# Patient Record
Sex: MALE | Race: WHITE | NOT HISPANIC OR LATINO | Employment: OTHER | URBAN - METROPOLITAN AREA
[De-identification: names, ages, dates, MRNs, and addresses within clinical notes are randomized per-mention and may not be internally consistent; named-entity substitution may affect disease eponyms.]

---

## 2017-01-06 ENCOUNTER — TELEPHONE (OUTPATIENT)
Dept: FAMILY MEDICINE | Facility: CLINIC | Age: 62
End: 2017-01-06

## 2017-01-06 NOTE — TELEPHONE ENCOUNTER
Reason for Call:  Same Day Appointment, Requested Provider:  Kris Weems M.D.    PCP: Kris Weems    Reason for visit: Patient has been having some right sided back pain around his kidney area. He is aware that Dr. Weems is not in the office today. He would like to get worked in with him next week.     Duration of symptoms: 1 week    Have you been treated for this in the past? No    Additional comments:     Can we leave a detailed message on this number? YES    Phone number patient can be reached at: Home number on file [phone] (home)    Best Time: any    Call taken on 1/6/2017 at 9:55 AM by Michela Swenson

## 2017-01-06 NOTE — TELEPHONE ENCOUNTER
Damon Manley is a 61 year old male who calls with right sided back pain.    NURSING ASSESSMENT:  Description:  He is reporting PCP had discussed possible kidney stone with patient a long time ago with this same sort of back pain.  He reports he does not have the pain all the time, but when it does come, it is sharp and would rate it at 6/10.  He is reporting his urine is a dark yellow/orange color.  He says he does not drink much water at all.  Mostly Diet Coke.  He is informed he needs to be drinking LOTS of water to see if this improves his symptoms.  He states he had nausea yesterday, but does not today.  He is denying the following:  Weakness in legs, numbness or tingling in legs or groin area, inability to urinate, cool moist skin, blood in urine, fever, vomiting, difficulty walking, pain worsening when laying down.  Patient is informed this will be routed to PCP for possible work-in next week.  Patient understands he will go to the ED if he has any of the discussed symptoms over the weekend.  Onset/duration:  1 week  Precip. factors:  none  Associated symptoms:  See above  Improves/worsens symptoms:  same  Pain scale (0-10)   6/10  LMP/preg/breast feeding:  NA  Last exam/Treatment:  8/25/16  Allergies:   Allergies   Allergen Reactions     No Known Drug Allergies        NURSING PLAN: Routed to provider Yes    RECOMMENDED DISPOSITION:  See in 72 hours   Will comply with recommendation: Yes  If further questions/concerns or if symptoms do not improve, worsen or new symptoms develop, call your PCP or [facility] as soon as possible.      Guideline used:  Back pain  Telephone Triage Protocols for Nurses, Fifth Edition, Kathy Yeung RN

## 2017-01-06 NOTE — TELEPHONE ENCOUNTER
Reason for call:  Patient reporting a symptom    Symptom or request: Patient has been experiencing right sided back pain around his kidney area. He has requested an appt with Dr. Weems for next week. Wondering if he needs to come in sooner.     Duration (how long have symptoms been present): 1 week.     Have you been treated for this before? No    Additional comments:     Phone Number patient can be reached at:  Home number on file [phone] (home)    Best Time:  any    Can we leave a detailed message on this number:  YES    Call taken on 1/6/2017 at 9:57 AM by Michela Swenson

## 2017-01-09 NOTE — TELEPHONE ENCOUNTER
I have attempted to contact this patient by phone with the following results: left message to return my call on answering machine.  Please have pt come at 4:10 today if he wishes.   Lori Pond, [facility]

## 2017-01-10 ENCOUNTER — OFFICE VISIT (OUTPATIENT)
Dept: FAMILY MEDICINE | Facility: CLINIC | Age: 62
End: 2017-01-10
Payer: COMMERCIAL

## 2017-01-10 VITALS
RESPIRATION RATE: 18 BRPM | BODY MASS INDEX: 41.52 KG/M2 | DIASTOLIC BLOOD PRESSURE: 86 MMHG | TEMPERATURE: 97.5 F | WEIGHT: 290 LBS | HEART RATE: 78 BPM | HEIGHT: 70 IN | SYSTOLIC BLOOD PRESSURE: 132 MMHG | OXYGEN SATURATION: 97 %

## 2017-01-10 DIAGNOSIS — Z23 NEED FOR PROPHYLACTIC VACCINATION AND INOCULATION AGAINST INFLUENZA: Primary | ICD-10-CM

## 2017-01-10 DIAGNOSIS — Z12.11 SPECIAL SCREENING FOR MALIGNANT NEOPLASMS, COLON: ICD-10-CM

## 2017-01-10 DIAGNOSIS — Z12.11 ENCOUNTER FOR SCREENING COLONOSCOPY: Primary | ICD-10-CM

## 2017-01-10 DIAGNOSIS — M62.830 BACK MUSCLE SPASM: ICD-10-CM

## 2017-01-10 PROCEDURE — 90686 IIV4 VACC NO PRSV 0.5 ML IM: CPT | Performed by: FAMILY MEDICINE

## 2017-01-10 PROCEDURE — 90471 IMMUNIZATION ADMIN: CPT | Performed by: FAMILY MEDICINE

## 2017-01-10 PROCEDURE — 99213 OFFICE O/P EST LOW 20 MIN: CPT | Mod: 25 | Performed by: FAMILY MEDICINE

## 2017-01-10 RX ORDER — CYCLOBENZAPRINE HCL 10 MG
10 TABLET ORAL 3 TIMES DAILY PRN
Qty: 60 TABLET | Refills: 1 | Status: SHIPPED | OUTPATIENT
Start: 2017-01-10 | End: 2017-09-28

## 2017-01-10 ASSESSMENT — PAIN SCALES - GENERAL: PAINLEVEL: MODERATE PAIN (4)

## 2017-01-10 NOTE — NURSING NOTE
"Chief Complaint   Patient presents with     Back Pain     x2w Pain can get real severe. He was working on a cement floor and it started bothering him after that. Low rt side, radiating to the center of back at times       Initial /86 mmHg  Pulse 78  Temp(Src) 97.5  F (36.4  C) (Tympanic)  Resp 18  Ht 5' 10.1\" (1.781 m)  Wt 290 lb (131.543 kg)  BMI 41.47 kg/m2  SpO2 97% Estimated body mass index is 41.47 kg/(m^2) as calculated from the following:    Height as of this encounter: 5' 10.1\" (1.781 m).    Weight as of this encounter: 290 lb (131.543 kg).  BP completed using cuff size: large  Health Maintenance Due   Topic Date Due     INFLUENZA VACCINE (SYSTEM ASSIGNED)  09/01/2016     Lori Pond, [facility]      "

## 2017-01-10 NOTE — PROGRESS NOTES
SUBJECTIVE:  Damon Manley is here today with complaints of right-sided back pain.  He was working on an [employer] last week, was on the cold floor, does not remember lifting or twisting, but developed right-sided back pain just underneath the right kidney.  States it stayed in this area.  It did not radiate into his buttocks or down his leg.  He actually got better for a few days and then last evening started to bother him again.  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  He was able make it through his shift.  He comes in this morning for evaluation.  No significant radicular pain at this time either.  Just states that it is just painful on the right side.      OBJECTIVE:  Patient has tenderness to palpation in the paraspinal musculature in the lower thoracic and upper lumbar region on the right.  The patient has forward flexion without difficulty.  Extension causes significant discomfort in this region.  He is able trunk twist to the right and left without pain.  Gait is normal muscle strength in the lower extremities is normal.      ASSESSMENT:   1.  Back muscle spasm.   2.  Need for a flu vaccination.   3.  Need for screening colonoscopy.      PLAN:  The patient did tell me that he is overdue for his colonoscopy.  He did have a history of adenomatous polyps back in 2007, but failed to do his followups.  States he got numerous letters but was always too busy to get the test done.  Now he has noticed some blood in his stool, it is only when wiping, it is bright red blood.  It is usually after a hard stool and his wife about wanted to make sure he got in and got it taken care of.  We will get that scheduled for him today also.  I will start Flexeril 10 mg t.i.d. p.r.n. for his back muscle spasm.  We will get him in to see Shyam Fields for further evaluation and treatment plan as per Dr. Fields.  I do not feel he has any thoracic or lumbar disk disease at this point in time, if he has  persistent symptoms imaging may be indicated.         DANIEL RODRIGUEZ MD             D: 01/10/2017 08:23   T: 01/10/2017 09:02   MT: ANNE[facility]MARIE#[id number]      Name:     AIXA BECKETT   MRN:      [id number]        Account:      [id number]   :      1955           Visit Date:   01/10/2017      Document: [id number]

## 2017-01-10 NOTE — PROGRESS NOTES
Injectable Influenza Immunization Documentation    1.  Is the person to be vaccinated sick today?  No    2. Does the person to be vaccinated have an allergy to eggs or to a component of the vaccine?  No    3. Has the person to be vaccinated today ever had a serious reaction to influenza vaccine in the past?  No    4. Has the person to be vaccinated ever had Guillain-Fraser syndrome?  No     Form completed by Lori Pond, [facility]

## 2017-01-10 NOTE — NURSING NOTE
Prior to injection verified patient identity using patient's name and date of birth.   Patient instructed to remain in clinic for 20 minutes afterwards and to report any adverse reaction to me immediately.  Lori Pond, [facility]

## 2017-01-10 NOTE — MR AVS SNAPSHOT
After Visit Summary   1/10/2017    Damon Manley    MRN: [id number]           Patient Information     Date Of Birth          1955        Visit Information        Provider Department      1/10/2017 7:40 AM Kris Weems MD [facility]        Today's Diagnoses     Need for prophylactic vaccination and inoculation against influenza    -  1     Back muscle spasm         Special screening for malignant neoplasms, colon            Follow-ups after your visit        Additional Services     GASTROENTEROLOGY ADULT REFERRAL +/- PROCEDURE       Your provider has referred you to Gastroenterology Services.    English    Procedure/Referral: PROCEDURE ONLY - COLONOSCOPY - Reason for procedure: Screening  FMG: [facility] [phone]   [email or web address]  Colonoscopy Questions - [phone] (home)     PROCEDURE: Colonoscopy  Patient able to sign consent: Yes     -- No Known Drug Allergies     COMPLETE FOR ALL PATIENTS:  Has the patient ever had problems with sedation when they had a previous Colonoscopy or Endoscopy? No    Does the patient have (or previously had) an Artificial Heart Valve, Indwelling Vascular Device, such as a Reeder Catheter, Dialysis Shunt, or Fistula? No  (If yes, order Prophylactic Antibiotic - recommendations in NL GI ORDERS AND PATIENT INSTRUCTIONS SmartSet)    Does the patient have (or previously had) Bacterial Endocarditis? No   (If yes, order Prophylactic Antibiotic - recommendations in NL GI ORDERS AND PATIENT INSTRUCTIONS SmartSet)    Antibiotics needed? NO  (If yes, order Prophylactic Antibiotic - recommendations in NL GI ORDERS AND PATIENT INSTRUCTIONS SmartSet)    Does the patient have CHF or Renal Insufficiency? NO  (If yes, patient needs to use the GoLytely/ NuLytely/ CoLyte prep)    Does the patient have an Internal Defibrillator? NO  (If yes, contact [facility] for scheduling at [phone])    PATIENT  INFORMATION REQUIRED PRE-TESTING/PROCEDURE FOR ALL PATIENTS  Is the patient on an Iron Supplement? No  Is the patient a Diabetic? No  Is the patient on Blood Thinners? No  Has the patient been instructed to hold all morning medications until after the procedure? No  Other Instructions:     If the patient has a Defibrillator and is scheduled with [facility], please fax the referral to [phone].    Date of Procedure: Unknown  Primary Provider: Kris Weems   Ordering Provider: Kris Weems    Please be aware that coverage of these services is subject to the terms and limitations of your health insurance plan.  Call member services at your health plan with any benefit or coverage questions.  Any procedures must be performed at a [facility] facility OR coordinated by your clinic's referral office.    Please bring the following with you to your appointment:    (1) Any X-Rays, CTs or MRIs which have been performed.  Contact the facility where they were done to arrange for  prior to your scheduled appointment.    (2) List of current medications   (3) This referral request   (4) Any documents/labs given to you for this referral                  Who to contact     If you have questions or need follow up information about today's clinic visit or your schedule please contact [facility] directly at [phone].  Normal or non-critical lab and imaging results will be communicated to you by MyChart, letter or phone within 4 business days after the clinic has received the results. If you do not hear from us within 7 days, please contact the clinic through MyChart or phone. If you have a critical or abnormal lab result, we will notify you by phone as soon as possible.  Submit refill requests through [employer] or call your pharmacy and they will forward the refill request to us. Please allow 3 business days for your refill to be completed.          Additional Information About Your Visit        [facility]  "Information     Mediamind lets you send messages to your doctor, view your test results, renew your prescriptions, schedule appointments and more. To sign up, go to [email or web address] . Click on \"Log in\" on the left side of the screen, which will take you to the Welcome page. Then click on \"Sign up Now\" on the right side of the page.     You will be asked to enter the access code listed below, as well as some personal information. Please follow the directions to create your username and password.     Your access code is: [email or web address]  Expires: 4/10/2017  3:04 PM     Your access code will  in 90 days. If you need help or a new code, please call your [place] clinic or [phone].        Care EveryWhere ID     This is your Care EveryWhere ID. This could be used by other organizations to access your [place] medical records  [id number]        Your Vitals Were     Pulse Temperature Respirations Height BMI (Body Mass Index) Pulse Oximetry    78 97.5  F (36.4  C) (Tympanic) 18 5' 10.1\" (1.781 m) 41.47 kg/m2 97%       Blood Pressure from Last 3 Encounters:   01/10/17 132/86   16 120/78   16 130/86    Weight from Last 3 Encounters:   01/10/17 290 lb (131.543 kg)   16 291 lb 4 oz (132.11 kg)   16 288 lb (130.636 kg)              We Performed the Following     FLU VAC, SPLIT VIRUS IM > 3 YO (QUADRIVALENT) [06260]     GASTROENTEROLOGY ADULT REFERRAL +/- PROCEDURE     Vaccine Administration, Initial [56490]          Today's Medication Changes          These changes are accurate as of: 1/10/17  3:04 PM.  If you have any questions, ask your nurse or doctor.               Start taking these medicines.        Dose/Directions    cyclobenzaprine 10 MG tablet   Commonly known as:  FLEXERIL   Used for:  Back muscle spasm   Started by:  Kris Weems MD        Dose:  10 mg   Take 1 tablet (10 mg) by mouth 3 times daily as needed for muscle spasms   Quantity:  60 tablet   Refills:  1          "   Where to get your medicines      These medications were sent to [facility] - [place], [place] - [address]  [address], [place] [address]     Phone:  [phone]    - cyclobenzaprine 10 MG tablet             Primary Care Provider Office Phone # Fax #    Kris Weems [phone]       [facility] [address] [facility] [address]        Thank you!     Thank you for choosing [facility]  for your care. Our goal is always to provide you with excellent care. Hearing back from our patients is one way we can continue to improve our services. Please take a few minutes to complete the written survey that you may receive in the mail after your visit with us. Thank you!             Your Updated Medication List - Protect others around you: Learn how to safely use, store and throw away your medicines at www.disposemymeds.org.          This list is accurate as of: 1/10/17  3:04 PM.  Always use your most recent med list.                   Brand Name Dispense Instructions for use    ampicillin 500 MG capsule    PRINCIPEN    4 capsule    4 capsules 1 hour prior to dental work       aspirin 81 MG tablet      Take 1 tablet (81 mg) by mouth daily       cyclobenzaprine 10 MG tablet    FLEXERIL    60 tablet    Take 1 tablet (10 mg) by mouth 3 times daily as needed for muscle spasms       ibuprofen 200 MG tablet   Generic drug:  ibuprofen      Take 400 mg by mouth every 4 hours as needed for mild pain       losartan 100 MG tablet    COZAAR    90 tablet    Take 1 tablet (100 mg) by mouth daily       metoprolol 100 MG 24 hr tablet    TOPROL-XL    30 tablet    Take 1 tablet (100mg) by mouth daily

## 2017-01-13 ENCOUNTER — SURGERY (OUTPATIENT)
Age: 62
End: 2017-01-13

## 2017-01-13 ENCOUNTER — HOSPITAL ENCOUNTER (OUTPATIENT)
Facility: CLINIC | Age: 62
Discharge: HOME | End: 2017-01-13
Attending: INTERNAL MEDICINE | Admitting: INTERNAL MEDICINE
Payer: COMMERCIAL

## 2017-01-13 VITALS
OXYGEN SATURATION: 94 % | SYSTOLIC BLOOD PRESSURE: 129 MMHG | DIASTOLIC BLOOD PRESSURE: 96 MMHG | RESPIRATION RATE: 18 BRPM | BODY MASS INDEX: 41.47 KG/M2 | WEIGHT: 290 LBS | TEMPERATURE: 97.9 F

## 2017-01-13 LAB — COLONOSCOPY: NORMAL

## 2017-01-13 PROCEDURE — 25000125 ZZHC RX 250: Performed by: INTERNAL MEDICINE

## 2017-01-13 PROCEDURE — 45385 COLONOSCOPY W/LESION REMOVAL: CPT | Performed by: INTERNAL MEDICINE

## 2017-01-13 PROCEDURE — 88305 TISSUE EXAM BY PATHOLOGIST: CPT | Performed by: INTERNAL MEDICINE

## 2017-01-13 PROCEDURE — 40000296 ZZH STATISTIC ENDO RECOVERY CLASS 1:2 FIRST HOUR: Performed by: INTERNAL MEDICINE

## 2017-01-13 PROCEDURE — 45380 COLONOSCOPY AND BIOPSY: CPT | Performed by: INTERNAL MEDICINE

## 2017-01-13 PROCEDURE — 88305 TISSUE EXAM BY PATHOLOGIST: CPT | Mod: 26 | Performed by: INTERNAL MEDICINE

## 2017-01-13 RX ORDER — ONDANSETRON 2 MG/ML
4 INJECTION INTRAMUSCULAR; INTRAVENOUS
Status: DISCONTINUED | OUTPATIENT
Start: 2017-01-13 | End: 2017-01-13 | Stop reason: HOSPADM

## 2017-01-13 RX ORDER — FENTANYL CITRATE 50 UG/ML
INJECTION, SOLUTION INTRAMUSCULAR; INTRAVENOUS PRN
Status: DISCONTINUED | OUTPATIENT
Start: 2017-01-13 | End: 2017-01-13 | Stop reason: HOSPADM

## 2017-01-13 RX ORDER — LIDOCAINE 40 MG/G
CREAM TOPICAL
Status: DISCONTINUED | OUTPATIENT
Start: 2017-01-13 | End: 2017-01-13 | Stop reason: HOSPADM

## 2017-01-13 RX ADMIN — MIDAZOLAM HYDROCHLORIDE 1 MG: 1 INJECTION, SOLUTION INTRAMUSCULAR; INTRAVENOUS at 11:51

## 2017-01-13 RX ADMIN — MIDAZOLAM HYDROCHLORIDE 1 MG: 1 INJECTION, SOLUTION INTRAMUSCULAR; INTRAVENOUS at 11:21

## 2017-01-13 RX ADMIN — FENTANYL CITRATE 50 MCG: 50 INJECTION, SOLUTION INTRAMUSCULAR; INTRAVENOUS at 11:21

## 2017-01-13 RX ADMIN — MIDAZOLAM HYDROCHLORIDE 1 MG: 1 INJECTION, SOLUTION INTRAMUSCULAR; INTRAVENOUS at 11:23

## 2017-01-13 RX ADMIN — MIDAZOLAM HYDROCHLORIDE 1 MG: 1 INJECTION, SOLUTION INTRAMUSCULAR; INTRAVENOUS at 11:35

## 2017-01-13 RX ADMIN — MIDAZOLAM HYDROCHLORIDE 1 MG: 1 INJECTION, SOLUTION INTRAMUSCULAR; INTRAVENOUS at 11:22

## 2017-01-13 NOTE — CONSULTS
[facility] GI Pre-Procedure Physical Assessment    Damon Manley MRN# [id number]   Age: 61 year old YOB: 1955      Date of Surgery: 1/13/2017  Location [facility]      Date of Exam 1/13/2017 Facility (Same day)       Home clinic: [facility]  Primary care provider: Kris Weems         Active problem list:   Patient Active Problem List   Diagnosis     GERD (gastroesophageal reflux disease)     CARDIOVASCULAR SCREENING; LDL GOAL LESS THAN 130     Abnormal glucose     Advanced directives, counseling/discussion     Essential hypertension with goal blood pressure less than 140/90            Medications (include herbals and vitamins):   Any Plavix use in the last 7 days?  No     Current Facility-Administered Medications   Medication     lidocaine 1 % 1 mL     lidocaine (LMX4) kit     sodium chloride (PF) 0.9% PF flush 3 mL     sodium chloride (PF) 0.9% PF flush 3 mL     sodium chloride (PF) 0.9% PF flush 3 mL     ondansetron (ZOFRAN) injection 4 mg             Allergies:      Allergies   Allergen Reactions     No Known Drug Allergies      Allergy to Latex?  No  Allergy to tape?    No          Social History:     Social History   Substance Use Topics     Smoking status: Former Smoker -- 15 years     Quit date: 04/01/1988     Smokeless tobacco: Never Used     Alcohol Use: 0.0 oz/week     0 Standard drinks or equivalent per week      Comment: social - glass of wine socially            Physical Exam:   All vitals have been reviewed  Blood pressure 127/89, temperature 97.9  F (36.6  C), temperature source Oral, resp. rate 16, weight 290 lb (131.543 kg), SpO2 99 %.  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 - no symptoms, no anticoag           Lab / Radiology Results:   All laboratory data reviewed          Assessment:   Appropriately NPO  Chief complaint or anatomic assessment of involved area: colonoscopy history of polyps         Plan:   Moderate (conscious) sedation     Patient's active problems diagnostically and therapeutically optimized for the planned procedure  Risks, benefits, alternatives to sedation and blood explained and consent obtained  Risks, benefits, alternatives to procedure explained and consent obtained  P2 (patient with mild systemic disease)  Orders and progress notes are in the chart  Discharge from Phase 1 and / or Phase 2 recovery when patient meets criteria    I have reviewed the history and physical, lab finding(s), diagnostic data, medicaitons, and the plan for sedation.  I have determined this patient to be an appropriate candidate for the planned sedation / procedure and have reassessed the patient immediately prior to sedation / procedure.    I have personally and medically directed the administration of medications used.    Dejan Mckee MD

## 2017-01-17 LAB — COPATH REPORT: NORMAL

## 2017-06-14 ENCOUNTER — TELEPHONE (OUTPATIENT)
Dept: FAMILY MEDICINE | Facility: CLINIC | Age: 62
End: 2017-06-14

## 2017-06-14 DIAGNOSIS — Z79.2 PROPHYLACTIC ANTIBIOTIC: Primary | ICD-10-CM

## 2017-06-14 NOTE — TELEPHONE ENCOUNTER
Reason for Call:  Other prescription    Detailed comments: Pt requesting Rx for Antibiotic for his upcoming dental appt on 06/16 @8:30am - [facility] [place]    Phone Number Patient can be reached at: Home number on file [phone] (home)  Try cell number also    Best Time: any    Can we leave a detailed message on this number? YES    Call taken on 6/14/2017 at 4:56 PM by Sofy Friend

## 2017-06-14 NOTE — TELEPHONE ENCOUNTER
Called Damon and told him that RF was out until morning and I will get back to him.   He is fine with this.  Lori Pond, [facility]

## 2017-06-15 RX ORDER — AMPICILLIN TRIHYDRATE 500 MG
500 CAPSULE ORAL 4 TIMES DAILY
Qty: 4 CAPSULE | Refills: 0 | Status: SHIPPED | OUTPATIENT
Start: 2017-06-15 | End: 2017-09-28

## 2017-06-15 NOTE — TELEPHONE ENCOUNTER
Patient calling back, gave message below, patient states that he already got the message, but stated that someone from Dr. Weems's office called him today, please call patient back if there is a new message

## 2017-06-15 NOTE — TELEPHONE ENCOUNTER
I have attempted to contact this patient by phone with the following results: left message to return my call on answering machine.    Please inform him that Weems filled antibiotic for him and it will be sent to [facility] in [place].  Lori Pond, [facility]

## 2017-08-07 DIAGNOSIS — I10 HYPERTENSION GOAL BP (BLOOD PRESSURE) < 140/90: ICD-10-CM

## 2017-08-07 DIAGNOSIS — I10 ESSENTIAL HYPERTENSION WITH GOAL BLOOD PRESSURE LESS THAN 140/90: ICD-10-CM

## 2017-08-07 NOTE — TELEPHONE ENCOUNTER
metoprolol      Last Written Prescription Date: 07/12/16  Last Fill Quantity: 30, # refills: 11    Last Office Visit with [facility], [facility] or [facility] prescribing provider:  01/10/17   Future Office Visit:        BP Readings from Last 3 Encounters:   01/13/17 (!) 129/96   01/10/17 132/86   08/25/16 120/78   \  losartan      Last Written Prescription Date: 05/03/16  Last Fill Quantity: 90, # refills: 3  Last Office Visit with [facility], [facility] or [facility] prescribing provider: 01/10/17       Potassium   Date Value Ref Range Status   05/03/2016 4.2 3.4 - 5.3 mmol/L Final     Creatinine   Date Value Ref Range Status   05/03/2016 0.92 0.66 - 1.25 mg/dL Final     BP Readings from Last 3 Encounters:   01/13/17 (!) 129/96   01/10/17 132/86   08/25/16 120/78

## 2017-08-07 NOTE — TELEPHONE ENCOUNTER
[place] metoprol    [facility] - pharmacy at [facility]    Reason for Call:  Other prescription    Detailed comments: patient states his wife works at [facility] and she is there and states they do not have his prescriptions.  I did explain that they need to request these 3 business days prior. He states that they did.  I am not showing any requests.  He is wanting to get his Losartan and Metoprolol filled as soon as possible.      Phone Number Patient can be reached at: Cell number on file:    Telephone Information:   Mobile [phone]       Best Time: any    Can we leave a detailed message on this number? YES    Call taken on 8/7/2017 at 3:24 PM by Car Enamorado

## 2017-08-09 RX ORDER — METOPROLOL SUCCINATE 100 MG/1
TABLET, EXTENDED RELEASE ORAL
Qty: 30 TABLET | Refills: 2 | Status: SHIPPED | OUTPATIENT
Start: 2017-08-09 | End: 2018-05-08

## 2017-08-09 RX ORDER — LOSARTAN POTASSIUM 100 MG/1
100 TABLET ORAL DAILY
Qty: 90 TABLET | Refills: 3 | Status: SHIPPED | OUTPATIENT
Start: 2017-08-09 | End: 2018-08-30

## 2017-08-09 NOTE — TELEPHONE ENCOUNTER
Losartan Routing refill request to provider for review/approval because:  Labs not current:  Potassium and Creatinine.  A break in medication.    Metoprolol Prescription approved per G Refill Protocol.    Elizabeth Robles RN

## 2017-09-28 ENCOUNTER — OFFICE VISIT (OUTPATIENT)
Dept: FAMILY MEDICINE | Facility: CLINIC | Age: 62
End: 2017-09-28
Payer: COMMERCIAL

## 2017-09-28 ENCOUNTER — HOSPITAL ENCOUNTER (OUTPATIENT)
Dept: CARDIOLOGY | Facility: CLINIC | Age: 62
Discharge: HOME | End: 2017-09-28
Attending: FAMILY MEDICINE | Admitting: FAMILY MEDICINE
Payer: COMMERCIAL

## 2017-09-28 VITALS
HEART RATE: 74 BPM | RESPIRATION RATE: 16 BRPM | HEIGHT: 70 IN | BODY MASS INDEX: 40.66 KG/M2 | SYSTOLIC BLOOD PRESSURE: 122 MMHG | WEIGHT: 284 LBS | DIASTOLIC BLOOD PRESSURE: 78 MMHG | TEMPERATURE: 97.8 F

## 2017-09-28 DIAGNOSIS — Z95.2 AORTIC VALVE REPLACED: ICD-10-CM

## 2017-09-28 DIAGNOSIS — Z95.2 PULMONARY VALVE REPLACED: ICD-10-CM

## 2017-09-28 DIAGNOSIS — R06.02 SOB (SHORTNESS OF BREATH): ICD-10-CM

## 2017-09-28 DIAGNOSIS — Z00.00 ROUTINE GENERAL MEDICAL EXAMINATION AT A HEALTH CARE FACILITY: ICD-10-CM

## 2017-09-28 DIAGNOSIS — Z13.6 CARDIOVASCULAR SCREENING; LDL GOAL LESS THAN 130: Primary | ICD-10-CM

## 2017-09-28 DIAGNOSIS — I10 ESSENTIAL HYPERTENSION WITH GOAL BLOOD PRESSURE LESS THAN 140/90: ICD-10-CM

## 2017-09-28 DIAGNOSIS — R79.89 ELEVATED LFTS: ICD-10-CM

## 2017-09-28 DIAGNOSIS — R06.09 DOE (DYSPNEA ON EXERTION): ICD-10-CM

## 2017-09-28 LAB
ALBUMIN SERPL-MCNC: 3.8 G/DL (ref 3.4–5)
ALP SERPL-CCNC: 80 U/L (ref 40–150)
ALT SERPL W P-5'-P-CCNC: 53 U/L (ref 0–70)
AST SERPL W P-5'-P-CCNC: 34 U/L (ref 0–45)
BILIRUB DIRECT SERPL-MCNC: 0.1 MG/DL (ref 0–0.2)
BILIRUB SERPL-MCNC: 0.6 MG/DL (ref 0.2–1.3)
CHOLEST SERPL-MCNC: 192 MG/DL
GLUCOSE SERPL-MCNC: 141 MG/DL (ref 70–99)
HDLC SERPL-MCNC: 45 MG/DL
LDLC SERPL CALC-MCNC: 116 MG/DL
NONHDLC SERPL-MCNC: 147 MG/DL
PROT SERPL-MCNC: 8 G/DL (ref 6.8–8.8)
TRIGL SERPL-MCNC: 157 MG/DL

## 2017-09-28 PROCEDURE — 40000264 ECHO COMPLETE WITH OPTISON

## 2017-09-28 PROCEDURE — 93306 TTE W/DOPPLER COMPLETE: CPT | Mod: 26 | Performed by: INTERNAL MEDICINE

## 2017-09-28 PROCEDURE — 36415 COLL VENOUS BLD VENIPUNCTURE: CPT | Performed by: FAMILY MEDICINE

## 2017-09-28 PROCEDURE — 99213 OFFICE O/P EST LOW 20 MIN: CPT | Mod: 25 | Performed by: FAMILY MEDICINE

## 2017-09-28 PROCEDURE — 99396 PREV VISIT EST AGE 40-64: CPT | Performed by: FAMILY MEDICINE

## 2017-09-28 PROCEDURE — 80061 LIPID PANEL: CPT | Performed by: FAMILY MEDICINE

## 2017-09-28 PROCEDURE — 80076 HEPATIC FUNCTION PANEL: CPT | Performed by: FAMILY MEDICINE

## 2017-09-28 PROCEDURE — 25500064 ZZH RX 255 OP 636: Performed by: INTERNAL MEDICINE

## 2017-09-28 PROCEDURE — 82947 ASSAY GLUCOSE BLOOD QUANT: CPT | Performed by: FAMILY MEDICINE

## 2017-09-28 RX ADMIN — HUMAN ALBUMIN MICROSPHERES AND PERFLUTREN 4 ML: 10; .22 INJECTION, SOLUTION INTRAVENOUS at 10:50

## 2017-09-28 ASSESSMENT — PAIN SCALES - GENERAL: PAINLEVEL: NO PAIN (0)

## 2017-09-28 NOTE — PROGRESS NOTES
SUBJECTIVE:   CC: Damon Manley is an 62 year old male who presents for preventative health visit.     Physical   Annual:     Getting at least 3 servings of Calcium per day::  NO    Bi-annual eye exam::  Yes    Dental care twice a year::  Yes    Sleep apnea or symptoms of sleep apnea::  None    Diet::  Regular (no restrictions)    Frequency of exercise::  1 day/week    Duration of exercise::  15-30 minutes    Taking medications regularly::  Yes    Medication side effects::  None    Additional concerns today::  YES            Patient also wants to discuss Right foot pain    Today's PHQ-2 Score: PHQ-2 ( 1999 Pf[employer]) 9/28/2017   Q1: Little interest or pleasure in doing things 0   Q2: Feeling down, depressed or hopeless 0   PHQ-2 Score 0   Q1: Little interest or pleasure in doing things Not at all   Q2: Feeling down, depressed or hopeless Not at all   PHQ-2 Score 0       Abuse: Current or Past(Physical, Sexual or Emotional)- No  Do you feel safe in your environment - Yes    Social History   Substance Use Topics     Smoking status: Former Smoker     Years: 15.00     Quit date: 4/1/1988     Smokeless tobacco: Never Used     Alcohol use 0.0 oz/week     0 Standard drinks or equivalent per week      Comment: social - glass of wine socially     The patient does not drink >3 drinks per day nor >7 drinks per week.    Last PSA:   PSA   Date Value Ref Range Status   08/26/2015 0.48 0 - 4 ug/L Final       Reviewed orders with patient. Reviewed health maintenance and updated orders accordingly - Yes  BP Readings from Last 3 Encounters:   09/28/17 122/78   01/13/17 (!) 129/96   01/10/17 132/86    Wt Readings from Last 3 Encounters:   09/28/17 284 lb (128.8 kg)   01/13/17 290 lb (131.5 kg)   01/10/17 290 lb (131.5 kg)                  Current Outpatient Prescriptions   Medication Sig Dispense Refill     metoprolol (TOPROL-XL) 100 MG 24 hr tablet Take 1 tablet (100mg) by mouth daily 30 tablet 2     losartan (COZAAR) 100 MG tablet  "Take 1 tablet (100 mg) by mouth daily 90 tablet 3     aspirin 81 MG tablet Take 1 tablet (81 mg) by mouth daily       ampicillin (PRINCIPEN) 500 MG capsule 4 capsules 1 hour prior to dental work 4 capsule 2         Reviewed and updated as needed this visit by clinical staffTobacco  Allergies  Meds         Reviewed and updated as needed this visit by Provider              ROS:  C: NEGATIVE for fever, chills, change in weight  I: NEGATIVE for worrisome rashes, moles or lesions  E: NEGATIVE for vision changes or irritation  ENT: NEGATIVE for ear, mouth and throat problems  RESP:NEGATIVE for significant cough or SOB, dyspnea on exertion and SOB/dyspnea started three months ago and has gotten worse, has not seen his cardiologist in some time, lost to follow up   CV: NEGATIVE for chest pain, palpitations or peripheral edema  GI: NEGATIVE for nausea, abdominal pain, heartburn, or change in bowel habits   male: negative for dysuria, hematuria, decreased urinary stream, erectile dysfunction, urethral discharge  M: NEGATIVE for significant arthralgias or myalgia  N: NEGATIVE for weakness, dizziness or paresthesias  P: NEGATIVE for changes in mood or affect    OBJECTIVE:   /78  Pulse 74  Temp 97.8  F (36.6  C) (Tympanic)  Resp 16  Ht 5' 10.1\" (1.781 m)  Wt 284 lb (128.8 kg)  BMI 40.63 kg/m2    EXAM:  GENERAL: healthy, alert and no distress  NECK: no adenopathy, no asymmetry, masses, or scars and thyroid normal to palpation  RESP: lungs clear to auscultation - no rales, rhonchi or wheezes  CV: regular rate and rhythm, normal S1 S2, no S3 or S4, no murmur, click or rub, no peripheral edema and peripheral pulses strong  ABDOMEN: soft, nontender, no hepatosplenomegaly, no masses and bowel sounds normal  MS: no gross musculoskeletal defects noted, no edema      Results for orders placed or performed in visit on 09/28/17 (from the past 24 hour(s))   Glucose   Result Value Ref Range    Glucose 141 (H) 70 - 99 mg/dL " "  Hepatic panel (Albumin, ALT, AST, Bili, Alk Phos, TP)   Result Value Ref Range    Bilirubin Direct 0.1 0.0 - 0.2 mg/dL    Bilirubin Total 0.6 0.2 - 1.3 mg/dL    Albumin 3.8 3.4 - 5.0 g/dL    Protein Total 8.0 6.8 - 8.8 g/dL    Alkaline Phosphatase 80 40 - 150 U/L    ALT 53 0 - 70 U/L    AST 34 0 - 45 U/L   Lipid panel reflex to direct LDL   Result Value Ref Range    Cholesterol 192 <200 mg/dL    Triglycerides 157 (H) <150 mg/dL    HDL Cholesterol 45 >39 mg/dL    LDL Cholesterol Calculated 116 (H) <100 mg/dL    Non HDL Cholesterol 147 (H) <130 mg/dL       ASSESSMENT/PLAN:   1. CARDIOVASCULAR SCREENING; LDL GOAL LESS THAN 130      2. ARIAS (dyspnea on exertion)    - Echocardiogram Complete; Future  - CARDIOLOGY EVAL ADULT REFERRAL  Concerned about valve issues vs CHF form valve concerns     3. SOB (shortness of breath)    - Echocardiogram Complete; Future  - CARDIOLOGY EVAL ADULT REFERRAL    4. Pulmonary valve replaced    - Echocardiogram Complete; Future  - CARDIOLOGY EVAL ADULT REFERRAL    5. Aortic valve replaced    - Echocardiogram Complete; Future  - CARDIOLOGY EVAL ADULT REFERRAL    6. Essential hypertension with goal blood pressure less than 140/90  Stable     7. Elevated LFTs      8. Routine general medical examination at a health care facility    - Glucose  Elevated , weight loss , exercise and recheck in 3 months   COUNSELING:   Reviewed preventive health counseling, as reflected in patient instructions       Regular exercise       Healthy diet/nutrition         reports that he quit smoking about 29 years ago. He quit after 15.00 years of use. He has never used smokeless tobacco.      Estimated body mass index is 40.63 kg/(m^2) as calculated from the following:    Height as of this encounter: 5' 10.1\" (1.781 m).    Weight as of this encounter: 284 lb (128.8 kg).   Weight management plan: Discussed healthy diet and exercise guidelines and patient will follow up in 12 months in clinic to " re-evaluate.    Counseling Resources:  ATP IV Guidelines  Pooled Cohorts Equation Calculator  FRAX Risk Assessment  ICSI Preventive Guidelines  Dietary Guidelines for Americans, 2010  USDA's MyPlate  ASA Prophylaxis  Lung CA Screening    Kris Weems MD  [facility]

## 2017-09-28 NOTE — PATIENT INSTRUCTIONS
Preventive Health Recommendations  Male Ages 50   64    Yearly exam:             See your health care provider every year in order to  o   Review health changes.   o   Discuss preventive care.    o   Review your medicines if your doctor has prescribed any.     Have a cholesterol test every 5 years, or more frequently if you are at risk for high cholesterol/heart disease.     Have a diabetes test (fasting glucose) every three years. If you are at risk for diabetes, you should have this test more often.     Have a colonoscopy at age 50, or have a yearly FIT test (stool test). These exams will check for colon cancer.      Talk with your health care provider about whether or not a prostate cancer screening test (PSA) is right for you.    You should be tested each year for STDs (sexually transmitted diseases), if you re at risk.     Shots: Get a flu shot each year. Get a tetanus shot every 10 years.     Nutrition:    Eat at least 5 servings of fruits and vegetables daily.     Eat whole-grain bread, whole-wheat pasta and brown rice instead of white grains and rice.     Talk to your provider about Calcium and Vitamin D.     Lifestyle    Exercise for at least 150 minutes a week (30 minutes a day, 5 days a week). This will help you control your weight and prevent disease.     Limit alcohol to one drink per day.     No smoking.     Wear sunscreen to prevent skin cancer.     See your dentist every six months for an exam and cleaning.     See your eye doctor every 1 to 2 years.    Ankle Dorsiflexion/Plantarflexion (Flexibility)    1. Sit on the floor or in bed with your legs straight in front of you.  2. Point both feet. Then flex both feet.  3. Do this 10 to 30 times in a row.  4. Repeat this exercise 2 times a day, or as instructed.  Date Last Reviewed: 5/1/2016 2000-2017 [employer] [address] [place] [address], [place] [address]. All rights reserved. This information is not intended as a substitute for  professional medical care. Always follow your healthcare professional's instructions.        Toe Extension (Flexibility)    These instructions are for your right foot. Switch sides for your left foot.  5. Sit in a chair. Rest your right ankle on your left knee.  6. Hold your toes with your right hand. Gently bend the toes backward. Feel a stretch in the undersides of the toes and ball of the foot. Hold for 30 to 60 seconds.  7. Then gently bend the toes in the other direction. Gently press on them until your foot is pointed. Hold for 30 to 60 seconds.  8. Repeat 5 times, or as instructed.  Date Last Reviewed: 5/1/2016 2000-2017 [employer]. [address] [place] [address], [place] [address]. All rights reserved. This information is not intended as a substitute for professional medical care. Always follow your healthcare professional's instructions.

## 2017-09-28 NOTE — LETTER
October 3, 2017      Damon Manley  [address]  [place] [address]        Dear ,    We are writing to inform you of your test results.    You need to get on  A serious exercise and weight loss program of diabetes will be in your future your blood sugars have been drifting up if you would like to meet with our dietician or diabetic Nurse educator please contact me otherwise we will recheck your blood sugar in 6 months.    Resulted Orders   Glucose   Result Value Ref Range    Glucose 141 (H) 70 - 99 mg/dL   Hepatic panel (Albumin, ALT, AST, Bili, Alk Phos, TP)   Result Value Ref Range    Bilirubin Direct 0.1 0.0 - 0.2 mg/dL    Bilirubin Total 0.6 0.2 - 1.3 mg/dL    Albumin 3.8 3.4 - 5.0 g/dL    Protein Total 8.0 6.8 - 8.8 g/dL    Alkaline Phosphatase 80 40 - 150 U/L    ALT 53 0 - 70 U/L    AST 34 0 - 45 U/L   Lipid panel reflex to direct LDL   Result Value Ref Range    Cholesterol 192 <200 mg/dL    Triglycerides 157 (H) <150 mg/dL      Comment:      Borderline high:  150-199 mg/dl  High:             200-499 mg/dl  Very high:       >499 mg/dl      HDL Cholesterol 45 >39 mg/dL    LDL Cholesterol Calculated 116 (H) <100 mg/dL      Comment:      Above desirable:  100-129 mg/dl  Borderline High:  130-159 mg/dL  High:             160-189 mg/dL  Very high:       >189 mg/dl      Non HDL Cholesterol 147 (H) <130 mg/dL      Comment:      Above Desirable:  130-159 mg/dl  Borderline high:  160-189 mg/dl  High:             190-219 mg/dl  Very high:       >219 mg/dl         If you have any questions or concerns, please call the clinic at the number listed above.       Sincerely,        Kris Weems MD, MD

## 2017-09-28 NOTE — MR AVS SNAPSHOT
After Visit Summary   9/28/2017    Damon Manley    MRN: [id number]           Patient Information     Date Of Birth          1955        Visit Information        Provider Department      9/28/2017 8:20 AM Kris Weems MD [facility]        Today's Diagnoses     CARDIOVASCULAR SCREENING; LDL GOAL LESS THAN 130    -  1    ARIAS (dyspnea on exertion)        SOB (shortness of breath)        Pulmonary valve replaced        Aortic valve replaced        Essential hypertension with goal blood pressure less than 140/90        Elevated LFTs        Routine general medical examination at a health care facility          Care Instructions      Preventive Health Recommendations  Male Ages 50 - 64    Yearly exam:             See your health care provider every year in order to  o   Review health changes.   o   Discuss preventive care.    o   Review your medicines if your doctor has prescribed any.     Have a cholesterol test every 5 years, or more frequently if you are at risk for high cholesterol/heart disease.     Have a diabetes test (fasting glucose) every three years. If you are at risk for diabetes, you should have this test more often.     Have a colonoscopy at age 50, or have a yearly FIT test (stool test). These exams will check for colon cancer.      Talk with your health care provider about whether or not a prostate cancer screening test (PSA) is right for you.    You should be tested each year for STDs (sexually transmitted diseases), if you re at risk.     Shots: Get a flu shot each year. Get a tetanus shot every 10 years.     Nutrition:    Eat at least 5 servings of fruits and vegetables daily.     Eat whole-grain bread, whole-wheat pasta and brown rice instead of white grains and rice.     Talk to your provider about Calcium and Vitamin D.     Lifestyle    Exercise for at least 150 minutes a week (30 minutes a day, 5 days a week). This will help you control your weight and prevent  disease.     Limit alcohol to one drink per day.     No smoking.     Wear sunscreen to prevent skin cancer.     See your dentist every six months for an exam and cleaning.     See your eye doctor every 1 to 2 years.    Ankle Dorsiflexion/Plantarflexion (Flexibility)    1. Sit on the floor or in bed with your legs straight in front of you.  2. Point both feet. Then flex both feet.  3. Do this 10 to 30 times in a row.  4. Repeat this exercise 2 times a day, or as instructed.  Date Last Reviewed: 5/1/2016 2000-2017 [employer] [address] [place] [address]. All rights reserved. This information is not intended as a substitute for professional medical care. Always follow your healthcare professional's instructions.        Toe Extension (Flexibility)    These instructions are for your right foot. Switch sides for your left foot.  5. Sit in a chair. Rest your right ankle on your left knee.  6. Hold your toes with your right hand. Gently bend the toes backward. Feel a stretch in the undersides of the toes and ball of the foot. Hold for 30 to 60 seconds.  7. Then gently bend the toes in the other direction. Gently press on them until your foot is pointed. Hold for 30 to 60 seconds.  8. Repeat 5 times, or as instructed.  Date Last Reviewed: 5/1/2016 2000-2017 [employer] [address] [place] [address]. All rights reserved. This information is not intended as a substitute for professional medical care. Always follow your healthcare professional's instructions.                Follow-ups after your visit        Additional Services     CARDIOLOGY EVAL ADULT REFERRAL       Your provider has referred you to:  [facility]: [facility] [phone]   [email or web address]    Please be aware that coverage of these services is subject to the terms and limitations of your health insurance plan.   Call member services at your health plan with any benefit or coverage questions.      Type of Referral:  New Cardiology Consult    Timeframe requested:  Within 1 month    Please bring the following to your appointment:  >>   Any x-rays, CTs or MRIs which have been performed.  Contact the facility where they were done to arrange for  prior to your scheduled appointment.    >>   List of current medications  >>   This referral request   >>   Any documents/labs given to you for this referral                  Your next 10 appointments already scheduled     Sep 28, 2017 10:00 AM CDT   Ech Complete with PHECHNIRALI   [facility]ch[facility] ([facility])    [address] [place], [place] [address] [id number] [phone]           1. Please bring or wear a comfortable two-piece outfit. 2. You may eat, drink and take your normal medicines. 3. For any questions that cannot be answered, please contact the ordering physician            Oct 16, 2017 10:00 AM CDT   New Visit with Ochoa Kenney MD   [facility] ([facility])    [address] [place] [id number] [phone]              Future tests that were ordered for you today     Open Future Orders        Priority Expected Expires Ordered    Echocardiogram Complete Routine  9/28/2018 9/28/2017            Who to contact     If you have questions or need follow up information about today's clinic visit or your schedule please contact [facility] directly at [phone].  Normal or non-critical lab and imaging results will be communicated to you by MyChart, letter or phone within 4 business days after the clinic has received the results. If you do not hear from us within 7 days, please contact the clinic through MyChart or phone. If you have a critical or abnormal lab result, we will notify you by phone as soon as possible.  Submit refill requests through [employer] or call your pharmacy  "and they will forward the refill request to us. Please allow 3 business days for your refill to be completed.          Additional Information About Your Visit        MyChart Information     [email or web address] lets you send messages to your doctor, view your test results, renew your prescriptions, schedule appointments and more. To sign up, go to [email or web address] . Click on \"Log in\" on the left side of the screen, which will take you to the Welcome page. Then click on \"Sign up Now\" on the right side of the page.     You will be asked to enter the access code listed below, as well as some personal information. Please follow the directions to create your username and password.     Your access code is: [email or web address]  Expires: 2017  9:16 AM     Your access code will  in 90 days. If you need help or a new code, please call your [facility] clinic or [phone].        Care EveryWhere ID     This is your [facility] EveryWhere ID. This could be used by other organizations to access your [facility] medical records  [id number]        Your Vitals Were     Pulse Temperature Respirations Height BMI (Body Mass Index)       74 97.8  F (36.6  C) (Tympanic) 16 5' 10.1\" (1.781 m) 40.63 kg/m2        Blood Pressure from Last 3 Encounters:   17 122/78   17 (!) 129/96   01/10/17 132/86    Weight from Last 3 Encounters:   17 284 lb (128.8 kg)   17 290 lb (131.5 kg)   01/10/17 290 lb (131.5 kg)              We Performed the Following     CARDIOLOGY EVAL ADULT REFERRAL     Glucose        Primary Care Provider Office Phone # Fax #    Kris Weems [phone] [address] [place] [address]        Equal Access to Services     [facility] : Soha Aly, wademarcus luqyg, qaybta kaalmajayna machado, carmelita quigley. [facility] [phone].    ATENCIÓN: Si habla español, tiene a givens disposición servicios gratuitos de asistencia lingüística. Llame al " [phone].    We comply with applicable federal civil rights laws and [place] laws. We do not discriminate on the basis of race, color, national origin, age, disability sex, sexual orientation or gender identity.            Thank you!     Thank you for choosing [facility]  for your care. Our goal is always to provide you with excellent care. Hearing back from our patients is one way we can continue to improve our services. Please take a few minutes to complete the written survey that you may receive in the mail after your visit with us. Thank you!             Your Updated Medication List - Protect others around you: Learn how to safely use, store and throw away your medicines at www.disposemymeds.org.          This list is accurate as of: 9/28/17  9:16 AM.  Always use your most recent med list.                   Brand Name Dispense Instructions for use Diagnosis    ampicillin 500 MG capsule    PRINCIPEN    4 capsule    4 capsules 1 hour prior to dental work    Unspecified prophylactic or treatment measure       aspirin 81 MG tablet      Take 1 tablet (81 mg) by mouth daily        losartan 100 MG tablet    COZAAR    90 tablet    Take 1 tablet (100 mg) by mouth daily    Hypertension goal BP (blood pressure) < 140/90       metoprolol 100 MG 24 hr tablet    TOPROL-XL    30 tablet    Take 1 tablet (100mg) by mouth daily    Essential hypertension with goal blood pressure less than 140/90

## 2017-09-28 NOTE — NURSING NOTE
"Chief Complaint   Patient presents with     Physical       Initial /78  Pulse 74  Temp 97.8  F (36.6  C) (Tympanic)  Resp 16  Ht 5' 10.1\" (1.781 m)  Wt 284 lb (128.8 kg)  BMI 40.63 kg/m2 Estimated body mass index is 40.63 kg/(m^2) as calculated from the following:    Height as of this encounter: 5' 10.1\" (1.781 m).    Weight as of this encounter: 284 lb (128.8 kg).  Medication Reconciliation: complete   Cristina Wolff CMA (AAMA)   "

## 2017-10-19 ENCOUNTER — OFFICE VISIT (OUTPATIENT)
Dept: CARDIOLOGY | Facility: CLINIC | Age: 62
End: 2017-10-19
Attending: FAMILY MEDICINE
Payer: COMMERCIAL

## 2017-10-19 VITALS
OXYGEN SATURATION: 94 % | WEIGHT: 286.7 LBS | HEIGHT: 70 IN | DIASTOLIC BLOOD PRESSURE: 88 MMHG | HEART RATE: 67 BPM | SYSTOLIC BLOOD PRESSURE: 140 MMHG | BODY MASS INDEX: 41.04 KG/M2 | RESPIRATION RATE: 14 BRPM

## 2017-10-19 DIAGNOSIS — Z95.2 S/P PULMONARY VALVE REPLACEMENT: ICD-10-CM

## 2017-10-19 DIAGNOSIS — Z95.2 S/P AVR (AORTIC VALVE REPLACEMENT): Primary | ICD-10-CM

## 2017-10-19 DIAGNOSIS — I10 BENIGN ESSENTIAL HYPERTENSION: ICD-10-CM

## 2017-10-19 DIAGNOSIS — R06.09 DYSPNEA ON EXERTION: ICD-10-CM

## 2017-10-19 DIAGNOSIS — I42.8 OTHER CARDIOMYOPATHY (H): ICD-10-CM

## 2017-10-19 PROCEDURE — 99204 OFFICE O/P NEW MOD 45 MIN: CPT | Performed by: INTERNAL MEDICINE

## 2017-10-19 ASSESSMENT — PAIN SCALES - GENERAL: PAINLEVEL: EXTREME PAIN (8)

## 2017-10-19 NOTE — MR AVS SNAPSHOT
"              After Visit Summary   10/19/2017    Damon Manley    MRN: [id number]           Patient Information     Date Of Birth          1955        Visit Information        Provider Department      10/19/2017 3:00 PM Ochoa Kenney MD [facility]         Follow-ups after your visit        Who to contact     If you have questions or need follow up information about today's clinic visit or your schedule please contact [facility] directly at [phone].  Normal or non-critical lab and imaging results will be communicated to you by MyChart, letter or phone within 4 business days after the clinic has received the results. If you do not hear from us within 7 days, please contact the clinic through [employer] or phone. If you have a critical or abnormal lab result, we will notify you by phone as soon as possible.  Submit refill requests through [employer] or call your pharmacy and they will forward the refill request to us. Please allow 3 business days for your refill to be completed.          Additional Information About Your Visit        [employer] Information     [employer] lets you send messages to your doctor, view your test results, renew your prescriptions, schedule appointments and more. To sign up, go to [email or web address] . Click on \"Log in\" on the left side of the screen, which will take you to the Welcome page. Then click on \"Sign up Now\" on the right side of the page.     You will be asked to enter the access code listed below, as well as some personal information. Please follow the directions to create your username and password.     Your access code is: [email or web address]  Expires: 2017  9:16 AM     Your access code will  in 90 days. If you need help or a new code, please call your [facility] or [phone].        Care EveryWhere ID     This is your Care EveryWhere ID. This could be used by other organizations to access your [place] medical " "records  [id number]        Your Vitals Were     Pulse Respirations Height Pulse Oximetry BMI (Body Mass Index)       67 14 1.778 m (5' 10\") 94% 41.14 kg/m2        Blood Pressure from Last 3 Encounters:   10/19/17 140/88   09/28/17 122/78   01/13/17 (!) 129/96    Weight from Last 3 Encounters:   10/19/17 130 kg (286 lb 11.2 oz)   09/28/17 128.8 kg (284 lb)   01/13/17 131.5 kg (290 lb)              Today, you had the following     No orders found for display       Primary Care Provider Office Phone # Fax #    Kris Weems [phone] [address] [place] [address]        Equal Access to Services     PEPE ASCENCIO : Hadii mckenna [place] hadasho Soomaali, waaxda luqadaha, qaybta kaalmada adeegyada, carmelita curiel . So [facility] [phone].    ATENCIÓN: Si habla español, tiene a givens disposición servicios gratuitos de asistencia lingüística. Llame al [phone].    We comply with applicable federal civil rights laws and [place] laws. We do not discriminate on the basis of race, color, national origin, age, disability, sex, sexual orientation, or gender identity.            Thank you!     Thank you for choosing [facility]  for your care. Our goal is always to provide you with excellent care. Hearing back from our patients is one way we can continue to improve our services. Please take a few minutes to complete the written survey that you may receive in the mail after your visit with us. Thank you!             Your Updated Medication List - Protect others around you: Learn how to safely use, store and throw away your medicines at www.disposemymeds.org.          This list is accurate as of: 10/19/17  4:01 PM.  Always use your most recent med list.                   Brand Name Dispense Instructions for use Diagnosis    ampicillin 500 MG capsule    PRINCIPEN    4 capsule    4 capsules 1 hour prior to dental work    Unspecified prophylactic or treatment measure "       aspirin 81 MG tablet      Take 1 tablet (81 mg) by mouth daily        losartan 100 MG tablet    COZAAR    90 tablet    Take 1 tablet (100 mg) by mouth daily    Hypertension goal BP (blood pressure) < 140/90       metoprolol 100 MG 24 hr tablet    TOPROL-XL    30 tablet    Take 1 tablet (100mg) by mouth daily    Essential hypertension with goal blood pressure less than 140/90

## 2017-10-19 NOTE — LETTER
10/19/2017    Kris Weems MD  [address] [place] [address]    RE: Damon Manley       Dear Colleague,    I had the pleasure of seeing Damon Manley in the [facility].    HISTORY:    Damon Manley is a very nice 62-year-old male accompanied by his wife today. 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 He had no significant coronary artery disease at that time.    Damon was asked to see me today because of complaints of increasing dyspnea and fatigue. He recently underwent an echocardiogram which I reviewed with him. It shows mildly decreased LV function, unchanged from 2 years ago.  The mitral and pulmonary valves continue to function quite well with a mean gradient of 17 mmHg across the aortic valve and 8 mmHg across the pulmonic valve.     Damon works as a  and does quite a lot of heavy lifting, often spending 45 hours out of the average day lifting heavy boxes. He can do this without difficulty but if he walks up an incline he finds himself getting short of breath. On a flat surface he can walk for long distances and in fact he did a 5K walk with his granddaughter recently and was able to complete it without too much difficulty, just some shortness of breath on the uphill segments.    Damon does very little aerobic exercise. He states that up until a year ago he was walking 10 miles a day at a different job, and went to his health club regularly with swimming as his favorite activity. He gave up his gym membership and changed his job and has been very inactive for about a year (other than the lifting described above). He denies exertional chest, arm, neck, or jaw discomfort and has not had problems with palpitations, claudication, peripheral edema, PND/orthopnea. He describes an episode of near syncope in late August which was fairly classic vasovagal. He began feeling  lightheaded and tunnel vision after standing up. He checked his blood pressure and noted that it was very low at 90/50 and that his pulse was slow. He does not recall if he was diaphoretic. He has had some more mild episode similar to this over many years.      ASSESSMENT/PLAN:    1.  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 Echo shows these bowels are still functioning adequately and his symptoms of dyspnea are not secondary to his valve.  2. Mild cardiomyopathy, idiopathic with negative stress test in the past. No significant change in LV function. Currently using Toprol-XL and losartan.  3. Dyspnea on exertion. This is clearly multifactorial with age, obesity, and particularly deconditioning planning significant roles.  4. Hypertension. It is noted that the blood pressure's a bit high today but this is unusual, with his blood pressure 3 weeks ago being more typical at 122/78. This will bear watching over time.    Thank you for arrest me to participate in your patient's care. I will plan a 2 year follow-up visit with echocardiogram to monitor her valvular function. I don't see any concerning cardiac abnormalities at this time. Please don't hesitate to call if I can be of further assistance.    No orders of the defined types were placed in this encounter.    No orders of the defined types were placed in this encounter.    There are no discontinued medications.      No diagnosis found.    CURRENT MEDICATIONS:  Current Outpatient Prescriptions   Medication Sig Dispense Refill     metoprolol (TOPROL-XL) 100 MG 24 hr tablet Take 1 tablet (100mg) by mouth daily 30 tablet 2     losartan (COZAAR) 100 MG tablet Take 1 tablet (100 mg) by mouth daily 90 tablet 3     aspirin 81 MG tablet Take 1 tablet (81 mg) by mouth daily       ampicillin (PRINCIPEN) 500 MG capsule 4 capsules 1 hour prior to dental work 4 capsule 2       ALLERGIES     Allergies   Allergen Reactions     No Known Drug Allergies         PAST MEDICAL HISTORY:  Past Medical History:   Diagnosis Date     Coronary artery disease     Valves replaced due to hx of Rheumatic fever. - No mechanical valves     Depressive disorder     situational - resolved     Hypertension      Motion sickness      Obesity (BMI 30-39.9)        PAST SURGICAL HISTORY:  Past Surgical History:   Procedure Laterality Date     C ANESTH,DX ARTHROSCOPIC PROC KNEE JOINT  12/12/05    Left knee with partial medial meniscectomy.     C ANESTH,OPEN HEART; W/O PUMP OXYEGENATOR  1998     COLONOSCOPY  05/09/2007    Colon polyps.     COLONOSCOPY N/A 1/13/2017    Procedure: COMBINED COLONOSCOPY, SINGLE OR MULTIPLE BIOPSY/POLYPECTOMY BY BIOPSY;  Surgeon: Dejan Mckee MD;  Location:  GI     HC KNEE SCOPE,MED/LAT MENISECTOMY  09/05/2007    Partial medial meniscectomy, both knees.       FAMILY HISTORY:  Family History   Problem Relation Age of Onset     DIABETES No family hx of      Coronary Artery Disease No family hx of      Hypertension No family hx of      Hyperlipidemia No family hx of      CEREBROVASCULAR DISEASE No family hx of      Other Cancer Father      lung     Other Cancer Mother      lung     Other Cancer Paternal Aunt      unsure       SOCIAL HISTORY:  Social History     Social History     Marital status:      Spouse name: Wanda Parks     Number of children: 2     Years of education: 14     Social History Main Topics     Smoking status: Former Smoker     Years: 15.00     Quit date: 4/1/1988     Smokeless tobacco: Never Used     Alcohol use 0.0 oz/week     0 Standard drinks or equivalent per week      Comment: social - glass of wine socially     Drug use: No     Sexual activity: Yes     Partners: Female      Comment:  - 2 children     Other Topics Concern      Service No     Blood Transfusions Yes     open heart surgery 1998     Caffeine Concern No     Occupational Exposure No     Hobby Hazards No     Sleep Concern Yes      wakes after 4-5 hours of  "sleep at night     Stress Concern Yes     Weight Concern Yes     Special Diet No     Back Care No     Exercise Yes     3-4 times/week     Bike Helmet Not Asked     n/a     Seat Belt Yes     Self-Exams Not Asked     n/a     Social History Narrative       Review of Systems:  Skin:  Negative     Eyes:  Negative    ENT:  Negative    Respiratory:  Positive for dyspnea on exertion  Cardiovascular:    Positive for;heaviness;exercise intolerance;fatigue;lightheadedness  Gastroenterology: Positive for heartburn  Genitourinary:  Negative    Musculoskeletal:  Positive for    Neurologic:  Negative    Psychiatric:  Negative    Heme/Lymph/Imm:  Negative    Endocrine:  Positive for      Physical Exam:  Vitals: /88 (BP Location: Right arm, Patient Position: Fowlers, Cuff Size: Adult Large)  Pulse 67  Resp 14  Ht 1.778 m (5' 10\")  Wt 130 kg (286 lb 11.2 oz)  SpO2 94%  BMI 41.14 kg/m2    Constitutional:  cooperative, alert and oriented, well developed, well nourished, in no acute distress;cooperative obese      Skin:  warm and dry to the touch        Head:  normocephalic        Eyes:  no xanthalasma        ENT:  no pallor or cyanosis        Neck:  carotid pulses are full and equal bilaterally, JVP normal, no carotid bruit, no thyromegaly        Chest:  normal breath sounds, clear to auscultation, normal A-P diameter, normal symmetry, normal respiratory excursion, no use of accessory muscles        Cardiac: regular rhythm;normal S1 and S2;no S3 or S4;apical impulse not displaced       grade 2;systolic ejection murmur;RUSB;LUSB          Abdomen:  abdomen soft, non-tender, BS normoactive, no mass, no HSM, no bruits        Vascular: pulses full and equal                                      Extremities and Back:  no edema        Neurological:  affect appropriate, oriented to time, person and place;no gross motor deficits          Recent Lab Results:  LIPID RESULTS:  Lab Results   Component Value Date    CHOL 192 09/28/2017    " HDL 45 09/28/2017     (H) 09/28/2017    TRIG 157 (H) 09/28/2017    CHOLHDLRATIO 4.0 02/26/2014       LIVER ENZYME RESULTS:  Lab Results   Component Value Date    AST 34 09/28/2017    ALT 53 09/28/2017       CBC RESULTS:  Lab Results   Component Value Date    WBC 7.9 03/14/2016    RBC 5.28 02/26/2010    HGB 16.7 02/26/2010    HCT 47.3 02/26/2010    MCV 90 02/26/2010    MCH 31.6 02/26/2010    MCHC 35.3 02/26/2010    RDW 12.5 02/26/2010     02/26/2010       BMP RESULTS:  Lab Results   Component Value Date     05/03/2016    POTASSIUM 4.2 05/03/2016    CHLORIDE 104 05/03/2016    CO2 25 05/03/2016    ANIONGAP 8 05/03/2016     (H) 09/28/2017    BUN 13 05/03/2016    CR 0.92 05/03/2016    GFRESTIMATED 84 05/03/2016    GFRESTBLACK >90   GFR Calc   05/03/2016    MARGARET 8.7 05/03/2016        A1C RESULTS:  Lab Results   Component Value Date    A1C 6.3 (H) 02/09/2011       INR RESULTS:  Lab Results   Component Value Date    INR 1.05 03/28/2007     Thank you for allowing me to participate in the care of your patient.    Sincerely,     Ochoa Kenney MD     [facility]

## 2017-10-19 NOTE — PROGRESS NOTES
HISTORY:    Damon Manley is a very nice 62-year-old male accompanied by his wife today. 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 He had no significant coronary artery disease at that time.    Damon was asked to see me today because of complaints of increasing dyspnea and fatigue. He recently underwent an echocardiogram which I reviewed with him. It shows mildly decreased LV function, unchanged from 2 years ago.  The mitral and pulmonary valves continue to function quite well with a mean gradient of 17 mmHg across the aortic valve and 8 mmHg across the pulmonic valve.     Damon works as a  and does quite a lot of heavy lifting, often spending 4 to 5 hours out of the average day lifting heavy boxes. He can do this without difficulty but if he walks up an incline he finds himself getting short of breath. On a flat surface he can walk for long distances and in fact he did a 5K walk with his granddaughter recently, and was able to complete it without too much difficulty, just some shortness of breath on the uphill segments.    Damon does very little aerobic exercise. He states that up until a year ago he was walking 10 miles a day at a different job, and went to his health club regularly with swimming as his favorite activity. He gave up his gym membership and changed his job and has been very inactive for about a year (other than the lifting described above). He denies exertional chest, arm, neck, or jaw discomfort and has not had problems with palpitations, claudication, peripheral edema, PND/orthopnea. He describes an episode of near syncope in late August which was fairly classic vasovagal. He began feeling lightheaded and tunnel vision after standing up. He checked his blood pressure and noted that it was very low at 90/50 and that his pulse was slow. He does not recall if he was diaphoretic. He has had some more mild  episode similar to this over many years.      ASSESSMENT/PLAN:    1.  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 Echo shows these valves are still functioning adequately and his symptoms of dyspnea are not secondary to valve disfunction.  2. Mild cardiomyopathy, idiopathic with negative stress test in the past. No significant change in LV function. Currently using Toprol-XL and losartan.  3. Dyspnea on exertion. This is clearly multifactorial with age, obesity, and particularly deconditioning playing significant roles.  4. Hypertension. It is noted that the blood pressure's a bit high today but this is unusual, with his blood pressure 3 weeks ago being more typical at 122/78. This will bear watching over time.    Thank you for asking me to participate in your patient's care. I will plan a 2 year follow-up visit with echocardiogram to monitor valvular function. I don't see any concerning cardiac abnormalities at this time. Please don't hesitate to call if I can be of further assistance.    No orders of the defined types were placed in this encounter.    No orders of the defined types were placed in this encounter.    There are no discontinued medications.      No diagnosis found.    CURRENT MEDICATIONS:  Current Outpatient Prescriptions   Medication Sig Dispense Refill     metoprolol (TOPROL-XL) 100 MG 24 hr tablet Take 1 tablet (100mg) by mouth daily 30 tablet 2     losartan (COZAAR) 100 MG tablet Take 1 tablet (100 mg) by mouth daily 90 tablet 3     aspirin 81 MG tablet Take 1 tablet (81 mg) by mouth daily       ampicillin (PRINCIPEN) 500 MG capsule 4 capsules 1 hour prior to dental work 4 capsule 2       ALLERGIES     Allergies   Allergen Reactions     No Known Drug Allergies        PAST MEDICAL HISTORY:  Past Medical History:   Diagnosis Date     Coronary artery disease     Valves replaced due to hx of Rheumatic fever. - No mechanical valves     Depressive disorder     situational - resolved      Hypertension      Motion sickness      Obesity (BMI 30-39.9)        PAST SURGICAL HISTORY:  Past Surgical History:   Procedure Laterality Date     C ANESTH,DX ARTHROSCOPIC PROC KNEE JOINT  12/12/05    Left knee with partial medial meniscectomy.     C ANESTH,OPEN HEART; W/O PUMP OXYEGENATOR  1998     COLONOSCOPY  05/09/2007    Colon polyps.     COLONOSCOPY N/A 1/13/2017    Procedure: COMBINED COLONOSCOPY, SINGLE OR MULTIPLE BIOPSY/POLYPECTOMY BY BIOPSY;  Surgeon: Dejan Mckee MD;  Location: [facility]     HC KNEE SCOPE,MED/LAT MENISECTOMY  09/05/2007    Partial medial meniscectomy, both knees.       FAMILY HISTORY:  Family History   Problem Relation Age of Onset     DIABETES No family hx of      Coronary Artery Disease No family hx of      Hypertension No family hx of      Hyperlipidemia No family hx of      CEREBROVASCULAR DISEASE No family hx of      Other Cancer Father      lung     Other Cancer Mother      lung     Other Cancer Paternal Aunt      unsure       SOCIAL HISTORY:  Social History     Social History     Marital status:      Spouse name: Wanda Parks     Number of children: 2     Years of education: 14     Social History Main Topics     Smoking status: Former Smoker     Years: 15.00     Quit date: 4/1/1988     Smokeless tobacco: Never Used     Alcohol use 0.0 oz/week     0 Standard drinks or equivalent per week      Comment: social - glass of wine socially     Drug use: No     Sexual activity: Yes     Partners: Female      Comment:  - 2 children     Other Topics Concern      Service No     Blood Transfusions Yes     open heart surgery 1998     Caffeine Concern No     Occupational Exposure No     Hobby Hazards No     Sleep Concern Yes      wakes after 4-5 hours of sleep at night     Stress Concern Yes     Weight Concern Yes     Special Diet No     Back Care No     Exercise Yes     3-4 times/week     Bike Helmet Not Asked     n/a     Seat Belt Yes     Self-Exams Not Asked     n/a  "    Social History Narrative       Review of Systems:  Skin:  Negative     Eyes:  Negative    ENT:  Negative    Respiratory:  Positive for dyspnea on exertion  Cardiovascular:    Positive for;heaviness;exercise intolerance;fatigue;lightheadedness  Gastroenterology: Positive for heartburn  Genitourinary:  Negative    Musculoskeletal:  Positive for    Neurologic:  Negative    Psychiatric:  Negative    Heme/Lymph/Imm:  Negative    Endocrine:  Positive for      Physical Exam:  Vitals: /88 (BP Location: Right arm, Patient Position: Fowlers, Cuff Size: Adult Large)  Pulse 67  Resp 14  Ht 1.778 m (5' 10\")  Wt 130 kg (286 lb 11.2 oz)  SpO2 94%  BMI 41.14 kg/m2    Constitutional:  cooperative, alert and oriented, well developed, well nourished, in no acute distress;cooperative obese      Skin:  warm and dry to the touch        Head:  normocephalic        Eyes:  no xanthalasma        ENT:  no pallor or cyanosis        Neck:  carotid pulses are full and equal bilaterally, JVP normal, no carotid bruit, no thyromegaly        Chest:  normal breath sounds, clear to auscultation, normal A-P diameter, normal symmetry, normal respiratory excursion, no use of accessory muscles        Cardiac: regular rhythm;normal S1 and S2;no S3 or S4;apical impulse not displaced       grade 2;systolic ejection murmur;RUSB;LUSB          Abdomen:  abdomen soft, non-tender, BS normoactive, no mass, no HSM, no bruits        Vascular: pulses full and equal                                      Extremities and Back:  no edema        Neurological:  affect appropriate, oriented to time, person and place;no gross motor deficits          Recent Lab Results:  LIPID RESULTS:  Lab Results   Component Value Date    CHOL 192 09/28/2017    HDL 45 09/28/2017     (H) 09/28/2017    TRIG 157 (H) 09/28/2017    CHOLHDLRATIO 4.0 02/26/2014       LIVER ENZYME RESULTS:  Lab Results   Component Value Date    AST 34 09/28/2017    ALT 53 09/28/2017       CBC " RESULTS:  Lab Results   Component Value Date    WBC 7.9 03/14/2016    RBC 5.28 02/26/2010    HGB 16.7 02/26/2010    HCT 47.3 02/26/2010    MCV 90 02/26/2010    MCH 31.6 02/26/2010    MCHC 35.3 02/26/2010    RDW 12.5 02/26/2010     02/26/2010       BMP RESULTS:  Lab Results   Component Value Date     05/03/2016    POTASSIUM 4.2 05/03/2016    CHLORIDE 104 05/03/2016    CO2 25 05/03/2016    ANIONGAP 8 05/03/2016     (H) 09/28/2017    BUN 13 05/03/2016    CR 0.92 05/03/2016    GFRESTIMATED 84 05/03/2016    GFRESTBLACK >90   GFR Calc   05/03/2016    MARGARET 8.7 05/03/2016        A1C RESULTS:  Lab Results   Component Value Date    A1C 6.3 (H) 02/09/2011       INR RESULTS:  Lab Results   Component Value Date    INR 1.05 03/28/2007         Ochoa Kenney MD, FACC    CC  Kris Weems MD  [address] [place] [address]

## 2017-10-19 NOTE — LETTER
10/19/2017      RE: Damon Manley  [address]  [facility] [address]       Dear Colleague,    Thank you for the opportunity to participate in the care of your patient, Damon Manley, at the [facility]. Please see a copy of my visit note below.    HISTORY:    Damon Manley is a very nice 62-year-old male accompanied by his wife today. 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 He had no significant coronary artery disease at that time.    Damon was asked to see me today because of complaints of increasing dyspnea and fatigue. He recently underwent an echocardiogram which I reviewed with him. It shows mildly decreased LV function, unchanged from 2 years ago.  The mitral and pulmonary valves continue to function quite well with a mean gradient of 17 mmHg across the aortic valve and 8 mmHg across the pulmonic valve.     Damon works as a  and does quite a lot of heavy lifting, often spending 45 hours out of the average day lifting heavy boxes. He can do this without difficulty but if he walks up an incline he finds himself getting short of breath. On a flat surface he can walk for long distances and in fact he did a 5K walk with his granddaughter recently and was able to complete it without too much difficulty, just some shortness of breath on the uphill segments.    Damon does very little aerobic exercise. He states that up until a year ago he was walking 10 miles a day at a different job, and went to his health club regularly with swimming as his favorite activity. He gave up his gym membership and changed his job and has been very inactive for about a year (other than the lifting described above). He denies exertional chest, arm, neck, or jaw discomfort and has not had problems with palpitations, claudication, peripheral edema, PND/orthopnea. He describes  an episode of near syncope in late August which was fairly classic vasovagal. He began feeling lightheaded and tunnel vision after standing up. He checked his blood pressure and noted that it was very low at 90/50 and that his pulse was slow. He does not recall if he was diaphoretic. He has had some more mild episode similar to this over many years.      ASSESSMENT/PLAN:    1.  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 Echo shows these bowels are still functioning adequately and his symptoms of dyspnea are not secondary to his valve.  2. Mild cardiomyopathy, idiopathic with negative stress test in the past. No significant change in LV function. Currently using Toprol-XL and losartan.  3. Dyspnea on exertion. This is clearly multifactorial with age, obesity, and particularly deconditioning planning significant roles.  4. Hypertension. It is noted that the blood pressure's a bit high today but this is unusual, with his blood pressure 3 weeks ago being more typical at 122/78. This will bear watching over time.    Thank you for arrest me to participate in your patient's care. I will plan a 2 year follow-up visit with echocardiogram to monitor her valvular function. I don't see any concerning cardiac abnormalities at this time. Please don't hesitate to call if I can be of further assistance.    No orders of the defined types were placed in this encounter.    No orders of the defined types were placed in this encounter.    There are no discontinued medications.      No diagnosis found.    CURRENT MEDICATIONS:  Current Outpatient Prescriptions   Medication Sig Dispense Refill     metoprolol (TOPROL-XL) 100 MG 24 hr tablet Take 1 tablet (100mg) by mouth daily 30 tablet 2     losartan (COZAAR) 100 MG tablet Take 1 tablet (100 mg) by mouth daily 90 tablet 3     aspirin 81 MG tablet Take 1 tablet (81 mg) by mouth daily       ampicillin (PRINCIPEN) 500 MG capsule 4 capsules 1 hour prior to dental work 4  capsule 2       ALLERGIES     Allergies   Allergen Reactions     No Known Drug Allergies        PAST MEDICAL HISTORY:  Past Medical History:   Diagnosis Date     Coronary artery disease     Valves replaced due to hx of Rheumatic fever. - No mechanical valves     Depressive disorder     situational - resolved     Hypertension      Motion sickness      Obesity (BMI 30-39.9)        PAST SURGICAL HISTORY:  Past Surgical History:   Procedure Laterality Date     C ANESTH,DX ARTHROSCOPIC PROC KNEE JOINT  12/12/05    Left knee with partial medial meniscectomy.     C ANESTH,OPEN HEART; W/O PUMP OXYEGENATOR  1998     COLONOSCOPY  05/09/2007    Colon polyps.     COLONOSCOPY N/A 1/13/2017    Procedure: COMBINED COLONOSCOPY, SINGLE OR MULTIPLE BIOPSY/POLYPECTOMY BY BIOPSY;  Surgeon: Dejan Mckee MD;  Location: [facility]     HC KNEE SCOPE,MED/LAT MENISECTOMY  09/05/2007    Partial medial meniscectomy, both knees.       FAMILY HISTORY:  Family History   Problem Relation Age of Onset     DIABETES No family hx of      Coronary Artery Disease No family hx of      Hypertension No family hx of      Hyperlipidemia No family hx of      CEREBROVASCULAR DISEASE No family hx of      Other Cancer Father      lung     Other Cancer Mother      lung     Other Cancer Paternal Aunt      unsure       SOCIAL HISTORY:  Social History     Social History     Marital status:      Spouse name: Wanda Parks     Number of children: 2     Years of education: 14     Social History Main Topics     Smoking status: Former Smoker     Years: 15.00     Quit date: 4/1/1988     Smokeless tobacco: Never Used     Alcohol use 0.0 oz/week     0 Standard drinks or equivalent per week      Comment: social - glass of wine socially     Drug use: No     Sexual activity: Yes     Partners: Female      Comment:  - 2 children     Other Topics Concern      Service No     Blood Transfusions Yes     open heart surgery 1998     Caffeine Concern No      "Occupational Exposure No     Hobby Hazards No     Sleep Concern Yes      wakes after 4-5 hours of sleep at night     Stress Concern Yes     Weight Concern Yes     Special Diet No     Back Care No     Exercise Yes     3-4 times/week     Bike Helmet Not Asked     n/a     Seat Belt Yes     Self-Exams Not Asked     n/a     Social History Narrative       Review of Systems:  Skin:  Negative     Eyes:  Negative    ENT:  Negative    Respiratory:  Positive for dyspnea on exertion  Cardiovascular:    Positive for;heaviness;exercise intolerance;fatigue;lightheadedness  Gastroenterology: Positive for heartburn  Genitourinary:  Negative    Musculoskeletal:  Positive for    Neurologic:  Negative    Psychiatric:  Negative    Heme/Lymph/Imm:  Negative    Endocrine:  Positive for      Physical Exam:  Vitals: /88 (BP Location: Right arm, Patient Position: Fowlers, Cuff Size: Adult Large)  Pulse 67  Resp 14  Ht 1.778 m (5' 10\")  Wt 130 kg (286 lb 11.2 oz)  SpO2 94%  BMI 41.14 kg/m2    Constitutional:  cooperative, alert and oriented, well developed, well nourished, in no acute distress;cooperative obese      Skin:  warm and dry to the touch        Head:  normocephalic        Eyes:  no xanthalasma        ENT:  no pallor or cyanosis        Neck:  carotid pulses are full and equal bilaterally, JVP normal, no carotid bruit, no thyromegaly        Chest:  normal breath sounds, clear to auscultation, normal A-P diameter, normal symmetry, normal respiratory excursion, no use of accessory muscles        Cardiac: regular rhythm;normal S1 and S2;no S3 or S4;apical impulse not displaced       grade 2;systolic ejection murmur;RUSB;LUSB          Abdomen:  abdomen soft, non-tender, BS normoactive, no mass, no HSM, no bruits        Vascular: pulses full and equal                                      Extremities and Back:  no edema        Neurological:  affect appropriate, oriented to time, person and place;no gross motor deficits    "       Recent Lab Results:  LIPID RESULTS:  Lab Results   Component Value Date    CHOL 192 09/28/2017    HDL 45 09/28/2017     (H) 09/28/2017    TRIG 157 (H) 09/28/2017    CHOLHDLRATIO 4.0 02/26/2014       LIVER ENZYME RESULTS:  Lab Results   Component Value Date    AST 34 09/28/2017    ALT 53 09/28/2017       CBC RESULTS:  Lab Results   Component Value Date    WBC 7.9 03/14/2016    RBC 5.28 02/26/2010    HGB 16.7 02/26/2010    HCT 47.3 02/26/2010    MCV 90 02/26/2010    MCH 31.6 02/26/2010    MCHC 35.3 02/26/2010    RDW 12.5 02/26/2010     02/26/2010       BMP RESULTS:  Lab Results   Component Value Date     05/03/2016    POTASSIUM 4.2 05/03/2016    CHLORIDE 104 05/03/2016    CO2 25 05/03/2016    ANIONGAP 8 05/03/2016     (H) 09/28/2017    BUN 13 05/03/2016    CR 0.92 05/03/2016    GFRESTIMATED 84 05/03/2016    GFRESTBLACK >90   GFR Calc   05/03/2016    MARGARET 8.7 05/03/2016        A1C RESULTS:  Lab Results   Component Value Date    A1C 6.3 (H) 02/09/2011       INR RESULTS:  Lab Results   Component Value Date    INR 1.05 03/28/2007         Ochoa Kenney MD, FACC    CC  Kris Weems MD  [address] [place] [address]                    Please do not hesitate to contact me if you have any questions/concerns.     Sincerely,     Ochoa Kenney MD

## 2017-11-03 DIAGNOSIS — I10 ESSENTIAL HYPERTENSION WITH GOAL BLOOD PRESSURE LESS THAN 140/90: ICD-10-CM

## 2017-11-07 RX ORDER — METOPROLOL SUCCINATE 100 MG/1
TABLET, EXTENDED RELEASE ORAL
Qty: 30 TABLET | Refills: 11 | Status: SHIPPED | OUTPATIENT
Start: 2017-11-07 | End: 2019-02-15

## 2017-11-07 NOTE — TELEPHONE ENCOUNTER
Requested Prescriptions   Pending Prescriptions Disp Refills     metoprolol (TOPROL-XL) 100 MG 24 hr tablet [Pharmacy Med Name: metoprolol succinate (TOPROL XL) 100 mg Sustained-Release tablet] 30 tablet 11     Sig: Take 1 tablet by mouth daily    Beta-Blockers Protocol Failed    11/3/2017  9:43 AM       Failed - Blood pressure under 140/90    BP Readings from Last 3 Encounters:   10/19/17 140/88   09/28/17 122/78   01/13/17 (!) 129/96                Passed - Patient is age 6 or older       Passed - Recent or future visit with authorizing provider's specialty    Patient had office visit in the last year or has a visit in the next 30 days with authorizing provider.  See chart review.               Routing refill request to provider for review/approval because:  Blood pressures are above goal.     Elizabeth Robles RN

## 2017-12-07 DIAGNOSIS — Z95.2 PULMONARY VALVE REPLACED: ICD-10-CM

## 2017-12-07 DIAGNOSIS — Z95.2 AORTIC VALVE PROSTHESIS PRESENT: Primary | ICD-10-CM

## 2017-12-07 RX ORDER — AMPICILLIN TRIHYDRATE 500 MG
CAPSULE ORAL
Qty: 4 CAPSULE | Refills: 2 | Status: SHIPPED | OUTPATIENT
Start: 2017-12-07 | End: 2018-05-08

## 2017-12-07 NOTE — TELEPHONE ENCOUNTER
Reason for Call:  Medication or medication refill:    Do you use a [facility]?  Name of the pharmacy and phone number for the current request:  [facility] - [phone]    Name of the medication requested: ampicillin (PRINCIPEN) 500 MG capsule needed for prophylactic dental work    Other request: would like to  today, has dental appointment tomorrow 12/8    Can we leave a detailed message on this number? YES    Phone number patient can be reached at: Cell number on file:    Telephone Information:   Mobile [phone]       Best Time: any    Call taken on 12/7/2017 at 1:38 PM by Mae Comer

## 2018-01-23 ENCOUNTER — TELEPHONE (OUTPATIENT)
Dept: FAMILY MEDICINE | Facility: CLINIC | Age: 63
End: 2018-01-23

## 2018-01-23 DIAGNOSIS — Z01.818 ENCOUNTER FOR PREOPERATIVE DENTAL EXAMINATION: Primary | ICD-10-CM

## 2018-01-23 RX ORDER — AMPICILLIN TRIHYDRATE 500 MG
CAPSULE ORAL
Qty: 20 CAPSULE | Refills: 0 | Status: SHIPPED | OUTPATIENT
Start: 2018-01-23 | End: 2018-05-08

## 2018-01-23 NOTE — TELEPHONE ENCOUNTER
Reason for Call:  Other prescription    Detailed comments: Pt need amoxicillin prescribed for his upcoming dental appt.  [facility]    Phone Number Patient can be reached at: Home number on file [phone] (home)    Best Time: any    Can we leave a detailed message on this number? YES    Call taken on 1/23/2018 at 1:24 PM by Sofy Friend

## 2018-02-05 DIAGNOSIS — Z95.2 PULMONARY VALVE REPLACED: ICD-10-CM

## 2018-02-05 DIAGNOSIS — Z95.2 AORTIC VALVE PROSTHESIS PRESENT: ICD-10-CM

## 2018-02-07 NOTE — TELEPHONE ENCOUNTER
Patient called back and said he usually only gets four pills prior to going to the dentist.  He was confused about the 20 pills.  He is asking that someone call him back as soon as possible.  Thanks.

## 2018-02-07 NOTE — TELEPHONE ENCOUNTER
Ampicillin  Medication was filled for 20 capsule on 1/23/18--want to confirm with pt that he has medication remaining.  Telephone call attempted to pt, no answer.  Left message that we were calling in regards to refill and asked for pt to return our call.    Rik Sherwood RN, BSN

## 2018-02-07 NOTE — TELEPHONE ENCOUNTER
Telephone call attempted to pt, no answer.  Left message to call back and ask for triage RN.    Rik Sherwood RN, BSN

## 2018-02-09 RX ORDER — AMPICILLIN TRIHYDRATE 500 MG
CAPSULE ORAL
Qty: 4 CAPSULE | Refills: 2 | OUTPATIENT
Start: 2018-02-09

## 2018-02-09 NOTE — TELEPHONE ENCOUNTER
Telephone call to pt, states that he received 20 pills and much prefers the 4 pills at a time with refills.  Advised pt unsure why med was ordered this way this time, but to specifically ask for 4 pill with refills next time.  Pt agrees to plan.  Refill refused.    Rik Sherwood RN, BSN

## 2018-04-26 ENCOUNTER — APPOINTMENT (OUTPATIENT)
Dept: CT IMAGING | Facility: CLINIC | Age: 63
End: 2018-04-26
Attending: EMERGENCY MEDICINE
Payer: COMMERCIAL

## 2018-04-26 ENCOUNTER — TELEPHONE (OUTPATIENT)
Dept: FAMILY MEDICINE | Facility: CLINIC | Age: 63
End: 2018-04-26

## 2018-04-26 ENCOUNTER — HOSPITAL ENCOUNTER (EMERGENCY)
Facility: CLINIC | Age: 63
Discharge: HOME | End: 2018-04-26
Attending: EMERGENCY MEDICINE | Admitting: EMERGENCY MEDICINE
Payer: COMMERCIAL

## 2018-04-26 VITALS
WEIGHT: 274 LBS | DIASTOLIC BLOOD PRESSURE: 75 MMHG | OXYGEN SATURATION: 97 % | BODY MASS INDEX: 39.22 KG/M2 | HEIGHT: 70 IN | SYSTOLIC BLOOD PRESSURE: 121 MMHG | RESPIRATION RATE: 16 BRPM | TEMPERATURE: 96 F

## 2018-04-26 DIAGNOSIS — R11.2 NON-INTRACTABLE VOMITING WITH NAUSEA, UNSPECIFIED VOMITING TYPE: ICD-10-CM

## 2018-04-26 DIAGNOSIS — R73.9 HYPERGLYCEMIA: ICD-10-CM

## 2018-04-26 DIAGNOSIS — R74.8 ELEVATED LIPASE: ICD-10-CM

## 2018-04-26 DIAGNOSIS — R42 DIZZINESS: ICD-10-CM

## 2018-04-26 LAB
ALBUMIN SERPL-MCNC: 4.2 G/DL (ref 3.4–5)
ALBUMIN UR-MCNC: NEGATIVE MG/DL
ALP SERPL-CCNC: 80 U/L (ref 40–150)
ALT SERPL W P-5'-P-CCNC: 53 U/L (ref 0–70)
ANION GAP SERPL CALCULATED.3IONS-SCNC: 10 MMOL/L (ref 3–14)
APPEARANCE UR: CLEAR
AST SERPL W P-5'-P-CCNC: 30 U/L (ref 0–45)
BASOPHILS # BLD AUTO: 0.1 10E9/L (ref 0–0.2)
BASOPHILS NFR BLD AUTO: 0.6 %
BILIRUB SERPL-MCNC: 0.5 MG/DL (ref 0.2–1.3)
BILIRUB UR QL STRIP: NEGATIVE
BUN SERPL-MCNC: 24 MG/DL (ref 7–30)
CALCIUM SERPL-MCNC: 9.4 MG/DL (ref 8.5–10.1)
CHLORIDE SERPL-SCNC: 106 MMOL/L (ref 94–109)
CO2 SERPL-SCNC: 24 MMOL/L (ref 20–32)
COLOR UR AUTO: ABNORMAL
CREAT SERPL-MCNC: 1.28 MG/DL (ref 0.66–1.25)
DIFFERENTIAL METHOD BLD: NORMAL
EOSINOPHIL # BLD AUTO: 0.3 10E9/L (ref 0–0.7)
EOSINOPHIL NFR BLD AUTO: 2.5 %
ERYTHROCYTE [DISTWIDTH] IN BLOOD BY AUTOMATED COUNT: 12.8 % (ref 10–15)
GFR SERPL CREATININE-BSD FRML MDRD: 57 ML/MIN/1.7M2
GLUCOSE SERPL-MCNC: 175 MG/DL (ref 70–99)
GLUCOSE UR STRIP-MCNC: 50 MG/DL
HBA1C MFR BLD: 7.3 % (ref 0–6.4)
HCT VFR BLD AUTO: 48.9 % (ref 40–53)
HGB BLD-MCNC: 17 G/DL (ref 13.3–17.7)
HGB UR QL STRIP: NEGATIVE
IMM GRANULOCYTES # BLD: 0 10E9/L (ref 0–0.4)
IMM GRANULOCYTES NFR BLD: 0.3 %
KETONES UR STRIP-MCNC: 5 MG/DL
LEUKOCYTE ESTERASE UR QL STRIP: NEGATIVE
LIPASE SERPL-CCNC: 959 U/L (ref 73–393)
LYMPHOCYTES # BLD AUTO: 3.4 10E9/L (ref 0.8–5.3)
LYMPHOCYTES NFR BLD AUTO: 31.1 %
MAGNESIUM SERPL-MCNC: 2.3 MG/DL (ref 1.6–2.3)
MCH RBC QN AUTO: 31.1 PG (ref 26.5–33)
MCHC RBC AUTO-ENTMCNC: 34.8 G/DL (ref 31.5–36.5)
MCV RBC AUTO: 89 FL (ref 78–100)
MONOCYTES # BLD AUTO: 1.1 10E9/L (ref 0–1.3)
MONOCYTES NFR BLD AUTO: 10.1 %
MUCOUS THREADS #/AREA URNS LPF: PRESENT /LPF
NEUTROPHILS # BLD AUTO: 6 10E9/L (ref 1.6–8.3)
NEUTROPHILS NFR BLD AUTO: 55.4 %
NITRATE UR QL: NEGATIVE
PH UR STRIP: 6 PH (ref 5–7)
PLATELET # BLD AUTO: 179 10E9/L (ref 150–450)
POTASSIUM SERPL-SCNC: 3.9 MMOL/L (ref 3.4–5.3)
PROT SERPL-MCNC: 8.1 G/DL (ref 6.8–8.8)
RBC # BLD AUTO: 5.47 10E12/L (ref 4.4–5.9)
RBC #/AREA URNS AUTO: 1 /HPF (ref 0–2)
SODIUM SERPL-SCNC: 140 MMOL/L (ref 133–144)
SOURCE: ABNORMAL
SP GR UR STRIP: 1.04 (ref 1–1.03)
TROPONIN I SERPL-MCNC: <0.015 UG/L (ref 0–0.04)
UROBILINOGEN UR STRIP-MCNC: 0 MG/DL (ref 0–2)
WBC # BLD AUTO: 10.9 10E9/L (ref 4–11)
WBC #/AREA URNS AUTO: 1 /HPF (ref 0–5)

## 2018-04-26 PROCEDURE — 80053 COMPREHEN METABOLIC PANEL: CPT | Performed by: EMERGENCY MEDICINE

## 2018-04-26 PROCEDURE — 96374 THER/PROPH/DIAG INJ IV PUSH: CPT | Performed by: EMERGENCY MEDICINE

## 2018-04-26 PROCEDURE — 96361 HYDRATE IV INFUSION ADD-ON: CPT | Performed by: EMERGENCY MEDICINE

## 2018-04-26 PROCEDURE — 99285 EMERGENCY DEPT VISIT HI MDM: CPT | Mod: 25 | Performed by: EMERGENCY MEDICINE

## 2018-04-26 PROCEDURE — 93005 ELECTROCARDIOGRAM TRACING: CPT | Performed by: EMERGENCY MEDICINE

## 2018-04-26 PROCEDURE — 96375 TX/PRO/DX INJ NEW DRUG ADDON: CPT | Performed by: EMERGENCY MEDICINE

## 2018-04-26 PROCEDURE — 83690 ASSAY OF LIPASE: CPT | Performed by: EMERGENCY MEDICINE

## 2018-04-26 PROCEDURE — 74177 CT ABD & PELVIS W/CONTRAST: CPT

## 2018-04-26 PROCEDURE — 36415 COLL VENOUS BLD VENIPUNCTURE: CPT | Performed by: EMERGENCY MEDICINE

## 2018-04-26 PROCEDURE — 25000128 H RX IP 250 OP 636: Performed by: EMERGENCY MEDICINE

## 2018-04-26 PROCEDURE — 81001 URINALYSIS AUTO W/SCOPE: CPT | Performed by: EMERGENCY MEDICINE

## 2018-04-26 PROCEDURE — 93010 ELECTROCARDIOGRAM REPORT: CPT | Mod: Z6 | Performed by: EMERGENCY MEDICINE

## 2018-04-26 PROCEDURE — 83735 ASSAY OF MAGNESIUM: CPT | Performed by: EMERGENCY MEDICINE

## 2018-04-26 PROCEDURE — 25000125 ZZHC RX 250: Performed by: EMERGENCY MEDICINE

## 2018-04-26 PROCEDURE — 83036 HEMOGLOBIN GLYCOSYLATED A1C: CPT | Performed by: EMERGENCY MEDICINE

## 2018-04-26 PROCEDURE — 85025 COMPLETE CBC W/AUTO DIFF WBC: CPT | Performed by: EMERGENCY MEDICINE

## 2018-04-26 PROCEDURE — 84484 ASSAY OF TROPONIN QUANT: CPT | Performed by: EMERGENCY MEDICINE

## 2018-04-26 RX ORDER — ONDANSETRON 4 MG/1
4 TABLET, ORALLY DISINTEGRATING ORAL ONCE
Status: COMPLETED | OUTPATIENT
Start: 2018-04-26 | End: 2018-04-26

## 2018-04-26 RX ORDER — LORAZEPAM 2 MG/ML
1 INJECTION INTRAMUSCULAR ONCE
Status: COMPLETED | OUTPATIENT
Start: 2018-04-26 | End: 2018-04-26

## 2018-04-26 RX ORDER — ONDANSETRON 4 MG/1
4 TABLET, ORALLY DISINTEGRATING ORAL EVERY 6 HOURS PRN
Qty: 10 TABLET | Refills: 0 | Status: SHIPPED | OUTPATIENT
Start: 2018-04-26 | End: 2019-02-23

## 2018-04-26 RX ORDER — ONDANSETRON 2 MG/ML
4 INJECTION INTRAMUSCULAR; INTRAVENOUS EVERY 30 MIN PRN
Status: DISCONTINUED | OUTPATIENT
Start: 2018-04-26 | End: 2018-04-26 | Stop reason: HOSPADM

## 2018-04-26 RX ORDER — IOPAMIDOL 755 MG/ML
500 INJECTION, SOLUTION INTRAVASCULAR ONCE
Status: COMPLETED | OUTPATIENT
Start: 2018-04-26 | End: 2018-04-26

## 2018-04-26 RX ADMIN — IOPAMIDOL 100 ML: 755 INJECTION, SOLUTION INTRAVENOUS at 19:13

## 2018-04-26 RX ADMIN — LORAZEPAM 1 MG: 2 INJECTION INTRAMUSCULAR; INTRAVENOUS at 17:26

## 2018-04-26 RX ADMIN — ONDANSETRON 4 MG: 4 TABLET, ORALLY DISINTEGRATING ORAL at 20:21

## 2018-04-26 RX ADMIN — SODIUM CHLORIDE 1000 ML: 9 INJECTION, SOLUTION INTRAVENOUS at 17:22

## 2018-04-26 RX ADMIN — ONDANSETRON 4 MG: 2 INJECTION INTRAMUSCULAR; INTRAVENOUS at 17:23

## 2018-04-26 RX ADMIN — SODIUM CHLORIDE 60 ML: 9 INJECTION, SOLUTION INTRAVENOUS at 19:13

## 2018-04-26 NOTE — ED AVS SNAPSHOT
[facility] Emergency Department    [address] [place] [address]    Phone:  [phone]    Fax:  [phone]                                       Damon Manley   MRN: [id number]    Department:  [facility] Emergency Department   Date of Visit:  4/26/2018           Patient Information     Date Of Birth          1955        Your diagnoses for this visit were:     Non-intractable vomiting with nausea, unspecified vomiting type     Dizziness possible vertigo    Elevated lipase     Hyperglycemia        You were seen by Danielle Hui MD.      Follow-up Information     Schedule an appointment as soon as possible for a visit with Kris Weems MD.    Specialty:  Family Practice    Contact information:    [address] [place] [address] [phone]          Discharge Instructions       Drink plenty of fluids and rest.    Please follow-up with Dr. Weems.  I am hoping you can see him in the next week or two.  I would like you to have repeat blood testing.    If you have any worsening, changes or concerns, return to the ED at any time.    I hope you stay well and have a good weekend!!    24 Hour Appointment Hotline       To make an appointment at any [facility]3], call [phone] ([phone]). If you don't have a family doctor or clinic, we will help you find one. [facility] are conveniently located to serve the needs of you and your family.             Review of your medicines      START taking        Dose / Directions Last dose taken    ondansetron 4 MG ODT tab   Commonly known as:  ZOFRAN ODT   Dose:  4 mg   Quantity:  10 tablet        Take 1 tablet (4 mg) by mouth every 6 hours as needed for nausea or vomiting   Refills:  0          Our records show that you are taking the medicines listed below. If these are incorrect, please call your family doctor or clinic.        Dose / Directions Last dose taken    * ampicillin 500 MG capsule   Commonly  known as:  PRINCIPEN   Quantity:  4 capsule        4 capsules 1 hour prior to dental work   Refills:  2        * ampicillin 500 MG capsule   Commonly known as:  PRINCIPEN   Quantity:  20 capsule        4 caps one hour prior to dental work   Refills:  0        aspirin 81 MG tablet   Dose:  81 mg        Take 1 tablet (81 mg) by mouth daily   Refills:  0        losartan 100 MG tablet   Commonly known as:  COZAAR   Dose:  100 mg   Quantity:  90 tablet        Take 1 tablet (100 mg) by mouth daily   Refills:  3        * metoprolol succinate 100 MG 24 hr tablet   Commonly known as:  TOPROL-XL   Quantity:  30 tablet        Take 1 tablet (100mg) by mouth daily   Refills:  2        * metoprolol succinate 100 MG 24 hr tablet   Commonly known as:  TOPROL-XL   Quantity:  30 tablet        Take 1 tablet by mouth daily   Refills:  11        * Notice:  This list has 4 medication(s) that are the same as other medications prescribed for you. Read the directions carefully, and ask your doctor or other care provider to review them with you.            Prescriptions were sent or printed at these locations (1 Prescription)                   [facility] [place] [address] [address]    [address] , [facility] [address]    Telephone:  [phone]   Fax:  [phone]   Hours:                  E-Prescribed (1 of 1)         ondansetron (ZOFRAN ODT) 4 MG ODT tab                Procedures and tests performed during your visit     CBC with platelets differential    CT Abdomen Pelvis w Contrast    Comprehensive metabolic panel    EKG 12-lead, tracing only    Give 20 ounces of water 15 minutes before CT of abdomen    Hemoglobin A1c    Lipase    Magnesium    Peripheral IV catheter    Troponin I    UA with Microscopic      Orders Needing Specimen Collection     None      Pending Results     Date and Time Order Name Status Description    4/26/2018 2001 UA with Microscopic In process             Pending Culture Results     Date  "and Time Order Name Status Description    2018 2001 UA with Microscopic In process             Pending Results Instructions     If you had any lab results that were not finalized at the time of your Discharge, you can call the ED Lab Result RN at [phone]. You will be contacted by this team for any positive Lab results or changes in treatment. The nurses are available 7 days a week from 10A to 6:30P.  You can leave a message 24 hours per day and they will return your call.        Thank you for choosing [facility]       Thank you for choosing [facility] for your care. Our goal is always to provide you with excellent care. Hearing back from our patients is one way we can continue to improve our services. Please take a few minutes to complete the written survey that you may receive in the mail after you visit with us. Thank you!        [employer] Information     [email or web address] lets you send messages to your doctor, view your test results, renew your prescriptions, schedule appointments and more. To sign up, go to [email or web address] . Click on \"Log in\" on the left side of the screen, which will take you to the Welcome page. Then click on \"Sign up Now\" on the right side of the page.     You will be asked to enter the access code listed below, as well as some personal information. Please follow the directions to create your username and password.     Your access code is: [email or web address]  Expires: 2018  8:12 PM     Your access code will  in 90 days. If you need help or a new code, please call your [facility] clinic or [phone].        Care EveryWhere ID     This is your Care EveryWhere ID. This could be used by other organizations to access your [facility] medical records  [id number]        Equal Access to Services     PEPE ASCENCIO : Soha Aly, joseph moraes, carmelita scott. So [facility] [phone].    ATENCIÓN: Si habla español, tiene a givens " disposición servicios gratuitos de asistencia lingüística. Claudio al [phone].    We comply with applicable federal civil rights laws and [place] laws. We do not discriminate on the basis of race, color, national origin, age, disability, sex, sexual orientation, or gender identity.            After Visit Summary       This is your record. Keep this with you and show to your community pharmacist(s) and doctor(s) at your next visit.

## 2018-04-26 NOTE — ED AVS SNAPSHOT
[facility] Emergency Department    [address] [place] [address]    Phone:  [phone]    Fax:  [phone]                                       Damon Manley   MRN: [id number]    Department:  [facility] Emergency Department   Date of Visit:  4/26/2018           After Visit Summary Signature Page     I have received my discharge instructions, and my questions have been answered. I have discussed any challenges I see with this plan with the nurse or doctor.    ..........................................................................................................................................  Patient/Patient Representative Signature      ..........................................................................................................................................  Patient Representative Print Name and Relationship to Patient    ..................................................               ................................................  Date                                            Time    ..........................................................................................................................................  Reviewed by Signature/Title    ...................................................              ..............................................  Date                                                            Time

## 2018-04-26 NOTE — ED TRIAGE NOTES
Pt presents to ED via EMS with N&V. 4mg given en route. Patient had sudden onset of N&V at baseball game. Was not outside long.

## 2018-04-27 NOTE — TELEPHONE ENCOUNTER
Patient was seen by Dr Hui on 04.26.18. She would like him to see you for a follow up sometime the week of May 7th. Your schedule is full for that week. Please contact patient as to where you would be able to work him in.    Thank you    Angela BARON  [facility] Emergency Dept  [phone]

## 2018-04-27 NOTE — DISCHARGE INSTRUCTIONS
Drink plenty of fluids and rest.    Please follow-up with Dr. Weems.  I am hoping you can see him in the next week or two.  I would like you to have repeat blood testing.    If you have any worsening, changes or concerns, return to the ED at any time.    I hope you stay well and have a good weekend!!

## 2018-04-27 NOTE — ED PROVIDER NOTES
History     Chief Complaint   Patient presents with     Nausea & Vomiting     The history is provided by the patient, medical records and the EMS personnel.     This is a 63-year-old male with history of aortic and pulmonary valve replacement secondary to rheumatic fever, hypertension, GERD, presented with nausea and vomiting.  Patient states that he was feeling well this morning.  He notes that he works overnight and stopped at [employer] on the way home.  He went to the gym and did some walking and swimming without difficulty.  He felt a little dizziness when he stopped at [facility].  He went home and worked with his wife, painting the ceiling.  He ate some leftover pork and then went to his granddaughter's baseball game.  He states he was just sitting and watching when he had acute onset of feeling unwell.  He states that he became off balance and quite dizzy and fell to the side.  Medics were called.  He then started having significant nausea and vomiting.  He denies any headache.  No chest pain.  He is not having any shortness of breath.  He has been having normal bowel movements with no blackness or blood.  He has had a recent cold with nasal stuffiness.  No fevers or chills although he was noted to have significant diaphoresis and medics thought he seemed overheated.  He has not had any urinary symptoms.  No weakness, numbness or tingling.    Problem List:    Patient Active Problem List    Diagnosis Date Noted     Aortic valve prosthesis present 09/28/2017     Priority: Medium     Pulmonary valve replaced 09/28/2017     Priority: Medium     Aortic valve replaced 09/28/2017     Priority: Medium     ARIAS (dyspnea on exertion) 09/28/2017     Priority: Medium     SOB (shortness of breath) 09/28/2017     Priority: Medium     Elevated LFTs 09/28/2017     Priority: Medium     Essential hypertension with goal blood pressure less than 140/90 06/15/2016     Priority: Medium     Advanced directives, counseling/discussion  03/05/2013     Priority: Medium     Abnormal glucose 03/09/2011     Priority: Medium     Problem list name updated by automated process. Provider to review       CARDIOVASCULAR SCREENING; LDL GOAL LESS THAN 130 10/31/2010     Priority: Medium     GERD (gastroesophageal reflux disease) 02/10/2010     Priority: Medium        Past Medical History:    Past Medical History:   Diagnosis Date     Coronary artery disease      Depressive disorder      Hypertension      Motion sickness      Obesity (BMI 30-39.9)        Past Surgical History:    Past Surgical History:   Procedure Laterality Date     C ANESTH,DX ARTHROSCOPIC PROC KNEE JOINT  12/12/05    Left knee with partial medial meniscectomy.     C ANESTH,OPEN HEART; W/O PUMP OXYEGENATOR  1998     COLONOSCOPY  05/09/2007    Colon polyps.     COLONOSCOPY N/A 1/13/2017    Procedure: COMBINED COLONOSCOPY, SINGLE OR MULTIPLE BIOPSY/POLYPECTOMY BY BIOPSY;  Surgeon: Dejan Mckee MD;  Location: [facility]     HC KNEE SCOPE,MED/LAT MENISECTOMY  09/05/2007    Partial medial meniscectomy, both knees.       Family History:    Family History   Problem Relation Age of Onset     DIABETES No family hx of      Coronary Artery Disease No family hx of      Hypertension No family hx of      Hyperlipidemia No family hx of      CEREBROVASCULAR DISEASE No family hx of      Other Cancer Father      lung     Other Cancer Mother      lung     Other Cancer Paternal Aunt      unsure       Social History:  Marital Status:   [2]  Social History   Substance Use Topics     Smoking status: Former Smoker     Years: 15.00     Quit date: 4/1/1988     Smokeless tobacco: Never Used     Alcohol use 0.0 oz/week     0 Standard drinks or equivalent per week      Comment: social - glass of wine socially        Medications:      ondansetron (ZOFRAN ODT) 4 MG ODT tab   ampicillin (PRINCIPEN) 500 MG capsule   ampicillin (PRINCIPEN) 500 MG capsule   aspirin 81 MG tablet   losartan (COZAAR) 100 MG tablet  "  metoprolol (TOPROL-XL) 100 MG 24 hr tablet   metoprolol (TOPROL-XL) 100 MG 24 hr tablet         Review of Systems   All other ROS reviewed and are negative or non-contributory except as stated in HPI.     Physical Exam   BP: 115/75  Heart Rate: 59  Temp: 96  F (35.6  C)  Resp: 18  Height: 177.8 cm (5' 10\")  Weight: 124.3 kg (274 lb)  SpO2: 98 %      Physical Exam   Constitutional: He is oriented to person, place, and time. He appears well-developed and well-nourished.   Patient arrived with medics on the cart.  He is a large man, extremely diaphoretic, retching and vomiting loudly.   HENT:   Head: Normocephalic.   Nose: Nose normal.   Mouth/Throat: Oropharynx is clear and moist.   Eyes: Conjunctivae and EOM are normal. Pupils are equal, round, and reactive to light.   Neck: Normal range of motion. Neck supple.   Cardiovascular: Normal rate, regular rhythm, normal heart sounds and intact distal pulses.    Pulmonary/Chest: Effort normal and breath sounds normal.   Abdominal: Soft. There is no tenderness.   Musculoskeletal: Normal range of motion. He exhibits no edema.   Neurological: He is alert and oriented to person, place, and time. No cranial nerve deficit. He exhibits normal muscle tone.   Skin: He is diaphoretic.   Flushed   Psychiatric: He has a normal mood and affect. His behavior is normal.   Vitals reviewed.      ED Course (with Medical Decision Making)    Pt seen and examined by me.  RN and EPIC notes reviewed.      Patient with acute onset of dizziness, nausea and vomiting.  No chest pain.  He does have a cardiac history.  This may be cardiac event, foodborne illness, viral gastroenteritis, vertigo, numerous other causes.  EKG, IV, labs, fluids, nausea medications.    Patient had improvement of his nausea and dizziness with Zofran and Ativan.  CBC is normal.  Chemistry panel with elevated glucose, slightly elevated BUN/creatinine.  Lipase is elevated although he denies any abdominal pain.  I did add a " hemoglobin A1c.  Because of the elevated lipase, CT scan of the abdomen was done.  Urine was also checked and he is obviously dehydrated.  He received 2 L of fluid.    CT scan does not show any intra-abdominal abnormalities.    After fluids, medication and rest, patient noted no further dizziness.  He still feels a little queasy.  He was able to get up and ambulate to the bathroom without difficulty.  I am wondering if he was having an acute vertigo attack versus other cause.  I do not see any focal neurologic deficits to suggest stroke.  His hemoglobin A1c is elevated.  I plan is to discharge him home with family.  He was given Zofran as needed for nausea.  I would like him to have follow-up appointment with primary care provider although Dr. Weems is out of the office for the next week.  Hopefully can be seen the following week to recheck the lipase and evaluate for diabetes treatment.    In the meantime, if patient has any worsening or changes return at any time.       Procedures       EKG Interpretation:      Interpreted by Danielle Hiu  Time reviewed: 1744  Symptoms at time of EKG: Nausea and dizziness  Rhythm: sinus bradycardia and 1 degree AV block  Rate: 58  Axis: Left Axis Deviation  Ectopy: none  Conduction: left anterior fasciclar block, 1st degree AV block and poor R-wave progression  ST Segments/ T Waves: Anterolateral ST elevation read by machine, repolarization variant  Q Waves: none  Comparison to prior: 2011 EKG with first-degree AV block    Clinical Impression: non-specific EKG, sinus bradycardia and 1st degree AV block    Results for orders placed or performed during the hospital encounter of 04/26/18 (from the past 24 hour(s))   CBC with platelets differential   Result Value Ref Range    WBC 10.9 4.0 - 11.0 10e9/L    RBC Count 5.47 4.4 - 5.9 10e12/L    Hemoglobin 17.0 13.3 - 17.7 g/dL    Hematocrit 48.9 40.0 - 53.0 %    MCV 89 78 - 100 fl    MCH 31.1 26.5 - 33.0 pg    MCHC 34.8 31.5 - 36.5  g/dL    RDW 12.8 10.0 - 15.0 %    Platelet Count 179 150 - 450 10e9/L    Diff Method Automated Method     % Neutrophils 55.4 %    % Lymphocytes 31.1 %    % Monocytes 10.1 %    % Eosinophils 2.5 %    % Basophils 0.6 %    % Immature Granulocytes 0.3 %    Absolute Neutrophil 6.0 1.6 - 8.3 10e9/L    Absolute Lymphocytes 3.4 0.8 - 5.3 10e9/L    Absolute Monocytes 1.1 0.0 - 1.3 10e9/L    Absolute Eosinophils 0.3 0.0 - 0.7 10e9/L    Absolute Basophils 0.1 0.0 - 0.2 10e9/L    Abs Immature Granulocytes 0.0 0 - 0.4 10e9/L   Comprehensive metabolic panel   Result Value Ref Range    Sodium 140 133 - 144 mmol/L    Potassium 3.9 3.4 - 5.3 mmol/L    Chloride 106 94 - 109 mmol/L    Carbon Dioxide 24 20 - 32 mmol/L    Anion Gap 10 3 - 14 mmol/L    Glucose 175 (H) 70 - 99 mg/dL    Urea Nitrogen 24 7 - 30 mg/dL    Creatinine 1.28 (H) 0.66 - 1.25 mg/dL    GFR Estimate 57 (L) >60 mL/min/1.7m2    GFR Estimate If Black 69 >60 mL/min/1.7m2    Calcium 9.4 8.5 - 10.1 mg/dL    Bilirubin Total 0.5 0.2 - 1.3 mg/dL    Albumin 4.2 3.4 - 5.0 g/dL    Protein Total 8.1 6.8 - 8.8 g/dL    Alkaline Phosphatase 80 40 - 150 U/L    ALT 53 0 - 70 U/L    AST 30 0 - 45 U/L   Lipase   Result Value Ref Range    Lipase 959 (H) 73 - 393 U/L   Magnesium   Result Value Ref Range    Magnesium 2.3 1.6 - 2.3 mg/dL   Troponin I   Result Value Ref Range    Troponin I ES <0.015 0.000 - 0.045 ug/L   Hemoglobin A1c   Result Value Ref Range    Hemoglobin A1C 7.3 (H) 0 - 6.4 %   CT Abdomen Pelvis w Contrast    Narrative    CT ABDOMEN PELVIS W CONTRAST 4/26/2018 7:20 PM    HISTORY: Nausea and vomiting.     COMPARISON: None.    TECHNIQUE:  Abdomen and pelvis CT was performed following intravenous  administration of 100mls Isovue 370.  Radiation dose for this scan was  reduced using automated exposure control, adjustment of the mA and/or  kV according to patient size, or iterative reconstruction technique.    FINDINGS: Lung bases are clear.    Gallbladder is collapsed. Liver,  spleen, pancreas, adrenal glands and  kidneys appear normal.    The bowel has normal caliber. Appendix is visualized and normal. No  free air. No free or loculated fluid collection.    Urinary bladder is distended with normal wall thickness. No free fluid  in the pelvis.      Impression    IMPRESSION: No acute abnormality in the abdomen or pelvis.    NADYA GIRALDO MD   UA with Microscopic   Result Value Ref Range    Color Urine Straw     Appearance Urine Clear     Glucose Urine 50 (A) NEG^Negative mg/dL    Bilirubin Urine Negative NEG^Negative    Ketones Urine 5 (A) NEG^Negative mg/dL    Specific Gravity Urine 1.040 (H) 1.003 - 1.035    Blood Urine Negative NEG^Negative    pH Urine 6.0 5.0 - 7.0 pH    Protein Albumin Urine Negative NEG^Negative mg/dL    Urobilinogen mg/dL 0.0 0.0 - 2.0 mg/dL    Nitrite Urine Negative NEG^Negative    Leukocyte Esterase Urine Negative NEG^Negative    Source Midstream Urine     WBC Urine 1 0 - 5 /HPF    RBC Urine 1 0 - 2 /HPF    Mucous Urine Present (A) NEG^Negative /LPF       Medications   0.9% sodium chloride BOLUS (0 mLs Intravenous Stopped 4/26/18 1953)   LORazepam (ATIVAN) injection 1 mg (1 mg Intravenous Given 4/26/18 1726)   sodium chloride 0.9 % bag 500mL for CT scan flush use (60 mLs Intravenous Given 4/26/18 1913)   sodium chloride (PF) 0.9% PF flush 3 mL (3 mLs Intracatheter Given 4/26/18 1912)   iopamidol (ISOVUE-370) solution 500 mL (100 mLs Intravenous Given 4/26/18 1913)   ondansetron (ZOFRAN-ODT) ODT tab 4 mg (4 mg Oral Given 4/26/18 2021)       Assessments & Plan     I have reviewed the findings, diagnosis, plan and need for follow up with the patient.  Discharge Medication List as of 4/26/2018  8:19 PM      START taking these medications    Details   ondansetron (ZOFRAN ODT) 4 MG ODT tab Take 1 tablet (4 mg) by mouth every 6 hours as needed for nausea or vomiting, Disp-10 tablet, R-0, E-Prescribe             Final diagnoses:   Non-intractable vomiting with nausea,  unspecified vomiting type   Dizziness - possible vertigo   Elevated lipase   Hyperglycemia     Disposition: Patient discharged home in stable condition.  Plan as above.  Return for concerns.     Note: Chart documentation done in part with Dragon Voice Recognition software. Although reviewed after completion, some word and grammatical errors may remain.     4/26/2018   [facility] EMERGENCY DEPARTMENT     Danielle Hui MD  04/27/18 0122

## 2018-05-08 ENCOUNTER — TELEPHONE (OUTPATIENT)
Dept: FAMILY MEDICINE | Facility: CLINIC | Age: 63
End: 2018-05-08

## 2018-05-08 ENCOUNTER — OFFICE VISIT (OUTPATIENT)
Dept: FAMILY MEDICINE | Facility: CLINIC | Age: 63
End: 2018-05-08
Payer: COMMERCIAL

## 2018-05-08 VITALS
TEMPERATURE: 96.6 F | BODY MASS INDEX: 38.94 KG/M2 | DIASTOLIC BLOOD PRESSURE: 60 MMHG | WEIGHT: 271.38 LBS | HEART RATE: 67 BPM | RESPIRATION RATE: 11 BRPM | SYSTOLIC BLOOD PRESSURE: 112 MMHG | OXYGEN SATURATION: 98 %

## 2018-05-08 DIAGNOSIS — R74.8 ELEVATED LIPASE: ICD-10-CM

## 2018-05-08 DIAGNOSIS — R55 NEAR SYNCOPE: ICD-10-CM

## 2018-05-08 DIAGNOSIS — Z95.2 AORTIC VALVE PROSTHESIS PRESENT: ICD-10-CM

## 2018-05-08 DIAGNOSIS — Z95.2 PULMONARY VALVE REPLACED: ICD-10-CM

## 2018-05-08 DIAGNOSIS — R42 DIZZINESS: Primary | ICD-10-CM

## 2018-05-08 LAB
ALBUMIN SERPL-MCNC: 4 G/DL (ref 3.4–5)
ALP SERPL-CCNC: 74 U/L (ref 40–150)
ALT SERPL W P-5'-P-CCNC: 54 U/L (ref 0–70)
AMYLASE SERPL-CCNC: 167 U/L (ref 30–110)
ANION GAP SERPL CALCULATED.3IONS-SCNC: 7 MMOL/L (ref 3–14)
AST SERPL W P-5'-P-CCNC: 32 U/L (ref 0–45)
BILIRUB SERPL-MCNC: 0.9 MG/DL (ref 0.2–1.3)
BUN SERPL-MCNC: 18 MG/DL (ref 7–30)
CALCIUM SERPL-MCNC: 8.9 MG/DL (ref 8.5–10.1)
CHLORIDE SERPL-SCNC: 107 MMOL/L (ref 94–109)
CO2 SERPL-SCNC: 26 MMOL/L (ref 20–32)
CREAT SERPL-MCNC: 0.93 MG/DL (ref 0.66–1.25)
GFR SERPL CREATININE-BSD FRML MDRD: 83 ML/MIN/1.7M2
GLUCOSE SERPL-MCNC: 137 MG/DL (ref 70–99)
LIPASE SERPL-CCNC: 3349 U/L (ref 73–393)
POTASSIUM SERPL-SCNC: 4.2 MMOL/L (ref 3.4–5.3)
PROT SERPL-MCNC: 7.7 G/DL (ref 6.8–8.8)
SODIUM SERPL-SCNC: 140 MMOL/L (ref 133–144)

## 2018-05-08 PROCEDURE — 36415 COLL VENOUS BLD VENIPUNCTURE: CPT | Performed by: FAMILY MEDICINE

## 2018-05-08 PROCEDURE — 99214 OFFICE O/P EST MOD 30 MIN: CPT | Performed by: FAMILY MEDICINE

## 2018-05-08 PROCEDURE — 82150 ASSAY OF AMYLASE: CPT | Performed by: FAMILY MEDICINE

## 2018-05-08 PROCEDURE — 83690 ASSAY OF LIPASE: CPT | Performed by: FAMILY MEDICINE

## 2018-05-08 PROCEDURE — 80053 COMPREHEN METABOLIC PANEL: CPT | Performed by: FAMILY MEDICINE

## 2018-05-08 RX ORDER — AMPICILLIN TRIHYDRATE 500 MG
CAPSULE ORAL
Qty: 4 CAPSULE | Refills: 2 | Status: SHIPPED | OUTPATIENT
Start: 2018-05-08 | End: 2019-05-15

## 2018-05-08 ASSESSMENT — PAIN SCALES - GENERAL: PAINLEVEL: MILD PAIN (2)

## 2018-05-08 NOTE — TELEPHONE ENCOUNTER
Called to notify Damon that per Dr. Weems he needs to see GI due to elevated lipase and amylase. Referral was placed and patient notified they will call him to schedule. He also needs to see cardiology. Referral was placed and patient given number to schedule. OSMIN/BOB

## 2018-05-08 NOTE — MR AVS SNAPSHOT
After Visit Summary   5/8/2018    Damon Manley    MRN: [id number]           Patient Information     Date Of Birth          1955        Visit Information        Provider Department      5/8/2018 10:00 AM Kris Weems MD [facility]        Today's Diagnoses     Dizziness    -  1    Aortic valve prosthesis present        Pulmonary valve replaced        Elevated lipase           Follow-ups after your visit        Your next 10 appointments already scheduled     May 10, 2018  8:45 AM CDT   MR BRAIN W/O & W CONTRAST with PHMR1   [facility] ([facility])    [address] [place] [id number] [phone]           Take your medicines as usual, unless your doctor tells you not to. Bring a list of your current medicines to your exam (including vitamins, minerals and over-the-counter drugs).  You may or may not receive intravenous (IV) contrast for this exam pending the discretion of the Radiologist.  You do not need to do anything special to prepare.  The MRI machine uses a strong magnet. Please wear clothes without metal (snaps, zippers). A sweatsuit works well, or we may give you a hospital gown.  Please remove any body piercings and hair extensions before you arrive. You will also remove watches, jewelry, hairpins, wallets, dentures, partial dental plates and hearing aids. You may wear contact lenses, and you may be able to wear your rings. We have a safe place to keep your personal items, but it is safer to leave them at home.  **IMPORTANT** THE INSTRUCTIONS BELOW ARE ONLY FOR THOSE PATIENTS WHO HAVE BEEN PRESCRIBED SEDATION OR GENERAL ANESTHESIA DURING THEIR MRI PROCEDURE:  IF YOUR DOCTOR PRESCRIBED ORAL SEDATION (take medicine to help you relax during your exam):   You must get the medicine from your doctor (oral medication) before you arrive. Bring the medicine to the exam. Do not take it at home. You ll be told when to take it upon  arriving for your exam.   Arrive one hour early. Bring someone who can take you home after the test. Your medicine will make you sleepy. After the exam, you may not drive, take a bus or take a taxi by yourself.  IF YOUR DOCTOR PRESCRIBED IV SEDATION:   Arrive one hour early. Bring someone who can take you home after the test. Your medicine will make you sleepy. After the exam, you may not drive, take a bus or take a taxi by yourself.   No eating 6 hours before your exam. You may have clear liquids up until 4 hours before your exam. (Clear liquids include water, clear tea, black coffee and fruit juice without pulp.)  IF YOUR DOCTOR PRESCRIBED ANESTHESIA (be asleep for your exam):   Arrive 1 1/2 hours early. Bring someone who can take you home after the test. You may not drive, take a bus or take a taxi by yourself.   No eating 8 hours before your exam. You may have clear liquids up until 4 hours before your exam. (Clear liquids include water, clear tea, black coffee and fruit juice without pulp.)   You will spend four to five hours in the recovery room.  Please call the Imaging Department at your exam site with any questions.            May 11, 2018  8:00 AM CDT   Event Monitor with [facility] EVENT/HOLTER MONITOR   [facility] ([facility])    [address] [place] [id number] [phone]              Future tests that were ordered for you today     Open Future Orders        Priority Expected Expires Ordered    Cardiac Event Monitor - Peds/Adult Routine  6/22/2018 5/8/2018    MR Brain w/o & w Contrast Routine  5/8/2019 5/8/2018            Who to contact     If you have questions or need follow up information about today's clinic visit or your schedule please contact [facility] directly at [phone].  Normal or non-critical lab and imaging results will be communicated to you by MyChart, letter or phone within 4 business days after the clinic has  "received the results. If you do not hear from us within 7 days, please contact the clinic through [email or web address] or phone. If you have a critical or abnormal lab result, we will notify you by phone as soon as possible.  Submit refill requests through [email or web address] or call your pharmacy and they will forward the refill request to us. Please allow 3 business days for your refill to be completed.          Additional Information About Your Visit        [email or web address] Information     [email or web address] lets you send messages to your doctor, view your test results, renew your prescriptions, schedule appointments and more. To sign up, go to [email or web address] . Click on \"Log in\" on the left side of the screen, which will take you to the Welcome page. Then click on \"Sign up Now\" on the right side of the page.     You will be asked to enter the access code listed below, as well as some personal information. Please follow the directions to create your username and password.     Your access code is: [email or web address]  Expires: 2018  8:12 PM     Your access code will  in 90 days. If you need help or a new code, please call your [place] clinic or [phone].        Care EveryWhere ID     This is your Care EveryWhere ID. This could be used by other organizations to access your [place] medical records  [id number]        Your Vitals Were     Pulse Temperature Respirations Pulse Oximetry BMI (Body Mass Index)       67 96.6  F (35.9  C) (Tympanic) 11 98% 38.94 kg/m2        Blood Pressure from Last 3 Encounters:   18 112/60   18 121/75   10/19/17 140/88    Weight from Last 3 Encounters:   18 271 lb 6 oz (123.1 kg)   18 274 lb (124.3 kg)   10/19/17 286 lb 11.2 oz (130 kg)              We Performed the Following     Amylase     Comprehensive metabolic panel (BMP + Alb, Alk Phos, ALT, AST, Total. Bili, TP)     Lipase          Where to get your medicines      These medications were sent to [facility] [address] [place] [address] - [address]  [address], [facility] [address]     Phone:  [phone]     ampicillin 500 MG capsule          Primary Care Provider Office Phone # Fax #    Kris Weems [phone] [address] [place] [address]        Equal Access to Services     BRENT ASCENCIO : Hadii aad ku hadasho Soomaali, waaxda luqadaha, qaybta kaalmada adeegyada, waxay idiin hayaan adeeg kharash la'aan ah. So [facility] [phone].    ATENCIÓN: Si habla español, tiene a givens disposición servicios gratuitos de asistencia lingüística. Llame al [phone].    We comply with applicable federal civil rights laws and [place] laws. We do not discriminate on the basis of race, color, national origin, age, disability, sex, sexual orientation, or gender identity.            Thank you!     Thank you for choosing [facility]  for your care. Our goal is always to provide you with excellent care. Hearing back from our patients is one way we can continue to improve our services. Please take a few minutes to complete the written survey that you may receive in the mail after your visit with us. Thank you!             Your Updated Medication List - Protect others around you: Learn how to safely use, store and throw away your medicines at www.disposemymeds.org.          This list is accurate as of 5/8/18 10:36 AM.  Always use your most recent med list.                   Brand Name Dispense Instructions for use Diagnosis    ampicillin 500 MG capsule    PRINCIPEN    4 capsule    4 capsules 1 hour prior to dental work    Aortic valve prosthesis present, Pulmonary valve replaced       aspirin 81 MG tablet      Take 1 tablet (81 mg) by mouth daily        losartan 100 MG tablet    COZAAR    90 tablet    Take 1 tablet (100 mg) by mouth daily    Hypertension goal BP (blood pressure) < 140/90       metoprolol succinate 100 MG 24 hr tablet    TOPROL-XL    30 tablet    Take 1 tablet by mouth daily    Essential hypertension with goal  blood pressure less than 140/90

## 2018-05-08 NOTE — PROGRESS NOTES
SUBJECTIVE:   Damon Manley is a 63 year old male who presents to clinic today for the following health issues:      ED/UC Followup:    Facility:  [facility]  Date of visit: 04/26/18  Reason for visit: nausea and vomiting  Current Status: he is still having nausea at times. He also gets lightheaded. Denies vomiting since ED visit. Weakness. Pain in rt side trunk at times.             Problem list and histories reviewed & adjusted, as indicated.  Additional history: as documented Dmaon was in the emergency room with nausea vomiting which started his severe dizziness.  He was hanging out with his 2 granddaughters at [facility] get a ball game and 1 of his granddaughter started to walk over to talk to him and he started to get very dizzy and eventually had to lay down on the ground because the dizziness became so severe.  He denied any chest pain no shortness of breath.  Ambulance was called he was transported to the urgency room and had a complete workup including CT of the abdomen because his lipase was elevated.  All of which turned out to be negative.  EKG was negative.  States she has not had any episodes since that time.  Cannot recall eating any contaminated food prior to the event.  After the event he became very diaphoretic as wife stated as he was getting into the ambulance is what was a stripping off him.  He has had a history of aortic valve replacement and pulmonary valve replacement but his cardiac follow-ups have all been normal recent cardiac visit last November was normal with echocardiogram.  He did have a stress thallium back in 2015 which was also normal.  Never had a history of atherosclerotic coronary vascular disease.  No other associated neuro symptoms with the event such as weakness difficulty with speech.  He has had history of benign positional vertigo but he stated the symptoms were different.  Did talk to him about the fact that he has 2 artificial heart valves and want to make  sure that were not dealing with some type of near syncopal event because 1 of the valves is not working correctly etc.  Would like him to see cardiology again    Current Outpatient Prescriptions   Medication Sig Dispense Refill     ampicillin (PRINCIPEN) 500 MG capsule 4 capsules 1 hour prior to dental work 4 capsule 2     aspirin 81 MG tablet Take 1 tablet (81 mg) by mouth daily       losartan (COZAAR) 100 MG tablet Take 1 tablet (100 mg) by mouth daily 90 tablet 3     metoprolol (TOPROL-XL) 100 MG 24 hr tablet Take 1 tablet by mouth daily 30 tablet 11     [DISCONTINUED] metoprolol (TOPROL-XL) 100 MG 24 hr tablet Take 1 tablet (100mg) by mouth daily 30 tablet 2     BP Readings from Last 3 Encounters:   05/08/18 112/60   04/26/18 121/75   10/19/17 140/88    Wt Readings from Last 3 Encounters:   05/08/18 271 lb 6 oz (123.1 kg)   04/26/18 274 lb (124.3 kg)   10/19/17 286 lb 11.2 oz (130 kg)                    Reviewed and updated as needed this visit by clinical staff       Reviewed and updated as needed this visit by Provider         ROS:  Constitutional, HEENT, cardiovascular, pulmonary, gi and gu systems are negative, except as otherwise noted.    OBJECTIVE:     /60  Pulse 67  Temp 96.6  F (35.9  C) (Tympanic)  Resp 11  Wt 271 lb 6 oz (123.1 kg)  SpO2 98%  BMI 38.94 kg/m2  Body mass index is 38.94 kg/(m^2).   GENERAL: healthy, alert and no distress  NECK: no adenopathy, no asymmetry, masses, or scars and thyroid normal to palpation  RESP: lungs clear to auscultation - no rales, rhonchi or wheezes  CV: Grade 2 systolic ejection murmur unchanged from previous  MS: no gross musculoskeletal defects noted, no edema  NEURO: Normal strength and tone, mentation intact and speech normal    Diagnostic Test Results:  Results for orders placed or performed in visit on 05/08/18 (from the past 24 hour(s))   Lipase   Result Value Ref Range    Lipase 3349 (H) 73 - 393 U/L   Amylase   Result Value Ref Range    Amylase  167 (H) 30 - 110 U/L   Comprehensive metabolic panel (BMP + Alb, Alk Phos, ALT, AST, Total. Bili, TP)   Result Value Ref Range    Sodium 140 133 - 144 mmol/L    Potassium 4.2 3.4 - 5.3 mmol/L    Chloride 107 94 - 109 mmol/L    Carbon Dioxide 26 20 - 32 mmol/L    Anion Gap 7 3 - 14 mmol/L    Glucose 137 (H) 70 - 99 mg/dL    Urea Nitrogen 18 7 - 30 mg/dL    Creatinine 0.93 0.66 - 1.25 mg/dL    GFR Estimate 83 >60 mL/min/1.7m2    GFR Estimate If Black >90 >60 mL/min/1.7m2    Calcium 8.9 8.5 - 10.1 mg/dL    Bilirubin Total 0.9 0.2 - 1.3 mg/dL    Albumin 4.0 3.4 - 5.0 g/dL    Protein Total 7.7 6.8 - 8.8 g/dL    Alkaline Phosphatase 74 40 - 150 U/L    ALT 54 0 - 70 U/L    AST 32 0 - 45 U/L       ASSESSMENT:       PLAN:   1. Aortic valve prosthesis present    - ampicillin (PRINCIPEN) 500 MG capsule; 4 capsules 1 hour prior to dental work  Dispense: 4 capsule; Refill: 2    2. Pulmonary valve replaced    - ampicillin (PRINCIPEN) 500 MG capsule; 4 capsules 1 hour prior to dental work  Dispense: 4 capsule; Refill: 2    3. Dizziness  I am going to get an MRI of the brain just to make sure not missing some neurologic issue here is I do not think the elevated lipase and amylase have anything to do with his episode of dizziness.  - Comprehensive metabolic panel (BMP + Alb, Alk Phos, ALT, AST, Total. Bili, TP)    - MR Brain w/o & w Contrast; Future    4. Elevated lipase  We will get a GI consult as soon as  - Lipase  - Amylase    5. Near syncope  We will get a cardiology consult.  Cardiac event monitor will be placed.        }    Kris Weems MD  [facility]

## 2018-05-09 NOTE — TELEPHONE ENCOUNTER
Patient called stating that Dr. Weems called him. He is returning his call. I gave him the information that was in the message and he said he was already aware of all of that and has appts scheduled. If you have any other questions you can call him back.  Thank you,  Arabella Swenson   for [facility]

## 2018-05-10 ENCOUNTER — HOSPITAL ENCOUNTER (OUTPATIENT)
Dept: MRI IMAGING | Facility: CLINIC | Age: 63
Discharge: HOME | End: 2018-05-10
Attending: FAMILY MEDICINE | Admitting: FAMILY MEDICINE
Payer: COMMERCIAL

## 2018-05-10 ENCOUNTER — TELEPHONE (OUTPATIENT)
Dept: FAMILY MEDICINE | Facility: CLINIC | Age: 63
End: 2018-05-10

## 2018-05-10 DIAGNOSIS — R42 DIZZINESS: ICD-10-CM

## 2018-05-10 PROCEDURE — 25000128 H RX IP 250 OP 636: Performed by: RADIOLOGY

## 2018-05-10 PROCEDURE — A9585 GADOBUTROL INJECTION: HCPCS | Performed by: RADIOLOGY

## 2018-05-10 PROCEDURE — 70553 MRI BRAIN STEM W/O & W/DYE: CPT

## 2018-05-10 RX ORDER — GADOBUTROL 604.72 MG/ML
15 INJECTION INTRAVENOUS ONCE
Status: COMPLETED | OUTPATIENT
Start: 2018-05-10 | End: 2018-05-10

## 2018-05-10 RX ADMIN — GADOBUTROL 12 ML: 604.72 INJECTION INTRAVENOUS at 09:43

## 2018-05-10 NOTE — TELEPHONE ENCOUNTER
----- Message from Kris Weems MD sent at 5/10/2018 12:34 PM CDT -----  Please call the patient with normal brain MRI results

## 2018-05-11 ENCOUNTER — TRANSFERRED RECORDS (OUTPATIENT)
Dept: HEALTH INFORMATION MANAGEMENT | Facility: CLINIC | Age: 63
End: 2018-05-11

## 2018-05-11 ENCOUNTER — HOSPITAL ENCOUNTER (OUTPATIENT)
Dept: CARDIOLOGY | Facility: CLINIC | Age: 63
Discharge: HOME | End: 2018-05-11
Attending: FAMILY MEDICINE | Admitting: FAMILY MEDICINE
Payer: COMMERCIAL

## 2018-05-11 DIAGNOSIS — R42 DIZZINESS: ICD-10-CM

## 2018-05-11 PROCEDURE — 93270 REMOTE 30 DAY ECG REV/REPORT: CPT | Performed by: FAMILY MEDICINE

## 2018-05-11 PROCEDURE — 93272 ECG/REVIEW INTERPRET ONLY: CPT | Performed by: INTERNAL MEDICINE

## 2018-05-11 NOTE — TELEPHONE ENCOUNTER
Patient returned call and message per PCP was relayed to patient, no further questions.  Thank you,  Keri Alfaro  Patient Representative

## 2018-05-22 ENCOUNTER — TELEPHONE (OUTPATIENT)
Dept: FAMILY MEDICINE | Facility: CLINIC | Age: 63
End: 2018-05-22

## 2018-05-22 DIAGNOSIS — E11.9 TYPE 2 DIABETES MELLITUS WITHOUT COMPLICATION, WITHOUT LONG-TERM CURRENT USE OF INSULIN (H): Primary | ICD-10-CM

## 2018-05-22 NOTE — TELEPHONE ENCOUNTER
Order was placed.  Per Dr Weems.    Called Damon and he was notified that someone would be calling him to get him scheduled with Diabetic [facility].   I did also give patient the phone too if he didn't hear anything.  MP/MA

## 2018-05-22 NOTE — TELEPHONE ENCOUNTER
Reason for Call:  Other call back    Detailed comments: Patients wife Wanda is requesting to speak with Dr Weems to discuss next steps after patients procedure on 5.29, please advise    Phone Number Patient can be reached at: Cell number on file:    Telephone Information:   Mobile [phone]       Best Time:     Can we leave a detailed message on this number? YES    Call taken on 5/22/2018 at 7:22 AM by Keri Alfaro

## 2018-05-24 ENCOUNTER — TELEPHONE (OUTPATIENT)
Dept: FAMILY MEDICINE | Facility: CLINIC | Age: 63
End: 2018-05-24

## 2018-05-24 DIAGNOSIS — E11.9 TYPE 2 DIABETES MELLITUS WITHOUT COMPLICATION, WITHOUT LONG-TERM CURRENT USE OF INSULIN (H): Primary | ICD-10-CM

## 2018-05-24 NOTE — TELEPHONE ENCOUNTER
Reason for call:  Form   Our goal is to have forms completed within 72 hours, however some forms may require a visit or additional information.     Who is the form from? Patient  Where did the form come from? Patient or family brought in     What clinic location was the form placed at? [place]  Where was the form placed? [employer]  What number is listed as a contact on the form? [phone]  Phone call message - patient request for a letter, form or note:     Date needed: as soon as possible  Please fax to [phone]  Pt would like to  originals  Has the patient signed a consent form for release of information? Not Applicable    Additional comments: Please call patient when forms are ready    Type of letter, form or note: [facility]    Phone number to reach patient:  Home number on file [phone] (home)    Best Time:  Any    Can we leave a detailed message on this number?  YES

## 2018-05-25 ENCOUNTER — OFFICE VISIT (OUTPATIENT)
Dept: FAMILY MEDICINE | Facility: CLINIC | Age: 63
End: 2018-05-25
Payer: COMMERCIAL

## 2018-05-25 VITALS
TEMPERATURE: 97.2 F | BODY MASS INDEX: 39.04 KG/M2 | HEART RATE: 70 BPM | SYSTOLIC BLOOD PRESSURE: 120 MMHG | DIASTOLIC BLOOD PRESSURE: 80 MMHG | OXYGEN SATURATION: 96 % | WEIGHT: 272.1 LBS

## 2018-05-25 DIAGNOSIS — E11.8 TYPE 2 DIABETES MELLITUS WITH COMPLICATION, WITHOUT LONG-TERM CURRENT USE OF INSULIN (H): ICD-10-CM

## 2018-05-25 DIAGNOSIS — Z01.818 PREOP GENERAL PHYSICAL EXAM: Primary | ICD-10-CM

## 2018-05-25 DIAGNOSIS — Z95.2 AORTIC VALVE REPLACED: ICD-10-CM

## 2018-05-25 DIAGNOSIS — Z95.2 PULMONARY VALVE REPLACED: ICD-10-CM

## 2018-05-25 DIAGNOSIS — R74.8 ELEVATED LIPASE: ICD-10-CM

## 2018-05-25 DIAGNOSIS — I10 ESSENTIAL HYPERTENSION WITH GOAL BLOOD PRESSURE LESS THAN 140/90: ICD-10-CM

## 2018-05-25 LAB
ALBUMIN SERPL-MCNC: 4 G/DL (ref 3.4–5)
ALP SERPL-CCNC: 78 U/L (ref 40–150)
ALT SERPL W P-5'-P-CCNC: 59 U/L (ref 0–70)
ANION GAP SERPL CALCULATED.3IONS-SCNC: 9 MMOL/L (ref 3–14)
AST SERPL W P-5'-P-CCNC: 25 U/L (ref 0–45)
BILIRUB SERPL-MCNC: 0.6 MG/DL (ref 0.2–1.3)
BUN SERPL-MCNC: 21 MG/DL (ref 7–30)
CALCIUM SERPL-MCNC: 8.7 MG/DL (ref 8.5–10.1)
CHLORIDE SERPL-SCNC: 107 MMOL/L (ref 94–109)
CO2 SERPL-SCNC: 23 MMOL/L (ref 20–32)
CREAT SERPL-MCNC: 0.81 MG/DL (ref 0.66–1.25)
ERYTHROCYTE [DISTWIDTH] IN BLOOD BY AUTOMATED COUNT: 12.8 % (ref 10–15)
GFR SERPL CREATININE-BSD FRML MDRD: >90 ML/MIN/1.7M2
GLUCOSE SERPL-MCNC: 136 MG/DL (ref 70–99)
HCT VFR BLD AUTO: 49.1 % (ref 40–53)
HGB BLD-MCNC: 17 G/DL (ref 13.3–17.7)
LIPASE SERPL-CCNC: 2885 U/L (ref 73–393)
MCH RBC QN AUTO: 31.1 PG (ref 26.5–33)
MCHC RBC AUTO-ENTMCNC: 34.6 G/DL (ref 31.5–36.5)
MCV RBC AUTO: 90 FL (ref 78–100)
PLATELET # BLD AUTO: 160 10E9/L (ref 150–450)
POTASSIUM SERPL-SCNC: 4.3 MMOL/L (ref 3.4–5.3)
PROT SERPL-MCNC: 8 G/DL (ref 6.8–8.8)
RBC # BLD AUTO: 5.46 10E12/L (ref 4.4–5.9)
SODIUM SERPL-SCNC: 139 MMOL/L (ref 133–144)
WBC # BLD AUTO: 8.2 10E9/L (ref 4–11)

## 2018-05-25 PROCEDURE — 85027 COMPLETE CBC AUTOMATED: CPT | Performed by: NURSE PRACTITIONER

## 2018-05-25 PROCEDURE — 80053 COMPREHEN METABOLIC PANEL: CPT | Performed by: NURSE PRACTITIONER

## 2018-05-25 PROCEDURE — 99214 OFFICE O/P EST MOD 30 MIN: CPT | Performed by: NURSE PRACTITIONER

## 2018-05-25 PROCEDURE — 83690 ASSAY OF LIPASE: CPT | Performed by: NURSE PRACTITIONER

## 2018-05-25 PROCEDURE — 36415 COLL VENOUS BLD VENIPUNCTURE: CPT | Performed by: NURSE PRACTITIONER

## 2018-05-25 NOTE — MR AVS SNAPSHOT
After Visit Summary   5/25/2018    Damon Manley    MRN: [id number]           Patient Information     Date Of Birth          1955        Visit Information        Provider Department      5/25/2018 8:00 AM Vy Allen APRN CNP [facility]        Today's Diagnoses     Preop general physical exam    -  1    Elevated lipase        Type 2 diabetes mellitus with complication, without long-term current use of insulin (H)        Pulmonary valve replaced        Aortic valve replaced        Essential hypertension with goal blood pressure less than 140/90          Care Instructions      Before Your Surgery      Call your surgeon if there is any change in your health. This includes signs of a cold or flu (such as a sore throat, runny nose, cough, rash or fever).    Do not smoke, drink alcohol or take over the counter medicine (unless your surgeon or primary care doctor tells you to) for the 24 hours before and after surgery.    If you take prescribed drugs: Follow your doctor s orders about which medicines to take and which to stop until after surgery.    Eating and drinking prior to surgery: follow the instructions from your surgeon    Take a shower or bath the night before surgery. Use the soap your surgeon gave you to gently clean your skin. If you do not have soap from your surgeon, use your regular soap. Do not shave or scrub the surgery site.  Wear clean pajamas and have clean sheets on your bed.           Follow-ups after your visit        Your next 10 appointments already scheduled     Jun 04, 2018  9:00 AM CDT   New Visit with Ochoa Kenney MD   [facility] ([facility])    [address] [place] [id number] [phone]              Who to contact     If you have questions or need follow up information about today's clinic visit or your schedule please contact [facility] directly at  "[phone].  Normal or non-critical lab and imaging results will be communicated to you by [employer], letter or phone within 4 business days after the clinic has received the results. If you do not hear from us within 7 days, please contact the clinic through [employer] or phone. If you have a critical or abnormal lab result, we will notify you by phone as soon as possible.  Submit refill requests through [employer] or call your pharmacy and they will forward the refill request to us. Please allow 3 business days for your refill to be completed.          Additional Information About Your Visit        [employer] Information     [employer] lets you send messages to your doctor, view your test results, renew your prescriptions, schedule appointments and more. To sign up, go to [email or web address] . Click on \"Log in\" on the left side of the screen, which will take you to the Welcome page. Then click on \"Sign up Now\" on the right side of the page.     You will be asked to enter the access code listed below, as well as some personal information. Please follow the directions to create your username and password.     Your access code is: [email or web address]  Expires: 2018 10:01 AM     Your access code will  in 90 days. If you need help or a new code, please call your [facility] clinic or [phone].        Care EveryWhere ID     This is your Care EveryWhere ID. This could be used by other organizations to access your [facility] medical records  [id number]        Your Vitals Were     Pulse Temperature Pulse Oximetry BMI (Body Mass Index)          70 97.2  F (36.2  C) (Temporal) 96% 39.04 kg/m2         Blood Pressure from Last 3 Encounters:   18 120/80   18 112/60   18 121/75    Weight from Last 3 Encounters:   18 272 lb 1.6 oz (123.4 kg)   18 271 lb 6 oz (123.1 kg)   18 274 lb (124.3 kg)              We Performed the Following     CBC with platelets     Comprehensive metabolic panel     Lipase "        Primary Care Provider Office Phone # Fax #    Kris Weems [phone] [address] [place] [address]        Equal Access to Services     PEPE ASCENCIO : Soha mckenna [place] amyo Sokerrieali, waaxda luqadaha, qaybta kaalmada jeannette, carmelita bolaños lalolismal dann. So [facility] [phone].    ATENCIÓN: Si habla español, tiene a givens disposición servicios gratuitos de asistencia lingüística. Llame al [phone].    We comply with applicable federal civil rights laws and [place] laws. We do not discriminate on the basis of race, color, national origin, age, disability, sex, sexual orientation, or gender identity.            Thank you!     Thank you for choosing [facility]  for your care. Our goal is always to provide you with excellent care. Hearing back from our patients is one way we can continue to improve our services. Please take a few minutes to complete the written survey that you may receive in the mail after your visit with us. Thank you!             Your Updated Medication List - Protect others around you: Learn how to safely use, store and throw away your medicines at www.disposemymeds.org.          This list is accurate as of 5/25/18  3:09 PM.  Always use your most recent med list.                   Brand Name Dispense Instructions for use Diagnosis    ampicillin 500 MG capsule    PRINCIPEN    4 capsule    4 capsules 1 hour prior to dental work    Aortic valve prosthesis present, Pulmonary valve replaced       aspirin 81 MG tablet      Take 1 tablet (81 mg) by mouth daily        losartan 100 MG tablet    COZAAR    90 tablet    Take 1 tablet (100 mg) by mouth daily    Hypertension goal BP (blood pressure) < 140/90       metoprolol succinate 100 MG 24 hr tablet    TOPROL-XL    30 tablet    Take 1 tablet by mouth daily    Essential hypertension with goal blood pressure less than 140/90

## 2018-05-25 NOTE — PROGRESS NOTES
[address] [id number] [phone]  Dept: [phone]    PRE-OP EVALUATION:  Today's date: 2018    Damon Manley (: 1955) presents for pre-operative evaluation assessment as requested by Dr. Lopes.  He requires evaluation and anesthesia risk assessment prior to undergoing surgery/procedure for evaluation of pancreas due to increased pancreatic enzymes and new onset diabetes.  Also rule out gallstones    Endoscopic ultrasound, upper GI tract    Fax number for surgical facility: [phone]  Primary Physician: Kris Weems  Type of Anesthesia Anticipated: unknown    Patient has a Health Care Directive or Living Will:  YES     Preop Questions 2018   Who is doing your surgery? na   What are you having done? endoscopic   Date of Surgery/Procedure: 2018    Facility or Hospital where procedure/surgery will be performed: [facility]   1.  Do you have a history of Heart attack, stroke, stent, coronary bypass surgery, or other heart surgery? YES  - heart surgery    2.  Do you ever have any pain or discomfort in your chest? No   3.  Do you have a history of  Heart Failure? No   4.   Are you troubled by shortness of breath when:  walking on a level surface, or up a slight hill, or at night? No   5.  Do you currently have a cold, bronchitis or other respiratory infection? No   6.  Do you have a cough, shortness of breath, or wheezing? No   7.  Do you sometimes get pains in the calves of your legs when you walk? YES - froy horses in calves   8. Do you or anyone in your family have previous history of blood clots? YES - sister   9.  Do you or does anyone in your family have a serious bleeding problem such as prolonged bleeding following surgeries or cuts? No   10. Have you ever had problems with anemia or been told to take iron pills? No   11. Have you had any abnormal blood loss such as black, tarry or bloody stools? No   12. Have  "you ever had a blood transfusion? YES - heart valve surgery 1998   13. Have you or any of your relatives ever had problems with anesthesia? No   14. Do you have sleep apnea, excessive snoring or daytime drowsiness?     15. Do you have any prosthetic heart valves?  Aortic and pulmonary valves   16. Do you have prosthetic joints? No         HPI:     HPI related to upcoming procedure: The patient states he had a sudden onset of nausea and projectile emesis with associated lightheadedness that occurred 4/26.  No previous issues.  Since that time he has had persistent nausea, is no longer vomiting.  He has elevated lipase and new onset diabetes.  Abdominal ultrasound and abdominal CT negative for abnormality.  He is currently wearing a cardiac event monitor to rule out underlying dysrhythmia to account for the lightheadedness.  He denies feeling palpitations.  However, he states that he is so \"out of it\" when he is feeling the lightheadedness, that he is not really aware of what his pulse rate is.  When he has normalized, he states his heart rate always seems to be normal.  He has been checking his blood sugar regularly.  Fasting glucose in the morning is typically elevated 170-180.  By later in the day, towards suppertime, it is running around 120    He has a history of replacement of the aortic and pulmonary valves in 1998.  Sees cardiology for follow-up, has been having no problem.  Hypertension is well controlled with metoprolol and losartan.    MEDICAL HISTORY:     Patient Active Problem List    Diagnosis Date Noted     Elevated lipase 05/25/2018     Priority: Medium     Aortic valve prosthesis present 09/28/2017     Priority: Medium     Pulmonary valve replaced 09/28/2017     Priority: Medium     Aortic valve replaced 09/28/2017     Priority: Medium     ARIAS (dyspnea on exertion) 09/28/2017     Priority: Medium     SOB (shortness of breath) 09/28/2017     Priority: Medium     Elevated LFTs 09/28/2017     Priority: " Medium     Essential hypertension with goal blood pressure less than 140/90 06/15/2016     Priority: Medium     Advanced directives, counseling/discussion 03/05/2013     Priority: Medium     Abnormal glucose 03/09/2011     Priority: Medium     Problem list name updated by automated process. Provider to review       CARDIOVASCULAR SCREENING; LDL GOAL LESS THAN 130 10/31/2010     Priority: Medium     GERD (gastroesophageal reflux disease) 02/10/2010     Priority: Medium      Past Medical History:   Diagnosis Date     Coronary artery disease     Valves replaced due to hx of Rheumatic fever. - No mechanical valves     Depressive disorder     situational - resolved     Hypertension      Motion sickness      Obesity (BMI 30-39.9)      Past Surgical History:   Procedure Laterality Date     C ANESTH,DX ARTHROSCOPIC PROC KNEE JOINT  12/12/05    Left knee with partial medial meniscectomy.     C ANESTH,OPEN HEART; W/O PUMP OXYEGENATOR  1998     COLONOSCOPY  05/09/2007    Colon polyps.     COLONOSCOPY N/A 1/13/2017    Procedure: COMBINED COLONOSCOPY, SINGLE OR MULTIPLE BIOPSY/POLYPECTOMY BY BIOPSY;  Surgeon: Dejan Mckee MD;  Location: [facility]     HC KNEE SCOPE,MED/LAT MENISECTOMY  09/05/2007    Partial medial meniscectomy, both knees.     Current Outpatient Prescriptions   Medication Sig Dispense Refill     ampicillin (PRINCIPEN) 500 MG capsule 4 capsules 1 hour prior to dental work 4 capsule 2     aspirin 81 MG tablet Take 1 tablet (81 mg) by mouth daily       losartan (COZAAR) 100 MG tablet Take 1 tablet (100 mg) by mouth daily 90 tablet 3     metoprolol (TOPROL-XL) 100 MG 24 hr tablet Take 1 tablet by mouth daily 30 tablet 11     OTC products: Aspirin; has been instructed to discontinue the aspirin until postprocedure    Allergies   Allergen Reactions     No Known Drug Allergies       Latex Allergy: NO    Social History   Substance Use Topics     Smoking status: Former Smoker     Years: 15.00     Quit date: 4/1/1988      Smokeless tobacco: Never Used     Alcohol use 0.0 oz/week     0 Standard drinks or equivalent per week      Comment: social - glass of wine socially     History   Drug Use No       REVIEW OF SYSTEMS:   CONSTITUTIONAL: NEGATIVE for fever, chills, change in weight  INTEGUMENTARY/SKIN: NEGATIVE for worrisome rashes, moles or lesions  EYES: NEGATIVE for vision changes or irritation  ENT/MOUTH: NEGATIVE for ear, mouth and throat problems  RESP: NEGATIVE for significant cough or SOB  CV: NEGATIVE for chest pain, palpitations or peripheral edema  GI: NEGATIVE for abdominal pain.  POSITIVE for nausea without emesis.  : NEGATIVE for frequency, dysuria, or hematuria  MUSCULOSKELETAL: NEGATIVE for significant arthralgias or myalgia  NEURO: POSITIVE for lightheadedness  ENDOCRINE: NEGATIVE for temperature intolerance, skin/hair changes  HEME: NEGATIVE for bleeding problems  PSYCHIATRIC: NEGATIVE for changes in mood or affect    EXAM:   /80  Pulse 70  Temp 97.2  F (36.2  C) (Temporal)  Wt 272 lb 1.6 oz (123.4 kg)  SpO2 96%  BMI 39.04 kg/m2    GENERAL APPEARANCE: healthy, alert and no distress     EYES: EOMI,  PERRL     HENT: ear canals and TM's normal and nose and mouth without ulcers or lesions     NECK: no adenopathy, no asymmetry, masses, or scars and thyroid normal to palpation     RESP: lungs clear to auscultation - no rales, rhonchi or wheezes     CV: Heart rate and rhythm regular S1-S2 with a soft systolic murmur.  No peripheral edema.  No carotid bruit     ABDOMEN:  soft, nontender, no HSM or masses and bowel sounds normal     MS: extremities normal- no gross deformities noted, no evidence of inflammation in joints, FROM in all extremities.     SKIN: no suspicious lesions or rashes     NEURO: Normal strength and tone, sensory exam grossly normal, mentation intact and speech normal     PSYCH: mentation appears normal. and affect normal/bright     LYMPHATICS: No cervical adenopathy    DIAGNOSTICS:     EKG:  Not indicated due to non-vascular surgery and last ekg on 5/11/2018 CAD history or last year for cardiac risk factors)  Labs Resulted Today:   Results for orders placed or performed in visit on 05/25/18   Comprehensive metabolic panel   Result Value Ref Range    Sodium 139 133 - 144 mmol/L    Potassium 4.3 3.4 - 5.3 mmol/L    Chloride 107 94 - 109 mmol/L    Carbon Dioxide 23 20 - 32 mmol/L    Anion Gap 9 3 - 14 mmol/L    Glucose 136 (H) 70 - 99 mg/dL    Urea Nitrogen 21 7 - 30 mg/dL    Creatinine 0.81 0.66 - 1.25 mg/dL    GFR Estimate >90 >60 mL/min/1.7m2    GFR Estimate If Black >90 >60 mL/min/1.7m2    Calcium 8.7 8.5 - 10.1 mg/dL    Bilirubin Total 0.6 0.2 - 1.3 mg/dL    Albumin 4.0 3.4 - 5.0 g/dL    Protein Total 8.0 6.8 - 8.8 g/dL    Alkaline Phosphatase 78 40 - 150 U/L    ALT 59 0 - 70 U/L    AST 25 0 - 45 U/L   Lipase   Result Value Ref Range    Lipase 2885 (H) 73 - 393 U/L   CBC with platelets   Result Value Ref Range    WBC 8.2 4.0 - 11.0 10e9/L    RBC Count 5.46 4.4 - 5.9 10e12/L    Hemoglobin 17.0 13.3 - 17.7 g/dL    Hematocrit 49.1 40.0 - 53.0 %    MCV 90 78 - 100 fl    MCH 31.1 26.5 - 33.0 pg    MCHC 34.6 31.5 - 36.5 g/dL    RDW 12.8 10.0 - 15.0 %    Platelet Count 160 150 - 450 10e9/L       Recent Labs   Lab Test  05/08/18   1040  04/26/18   1729   02/09/11   0811   HGB   --   17.0   --    --    PLT   --   179   --    --    NA  140  140   < >   --    POTASSIUM  4.2  3.9   < >  4.4   CR  0.93  1.28*   < >  1.06   A1C   --   7.3*   --   6.3*    < > = values in this interval not displayed.        IMPRESSION:   Reason for surgery/procedure: Endoscopic ultrasound, upper GI tract    Diagnosis/reason for consult: No medical contraindication noted to proceeding as planned    The proposed surgical procedure is considered LOW risk.    REVISED CARDIAC RISK INDEX  The patient has the following serious cardiovascular risks for perioperative complications such as (MI, PE, VFib and 3  AV Block):  No serious cardiac  risks  INTERPRETATION: 0 risks: Class I (very low risk - 0.4% complication rate)    The patient has the following additional risks for perioperative complications:  Previous cardiac surgery for replacement pulmonary and aortic valves.  Currently experiencing presyncopal episodes, lightheadedness.  Wearing a cardiac event monitor to rule out underlying cardiac dysrhythmia.  No dysrhythmia noted on most recent EKG      ICD-10-CM    1. Preop general physical exam Z01.818 Comprehensive metabolic panel     Lipase     CBC with platelets   2. Elevated lipase R74.8 Lipase   3. Type 2 diabetes mellitus with complication, without long-term current use of insulin (H) E11.8 Comprehensive metabolic panel   4. Pulmonary valve replaced Z95.2    5. Aortic valve replaced Z95.2    6. Essential hypertension with goal blood pressure less than 140/90 I10        RECOMMENDATIONS:     --Consult hospital rounder / IM to assist post-op medical management    _The patient will hold all medications morning of procedure, resume postprocedure    APPROVAL GIVEN to proceed with proposed procedure, without further diagnostic evaluation       Signed Electronically by: ISRAEL Arias CNP    Copy of this evaluation report is provided to requesting physician.    [facility] Preop Guidelines    Revised Cardiac Risk Index

## 2018-05-29 NOTE — TELEPHONE ENCOUNTER
Patient is waiting for this form, and now needs a note saying if he should or should not go back to work. Please call to advise

## 2018-05-30 PROBLEM — E11.9 TYPE 2 DIABETES MELLITUS WITHOUT COMPLICATION, WITHOUT LONG-TERM CURRENT USE OF INSULIN (H): Status: ACTIVE | Noted: 2018-05-30

## 2018-05-30 NOTE — TELEPHONE ENCOUNTER
Patient is calling back stating he received a call from Dr. Weems and told to call him back. I was not able to reach his nurse. Please call back when able.

## 2018-06-04 ENCOUNTER — OFFICE VISIT (OUTPATIENT)
Dept: CARDIOLOGY | Facility: CLINIC | Age: 63
End: 2018-06-04
Payer: COMMERCIAL

## 2018-06-04 VITALS
HEIGHT: 70 IN | SYSTOLIC BLOOD PRESSURE: 120 MMHG | HEART RATE: 64 BPM | OXYGEN SATURATION: 96 % | BODY MASS INDEX: 39.28 KG/M2 | WEIGHT: 274.4 LBS | DIASTOLIC BLOOD PRESSURE: 86 MMHG

## 2018-06-04 DIAGNOSIS — Z95.4 S/P PULMONIC VALVE REPLACEMENT WITH HOMOGRAFT: ICD-10-CM

## 2018-06-04 DIAGNOSIS — R42 DIZZINESS: Primary | ICD-10-CM

## 2018-06-04 DIAGNOSIS — Z95.2 S/P AVR (AORTIC VALVE REPLACEMENT): ICD-10-CM

## 2018-06-04 PROCEDURE — 99214 OFFICE O/P EST MOD 30 MIN: CPT | Performed by: INTERNAL MEDICINE

## 2018-06-04 NOTE — PROGRESS NOTES
"HISTORY:    Damon Manley is a pleasant 63-year-old male accompanied by his wife today.  He has a history of rheumatic fever with aortic and miotic valve replacements, bioprosthetic, in 1998.  He was seen by me for the first time last October and was doing well.  He had experienced a single episode of near syncope which was very suspicious for vasovagal.    Damon was asked to see me today because of a recent event.  In early April he had a small meal, went to [employer] for a bit of shopping and felt a little bit dizzy, and then went to a [employer] game to watch his granddaughter.  He had only been there a few moments when he suddenly felt dizzy and developed nausea and diaphoresis.  He \"keeled over\", and felt disoriented and could not focus his eyes.  He also began having violent vomiting, many many episodes.  There was an EMT at the ball field who examined him.  Apparently his initial blood pressure was a bit elevated and then when they rechecked it it was normal.  There is no comment that he was bradycardic.  He was brought to the emergency room and monitored for 3 or 4 hours with continued nausea and vomiting and was found to have an elevated amylase and lipase.  When he got home from the emergency room he went to bed.  The next day he felt that he was still weak and it took him a couple of more days to recover.    Damon has had 2 further episodes that were somewhat similar to this, both occurring at work.  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  He suddenly became sweaty and dizzy lasting about 15 minutes.  The second time he had similar symptoms also for a brief period of time but this time associated with nausea again.    Damon describes frequent episodes of lightheadedness.  These are not positional and can occur when he is active or inactive or sitting or standing.  He has never noticed them when he is lying flat.  He just has a minute or 2 of pressure sensation in his head with a flushed sensation of his " face and of course the dizziness.  He has not had syncope with this.  He is currently being monitored and has had multiple episodes which he has pressed his button.  He has a home blood pressure monitor but has never checked his blood pressure with his episodes of dizziness.    In general, Damon reports that he feels that he has had no change in his level of stamina.  He continues to work full-time, spending much of his day lifting moderately heavy boxes, and does not find himself getting any more tired or short of breath than 6 months ago.  He has recently been diagnosed with DM2.    ASSESSMENT/PLAN:    1.  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  2.  Episodes of nausea, dizziness, lightheadedness, of variable severity as described above.  His exam is unchanged from previous.  This is very unlikely to be a cardiac issue.  His exam does not suggest valvular dysfunction and we certainly would not ask and transient.  A Holter monitor is being done to evaluate whether this might represent a rhythm problem, and I am asked him to check his own blood pressure when he has his dizziness to make sure that he is not having episodic hypotension.  The dizziness is not orthostatic, so I think hypotension is highly unlikely.    Thank you for asking me to participate in your patient's care.  Please do not hesitate to call if I can be of further assistance.  I will review his Holter monitor when available.    No orders of the defined types were placed in this encounter.    No orders of the defined types were placed in this encounter.    There are no discontinued medications.    10 year ASCVD risk: The 10-year ASCVD risk score (Brandon CRISTIANO Jr, et al., 2013) is: 20%    Values used to calculate the score:      Age: 63 years      Sex: Male      Is Non- : No      Diabetic: Yes      Tobacco smoker: No      Systolic Blood Pressure: 120 mmHg      Is BP  treated: Yes      HDL Cholesterol: 45 mg/dL      Total Cholesterol: 192 mg/dL    No diagnosis found.    CURRENT MEDICATIONS:  Current Outpatient Prescriptions   Medication Sig Dispense Refill     losartan (COZAAR) 100 MG tablet Take 1 tablet (100 mg) by mouth daily 90 tablet 3     metFORMIN (GLUCOPHAGE) 500 MG tablet Take 1 tablet (500 mg) by mouth 2 times daily (with meals) 180 tablet 3     metoprolol (TOPROL-XL) 100 MG 24 hr tablet Take 1 tablet by mouth daily 30 tablet 11     ampicillin (PRINCIPEN) 500 MG capsule 4 capsules 1 hour prior to dental work (Patient not taking: Reported on 6/4/2018) 4 capsule 2     aspirin 81 MG tablet Take 1 tablet (81 mg) by mouth daily         ALLERGIES     Allergies   Allergen Reactions     No Known Drug Allergies        PAST MEDICAL HISTORY:  Past Medical History:   Diagnosis Date     Coronary artery disease     Valves replaced due to hx of Rheumatic fever. - No mechanical valves     Depressive disorder     situational - resolved     Hypertension      Motion sickness      Obesity (BMI 30-39.9)        PAST SURGICAL HISTORY:  Past Surgical History:   Procedure Laterality Date     C ANESTH,DX ARTHROSCOPIC PROC KNEE JOINT  12/12/05    Left knee with partial medial meniscectomy.     C ANESTH,OPEN HEART; W/O PUMP OXYEGENATOR  1998     COLONOSCOPY  05/09/2007    Colon polyps.     COLONOSCOPY N/A 1/13/2017    Procedure: COMBINED COLONOSCOPY, SINGLE OR MULTIPLE BIOPSY/POLYPECTOMY BY BIOPSY;  Surgeon: Dejan Mckee MD;  Location:  GI     HC KNEE SCOPE,MED/LAT MENISECTOMY  09/05/2007    Partial medial meniscectomy, both knees.       FAMILY HISTORY:  Family History   Problem Relation Age of Onset     Other Cancer Father      lung     Other Cancer Mother      lung     Other Cancer Paternal Aunt      unsure     DIABETES No family hx of      Coronary Artery Disease No family hx of      Hypertension No family hx of      Hyperlipidemia No family hx of      CEREBROVASCULAR DISEASE No  "family hx of        SOCIAL HISTORY:  Social History     Social History     Marital status:      Spouse name: Wanda Parks     Number of children: 2     Years of education: 14     Social History Main Topics     Smoking status: Former Smoker     Years: 15.00     Quit date: 4/1/1988     Smokeless tobacco: Never Used     Alcohol use 0.0 oz/week     0 Standard drinks or equivalent per week      Comment: social - glass of wine socially     Drug use: No     Sexual activity: Yes     Partners: Female      Comment:  - 2 children     Other Topics Concern      Service No     Blood Transfusions Yes     open heart surgery 1998     Caffeine Concern No     Occupational Exposure No     Hobby Hazards No     Sleep Concern Yes      wakes after 4-5 hours of sleep at night     Stress Concern Yes     Weight Concern Yes     Special Diet No     Back Care No     Exercise Yes     3-4 times/week     Bike Helmet Not Asked     n/a     Seat Belt Yes     Self-Exams Not Asked     n/a     Social History Narrative       Review of Systems:  Skin:  Negative     Eyes:  Negative    ENT:  Negative    Respiratory:  Negative    Cardiovascular:  Negative for;palpitations;chest pain;edema Positive for;lightheadedness;dizziness;syncope or near-syncope  Gastroenterology: Positive for    Genitourinary:  Negative    Musculoskeletal:  Positive for joint pain  Neurologic:  Negative    Psychiatric:  Positive for sleep disturbances  Heme/Lymph/Imm:  Negative    Endocrine:  Positive for diabetes    Physical Exam:  Vitals: /86 (BP Location: Right arm, Patient Position: Fowlers, Cuff Size: Adult Large)  Pulse 64  Ht 1.778 m (5' 10\")  Wt 124.5 kg (274 lb 6.4 oz)  SpO2 96%  BMI 39.37 kg/m2    Constitutional:  cooperative, alert and oriented, well developed, well nourished, in no acute distress;cooperative        Skin:  warm and dry to the touch        Head:  normocephalic        Eyes:  no xanthalasma        ENT:  no pallor or cyanosis    "     Neck:  carotid pulses are full and equal bilaterally, JVP normal, no carotid bruit transmitted murmur      Chest:  normal breath sounds, clear to auscultation, normal A-P diameter, normal symmetry, normal respiratory excursion, no use of accessory muscles        Cardiac: regular rhythm;normal S1 and S2;no S3 or S4;apical impulse not displaced       grade 2;systolic ejection murmur;RUSB;LUSB;radiation to the carotid          Abdomen:  abdomen soft;BS normoactive        Vascular: pulses full and equal                                      Extremities and Back:  no edema        Neurological:  no gross motor deficits          Recent Lab Results:  LIPID RESULTS:  Lab Results   Component Value Date    CHOL 192 09/28/2017    HDL 45 09/28/2017     (H) 09/28/2017    TRIG 157 (H) 09/28/2017    CHOLHDLRATIO 4.0 02/26/2014       LIVER ENZYME RESULTS:  Lab Results   Component Value Date    AST 25 05/25/2018    ALT 59 05/25/2018       CBC RESULTS:  Lab Results   Component Value Date    WBC 8.2 05/25/2018    RBC 5.46 05/25/2018    HGB 17.0 05/25/2018    HCT 49.1 05/25/2018    MCV 90 05/25/2018    MCH 31.1 05/25/2018    MCHC 34.6 05/25/2018    RDW 12.8 05/25/2018     05/25/2018       BMP RESULTS:  Lab Results   Component Value Date     05/25/2018    POTASSIUM 4.3 05/25/2018    CHLORIDE 107 05/25/2018    CO2 23 05/25/2018    ANIONGAP 9 05/25/2018     (H) 05/25/2018    BUN 21 05/25/2018    CR 0.81 05/25/2018    GFRESTIMATED >90 05/25/2018    GFRESTBLACK >90 05/25/2018    MARGARET 8.7 05/25/2018        A1C RESULTS:  Lab Results   Component Value Date    A1C 7.3 (H) 04/26/2018       INR RESULTS:  Lab Results   Component Value Date    INR 1.05 03/28/2007         Ochoa Kenney MD, FACC    CC  Kris Weems MD  [address] [place] [address]

## 2018-06-04 NOTE — MR AVS SNAPSHOT
"              After Visit Summary   6/4/2018    Damon Manley    MRN: [id number]           Patient Information     Date Of Birth          1955        Visit Information        Provider Department      6/4/2018 9:00 AM Ochoa Kenney MD [facility]         Follow-ups after your visit        Your next 10 appointments already scheduled     Jun 12, 2018 10:00 AM CDT   Diabetic Education with [facility] DIABETIC ED RESOURCE   [facility]    [address] [place] [address] [phone]              Who to contact     If you have questions or need follow up information about today's clinic visit or your schedule please contact [facility] directly at [phone].  Normal or non-critical lab and imaging results will be communicated to you by MyChart, letter or phone within 4 business days after the clinic has received the results. If you do not hear from us within 7 days, please contact the clinic through MyChart or phone. If you have a critical or abnormal lab result, we will notify you by phone as soon as possible.  Submit refill requests through [employer] or call your pharmacy and they will forward the refill request to us. Please allow 3 business days for your refill to be completed.          Additional Information About Your Visit        [employer] Information     [employer] lets you send messages to your doctor, view your test results, renew your prescriptions, schedule appointments and more. To sign up, go to [email or web address] . Click on \"Log in\" on the left side of the screen, which will take you to the Welcome page. Then click on \"Sign up Now\" on the right side of the page.     You will be asked to enter the access code listed below, as well as some personal information. Please follow the directions to create your username and password.     Your access code " "is: [email or web address]  Expires: 2018 10:01 AM     Your access code will  in 90 days. If you need help or a new code, please call your [facility] clinic or [phone].        Care EveryWhere ID     This is your Care EveryWhere ID. This could be used by other organizations to access your [facility] medical records  [id number]        Your Vitals Were     Pulse Height Pulse Oximetry BMI (Body Mass Index)          64 1.778 m (5' 10\") 96% 39.37 kg/m2         Blood Pressure from Last 3 Encounters:   18 120/86   18 120/80   18 112/60    Weight from Last 3 Encounters:   18 124.5 kg (274 lb 6.4 oz)   18 123.4 kg (272 lb 1.6 oz)   18 123.1 kg (271 lb 6 oz)              Today, you had the following     No orders found for display       Primary Care Provider Office Phone # Fax #    Kris Weems [phone] [address] [place] [address]        Equal Access to Services     [facility] AH: Hadii aad ku hadasho Soomaali, waaxda luqadaha, qaybta kaalmada adeegyada, carmelita curiel ah. So [facility] [phone].    ATENCIÓN: Si habla español, tiene a givens disposición servicios gratuitos de asistencia lingüística. Llame al [phone].    We comply with applicable federal civil rights laws and [place] laws. We do not discriminate on the basis of race, color, national origin, age, disability, sex, sexual orientation, or gender identity.            Thank you!     Thank you for choosing [facility]  for your care. Our goal is always to provide you with excellent care. Hearing back from our patients is one way we can continue to improve our services. Please take a few minutes to complete the written survey that you may receive in the mail after your visit with us. Thank you!             Your Updated Medication List - Protect others around you: Learn how to safely use, store and throw away your medicines at " www.disposemymeds.org.          This list is accurate as of 6/4/18  9:29 AM.  Always use your most recent med list.                   Brand Name Dispense Instructions for use Diagnosis    ampicillin 500 MG capsule    PRINCIPEN    4 capsule    4 capsules 1 hour prior to dental work    Aortic valve prosthesis present, Pulmonary valve replaced       aspirin 81 MG tablet      Take 1 tablet (81 mg) by mouth daily        losartan 100 MG tablet    COZAAR    90 tablet    Take 1 tablet (100 mg) by mouth daily    Hypertension goal BP (blood pressure) < 140/90       metFORMIN 500 MG tablet    GLUCOPHAGE    180 tablet    Take 1 tablet (500 mg) by mouth 2 times daily (with meals)    Type 2 diabetes mellitus without complication, without long-term current use of insulin (H)       metoprolol succinate 100 MG 24 hr tablet    TOPROL-XL    30 tablet    Take 1 tablet by mouth daily    Essential hypertension with goal blood pressure less than 140/90

## 2018-06-04 NOTE — LETTER
"6/4/2018    Kris Weems MD, MD  [address] [place] [address]    RE: Damon Manley       Dear Colleague,    I had the pleasure of seeing Damon Manley in the [facility].    HISTORY:    Damon Manley is a pleasant 63-year-old male accompanied by his wife today.  He has a history of rheumatic fever with aortic and miotic valve replacements, bioprosthetic, in 1998.  He was seen by me for the first time last October and was doing well.  He had experienced a single episode of near syncope which was very suspicious for vasovagal.    Damon was asked to see me today because of a recent event.  In early April he had a small meal, went to [employer] for a bit of shopping and felt a little bit dizzy, and then went to a [employer] game to watch his granddaughter.  He had only been there a few moments when he suddenly felt dizzy and developed nausea and diaphoresis.  He \"keeled over\", and felt disoriented and could not focus his eyes.  He also began having violent vomiting, many many episodes.  There was an EMT at the ball field who examined him.  Apparently his initial blood pressure was a bit elevated and then when they rechecked it it was normal.  There is no comment that he was bradycardic.  He was brought to the emergency room and monitored for 3 or 4 hours with continued nausea and vomiting and was found to have an elevated amylase and lipase.  When he got home from the emergency room he went to bed.  The next day he felt that he was still weak and it took him a couple of more days to recover.    Damon has had 2 further episodes that were somewhat similar to this, both occurring at work.  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  He suddenly became sweaty and dizzy lasting about 15 minutes.  The second time he had similar symptoms also for a brief period of time but this time associated with nausea again.    Damon describes frequent episodes of lightheadedness.  These are not " positional and can occur when he is active or inactive or sitting or standing.  He has never noticed them when he is lying flat.  He just has a minute or 2 of pressure sensation in his head with a flushed sensation of his face and of course the dizziness.  He has not had syncope with this.  He is currently being monitored and has had multiple episodes which he has pressed his button.  He has a home blood pressure monitor but has never checked his blood pressure with his episodes of dizziness.    In general, Damon reports that he feels that he has had no change in his level of stamina.  He continues to work full-time, spending much of his day lifting moderately heavy boxes, and does not find himself getting any more tired or short of breath than 6 months ago.  He has recently been diagnosed with DM2.    ASSESSMENT/PLAN:    1.  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  2.  Episodes of nausea, dizziness, lightheadedness, of variable severity as described above.  His exam is unchanged from previous.  This is very unlikely to be a cardiac issue.  His exam does not suggest valvular dysfunction and we certainly would not ask and transient.  A Holter monitor is being done to evaluate whether this might represent a rhythm problem, and I am asked him to check his own blood pressure when he has his dizziness to make sure that he is not having episodic hypotension.  The dizziness is not orthostatic, so I think hypotension is highly unlikely.    Thank you for asking me to participate in your patient's care.  Please do not hesitate to call if I can be of further assistance.  I will review his Holter monitor when available.    No orders of the defined types were placed in this encounter.    No orders of the defined types were placed in this encounter.    There are no discontinued medications.    10 year ASCVD risk: The 10-year ASCVD risk score (Brandon CRISTIANO Jr, et al.,  2013) is: 20%    Values used to calculate the score:      Age: 63 years      Sex: Male      Is Non- : No      Diabetic: Yes      Tobacco smoker: No      Systolic Blood Pressure: 120 mmHg      Is BP treated: Yes      HDL Cholesterol: 45 mg/dL      Total Cholesterol: 192 mg/dL    No diagnosis found.    CURRENT MEDICATIONS:  Current Outpatient Prescriptions   Medication Sig Dispense Refill     losartan (COZAAR) 100 MG tablet Take 1 tablet (100 mg) by mouth daily 90 tablet 3     metFORMIN (GLUCOPHAGE) 500 MG tablet Take 1 tablet (500 mg) by mouth 2 times daily (with meals) 180 tablet 3     metoprolol (TOPROL-XL) 100 MG 24 hr tablet Take 1 tablet by mouth daily 30 tablet 11     ampicillin (PRINCIPEN) 500 MG capsule 4 capsules 1 hour prior to dental work (Patient not taking: Reported on 6/4/2018) 4 capsule 2     aspirin 81 MG tablet Take 1 tablet (81 mg) by mouth daily         ALLERGIES     Allergies   Allergen Reactions     No Known Drug Allergies        PAST MEDICAL HISTORY:  Past Medical History:   Diagnosis Date     Coronary artery disease     Valves replaced due to hx of Rheumatic fever. - No mechanical valves     Depressive disorder     situational - resolved     Hypertension      Motion sickness      Obesity (BMI 30-39.9)        PAST SURGICAL HISTORY:  Past Surgical History:   Procedure Laterality Date     C ANESTH,DX ARTHROSCOPIC PROC KNEE JOINT  12/12/05    Left knee with partial medial meniscectomy.     C ANESTH,OPEN HEART; W/O PUMP OXYEGENATOR  1998     COLONOSCOPY  05/09/2007    Colon polyps.     COLONOSCOPY N/A 1/13/2017    Procedure: COMBINED COLONOSCOPY, SINGLE OR MULTIPLE BIOPSY/POLYPECTOMY BY BIOPSY;  Surgeon: Dejan Mckee MD;  Location:  GI     HC KNEE SCOPE,MED/LAT MENISECTOMY  09/05/2007    Partial medial meniscectomy, both knees.       FAMILY HISTORY:  Family History   Problem Relation Age of Onset     Other Cancer Father      lung     Other Cancer Mother      lung  "    Other Cancer Paternal Aunt      unsure     DIABETES No family hx of      Coronary Artery Disease No family hx of      Hypertension No family hx of      Hyperlipidemia No family hx of      CEREBROVASCULAR DISEASE No family hx of        SOCIAL HISTORY:  Social History     Social History     Marital status:      Spouse name: Wanda Parks     Number of children: 2     Years of education: 14     Social History Main Topics     Smoking status: Former Smoker     Years: 15.00     Quit date: 4/1/1988     Smokeless tobacco: Never Used     Alcohol use 0.0 oz/week     0 Standard drinks or equivalent per week      Comment: social - glass of wine socially     Drug use: No     Sexual activity: Yes     Partners: Female      Comment:  - 2 children     Other Topics Concern      Service No     Blood Transfusions Yes     open heart surgery 1998     Caffeine Concern No     Occupational Exposure No     Hobby Hazards No     Sleep Concern Yes      wakes after 4-5 hours of sleep at night     Stress Concern Yes     Weight Concern Yes     Special Diet No     Back Care No     Exercise Yes     3-4 times/week     Bike Helmet Not Asked     n/a     Seat Belt Yes     Self-Exams Not Asked     n/a     Social History Narrative       Review of Systems:  Skin:  Negative     Eyes:  Negative    ENT:  Negative    Respiratory:  Negative    Cardiovascular:  Negative for;palpitations;chest pain;edema Positive for;lightheadedness;dizziness;syncope or near-syncope  Gastroenterology: Positive for    Genitourinary:  Negative    Musculoskeletal:  Positive for joint pain  Neurologic:  Negative    Psychiatric:  Positive for sleep disturbances  Heme/Lymph/Imm:  Negative    Endocrine:  Positive for diabetes    Physical Exam:  Vitals: /86 (BP Location: Right arm, Patient Position: Fowlers, Cuff Size: Adult Large)  Pulse 64  Ht 1.778 m (5' 10\")  Wt 124.5 kg (274 lb 6.4 oz)  SpO2 96%  BMI 39.37 kg/m2    Constitutional:  cooperative, alert " and oriented, well developed, well nourished, in no acute distress;cooperative        Skin:  warm and dry to the touch        Head:  normocephalic        Eyes:  no xanthalasma        ENT:  no pallor or cyanosis        Neck:  carotid pulses are full and equal bilaterally, JVP normal, no carotid bruit transmitted murmur      Chest:  normal breath sounds, clear to auscultation, normal A-P diameter, normal symmetry, normal respiratory excursion, no use of accessory muscles        Cardiac: regular rhythm;normal S1 and S2;no S3 or S4;apical impulse not displaced       grade 2;systolic ejection murmur;RUSB;LUSB;radiation to the carotid          Abdomen:  abdomen soft;BS normoactive        Vascular: pulses full and equal                                      Extremities and Back:  no edema        Neurological:  no gross motor deficits          Recent Lab Results:  LIPID RESULTS:  Lab Results   Component Value Date    CHOL 192 09/28/2017    HDL 45 09/28/2017     (H) 09/28/2017    TRIG 157 (H) 09/28/2017    CHOLHDLRATIO 4.0 02/26/2014       LIVER ENZYME RESULTS:  Lab Results   Component Value Date    AST 25 05/25/2018    ALT 59 05/25/2018       CBC RESULTS:  Lab Results   Component Value Date    WBC 8.2 05/25/2018    RBC 5.46 05/25/2018    HGB 17.0 05/25/2018    HCT 49.1 05/25/2018    MCV 90 05/25/2018    MCH 31.1 05/25/2018    MCHC 34.6 05/25/2018    RDW 12.8 05/25/2018     05/25/2018       BMP RESULTS:  Lab Results   Component Value Date     05/25/2018    POTASSIUM 4.3 05/25/2018    CHLORIDE 107 05/25/2018    CO2 23 05/25/2018    ANIONGAP 9 05/25/2018     (H) 05/25/2018    BUN 21 05/25/2018    CR 0.81 05/25/2018    GFRESTIMATED >90 05/25/2018    GFRESTBLACK >90 05/25/2018    MARGARET 8.7 05/25/2018        A1C RESULTS:  Lab Results   Component Value Date    A1C 7.3 (H) 04/26/2018       INR RESULTS:  Lab Results   Component Value Date    INR 1.05 03/28/2007         Ochoa Kenney MD,  FACC    CC  Kris Weems MD  [address] [address]                    Thank you for allowing me to participate in the care of your patient.      Sincerely,     Ochoa Kenney MD     [facility]    cc:   Kris Weems MD  [address] [place] [address]

## 2018-06-04 NOTE — LETTER
"6/4/2018    Kris Weems MD, MD  [address] [place] [address]    RE: Damon Manley       Dear Colleague,    I had the pleasure of seeing Damon Manley in the [facility].    HISTORY:    Damon Manley is a pleasant 63-year-old male accompanied by his wife today.  He has a history of rheumatic fever with aortic and miotic valve replacements, bioprosthetic, in 1998.  He was seen by me for the first time last October and was doing well.  He had experienced a single episode of near syncope which was very suspicious for vasovagal.    Damon was asked to see me today because of a recent event.  In early April he had a small meal, went to [employer] for a bit of shopping and felt a little bit dizzy, and then went to a [employer] game to watch his granddaughter.  He had only been there a few moments when he suddenly felt dizzy and developed nausea and diaphoresis.  He \"keeled over\", and felt disoriented and could not focus his eyes.  He also began having violent vomiting, many many episodes.  There was an EMT at the ball field who examined him.  Apparently his initial blood pressure was a bit elevated and then when they rechecked it it was normal.  There is no comment that he was bradycardic.  He was brought to the emergency room and monitored for 3 or 4 hours with continued nausea and vomiting and was found to have an elevated amylase and lipase.  When he got home from the emergency room he went to bed.  The next day he felt that he was still weak and it took him a couple of more days to recover.    Damon has had 2 further episodes that were somewhat similar to this, both occurring at work.  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  He suddenly became sweaty and dizzy lasting about 15 minutes.  The second time he had similar symptoms also for a brief period of time but this time associated with nausea again.    Damon describes frequent episodes of lightheadedness.  These are not " positional and can occur when he is active or inactive or sitting or standing.  He has never noticed them when he is lying flat.  He just has a minute or 2 of pressure sensation in his head with a flushed sensation of his face and of course the dizziness.  He has not had syncope with this.  He is currently being monitored and has had multiple episodes which he has pressed his button.  He has a home blood pressure monitor but has never checked his blood pressure with his episodes of dizziness.    In general, Damon reports that he feels that he has had no change in his level of stamina.  He continues to work full-time, spending much of his day lifting moderately heavy boxes, and does not find himself getting any more tired or short of breath than 6 months ago.  He has recently been diagnosed with DM2.    ASSESSMENT/PLAN:    1.  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  2.  Episodes of nausea, dizziness, lightheadedness, of variable severity as described above.  His exam is unchanged from previous.  This is very unlikely to be a cardiac issue.  His exam does not suggest valvular dysfunction and we certainly would not ask and transient.  A Holter monitor is being done to evaluate whether this might represent a rhythm problem, and I am asked him to check his own blood pressure when he has his dizziness to make sure that he is not having episodic hypotension.  The dizziness is not orthostatic, so I think hypotension is highly unlikely.    Thank you for asking me to participate in your patient's care.  Please do not hesitate to call if I can be of further assistance.  I will review his Holter monitor when available.    No orders of the defined types were placed in this encounter.    No orders of the defined types were placed in this encounter.    There are no discontinued medications.    10 year ASCVD risk: The 10-year ASCVD risk score (Brandon CRISTIANO Jr, et al.,  2013) is: 20%    Values used to calculate the score:      Age: 63 years      Sex: Male      Is Non- : No      Diabetic: Yes      Tobacco smoker: No      Systolic Blood Pressure: 120 mmHg      Is BP treated: Yes      HDL Cholesterol: 45 mg/dL      Total Cholesterol: 192 mg/dL    No diagnosis found.    CURRENT MEDICATIONS:  Current Outpatient Prescriptions   Medication Sig Dispense Refill     losartan (COZAAR) 100 MG tablet Take 1 tablet (100 mg) by mouth daily 90 tablet 3     metFORMIN (GLUCOPHAGE) 500 MG tablet Take 1 tablet (500 mg) by mouth 2 times daily (with meals) 180 tablet 3     metoprolol (TOPROL-XL) 100 MG 24 hr tablet Take 1 tablet by mouth daily 30 tablet 11     ampicillin (PRINCIPEN) 500 MG capsule 4 capsules 1 hour prior to dental work (Patient not taking: Reported on 6/4/2018) 4 capsule 2     aspirin 81 MG tablet Take 1 tablet (81 mg) by mouth daily         ALLERGIES     Allergies   Allergen Reactions     No Known Drug Allergies        PAST MEDICAL HISTORY:  Past Medical History:   Diagnosis Date     Coronary artery disease     Valves replaced due to hx of Rheumatic fever. - No mechanical valves     Depressive disorder     situational - resolved     Hypertension      Motion sickness      Obesity (BMI 30-39.9)        PAST SURGICAL HISTORY:  Past Surgical History:   Procedure Laterality Date     C ANESTH,DX ARTHROSCOPIC PROC KNEE JOINT  12/12/05    Left knee with partial medial meniscectomy.     C ANESTH,OPEN HEART; W/O PUMP OXYEGENATOR  1998     COLONOSCOPY  05/09/2007    Colon polyps.     COLONOSCOPY N/A 1/13/2017    Procedure: COMBINED COLONOSCOPY, SINGLE OR MULTIPLE BIOPSY/POLYPECTOMY BY BIOPSY;  Surgeon: Dejan Mceke MD;  Location:  GI     HC KNEE SCOPE,MED/LAT MENISECTOMY  09/05/2007    Partial medial meniscectomy, both knees.       FAMILY HISTORY:  Family History   Problem Relation Age of Onset     Other Cancer Father      lung     Other Cancer Mother      lung  "    Other Cancer Paternal Aunt      unsure     DIABETES No family hx of      Coronary Artery Disease No family hx of      Hypertension No family hx of      Hyperlipidemia No family hx of      CEREBROVASCULAR DISEASE No family hx of        SOCIAL HISTORY:  Social History     Social History     Marital status:      Spouse name: Wanda Parks     Number of children: 2     Years of education: 14     Social History Main Topics     Smoking status: Former Smoker     Years: 15.00     Quit date: 4/1/1988     Smokeless tobacco: Never Used     Alcohol use 0.0 oz/week     0 Standard drinks or equivalent per week      Comment: social - glass of wine socially     Drug use: No     Sexual activity: Yes     Partners: Female      Comment:  - 2 children     Other Topics Concern      Service No     Blood Transfusions Yes     open heart surgery 1998     Caffeine Concern No     Occupational Exposure No     Hobby Hazards No     Sleep Concern Yes      wakes after 4-5 hours of sleep at night     Stress Concern Yes     Weight Concern Yes     Special Diet No     Back Care No     Exercise Yes     3-4 times/week     Bike Helmet Not Asked     n/a     Seat Belt Yes     Self-Exams Not Asked     n/a     Social History Narrative       Review of Systems:  Skin:  Negative     Eyes:  Negative    ENT:  Negative    Respiratory:  Negative    Cardiovascular:  Negative for;palpitations;chest pain;edema Positive for;lightheadedness;dizziness;syncope or near-syncope  Gastroenterology: Positive for    Genitourinary:  Negative    Musculoskeletal:  Positive for joint pain  Neurologic:  Negative    Psychiatric:  Positive for sleep disturbances  Heme/Lymph/Imm:  Negative    Endocrine:  Positive for diabetes    Physical Exam:  Vitals: /86 (BP Location: Right arm, Patient Position: Fowlers, Cuff Size: Adult Large)  Pulse 64  Ht 1.778 m (5' 10\")  Wt 124.5 kg (274 lb 6.4 oz)  SpO2 96%  BMI 39.37 kg/m2    Constitutional:  cooperative, alert " and oriented, well developed, well nourished, in no acute distress;cooperative        Skin:  warm and dry to the touch        Head:  normocephalic        Eyes:  no xanthalasma        ENT:  no pallor or cyanosis        Neck:  carotid pulses are full and equal bilaterally, JVP normal, no carotid bruit transmitted murmur      Chest:  normal breath sounds, clear to auscultation, normal A-P diameter, normal symmetry, normal respiratory excursion, no use of accessory muscles        Cardiac: regular rhythm;normal S1 and S2;no S3 or S4;apical impulse not displaced       grade 2;systolic ejection murmur;RUSB;LUSB;radiation to the carotid          Abdomen:  abdomen soft;BS normoactive        Vascular: pulses full and equal                                      Extremities and Back:  no edema        Neurological:  no gross motor deficits          Recent Lab Results:  LIPID RESULTS:  Lab Results   Component Value Date    CHOL 192 09/28/2017    HDL 45 09/28/2017     (H) 09/28/2017    TRIG 157 (H) 09/28/2017    CHOLHDLRATIO 4.0 02/26/2014       LIVER ENZYME RESULTS:  Lab Results   Component Value Date    AST 25 05/25/2018    ALT 59 05/25/2018       CBC RESULTS:  Lab Results   Component Value Date    WBC 8.2 05/25/2018    RBC 5.46 05/25/2018    HGB 17.0 05/25/2018    HCT 49.1 05/25/2018    MCV 90 05/25/2018    MCH 31.1 05/25/2018    MCHC 34.6 05/25/2018    RDW 12.8 05/25/2018     05/25/2018       BMP RESULTS:  Lab Results   Component Value Date     05/25/2018    POTASSIUM 4.3 05/25/2018    CHLORIDE 107 05/25/2018    CO2 23 05/25/2018    ANIONGAP 9 05/25/2018     (H) 05/25/2018    BUN 21 05/25/2018    CR 0.81 05/25/2018    GFRESTIMATED >90 05/25/2018    GFRESTBLACK >90 05/25/2018    MARGARET 8.7 05/25/2018        A1C RESULTS:  Lab Results   Component Value Date    A1C 7.3 (H) 04/26/2018       INR RESULTS:  Lab Results   Component Value Date    INR 1.05 03/28/2007           Thank you for allowing me to  participate in the care of your patient.    Sincerely,     Ochoa Kenney MD     [facility]

## 2018-06-06 ENCOUNTER — TRANSFERRED RECORDS (OUTPATIENT)
Dept: HEALTH INFORMATION MANAGEMENT | Facility: CLINIC | Age: 63
End: 2018-06-06

## 2018-06-07 ENCOUNTER — TELEPHONE (OUTPATIENT)
Dept: FAMILY MEDICINE | Facility: CLINIC | Age: 63
End: 2018-06-07

## 2018-06-07 NOTE — TELEPHONE ENCOUNTER
Spoke to patient wife she stated patient  would like to go back to work on Monday but will need a note to be able to return if this level has gone down can you please advise in the absence of PCP  Ruby Suh MA 6/7/2018

## 2018-06-07 NOTE — TELEPHONE ENCOUNTER
Reason for Call: Request for an order or referral:    Order or referral being requested: lipase     Date needed: at your convenience    Has the patient been seen by the PCP for this problem? YES    Additional comments: Pt's wife would like this done again to see if it is going down as the HIDA was normal.     Phone number Patient can be reached at: [phone]    Best Time:  Any     Can we leave a detailed message on this number?  YES    Call taken on 6/7/2018 at 12:17 PM by Ewa Angeles

## 2018-06-08 NOTE — TELEPHONE ENCOUNTER
Called patient and left a voicemail to call the clinic back, when he calls back please let him know that per Dr. Ingram he needs to get the letter to go back to work from the doctor that took him off of work due to him not knowing why he was off work in the first place and what improvement they are looking for.

## 2018-06-11 ENCOUNTER — TELEPHONE (OUTPATIENT)
Dept: FAMILY MEDICINE | Facility: CLINIC | Age: 63
End: 2018-06-11

## 2018-06-11 NOTE — TELEPHONE ENCOUNTER
Reason for Call:  Same Day Appointment, Requested Provider:  Kris Weems M.D.    PCP: Kris Weems    Reason for visit: recheck dizziness     Duration of symptoms:  Ongoing     Have you been treated for this in the past? Yes    Additional comments:  Would like to see you this week for a recheck dizziness     Can we leave a detailed message on this number? YES    Phone number patient can be reached at: Cell number on file:    Telephone Information:   Mobile [phone]       Best Time:     Call taken on 6/11/2018 at 8:49 AM by Julissa Trinidad

## 2018-06-12 ENCOUNTER — ALLIED HEALTH/NURSE VISIT (OUTPATIENT)
Dept: EDUCATION SERVICES | Facility: CLINIC | Age: 63
End: 2018-06-12
Payer: COMMERCIAL

## 2018-06-12 DIAGNOSIS — E11.9 TYPE 2 DIABETES MELLITUS WITHOUT COMPLICATION, WITHOUT LONG-TERM CURRENT USE OF INSULIN (H): Primary | ICD-10-CM

## 2018-06-12 PROCEDURE — G0108 DIAB MANAGE TRN  PER INDIV: HCPCS

## 2018-06-12 NOTE — MR AVS SNAPSHOT
"              After Visit Summary   6/12/2018    Damon Manley    MRN: [id number]           Patient Information     Date Of Birth          1955        Visit Information        Provider Department      6/12/2018 10:00 AM [facility] RES[facility]        Today's Diagnoses     Type 2 diabetes mellitus without complication, without long-term current use of insulin (H)    -  1       Follow-ups after your visit        Your next 10 appointments already scheduled     Jul 12, 2018  9:00 AM CDT   Diabetic Education with [facility] RES[facility]   [facility])    [address] [place] [address]   [phone]              Who to contact     If you have questions or need follow up information about today's clinic visit or your schedule please contact [facility] directly at [phone].  Normal or non-critical lab and imaging results will be communicated to you by [employer], letter or phone within 4 business days after the clinic has received the results. If you do not hear from us within 7 days, please contact the clinic through [employer] or phone. If you have a critical or abnormal lab result, we will notify you by phone as soon as possible.  Submit refill requests through [employer] or call your pharmacy and they will forward the refill request to us. Please allow 3 business days for your refill to be completed.          Additional Information About Your Visit        [employer] Information     [employer] lets you send messages to your doctor, view your test results, renew your prescriptions, schedule appointments and more. To sign up, go to [email or web address] . Click on \"Log in\" on the left side of the screen, which will take you to the Welcome page. Then click on \"Sign up Now\" on the right side of the page.     You will be asked to enter the access code listed below, as well as some personal " information. Please follow the directions to create your username and password.     Your access code is: [email or web address]  Expires: 2018 10:01 AM     Your access code will  in 90 days. If you need help or a new code, please call your [facility] clinic or [phone].        Care EveryWhere ID     This is your Care EveryWhere ID. This could be used by other organizations to access your [facility] medical records  [id number]         Blood Pressure from Last 3 Encounters:   18 120/86   18 120/80   18 112/60    Weight from Last 3 Encounters:   18 124.5 kg (274 lb 6.4 oz)   18 123.4 kg (272 lb 1.6 oz)   18 123.1 kg (271 lb 6 oz)              We Performed the Following     DIABETES EDUCATION - Individual  []          Today's Medication Changes          These changes are accurate as of 18  2:43 PM.  If you have any questions, ask your nurse or doctor.               Start taking these medicines.        Dose/Directions    blood glucose monitoring lancets   Used for:  Type 2 diabetes mellitus without complication, without long-term current use of insulin (H)        Use to check blood sugar up to 3 times daily.   Quantity:  100 each   Refills:  11       blood glucose monitoring test strip   Commonly known as:  CONTOUR NEXT TEST   Used for:  Type 2 diabetes mellitus without complication, without long-term current use of insulin (H)        Use to test blood sugar 4 times daily or as directed.   Quantity:  100 each   Refills:  prn            Where to get your medicines      These medications were sent to [facility] - [place], [place] - [address]  [address] [address] [address]     Phone:  [phone]     blood glucose monitoring lancets    blood glucose monitoring test strip                Primary Care Provider Office Phone # Fax #    Kris Weems [phone]       [address] [place]  [address]        Equal Access to Services     [facility] : Hadii mckenna [place] osiel Aly, waaxda luqadaha, qaybta kaalmada jeannette, carmelita quigley. So [facility] [phone].    ATENCIÓN: Si habla español, tiene a givens disposición servicios gratuitos de asistencia lingüística. Llame al [phone].    We comply with applicable federal civil rights laws and [place] laws. We do not discriminate on the basis of race, color, national origin, age, disability, sex, sexual orientation, or gender identity.            Thank you!     Thank you for choosing [facility]  for your care. Our goal is always to provide you with excellent care. Hearing back from our patients is one way we can continue to improve our services. Please take a few minutes to complete the written survey that you may receive in the mail after your visit with us. Thank you!             Your Updated Medication List - Protect others around you: Learn how to safely use, store and throw away your medicines at www.disposemymeds.org.          This list is accurate as of 6/12/18  2:43 PM.  Always use your most recent med list.                   Brand Name Dispense Instructions for use Diagnosis    ampicillin 500 MG capsule    PRINCIPEN    4 capsule    4 capsules 1 hour prior to dental work    Aortic valve prosthesis present, Pulmonary valve replaced       aspirin 81 MG tablet      Take 1 tablet (81 mg) by mouth daily        blood glucose monitoring lancets     100 each    Use to check blood sugar up to 3 times daily.    Type 2 diabetes mellitus without complication, without long-term current use of insulin (H)       blood glucose monitoring test strip    CONTOUR NEXT TEST    100 each    Use to test blood sugar 4 times daily or as directed.    Type 2 diabetes mellitus without complication, without long-term current use of insulin (H)       losartan 100 MG tablet    COZAAR    90 tablet    Take 1 tablet (100 mg) by  mouth daily    Hypertension goal BP (blood pressure) < 140/90       metFORMIN 500 MG tablet    GLUCOPHAGE    180 tablet    Take 1 tablet (500 mg) by mouth 2 times daily (with meals)    Type 2 diabetes mellitus without complication, without long-term current use of insulin (H)       metoprolol succinate 100 MG 24 hr tablet    TOPROL-XL    30 tablet    Take 1 tablet by mouth daily    Essential hypertension with goal blood pressure less than 140/90

## 2018-06-12 NOTE — PROGRESS NOTES
Diabetes Self Management Training: Initial Assessment Visit for Newly Diagnosed Patients (Complete AADE Goals Flowsheet)    Damon Manley presents today for education related to Type 2 diabetes.    He is accompanied by self    Patient's diabetes management related comments/concerns: has light headedness/dizziness since 4/26 when ended up in ED    Patient's emotional response to diabetes: expresses readiness to learn and concern for health and well-being    Patient would like this visit to be focused around the following diabetes-related behaviors and goals: Healthy Eating, Monitoring and Taking Medication    ASSESSMENT:  Patient Problem List and Family Medical History reviewed for relevant medical history, current medical status, and diabetes risk factors.    Current Diabetes Management per Patient:  Taking diabetes medications?   yes:     Diabetes Medication(s)     Biguanides Sig    metFORMIN (GLUCOPHAGE) 500 MG tablet Take 1 tablet (500 mg) by mouth 2 times daily (with meals)          Do you have any difficulty affording your medications or glucose monitoring supplies?     No     Patient glucose self monitoring as follows: two times daily, three times daily, four times daily.   BG meter: Contour Next  meter  BG results: using wife's meter and 110-223 range. -180 and end of day 110-130     BG values are: unable to assess  Patient's most recent   Lab Results   Component Value Date    A1C 7.3 04/26/2018    is not meeting goal of <7.0    Nutrition:  Not working past 3 weeks. Usual hours are 10 pm to 6 am. Would sleep until 1 pm, about 5 hours and then another hour or 2 before work. Now since off work still gets about 6 hours. Some days can't sleep and then will get nap and 4-5 hours.   Loves chocolate but has quit most lately.   Cooks a lot. Loves seafood, jumbalaya with rice and chicken rice dishes with mushrooms and onions. Not much beef. Likes sweet potatoes.   Breakfast - peanut butter on toast or eggs  "with homemade salsa usually; not a cereal eater. Today had pork chop leftover.   Lunch -  Usually none.   Dinner - homemade chicken pot pie   Snacks - was having chocolate in evening before week like candy bar once/day.     Beverages: diet Coke, Root Beer, water, GAtorade; no coffee,     Cultural/Anglican diet restrictions: No     Biggest Challenge to Healthy Eating: avoiding chocolate    Physical Activity:    Type: a lot of gardening; had gym membership but right now cancelled  Limitations: recent diziness    Diabetes Risk Factors:  family history and age over 45 years    Relevant co-morbidities and related health problems:  Significant for:  2 heart valve replacements     Diabetes Complications:  Acute Complications: At risk for hypoglycemia? no    Recent health service and resource utilization related to diabetes (hyperglycemia, hypoglycemia, etc):   Emergency Department visit - date(s): 4/26    Vitals:  There were no vitals taken for this visit.  Estimated body mass index is 39.37 kg/(m^2) as calculated from the following:    Height as of 6/4/18: 1.778 m (5' 10\").    Weight as of 6/4/18: 124.5 kg (274 lb 6.4 oz).   Last 3 BP:   BP Readings from Last 3 Encounters:   06/04/18 120/86   05/25/18 120/80   05/08/18 112/60       History   Smoking Status     Former Smoker     Years: 15.00     Quit date: 4/1/1988   Smokeless Tobacco     Never Used       Labs:  Lab Results   Component Value Date    A1C 7.3 04/26/2018     Lab Results   Component Value Date     05/25/2018     Lab Results   Component Value Date     09/28/2017     HDL Cholesterol   Date Value Ref Range Status   09/28/2017 45 >39 mg/dL Final   ]  GFR Estimate   Date Value Ref Range Status   05/25/2018 >90 >60 mL/min/1.7m2 Final     Comment:     Non  GFR Calc     GFR Estimate If Black   Date Value Ref Range Status   05/25/2018 >90 >60 mL/min/1.7m2 Final     Comment:      GFR Calc     Lab Results   Component Value " "Date    CR 0.81 05/25/2018     No results found for: MICROALBUMIN    Socio/Economic/Cultural Considerations:    Support system: spouse/significant other    Cultural Influences/Ethnic Background:  American      Health Literacy/Numeracy:  \"With diabetes, it's helpful to use forms and log books to write down blood sugars and what you're eating at times to help understand how foods affect your blood sugars. With this in mind how confident are you at filling out medical forms, such as these, by yourself?  Extremely    Health Beliefs and Attitudes:   Patient Activation Measure Survey Score:  CLINT Score (Last Two) 2/9/2011   CLINT Raw Score 39   Activation Score 56.4   CLINT Level 3       Stage of Change: ACTION (Actively working towards change)      Diabetes knowledge and skills assessment:     Patient is knowledgeable in diabetes management concepts related to: Healthy Eating, Being Active, Monitoring, Taking Medication and Healthy Coping    Patient needs further education on the following diabetes management concepts: Monitoring and Taking Medication    Barriers to Learning Assessment: No Barriers identified    Based on learning assessment above, most appropriate setting for further diabetes education would be: Group class or Individual setting.    INTERVENTION:   Education provided today on:  AADE Self-Care Behaviors:  Monitoring: log and interpret results, individual blood glucose targets and frequency of monitoring  Taking Medication: action of prescribed medication, side effects of prescribed medications and when to take medications  Patient was instructed on Contour Next One meter and was able to provide an accurate return demonstration.     Opportunities for ongoing education and support in diabetes-self management were discussed.    Pt verbalized understanding of concepts discussed and recommendations provided today.       Education Materials Provided:  Jo Ann Understanding Diabetes Booklet, BG Log Sheet and Contour " Next One meter kit    PLAN:  Check blood sugars fasting and 2 hours after the start of meals ( as desired)  Keep a blood glucose record for next visit.      FOLLOW-UP:  Follow-up appointment scheduled on 7/12.  Chart routed to referring provider.    Ongoing plan for education and support: Written resources (magazines, books, etc.) and Follow-up visit with diabetes educator in 1 month    PAIGE Calderón RDN, CDE    Time Spent: 60 minutes  Encounter Type: Individual    Any diabetes medication dose changes were made via the CDE Protocol and Collaborative Practice Agreement with the patient's primary care provider. A copy of this encounter was shared with the provider.'

## 2018-06-13 DIAGNOSIS — R74.8 ELEVATED LIPASE: ICD-10-CM

## 2018-06-13 DIAGNOSIS — R42 DIZZINESS: Primary | ICD-10-CM

## 2018-06-13 NOTE — TELEPHONE ENCOUNTER
Spoke with Damon and he states he has been off of work for 3 weeks and cant afford to be off any longer. He cannot have restrictions because he is a  and there would be nothing else for him to do there.  His wife is questioning if he should be checked for lymes? He is still having problems with being lightheaded, and some cramping with diarrhea. He thinks the diarrhea is from just recently being prescribed metformin.   He is wondering what he should do next?    Work note should state no restrictions and would like to go back on Monday if possible.    Please advise.  Lori Pond, [facility]

## 2018-06-14 ENCOUNTER — DOCUMENTATION ONLY (OUTPATIENT)
Dept: CARDIOLOGY | Facility: CLINIC | Age: 63
End: 2018-06-14

## 2018-06-14 ENCOUNTER — TELEPHONE (OUTPATIENT)
Dept: EDUCATION SERVICES | Facility: CLINIC | Age: 63
End: 2018-06-14

## 2018-06-14 DIAGNOSIS — E11.9 DIABETES MELLITUS (H): Primary | ICD-10-CM

## 2018-06-14 DIAGNOSIS — R74.8 ELEVATED LIPASE: ICD-10-CM

## 2018-06-14 DIAGNOSIS — R42 DIZZINESS: ICD-10-CM

## 2018-06-14 LAB — LIPASE SERPL-CCNC: 248 U/L (ref 73–393)

## 2018-06-14 PROCEDURE — 36415 COLL VENOUS BLD VENIPUNCTURE: CPT | Performed by: FAMILY MEDICINE

## 2018-06-14 PROCEDURE — 83690 ASSAY OF LIPASE: CPT | Performed by: FAMILY MEDICINE

## 2018-06-14 PROCEDURE — 86618 LYME DISEASE ANTIBODY: CPT | Performed by: FAMILY MEDICINE

## 2018-06-14 RX ORDER — BLOOD-GLUCOSE METER
EACH MISCELLANEOUS
Qty: 1 KIT | Refills: 0 | Status: SHIPPED | OUTPATIENT
Start: 2018-06-14 | End: 2020-09-11

## 2018-06-14 NOTE — PROGRESS NOTES
Reviewed results of event monitor.  Pt with hx of dizziness, nausea.  Event monitor per Dr. Weems, PCP.  Saw Dr. Kenney 06/04/2018 for Cardiology- will send Dr. Kenney results to review.

## 2018-06-14 NOTE — TELEPHONE ENCOUNTER
Patient calls stating insurance will not cover Contour meter. Needs One Touch. Order changed.     Saumya Quigley RDN, LD, CDE

## 2018-06-15 LAB — B BURGDOR IGG+IGM SER QL: 0.03 (ref 0–0.89)

## 2018-06-15 NOTE — PROGRESS NOTES
Event monitor not helpful  It looks like he did not report any symptoms in this timeframe.  He is going to check his BP if he has symptoms.  No further studies at this time.  If his symptoms get worse we could consider an implantable monitor.

## 2018-06-15 NOTE — PROGRESS NOTES
"Called pt with Dr. escudero' recommendations  Event monitor did not give a reason for pt symptoms.  Pt states his BP has been \"good\"  No further cardiac testing recommended at this time but could discuss it further if symptoms continue   Pt will call as needed.    Pt states his symptoms are less now with blood sugars more controlled on metformin and pt is taking a \"motion sickness pill\".    "

## 2018-07-12 ENCOUNTER — ALLIED HEALTH/NURSE VISIT (OUTPATIENT)
Dept: EDUCATION SERVICES | Facility: CLINIC | Age: 63
End: 2018-07-12
Payer: COMMERCIAL

## 2018-07-12 DIAGNOSIS — E11.9 TYPE 2 DIABETES MELLITUS WITHOUT COMPLICATION, WITHOUT LONG-TERM CURRENT USE OF INSULIN (H): Primary | ICD-10-CM

## 2018-07-12 PROCEDURE — G0108 DIAB MANAGE TRN  PER INDIV: HCPCS

## 2018-07-12 NOTE — Clinical Note
I expect his next A1c should be <7. Recommended he follow up with you in September for A1c and diabetes check.   Saumya Quigley RDN, LD, CDE

## 2018-07-12 NOTE — MR AVS SNAPSHOT
After Visit Summary   7/12/2018    Damon Manley    MRN: [id number]           Patient Information     Date Of Birth          1955        Visit Information        Provider Department      7/12/2018 3:00 PM [facility] DIABETIC ED RESOURCE [facility]        Today's Diagnoses     Type 2 diabetes mellitus without complication, without long-term current use of insulin (H)    -  1       Follow-ups after your visit        Who to contact     If you have questions or need follow up information about today's clinic visit or your schedule please contact [facility] directly at [phone].  Normal or non-critical lab and imaging results will be communicated to you by MyChart, letter or phone within 4 business days after the clinic has received the results. If you do not hear from us within 7 days, please contact the clinic through MyChart or phone. If you have a critical or abnormal lab result, we will notify you by phone as soon as possible.  Submit refill requests through [employer] or call your pharmacy and they will forward the refill request to us. Please allow 3 business days for your refill to be completed.          Additional Information About Your Visit        Care EveryWhere ID     This is your Care EveryWhere ID. This could be used by other organizations to access your [facility] medical records  [id number]         Blood Pressure from Last 3 Encounters:   06/04/18 120/86   05/25/18 120/80   05/08/18 112/60    Weight from Last 3 Encounters:   06/04/18 124.5 kg (274 lb 6.4 oz)   05/25/18 123.4 kg (272 lb 1.6 oz)   05/08/18 123.1 kg (271 lb 6 oz)              We Performed the Following     DIABETES EDUCATION - Individual  []        Primary Care Provider Office Phone # Fax #    Kris Weems [phone] [address] [place] [address]        Equal Access to Services     PEPE ASCENCIO : joseph Jurado  dimitry josiaholivier escamillacarmelita aguirre. [facility] [phone].    ATENCIÓN: Si qing craig, tiene a givens disposición servicios gratuitos de asistencia lingüística. Vivianame al [phone].    We comply with applicable federal civil rights laws and [place] laws. We do not discriminate on the basis of race, color, national origin, age, disability, sex, sexual orientation, or gender identity.            Thank you!     Thank you for choosing [facility]  for your care. Our goal is always to provide you with excellent care. Hearing back from our patients is one way we can continue to improve our services. Please take a few minutes to complete the written survey that you may receive in the mail after your visit with us. Thank you!             Your Updated Medication List - Protect others around you: Learn how to safely use, store and throw away your medicines at www.disposemymeds.org.          This list is accurate as of 7/12/18  4:03 PM.  Always use your most recent med list.                   Brand Name Dispense Instructions for use Diagnosis    ampicillin 500 MG capsule    PRINCIPEN    4 capsule    4 capsules 1 hour prior to dental work    Aortic valve prosthesis present, Pulmonary valve replaced       aspirin 81 MG tablet      Take 1 tablet (81 mg) by mouth daily        blood glucose monitoring lancets     100 each    Use to test blood sugars 1-2 times daily or as directed.    Diabetes mellitus (H)       blood glucose monitoring meter device kit     1 kit    Use to test blood sugars 1-2 times daily or as directed.    Diabetes mellitus (H)       blood glucose monitoring test strip    ONETOUCH VERIO IQ    100 strip    Use to test blood sugars 1-2 times daily or as directed.    Diabetes mellitus (H)       losartan 100 MG tablet    COZAAR    90 tablet    Take 1 tablet (100 mg) by mouth daily    Hypertension goal BP (blood pressure) < 140/90       metFORMIN  500 MG tablet    GLUCOPHAGE    180 tablet    Take 1 tablet (500 mg) by mouth 2 times daily (with meals)    Type 2 diabetes mellitus without complication, without long-term current use of insulin (H)       metoprolol succinate 100 MG 24 hr tablet    TOPROL-XL    30 tablet    Take 1 tablet by mouth daily    Essential hypertension with goal blood pressure less than 140/90

## 2018-07-12 NOTE — PROGRESS NOTES
Diabetes Self Management Training: Follow-up Visit    Damon Manley presents today for education and evaluation of glucose control Type 2 diabetes.    He is accompanied by self    Patient's diabetes management related comments/concerns:   Patient would like this visit to be focused around the following diabetes-related behaviors and goals: not sure    ASSESSMENT:  Patient Problem List reviewed for relevant medical history and current medical status.    Current Diabetes Management per Patient:  Taking diabetes medications?   yes:     Diabetes Medication(s)     Biguanides Sig    metFORMIN (GLUCOPHAGE) 500 MG tablet Take 1 tablet (500 mg) by mouth 2 times daily (with meals)          Do you have any difficulty affording your medications or glucose monitoring supplies?  No     Patient glucose self monitoring as follows: weekly;   noticed after gardening BG was down to 120. Now that working days BG running 130-160 around 4 pm after work and gym. FBG usually <130.     BG meter: One Touch Verio meter  BG results: not available     BG values are: he did not bring meter or log but reports numbers are doing well  Patient's most recent   Lab Results   Component Value Date    A1C 7.3 04/26/2018    is not meeting goal of <7.0    Nutrition:  Patient has cut out most sweets other than occasional treats.   Now working 5:30 am - 2 pm.   Breakfast - same breakfast  Lunch - works through lunch   Dinner - protein, vegetables, maybe bread or brown rice   Snacks -     Beverages: diet pop, Gatorade, water, wine    Cultural/Sabianism diet restrictions: No     Biggest Challenge to Healthy Eating: none    Physical Activity:    Type: gym  Frequency: 4-5 days/week  Limitations: dizziness feeling    Diabetes Complications:  Chronic Complication Prevention: Eyes: exam within in the last year? Yes  Nerve/Circulation: foot exam within the last year No  Heart Health: BP to goal Yes, LDL to goal No, Daily Aspirin Yes  Dental Health:  "brushing/flossing regularly Yes, dental exam within last year Yes  Immunizations (flu/pneumonia) up to date? Did not get flu shot last year but usually does    Recent health service and resource utilization related to diabetes (hyperglycemia, hypoglycemia, etc.):  None    Vitals:  There were no vitals taken for this visit.  Estimated body mass index is 39.37 kg/(m^2) as calculated from the following:    Height as of 6/4/18: 1.778 m (5' 10\").    Weight as of 6/4/18: 124.5 kg (274 lb 6.4 oz).   Last 3 BP:   BP Readings from Last 3 Encounters:   06/04/18 120/86   05/25/18 120/80   05/08/18 112/60       History   Smoking Status     Former Smoker     Years: 15.00     Quit date: 4/1/1988   Smokeless Tobacco     Never Used       Labs:  Lab Results   Component Value Date    A1C 7.3 04/26/2018     Lab Results   Component Value Date     05/25/2018     Lab Results   Component Value Date     09/28/2017     HDL Cholesterol   Date Value Ref Range Status   09/28/2017 45 >39 mg/dL Final   ]  GFR Estimate   Date Value Ref Range Status   05/25/2018 >90 >60 mL/min/1.7m2 Final     Comment:     Non  GFR Calc     GFR Estimate If Black   Date Value Ref Range Status   05/25/2018 >90 >60 mL/min/1.7m2 Final     Comment:      GFR Calc     Lab Results   Component Value Date    CR 0.81 05/25/2018     No results found for: MICROALBUMIN    Health Beliefs and Attitudes:   Patient Activation Measure Survey Score:  CLINT Score (Last Two) 2/9/2011   CLINT Raw Score 39   Activation Score 56.4   CLINT Level 3       Stage of Change: ACTION (Actively working towards change)    Progress toward meeting diabetes-related behavioral goals:    GOALS % Met Goal   Healthy Eating     Physical Activity     Monitoring  75   Medication Taking     Problem Solving     Healthy Coping     Risk Reduction           Diabetes knowledge and skills assessment:     Patient is knowledgeable in diabetes management concepts related to: " Healthy Eating, Being Active, Monitoring, Taking Medication and Healthy Coping    Patient needs further education on the following diabetes management concepts: Reducing Risks    Barriers to Learning Assessment: No Barriers identified    Based on learning assessment above, most appropriate setting for further diabetes education would be: Group class or Individual setting.    INTERVENTION:    Education provided today on:  AADE Self-Care Behaviors:  Taking Medication: side effects of prescribed medications  Reducing Risks: annual eye exam and A1C - goals, relating to blood glucose levels, how often to check    Opportunities for ongoing education and support in diabetes-self management were discussed.    Pt verbalized understanding of concepts discussed and recommendations provided today.       Education Materials Provided:  Jo [facility] Taking Charge of Your Diabetes Book    PLAN:  No changes in the patient's current treatment plan  AVS printed and provided to patient today.    FOLLOW-UP:  Follow-up with PCP recommended for September diabetes check.   Chart routed to referring provider.    Ongoing plan for education and support: Written resources (magazines, books, etc.) and Follow-up with primary care provider    PAIGE Calderón RDN, ROBEL    Time Spent: 60 minutes  Encounter Type: Individual    Any diabetes medication dose changes were made via the CDE Protocol and Collaborative Practice Agreement with the patient's primary care provider. A copy of this encounter was shared with the provider.

## 2018-08-15 ENCOUNTER — TELEPHONE (OUTPATIENT)
Dept: FAMILY MEDICINE | Facility: CLINIC | Age: 63
End: 2018-08-15

## 2018-08-15 NOTE — TELEPHONE ENCOUNTER
I have attempted to contact this patient by phone with the following results: left message to return my call on answering machine.  Pt is overdue for a diabetic follow up.    Panel Management Review      Patient has the following on his problem list:     Diabetes    ASA: Passed    Last A1C  Lab Results   Component Value Date    A1C 7.3 04/26/2018    A1C 6.3 02/09/2011    A1C 5.8 08/23/2010     A1C tested: FAILED    Last LDL:    Lab Results   Component Value Date    CHOL 192 09/28/2017     Lab Results   Component Value Date    HDL 45 09/28/2017     Lab Results   Component Value Date     09/28/2017     Lab Results   Component Value Date    TRIG 157 09/28/2017     Lab Results   Component Value Date    CHOLHDLRATIO 4.0 02/26/2014     Lab Results   Component Value Date    NHDL 147 09/28/2017       Is the patient on a Statin? NO             Is the patient on Aspirin? NO    Medications     Salicylates    aspirin 81 MG tablet          Last three blood pressure readings:  BP Readings from Last 3 Encounters:   06/04/18 120/86   05/25/18 120/80   05/08/18 112/60       Date of last diabetes office visit: 09/28/17     Tobacco History:     History   Smoking Status     Former Smoker     Years: 15.00     Quit date: 4/1/1988   Smokeless Tobacco     Never Used         Hypertension   Last three blood pressure readings:  BP Readings from Last 3 Encounters:   06/04/18 120/86   05/25/18 120/80   05/08/18 112/60     Blood pressure: Passed    HTN Guidelines:  Age 18-59 BP range:  Less than 140/90  Age 60-85 with Diabetes:  Less than 140/90  Age 60-85 without Diabetes:  less than 150/90      Composite cancer screening  Chart review shows that this patient is due/due soon for the following None  Summary:    Patient is due/failing the following:   A1C and FOLLOW UP    Action needed:   Patient needs office visit for diabetic follow up.    Type of outreach:    Phone, left message for patient to call back.     Questions for provider  review:    None                                                                                                                                    Lori Pond, [facility]       Chart routed to myself to follow up .

## 2018-08-30 ENCOUNTER — TRANSFERRED RECORDS (OUTPATIENT)
Dept: HEALTH INFORMATION MANAGEMENT | Facility: CLINIC | Age: 63
End: 2018-08-30

## 2018-08-30 DIAGNOSIS — I10 HYPERTENSION GOAL BP (BLOOD PRESSURE) < 140/90: ICD-10-CM

## 2018-08-30 RX ORDER — LOSARTAN POTASSIUM 100 MG/1
100 TABLET ORAL DAILY
Qty: 90 TABLET | Refills: 0 | Status: SHIPPED | OUTPATIENT
Start: 2018-08-30 | End: 2018-11-01

## 2018-08-30 NOTE — TELEPHONE ENCOUNTER
"Requested Prescriptions   Pending Prescriptions Disp Refills     losartan (COZAAR) 100 MG tablet [Pharmacy Med Name: losartan 100 mg tablet] 90 tablet 3     Sig: Take 1 tablet by mouth once daily.    Angiotensin-II Receptors Passed    8/30/2018 12:34 PM       Passed - Blood pressure under 140/90 in past 12 months    BP Readings from Last 3 Encounters:   06/04/18 120/86   05/25/18 120/80   05/08/18 112/60                Passed - Recent (12 mo) or future (30 days) visit within the authorizing provider's specialty    Patient had office visit in the last 12 months or has a visit in the next 30 days with authorizing provider or within the authorizing provider's specialty.  See \"Patient Info\" tab in inbasket, or \"Choose Columns\" in Meds & Orders section of the refill encounter.           Passed - Patient is age 18 or older       Passed - Normal serum creatinine on file in past 12 months    Recent Labs   Lab Test  05/25/18   0832   CR  0.81            Passed - Normal serum potassium on file in past 12 months    Recent Labs   Lab Test  05/25/18   0832   POTASSIUM  4.3                    Last Written Prescription Date:  8/9/17  Last Fill Quantity: 90,  # refills: 3   Last office visit: 5/25/2018 with prescribing provider:     Future Office Visit:   Next 5 appointments (look out 90 days)     Oct 02, 2018  2:10 PM CDT   [facility]    [address] [place] [id number] [phone]                   "

## 2018-08-30 NOTE — TELEPHONE ENCOUNTER
Prescription approved per [facility] Refill Protocol.    Elizabeth Robles RN

## 2018-10-02 ENCOUNTER — OFFICE VISIT (OUTPATIENT)
Dept: FAMILY MEDICINE | Facility: CLINIC | Age: 63
End: 2018-10-02
Payer: COMMERCIAL

## 2018-10-02 VITALS
WEIGHT: 248 LBS | OXYGEN SATURATION: 96 % | DIASTOLIC BLOOD PRESSURE: 80 MMHG | RESPIRATION RATE: 18 BRPM | HEART RATE: 74 BPM | BODY MASS INDEX: 35.58 KG/M2 | TEMPERATURE: 98 F | SYSTOLIC BLOOD PRESSURE: 114 MMHG

## 2018-10-02 DIAGNOSIS — I10 ESSENTIAL HYPERTENSION WITH GOAL BLOOD PRESSURE LESS THAN 140/90: ICD-10-CM

## 2018-10-02 DIAGNOSIS — Z13.6 CARDIOVASCULAR SCREENING; LDL GOAL LESS THAN 130: ICD-10-CM

## 2018-10-02 DIAGNOSIS — Z23 NEED FOR PROPHYLACTIC VACCINATION AND INOCULATION AGAINST INFLUENZA: ICD-10-CM

## 2018-10-02 DIAGNOSIS — E11.9 TYPE 2 DIABETES MELLITUS WITHOUT COMPLICATION, WITHOUT LONG-TERM CURRENT USE OF INSULIN (H): Primary | ICD-10-CM

## 2018-10-02 LAB
ANION GAP SERPL CALCULATED.3IONS-SCNC: 8 MMOL/L (ref 3–14)
BUN SERPL-MCNC: 19 MG/DL (ref 7–30)
CALCIUM SERPL-MCNC: 9.1 MG/DL (ref 8.5–10.1)
CHLORIDE SERPL-SCNC: 108 MMOL/L (ref 94–109)
CHOLEST SERPL-MCNC: 195 MG/DL
CO2 SERPL-SCNC: 24 MMOL/L (ref 20–32)
CREAT SERPL-MCNC: 0.84 MG/DL (ref 0.66–1.25)
CREAT UR-MCNC: 165 MG/DL
GFR SERPL CREATININE-BSD FRML MDRD: >90 ML/MIN/1.7M2
GLUCOSE SERPL-MCNC: 133 MG/DL (ref 70–99)
HBA1C MFR BLD: 6.6 % (ref 0–5.6)
HDLC SERPL-MCNC: 41 MG/DL
LDLC SERPL CALC-MCNC: 125 MG/DL
MICROALBUMIN UR-MCNC: 16 MG/L
MICROALBUMIN/CREAT UR: 9.39 MG/G CR (ref 0–17)
NONHDLC SERPL-MCNC: 154 MG/DL
POTASSIUM SERPL-SCNC: 4.3 MMOL/L (ref 3.4–5.3)
SODIUM SERPL-SCNC: 140 MMOL/L (ref 133–144)
TRIGL SERPL-MCNC: 146 MG/DL
TSH SERPL DL<=0.005 MIU/L-ACNC: 1.42 MU/L (ref 0.4–4)

## 2018-10-02 PROCEDURE — 90682 RIV4 VACC RECOMBINANT DNA IM: CPT | Performed by: FAMILY MEDICINE

## 2018-10-02 PROCEDURE — 99214 OFFICE O/P EST MOD 30 MIN: CPT | Mod: 25 | Performed by: FAMILY MEDICINE

## 2018-10-02 PROCEDURE — 80061 LIPID PANEL: CPT | Performed by: FAMILY MEDICINE

## 2018-10-02 PROCEDURE — 90471 IMMUNIZATION ADMIN: CPT | Performed by: FAMILY MEDICINE

## 2018-10-02 PROCEDURE — 84443 ASSAY THYROID STIM HORMONE: CPT | Performed by: FAMILY MEDICINE

## 2018-10-02 PROCEDURE — 83036 HEMOGLOBIN GLYCOSYLATED A1C: CPT | Performed by: FAMILY MEDICINE

## 2018-10-02 PROCEDURE — 80048 BASIC METABOLIC PNL TOTAL CA: CPT | Performed by: FAMILY MEDICINE

## 2018-10-02 PROCEDURE — 82043 UR ALBUMIN QUANTITATIVE: CPT | Performed by: FAMILY MEDICINE

## 2018-10-02 PROCEDURE — 36415 COLL VENOUS BLD VENIPUNCTURE: CPT | Performed by: FAMILY MEDICINE

## 2018-10-02 ASSESSMENT — PAIN SCALES - GENERAL: PAINLEVEL: MODERATE PAIN (5)

## 2018-10-02 NOTE — PROGRESS NOTES

## 2018-10-02 NOTE — MR AVS SNAPSHOT
After Visit Summary   10/2/2018    Damon Manley    MRN: [id number]           Patient Information     Date Of Birth          1955        Visit Information        Provider Department      10/2/2018 2:10 PM Kris Weems MD [facility]        Today's Diagnoses     CARDIOVASCULAR SCREENING; LDL GOAL LESS THAN 130    -  1    Essential hypertension with goal blood pressure less than 140/90        Type 2 diabetes mellitus without complication, without long-term current use of insulin (H)        Need for prophylactic vaccination and inoculation against influenza           Follow-ups after your visit        Your next 10 appointments already scheduled     Oct 02, 2018  2:10 PM CDT   Office Visit with Kris Weems MD   [facility] ([facility])    [address] [place] [id number] [phone]           Bring a current list of meds and any records pertaining to this visit. For Physicals, please bring immunization records and any forms needing to be filled out. Please arrive 10 minutes early to complete paperwork.              Who to contact     If you have questions or need follow up information about today's clinic visit or your schedule please contact [facility] directly at [phone].  Normal or non-critical lab and imaging results will be communicated to you by MyChart, letter or phone within 4 business days after the clinic has received the results. If you do not hear from us within 7 days, please contact the clinic through MyChart or phone. If you have a critical or abnormal lab result, we will notify you by phone as soon as possible.  Submit refill requests through [employer] or call your pharmacy and they will forward the refill request to us. Please allow 3 business days for your refill to be completed.          Additional Information About Your Visit        Care EveryWhere ID     This is your Care EveryWhere ID. This  could be used by other organizations to access your [facility] medical records  [id number]        Your Vitals Were     Pulse Temperature Respirations Pulse Oximetry BMI (Body Mass Index)       74 98  F (36.7  C) (Temporal) 18 96% 35.58 kg/m2        Blood Pressure from Last 3 Encounters:   10/02/18 114/80   06/04/18 120/86   05/25/18 120/80    Weight from Last 3 Encounters:   10/02/18 248 lb (112.5 kg)   06/04/18 274 lb 6.4 oz (124.5 kg)   05/25/18 272 lb 1.6 oz (123.4 kg)              We Performed the Following     Albumin Random Urine Quantitative with Creat Ratio     Basic metabolic panel  (Ca, Cl, CO2, Creat, Gluc, K, Na, BUN)     FLU VACCINE, (RIV4) RECOMBINANT HA  , IM (FluBlok, egg free) [47549]- >18 YRS (G recommended  50-64 YRS)     Hemoglobin A1c     Lipid panel reflex to direct LDL Fasting     TSH with free T4 reflex     Vaccine Administration, Initial [41483]        Primary Care Provider Office Phone # Fax #    Kris Weems [phone] [address] [place] [address]        Equal Access to Services     PEPE ASCENCIO AH: Hadii mckenna reyeso Sofederico, waaxda luqadaha, qaybta kaalmada adeegyada, carmelita quigley. So [facility] [phone].    ATENCIÓN: Si habla español, tiene a givens disposición servicios gratuitos de asistencia lingüística. Claudio al [phone].    We comply with applicable federal civil rights laws and [place] laws. We do not discriminate on the basis of race, color, national origin, age, disability, sex, sexual orientation, or gender identity.            Thank you!     Thank you for choosing [facility]  for your care. Our goal is always to provide you with excellent care. Hearing back from our patients is one way we can continue to improve our services. Please take a few minutes to complete the written survey that you may receive in the mail after your visit with us. Thank you!             Your Updated Medication List -  Protect others around you: Learn how to safely use, store and throw away your medicines at www.disposemymeds.org.          This list is accurate as of 10/2/18  1:19 PM.  Always use your most recent med list.                   Brand Name Dispense Instructions for use Diagnosis    ampicillin 500 MG capsule    PRINCIPEN    4 capsule    4 capsules 1 hour prior to dental work    Aortic valve prosthesis present, Pulmonary valve replaced       aspirin 81 MG tablet      Take 1 tablet (81 mg) by mouth daily        blood glucose monitoring lancets     100 each    Use to test blood sugars 1-2 times daily or as directed.    Diabetes mellitus (H)       blood glucose monitoring meter device kit     1 kit    Use to test blood sugars 1-2 times daily or as directed.    Diabetes mellitus (H)       blood glucose monitoring test strip    ONETOUCH VERIO IQ    100 strip    Use to test blood sugars 1-2 times daily or as directed.    Diabetes mellitus (H)       losartan 100 MG tablet    COZAAR    90 tablet    Take 1 tablet (100 mg) by mouth daily    Hypertension goal BP (blood pressure) < 140/90       metFORMIN 500 MG tablet    GLUCOPHAGE    180 tablet    Take 1 tablet (500 mg) by mouth 2 times daily (with meals)    Type 2 diabetes mellitus without complication, without long-term current use of insulin (H)       metoprolol succinate 100 MG 24 hr tablet    TOPROL-XL    30 tablet    Take 1 tablet by mouth daily    Essential hypertension with goal blood pressure less than 140/90

## 2018-10-02 NOTE — LETTER
[address] [id number] [phone]        October 2, 2018    Damon Manley  [facility] [place] [id number]          Dear Damon,    LAB RESULTS:     The results of your recent Tests were NORMAL.  If you have any further questions or problems, please contact our office at [phone].        Sincerely,        Kris Weems M.D.

## 2018-10-02 NOTE — PROGRESS NOTES
SUBJECTIVE:   Damon Manley is a 63 year old male who presents to clinic today for the following health issues:      Diabetes Follow-up    Patient is checking blood sugars: once daily.  Results are as follows:              postprandial after lunch- 105-140's    Diabetic concerns: None     Symptoms of hypoglycemia (low blood sugar): none     Paresthesias (numbness or burning in feet) or sores: Yes numbness in feet     Date of last diabetic eye exam: 9/2018    BP Readings from Last 2 Encounters:   06/04/18 120/86   05/25/18 120/80     Hemoglobin A1C (%)   Date Value   04/26/2018 7.3 (H)   02/09/2011 6.3 (H)     LDL Cholesterol Calculated (mg/dL)   Date Value   09/28/2017 116 (H)   02/26/2014 105       Diabetes Management Resources    Amount of exercise or physical activity: 3-4 days a week and walk 2-3 miles    Problems taking medications regularly: No    Medication side effects: none    Diet: diabetic            Problem list and histories reviewed & adjusted, as indicated.  Additional history: as documented    Patient Active Problem List   Diagnosis     GERD (gastroesophageal reflux disease)     CARDIOVASCULAR SCREENING; LDL GOAL LESS THAN 130     Abnormal glucose     Advanced directives, counseling/discussion     Essential hypertension with goal blood pressure less than 140/90     Aortic valve prosthesis present     Pulmonary valve replaced     Aortic valve replaced     ARIAS (dyspnea on exertion)     SOB (shortness of breath)     Elevated LFTs     Elevated lipase     Type 2 diabetes mellitus without complication, without long-term current use of insulin (H)     Past Surgical History:   Procedure Laterality Date     C ANESTH,DX ARTHROSCOPIC PROC KNEE JOINT  12/12/05    Left knee with partial medial meniscectomy.     C ANESTH,OPEN HEART; W/O PUMP OXYEGENATOR  1998     COLONOSCOPY  05/09/2007    Colon polyps.     COLONOSCOPY N/A 1/13/2017    Procedure: COMBINED COLONOSCOPY, SINGLE OR MULTIPLE BIOPSY/POLYPECTOMY BY  BIOPSY;  Surgeon: Dejan Mckee MD;  Location: [facility]     HC KNEE SCOPE,MED/LAT MENISECTOMY  09/05/2007    Partial medial meniscectomy, both knees.       Social History   Substance Use Topics     Smoking status: Former Smoker     Years: 15.00     Quit date: 4/1/1988     Smokeless tobacco: Never Used     Alcohol use 0.0 oz/week     0 Standard drinks or equivalent per week      Comment: social - glass of wine socially     Family History   Problem Relation Age of Onset     Other Cancer Father      lung     Other Cancer Mother      lung     Other Cancer Paternal Aunt      unsure     Diabetes No family hx of      Coronary Artery Disease No family hx of      Hypertension No family hx of      Hyperlipidemia No family hx of      Cerebrovascular Disease No family hx of          Current Outpatient Prescriptions   Medication Sig Dispense Refill     aspirin 81 MG tablet Take 1 tablet (81 mg) by mouth daily       blood glucose monitoring (ONE TOUCH DELICA) lancets Use to test blood sugars 1-2 times daily or as directed. 100 each 11     blood glucose monitoring (ONETOUCH VERIO IQ) test strip Use to test blood sugars 1-2 times daily or as directed. 100 strip 11     blood glucose monitoring (ONETOUCH VERIO) meter device kit Use to test blood sugars 1-2 times daily or as directed. 1 kit 0     losartan (COZAAR) 100 MG tablet Take 1 tablet (100 mg) by mouth daily 90 tablet 0     metFORMIN (GLUCOPHAGE) 500 MG tablet Take 1 tablet (500 mg) by mouth 2 times daily (with meals) 180 tablet 3     metoprolol (TOPROL-XL) 100 MG 24 hr tablet Take 1 tablet by mouth daily 30 tablet 11     ampicillin (PRINCIPEN) 500 MG capsule 4 capsules 1 hour prior to dental work 4 capsule 2       Reviewed and updated as needed this visit by clinical staff  Tobacco  Meds       Reviewed and updated as needed this visit by Provider         ROS:  Constitutional, HEENT, cardiovascular, pulmonary, gi and gu systems are negative, except as otherwise  noted.    OBJECTIVE:     /80  Pulse 74  Temp 98  F (36.7  C) (Temporal)  Resp 18  Wt 248 lb (112.5 kg)  SpO2 96%  BMI 35.58 kg/m2  Body mass index is 35.58 kg/(m^2).   GENERAL: healthy, alert and no distress  NECK: no adenopathy, no asymmetry, masses, or scars and thyroid normal to palpation  RESP: lungs clear to auscultation - no rales, rhonchi or wheezes  CV: regular rate and rhythm, normal S1 S2, no S3 or S4, no murmur, click or rub, no peripheral edema and peripheral pulses strong  MS: no gross musculoskeletal defects noted, no edema    Diagnostic Test Results:  Results for orders placed or performed in visit on 10/02/18 (from the past 24 hour(s))   Hemoglobin A1c   Result Value Ref Range    Hemoglobin A1C 6.6 (H) 0 - 5.6 %       ASSESSMENT:       PLAN:   1. CARDIOVASCULAR SCREENING; LDL GOAL LESS THAN 130  Will inform pt. of all test results and any care plan changes.    - Lipid panel reflex to direct LDL Fasting    2. Essential hypertension with goal blood pressure less than 140/90  Stable Will inform pt. of all test results and any care plan changes.    - Albumin Random Urine Quantitative with Creat Ratio  - Basic metabolic panel  (Ca, Cl, CO2, Creat, Gluc, K, Na, BUN)    3. Type 2 diabetes mellitus without complication, without long-term current use of insulin (H)  Will inform pt. of all test results and any care plan changes.    - TSH with free T4 reflex  - Hemoglobin A1c    4. Need for prophylactic vaccination and inoculation against influenza    - FLU VACCINE, (RIV4) RECOMBINANT HA  , IM (FluBlok, egg free) [97202]- >18 YRS ([facility] recommended  50-64 YRS)  - Vaccine Administration, Initial [[id number]]      5. Start a statin to see if we can get LDL < 100   Recheck LDL and ALT in 6-8 weeks   Lipitor 10 mg daily     Kris Weems MD  [facility]

## 2018-10-02 NOTE — NURSING NOTE
Prior to injection verified patient identity using patient's name and date of birth.   Patient instructed to remain in clinic for 20 minutes afterwards and to report any adverse reaction to me immediately.  Lori Pond, [facility]

## 2018-10-10 RX ORDER — ATORVASTATIN CALCIUM 10 MG/1
10 TABLET, FILM COATED ORAL DAILY
Qty: 31 TABLET | Refills: 1 | Status: ON HOLD | OUTPATIENT
Start: 2018-10-10 | End: 2019-02-24

## 2018-11-01 DIAGNOSIS — I10 HYPERTENSION GOAL BP (BLOOD PRESSURE) < 140/90: ICD-10-CM

## 2018-11-02 RX ORDER — LOSARTAN POTASSIUM 100 MG/1
TABLET ORAL
Qty: 90 TABLET | Refills: 3 | Status: SHIPPED | OUTPATIENT
Start: 2018-11-02 | End: 2020-01-16

## 2018-11-02 NOTE — TELEPHONE ENCOUNTER
"Requested Prescriptions   Pending Prescriptions Disp Refills     losartan (COZAAR) 100 MG tablet [Pharmacy Med Name: losartan 100 mg tablet] 90 tablet 0     Sig: Take 1 tablet by mouth once daily.    Angiotensin-II Receptors Passed    11/1/2018  1:48 PM       Passed - Blood pressure under 140/90 in past 12 months    BP Readings from Last 3 Encounters:   10/02/18 114/80   06/04/18 120/86   05/25/18 120/80                Passed - Recent (12 mo) or future (30 days) visit within the authorizing provider's specialty    Patient had office visit in the last 12 months or has a visit in the next 30 days with authorizing provider or within the authorizing provider's specialty.  See \"Patient Info\" tab in inbasket, or \"Choose Columns\" in Meds & Orders section of the refill encounter.             Passed - Patient is age 18 or older       Passed - Normal serum creatinine on file in past 12 months    Recent Labs   Lab Test  10/02/18   1247   CR  0.84            Passed - Normal serum potassium on file in past 12 months    Recent Labs   Lab Test  10/02/18   1247   POTASSIUM  4.3                    Last Written Prescription Date:  8/30/18  Last Fill Quantity: 90,  # refills: 0   Last office visit: 10/2/2018 with prescribing provider:     Future Office Visit:      Rx refilled per RN protocol.  SHARAD Aden, RN    "

## 2018-11-06 ENCOUNTER — TELEPHONE (OUTPATIENT)
Dept: FAMILY MEDICINE | Facility: CLINIC | Age: 63
End: 2018-11-06

## 2018-11-10 DIAGNOSIS — E11.9 TYPE 2 DIABETES MELLITUS WITHOUT COMPLICATION, WITHOUT LONG-TERM CURRENT USE OF INSULIN (H): ICD-10-CM

## 2018-11-10 LAB
ALT SERPL W P-5'-P-CCNC: 51 U/L (ref 0–70)
LDLC SERPL DIRECT ASSAY-MCNC: 123 MG/DL

## 2018-11-10 PROCEDURE — 36415 COLL VENOUS BLD VENIPUNCTURE: CPT | Performed by: FAMILY MEDICINE

## 2018-11-10 PROCEDURE — 84460 ALANINE AMINO (ALT) (SGPT): CPT | Performed by: FAMILY MEDICINE

## 2018-11-10 PROCEDURE — 83721 ASSAY OF BLOOD LIPOPROTEIN: CPT | Performed by: FAMILY MEDICINE

## 2019-01-02 ENCOUNTER — OFFICE VISIT (OUTPATIENT)
Dept: FAMILY MEDICINE | Facility: CLINIC | Age: 64
End: 2019-01-02
Payer: COMMERCIAL

## 2019-01-02 VITALS
DIASTOLIC BLOOD PRESSURE: 80 MMHG | SYSTOLIC BLOOD PRESSURE: 118 MMHG | RESPIRATION RATE: 20 BRPM | TEMPERATURE: 98 F | OXYGEN SATURATION: 96 % | WEIGHT: 271 LBS | BODY MASS INDEX: 38.88 KG/M2 | HEART RATE: 82 BPM

## 2019-01-02 DIAGNOSIS — J40 BRONCHITIS: Primary | ICD-10-CM

## 2019-01-02 DIAGNOSIS — I10 ESSENTIAL HYPERTENSION WITH GOAL BLOOD PRESSURE LESS THAN 140/90: ICD-10-CM

## 2019-01-02 DIAGNOSIS — E11.9 TYPE 2 DIABETES MELLITUS WITHOUT COMPLICATION, WITHOUT LONG-TERM CURRENT USE OF INSULIN (H): ICD-10-CM

## 2019-01-02 PROCEDURE — 99214 OFFICE O/P EST MOD 30 MIN: CPT | Performed by: FAMILY MEDICINE

## 2019-01-02 NOTE — PROGRESS NOTES
SUBJECTIVE:   Damon Manley is a 63 year old male who presents to clinic today for the following health issues:      Acute Illness   Acute illness concerns: URI  Onset: x one week    Fever: no    Chills/Sweats: YES    Headache (location?): no    Sinus Pressure:YES    Conjunctivitis:  no    Ear Pain: no    Rhinorrhea: YES    Congestion: YES    Sore Throat: no     Cough: YES-productive of green sputum    Wheeze: YES    Decreased Appetite: no    Nausea: YES    Vomiting: no    Diarrhea:  no    Dysuria/Freq.: no    Fatigue/Achiness: YES    Sick/Strep Exposure: no     Therapies Tried and outcome: over the counter pain reliever, vitamin C          Problem list and histories reviewed & adjusted, as indicated.  Additional history: as documented        Reviewed and updated as needed this visit by clinical staff       Reviewed and updated as needed this visit by Provider        SUBJECTIVE:  Damon  is a 63 year old male who presents for: Symptoms as noted above.  He has underlying comorbidities of hypertension but blood sugars have been up a little bit.  He is reported a little shortness of breath with activity with all this.  He has been taking some over-the-counter pain medication only.    Past Medical History:   Diagnosis Date     Coronary artery disease     Valves replaced due to hx of Rheumatic fever. - No mechanical valves     Depressive disorder     situational - resolved     Hypertension      Motion sickness      Obesity (BMI 30-39.9)      Past Surgical History:   Procedure Laterality Date     C ANESTH,DX ARTHROSCOPIC PROC KNEE JOINT  12/12/05    Left knee with partial medial meniscectomy.     C ANESTH,OPEN HEART; W/O PUMP OXYEGENATOR  1998     COLONOSCOPY  05/09/2007    Colon polyps.     COLONOSCOPY N/A 1/13/2017    Procedure: COMBINED COLONOSCOPY, SINGLE OR MULTIPLE BIOPSY/POLYPECTOMY BY BIOPSY;  Surgeon: Dejan Mckee MD;  Location:  GI     HC KNEE SCOPE,MED/LAT MENISECTOMY  09/05/2007    Partial medial  meniscectomy, both knees.     Social History     Tobacco Use     Smoking status: Former Smoker     Years: 15.00     Last attempt to quit: 1988     Years since quittin.7     Smokeless tobacco: Never Used   Substance Use Topics     Alcohol use: Yes     Alcohol/week: 0.0 oz     Comment: social - glass of wine socially     Current Outpatient Medications   Medication Sig Dispense Refill     amoxicillin-clavulanate (AUGMENTIN) 875-125 MG tablet Take 1 tablet by mouth 2 times daily for 10 days 20 tablet 0     aspirin 81 MG tablet Take 1 tablet (81 mg) by mouth daily       atorvastatin (LIPITOR) 10 MG tablet Take 1 tablet (10 mg) by mouth daily 31 tablet 1     blood glucose monitoring (ONE TOUCH DELICA) lancets Use to test blood sugars 1-2 times daily or as directed. 100 each 11     blood glucose monitoring (ONETOUCH VERIO IQ) test strip Use to test blood sugars 1-2 times daily or as directed. 100 strip 11     blood glucose monitoring (ONETOUCH VERIO) meter device kit Use to test blood sugars 1-2 times daily or as directed. 1 kit 0     losartan (COZAAR) 100 MG tablet Take 1 tablet by mouth once daily. 90 tablet 3     metFORMIN (GLUCOPHAGE) 500 MG tablet Take 1 tablet (500 mg) by mouth 2 times daily (with meals) 180 tablet 3     metoprolol (TOPROL-XL) 100 MG 24 hr tablet Take 1 tablet by mouth daily 30 tablet 11     ampicillin (PRINCIPEN) 500 MG capsule 4 capsules 1 hour prior to dental work (Patient not taking: Reported on 2019) 4 capsule 2       REVIEW OF SYSTEMS:   5 point ROS negative except as noted above in HPI, including Gen., Resp, CV, GI &  system review.     OBJECTIVE:  Vitals: /80   Pulse 82   Temp 98  F (36.7  C) (Temporal)   Resp 20   Wt 122.9 kg (271 lb)   SpO2 96%   BMI 38.88 kg/m    BMI= Body mass index is 38.88 kg/m .  He is alert and appears in no distress.  Throat with some drainage.  Ears are clear.  Some tenderness to percussion over the frontal sinuses.  Lungs with a few  wheezes and rhonchi.  Heart with a regular rhythm.  Skin clear.  Extremities okay.  Neck supple no adenopathy.    ASSESSMENT:  #1 bronchitis #2 hypertension #3 diabetes    PLAN:  Put him on some Augmentin 875 twice daily.  He needs to use caution in his choice of medications for cold symptoms because of his blood pressure and diabetes.  Recommended just plain Mucinex to help loosen things up.        Pk Trinidad MD  [facility]

## 2019-02-15 DIAGNOSIS — I10 ESSENTIAL HYPERTENSION WITH GOAL BLOOD PRESSURE LESS THAN 140/90: ICD-10-CM

## 2019-02-15 RX ORDER — METOPROLOL SUCCINATE 100 MG/1
100 TABLET, EXTENDED RELEASE ORAL DAILY
Qty: 30 TABLET | Refills: 5 | Status: SHIPPED | OUTPATIENT
Start: 2019-02-15 | End: 2019-11-13

## 2019-02-15 NOTE — TELEPHONE ENCOUNTER
Reason for Call:  Medication or medication refill:    Do you use a [facility]?  Name of the pharmacy and phone number for the current request: [facility] - [place], [place] - [address] [address]    Name of the medication requested: Metropolol    Other request: Pt's wife calling stating they contacted couple days ago to have this refilled and they haven't heard anything yet. Can you fill this? Please call and advise.     Can we leave a detailed message on this number? YES    Phone number patient can be reached at: Other phone number:      Best Time: anytime    Call taken on 2/15/2019 at 9:14 AM by Praveena Flowers

## 2019-02-15 NOTE — TELEPHONE ENCOUNTER
"Metoprolol  Last Written Prescription Date:  11/07/2017  Last Fill Quantity: 30,  # refills: 11   Last office visit: 10/02/2018 with prescribing provider:  Dank   Future Office Visit:  None  Prescription approved per [facility] Refill Protocol.    Requested Prescriptions   Pending Prescriptions Disp Refills     metoprolol succinate ER (TOPROL-XL) 100 MG 24 hr tablet 30 tablet 11     Sig: Take 1 tablet (100 mg) by mouth daily    Beta-Blockers Protocol Passed - 2/15/2019  9:29 AM       Passed - Blood pressure under 140/90 in past 12 months    BP Readings from Last 3 Encounters:   01/02/19 118/80   10/02/18 114/80   06/04/18 120/86          Passed - Patient is age 6 or older       Passed - Recent (12 mo) or future (30 days) visit within the authorizing provider's specialty    Patient had office visit in the last 12 months or has a visit in the next 30 days with authorizing provider or within the authorizing provider's specialty.  See \"Patient Info\" tab in inbasket, or \"Choose Columns\" in Meds & Orders section of the refill encounter.         Passed - Medication is active on med list      Elizabeth Mares RN   "

## 2019-02-23 ENCOUNTER — APPOINTMENT (OUTPATIENT)
Dept: CT IMAGING | Facility: CLINIC | Age: 64
End: 2019-02-23
Attending: EMERGENCY MEDICINE
Payer: COMMERCIAL

## 2019-02-23 ENCOUNTER — HOSPITAL ENCOUNTER (OUTPATIENT)
Facility: CLINIC | Age: 64
Setting detail: OBSERVATION
Discharge: HOME | End: 2019-02-24
Attending: EMERGENCY MEDICINE | Admitting: INTERNAL MEDICINE
Payer: COMMERCIAL
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
## 2019-02-23 DIAGNOSIS — S22.41XA CLOSED FRACTURE OF MULTIPLE RIBS OF RIGHT SIDE, INITIAL ENCOUNTER: ICD-10-CM

## 2019-02-23 DIAGNOSIS — S32.009A CLOSED FRACTURE OF TRANSVERSE PROCESS OF LUMBAR VERTEBRA, INITIAL ENCOUNTER (H): ICD-10-CM

## 2019-02-23 PROBLEM — S22.49XA RIB FRACTURES: Status: ACTIVE | Noted: 2019-02-23

## 2019-02-23 LAB
ALBUMIN SERPL-MCNC: 4 G/DL (ref 3.4–5)
ALP SERPL-CCNC: 81 U/L (ref 40–150)
ALT SERPL W P-5'-P-CCNC: 58 U/L (ref 0–70)
ANION GAP SERPL CALCULATED.3IONS-SCNC: 9 MMOL/L (ref 3–14)
AST SERPL W P-5'-P-CCNC: 50 U/L (ref 0–45)
BASOPHILS # BLD AUTO: 0.1 10E9/L (ref 0–0.2)
BASOPHILS NFR BLD AUTO: 0.7 %
BILIRUB SERPL-MCNC: 0.4 MG/DL (ref 0.2–1.3)
BUN SERPL-MCNC: 22 MG/DL (ref 7–30)
CALCIUM SERPL-MCNC: 8.7 MG/DL (ref 8.5–10.1)
CHLORIDE SERPL-SCNC: 109 MMOL/L (ref 94–109)
CO2 SERPL-SCNC: 25 MMOL/L (ref 20–32)
CREAT SERPL-MCNC: 0.98 MG/DL (ref 0.66–1.25)
DIFFERENTIAL METHOD BLD: NORMAL
EOSINOPHIL NFR BLD AUTO: 2.9 %
ERYTHROCYTE [DISTWIDTH] IN BLOOD BY AUTOMATED COUNT: 11.9 % (ref 10–15)
GFR SERPL CREATININE-BSD FRML MDRD: 81 ML/MIN/{1.73_M2}
GLUCOSE BLDC GLUCOMTR-MCNC: 133 MG/DL (ref 70–99)
GLUCOSE BLDC GLUCOMTR-MCNC: 145 MG/DL (ref 70–99)
GLUCOSE SERPL-MCNC: 119 MG/DL (ref 70–99)
HBA1C MFR BLD: 6.6 % (ref 0–5.6)
HCT VFR BLD AUTO: 48.5 % (ref 40–53)
HGB BLD-MCNC: 16.7 G/DL (ref 13.3–17.7)
IMM GRANULOCYTES # BLD: 0.1 10E9/L (ref 0–0.4)
IMM GRANULOCYTES NFR BLD: 0.8 %
LYMPHOCYTES # BLD AUTO: 2.2 10E9/L (ref 0.8–5.3)
LYMPHOCYTES NFR BLD AUTO: 25.9 %
MCH RBC QN AUTO: 31.3 PG (ref 26.5–33)
MCHC RBC AUTO-ENTMCNC: 34.4 G/DL (ref 31.5–36.5)
MCV RBC AUTO: 91 FL (ref 78–100)
MONOCYTES # BLD AUTO: 0.9 10E9/L (ref 0–1.3)
MONOCYTES NFR BLD AUTO: 10.4 %
NEUTROPHILS # BLD AUTO: 5.1 10E9/L (ref 1.6–8.3)
NEUTROPHILS NFR BLD AUTO: 59.3 %
NRBC # BLD AUTO: 0 10*3/UL
NRBC BLD AUTO-RTO: 0 /100
PLATELET # BLD AUTO: 179 10E9/L (ref 150–450)
POTASSIUM SERPL-SCNC: 4.2 MMOL/L (ref 3.4–5.3)
PROT SERPL-MCNC: 7.9 G/DL (ref 6.8–8.8)
RBC # BLD AUTO: 5.33 10E12/L (ref 4.4–5.9)
SODIUM SERPL-SCNC: 143 MMOL/L (ref 133–144)
WBC # BLD AUTO: 8.6 10E9/L (ref 4–11)

## 2019-02-23 PROCEDURE — 85025 COMPLETE CBC W/AUTO DIFF WBC: CPT | Performed by: EMERGENCY MEDICINE

## 2019-02-23 PROCEDURE — 25000128 H RX IP 250 OP 636: Performed by: INTERNAL MEDICINE

## 2019-02-23 PROCEDURE — 96374 THER/PROPH/DIAG INJ IV PUSH: CPT | Mod: 59

## 2019-02-23 PROCEDURE — 96361 HYDRATE IV INFUSION ADD-ON: CPT

## 2019-02-23 PROCEDURE — 71260 CT THORAX DX C+: CPT

## 2019-02-23 PROCEDURE — 72131 CT LUMBAR SPINE W/O DYE: CPT

## 2019-02-23 PROCEDURE — 25000132 ZZH RX MED GY IP 250 OP 250 PS 637: Performed by: INTERNAL MEDICINE

## 2019-02-23 PROCEDURE — 00000146 ZZHCL STATISTIC GLUCOSE BY METER IP

## 2019-02-23 PROCEDURE — 25000132 ZZH RX MED GY IP 250 OP 250 PS 637: Performed by: SURGERY

## 2019-02-23 PROCEDURE — 74177 CT ABD & PELVIS W/CONTRAST: CPT

## 2019-02-23 PROCEDURE — 25000128 H RX IP 250 OP 636: Performed by: EMERGENCY MEDICINE

## 2019-02-23 PROCEDURE — 96375 TX/PRO/DX INJ NEW DRUG ADDON: CPT | Mod: 59

## 2019-02-23 PROCEDURE — 96376 TX/PRO/DX INJ SAME DRUG ADON: CPT

## 2019-02-23 PROCEDURE — 99291 CRITICAL CARE FIRST HOUR: CPT | Mod: 25 | Performed by: EMERGENCY MEDICINE

## 2019-02-23 PROCEDURE — 80053 COMPREHEN METABOLIC PANEL: CPT | Performed by: EMERGENCY MEDICINE

## 2019-02-23 PROCEDURE — G0378 HOSPITAL OBSERVATION PER HR: HCPCS

## 2019-02-23 PROCEDURE — 72128 CT CHEST SPINE W/O DYE: CPT

## 2019-02-23 PROCEDURE — 25800030 ZZH RX IP 258 OP 636: Performed by: EMERGENCY MEDICINE

## 2019-02-23 PROCEDURE — 72125 CT NECK SPINE W/O DYE: CPT

## 2019-02-23 PROCEDURE — 99219 ZZC INITIAL OBSERVATION CARE,LEVL II: CPT | Performed by: INTERNAL MEDICINE

## 2019-02-23 PROCEDURE — 99203 OFFICE O/P NEW LOW 30 MIN: CPT | Performed by: SURGERY

## 2019-02-23 PROCEDURE — 83036 HEMOGLOBIN GLYCOSYLATED A1C: CPT | Performed by: INTERNAL MEDICINE

## 2019-02-23 PROCEDURE — 70450 CT HEAD/BRAIN W/O DYE: CPT

## 2019-02-23 PROCEDURE — 99207 ZZC CDG-CODE CATEGORY CHANGED: CPT | Performed by: INTERNAL MEDICINE

## 2019-02-23 PROCEDURE — 25000125 ZZHC RX 250: Performed by: EMERGENCY MEDICINE

## 2019-02-23 PROCEDURE — 99285 EMERGENCY DEPT VISIT HI MDM: CPT | Mod: 25

## 2019-02-23 RX ORDER — GABAPENTIN 100 MG/1
100 CAPSULE ORAL 3 TIMES DAILY
Status: DISCONTINUED | OUTPATIENT
Start: 2019-02-23 | End: 2019-02-24 | Stop reason: HOSPADM

## 2019-02-23 RX ORDER — ONDANSETRON 2 MG/ML
4 INJECTION INTRAMUSCULAR; INTRAVENOUS EVERY 30 MIN PRN
Status: DISCONTINUED | OUTPATIENT
Start: 2019-02-23 | End: 2019-02-23

## 2019-02-23 RX ORDER — ATORVASTATIN CALCIUM 10 MG/1
10 TABLET, FILM COATED ORAL DAILY
Status: DISCONTINUED | OUTPATIENT
Start: 2019-02-24 | End: 2019-02-24 | Stop reason: HOSPADM

## 2019-02-23 RX ORDER — SODIUM CHLORIDE 9 MG/ML
1000 INJECTION, SOLUTION INTRAVENOUS CONTINUOUS
Status: DISCONTINUED | OUTPATIENT
Start: 2019-02-23 | End: 2019-02-24

## 2019-02-23 RX ORDER — ASPIRIN 81 MG/1
81 TABLET, CHEWABLE ORAL DAILY
Status: DISCONTINUED | OUTPATIENT
Start: 2019-02-24 | End: 2019-02-24 | Stop reason: HOSPADM

## 2019-02-23 RX ORDER — NICOTINE POLACRILEX 4 MG
15-30 LOZENGE BUCCAL
Status: DISCONTINUED | OUTPATIENT
Start: 2019-02-23 | End: 2019-02-24 | Stop reason: HOSPADM

## 2019-02-23 RX ORDER — HYDROMORPHONE HYDROCHLORIDE 1 MG/ML
.3-.5 INJECTION, SOLUTION INTRAMUSCULAR; INTRAVENOUS; SUBCUTANEOUS EVERY 6 HOURS PRN
Status: DISCONTINUED | OUTPATIENT
Start: 2019-02-23 | End: 2019-02-24

## 2019-02-23 RX ORDER — BISACODYL 10 MG
10 SUPPOSITORY, RECTAL RECTAL DAILY PRN
Status: DISCONTINUED | OUTPATIENT
Start: 2019-02-23 | End: 2019-02-24 | Stop reason: HOSPADM

## 2019-02-23 RX ORDER — ACETAMINOPHEN 650 MG/1
650 SUPPOSITORY RECTAL EVERY 4 HOURS PRN
Status: DISCONTINUED | OUTPATIENT
Start: 2019-02-23 | End: 2019-02-24 | Stop reason: HOSPADM

## 2019-02-23 RX ORDER — NALOXONE HYDROCHLORIDE 0.4 MG/ML
.1-.4 INJECTION, SOLUTION INTRAMUSCULAR; INTRAVENOUS; SUBCUTANEOUS
Status: DISCONTINUED | OUTPATIENT
Start: 2019-02-23 | End: 2019-02-24 | Stop reason: HOSPADM

## 2019-02-23 RX ORDER — IOPAMIDOL 755 MG/ML
500 INJECTION, SOLUTION INTRAVASCULAR ONCE
Status: COMPLETED | OUTPATIENT
Start: 2019-02-23 | End: 2019-02-23

## 2019-02-23 RX ORDER — ONDANSETRON 2 MG/ML
4 INJECTION INTRAMUSCULAR; INTRAVENOUS EVERY 6 HOURS PRN
Status: DISCONTINUED | OUTPATIENT
Start: 2019-02-23 | End: 2019-02-24 | Stop reason: HOSPADM

## 2019-02-23 RX ORDER — ONDANSETRON 4 MG/1
4 TABLET, ORALLY DISINTEGRATING ORAL EVERY 6 HOURS PRN
Status: DISCONTINUED | OUTPATIENT
Start: 2019-02-23 | End: 2019-02-24 | Stop reason: HOSPADM

## 2019-02-23 RX ORDER — IBUPROFEN 600 MG/1
600 TABLET, FILM COATED ORAL EVERY 6 HOURS PRN
Status: DISCONTINUED | OUTPATIENT
Start: 2019-02-23 | End: 2019-02-24 | Stop reason: HOSPADM

## 2019-02-23 RX ORDER — LOSARTAN POTASSIUM 50 MG/1
100 TABLET ORAL DAILY
Status: DISCONTINUED | OUTPATIENT
Start: 2019-02-24 | End: 2019-02-24 | Stop reason: HOSPADM

## 2019-02-23 RX ORDER — ACETAMINOPHEN 325 MG/1
650 TABLET ORAL EVERY 4 HOURS PRN
Status: DISCONTINUED | OUTPATIENT
Start: 2019-02-23 | End: 2019-02-24 | Stop reason: HOSPADM

## 2019-02-23 RX ORDER — DOCUSATE SODIUM 100 MG/1
100 CAPSULE, LIQUID FILLED ORAL 2 TIMES DAILY
Status: DISCONTINUED | OUTPATIENT
Start: 2019-02-23 | End: 2019-02-24 | Stop reason: HOSPADM

## 2019-02-23 RX ORDER — METOPROLOL SUCCINATE 100 MG/1
100 TABLET, EXTENDED RELEASE ORAL DAILY
Status: DISCONTINUED | OUTPATIENT
Start: 2019-02-24 | End: 2019-02-24 | Stop reason: HOSPADM

## 2019-02-23 RX ORDER — POLYETHYLENE GLYCOL 3350 17 G/17G
17 POWDER, FOR SOLUTION ORAL DAILY PRN
Status: DISCONTINUED | OUTPATIENT
Start: 2019-02-23 | End: 2019-02-24 | Stop reason: HOSPADM

## 2019-02-23 RX ORDER — HYDROMORPHONE HYDROCHLORIDE 1 MG/ML
0.5 INJECTION, SOLUTION INTRAMUSCULAR; INTRAVENOUS; SUBCUTANEOUS
Status: DISCONTINUED | OUTPATIENT
Start: 2019-02-23 | End: 2019-02-23

## 2019-02-23 RX ORDER — DEXTROSE MONOHYDRATE 25 G/50ML
25-50 INJECTION, SOLUTION INTRAVENOUS
Status: DISCONTINUED | OUTPATIENT
Start: 2019-02-23 | End: 2019-02-24 | Stop reason: HOSPADM

## 2019-02-23 RX ORDER — OXYCODONE HYDROCHLORIDE 5 MG/1
5-10 TABLET ORAL
Status: DISCONTINUED | OUTPATIENT
Start: 2019-02-23 | End: 2019-02-24

## 2019-02-23 RX ADMIN — DOCUSATE SODIUM 100 MG: 100 CAPSULE, LIQUID FILLED ORAL at 20:50

## 2019-02-23 RX ADMIN — OXYCODONE HYDROCHLORIDE 5 MG: 5 TABLET ORAL at 15:55

## 2019-02-23 RX ADMIN — GABAPENTIN 100 MG: 100 CAPSULE ORAL at 20:50

## 2019-02-23 RX ADMIN — ACETAMINOPHEN 650 MG: 325 TABLET ORAL at 19:28

## 2019-02-23 RX ADMIN — ONDANSETRON 4 MG: 2 INJECTION INTRAMUSCULAR; INTRAVENOUS at 12:26

## 2019-02-23 RX ADMIN — SODIUM CHLORIDE 1000 ML: 9 INJECTION, SOLUTION INTRAVENOUS at 14:30

## 2019-02-23 RX ADMIN — ACETAMINOPHEN 650 MG: 325 TABLET ORAL at 15:55

## 2019-02-23 RX ADMIN — OXYCODONE HYDROCHLORIDE 5 MG: 5 TABLET ORAL at 19:28

## 2019-02-23 RX ADMIN — SODIUM CHLORIDE 1000 ML: 9 INJECTION, SOLUTION INTRAVENOUS at 12:26

## 2019-02-23 RX ADMIN — Medication 0.5 MG: at 12:25

## 2019-02-23 RX ADMIN — HYDROMORPHONE HYDROCHLORIDE 0.5 MG: 1 INJECTION, SOLUTION INTRAMUSCULAR; INTRAVENOUS; SUBCUTANEOUS at 15:55

## 2019-02-23 RX ADMIN — SODIUM CHLORIDE 57 ML: 9 INJECTION, SOLUTION INTRAVENOUS at 12:42

## 2019-02-23 RX ADMIN — IOPAMIDOL 100 ML: 755 INJECTION, SOLUTION INTRAVENOUS at 12:42

## 2019-02-23 RX ADMIN — IBUPROFEN 600 MG: 600 TABLET ORAL at 23:54

## 2019-02-23 RX ADMIN — Medication 0.5 MG: at 13:40

## 2019-02-23 RX ADMIN — OXYCODONE HYDROCHLORIDE 5 MG: 5 TABLET ORAL at 22:24

## 2019-02-23 ASSESSMENT — ENCOUNTER SYMPTOMS
NECK PAIN: 0
CONFUSION: 1
NUMBNESS: 0
WEAKNESS: 0
BACK PAIN: 1
ABDOMINAL PAIN: 0

## 2019-02-23 ASSESSMENT — MIFFLIN-ST. JEOR: SCORE: 2022.35

## 2019-02-23 NOTE — H&P
[facility]    History and Physical  Hospitalist       Date of Admission:  2/23/2019    Assessment & Plan   Damon Manley is a 63 year old male who presents with multiple fractures including right lower rib fractures and L1-2 transverse processes fracture after a fall.  Will monitor in the hospital in order to assess mobility and establish pain control.  --pain control  --mobility  --incentive spirometry    H/o DM:  Due to contrast use; hold metformin  --sliding scale insulin  --lantus  --monitor blood sugars    DVT Prophylaxis: Enoxaparin (Lovenox) SQ  Code Status: Full Code  Expected discharge:1-2 days    Kathia Mcknight MD    Primary Care Physician   *Kris Weems MD    Chief Complaint   S/p fall resulting in multiple rib fractures and transverse fractures    History is obtained from the patient    History of Present Illness   Damon Manley is a 63 year old male who presents s/p fall.  Patient was removing snow and salting the roof of his home.  The ladder he was standing on slipped and he fell from the ladder.  He fell approximately 6-8 ft.  Wife noticed that patient experienced LOC.  He would not respond and his eyes were rolling into the back of his head.  He was also moaning. She called EMS.  By the time they arrived patient was awake and walking around.  He wanted to go the ER for pain but did not wish to go via EMS.  His wife drove him to ER.  Patient does not recall EMS arrival or feeling pain until he got into his car.  He only recalls the act of falling and driving towards the hospital.     His pain is located in his lower back and right chest.  The latter is exacerbated by breathing. Initially breathing was hard and very painful, he has now improved with dilaudid.     +lightheaded during CT scan; when he got up but now resolved  Starting to get a headache now  Right chest pain  No abdominal pain  No diarrhea  No constipation  No fever, chills, coughing, sore  throat, runny nose  No melena  No hematochezia  No pain with urination    Past Medical History    I have reviewed this patient's medical history and updated it with pertinent information if needed.   Past Medical History:   Diagnosis Date     Coronary artery disease     Valves replaced due to hx of Rheumatic fever. - No mechanical valves     Depressive disorder     situational - resolved     Hypertension      Motion sickness      Obesity (BMI 30-39.9)    mitral?? Vs tricuspid???  and aortic valve replacement 20 years ago    Past Surgical History   I have reviewed this patient's surgical history and updated it with pertinent information if needed.  Past Surgical History:   Procedure Laterality Date     C ANESTH,DX ARTHROSCOPIC PROC KNEE JOINT  05    Left knee with partial medial meniscectomy.     C ANESTH,OPEN HEART; W/O PUMP OXYEGENATOR       COLONOSCOPY  2007    Colon polyps.     COLONOSCOPY N/A 2017    Procedure: COMBINED COLONOSCOPY, SINGLE OR MULTIPLE BIOPSY/POLYPECTOMY BY BIOPSY;  Surgeon: Dejan Mckee MD;  Location:  GI     HC KNEE SCOPE,MED/LAT MENISECTOMY  2007    Partial medial meniscectomy, both knees.       Prior to Admission Medications   Prior to Admission Medications   Prescriptions Last Dose Informant Patient Reported? Taking?   amoxicillin-clavulanate (AUGMENTIN) 875-125 MG tablet   No No   Sig: Take 1 tablet by mouth 2 times daily for 10 days   ampicillin (PRINCIPEN) 500 MG capsule   No No   Si capsules 1 hour prior to dental work   Patient not taking: Reported on 2019   aspirin 81 MG tablet   Yes No   Sig: Take 1 tablet (81 mg) by mouth daily   atorvastatin (LIPITOR) 10 MG tablet   No No   Sig: Take 1 tablet (10 mg) by mouth daily   blood glucose monitoring (ONE TOUCH DELICA) lancets   No No   Sig: Use to test blood sugars 1-2 times daily or as directed.   blood glucose monitoring (ONETOUCH VERIO IQ) test strip   No No   Sig: Use to test blood sugars 1-2  times daily or as directed.   blood glucose monitoring (ONETOUCH VERIO) meter device kit   No No   Sig: Use to test blood sugars 1-2 times daily or as directed.   losartan (COZAAR) 100 MG tablet   No No   Sig: Take 1 tablet by mouth once daily.   metFORMIN (GLUCOPHAGE) 500 MG tablet   No No   Sig: Take 1 tablet (500 mg) by mouth 2 times daily (with meals)   metoprolol succinate ER (TOPROL-XL) 100 MG 24 hr tablet   No No   Sig: Take 1 tablet (100 mg) by mouth daily   ondansetron (ZOFRAN ODT) 4 MG ODT tab   No No   Sig: Take 1 tablet (4 mg) by mouth every 6 hours as needed for nausea or vomiting      Facility-Administered Medications: None     Allergies   Allergies   Allergen Reactions     No Known Drug Allergies        Social History   I have reviewed this patient's social history and updated it with pertinent information if needed. Damon Manley  reports that he quit smoking about 30 years ago. He quit after 15.00 years of use. he has never used smokeless tobacco. He reports that he drinks alcohol. He reports that he does not use drugs.    Family History   I have reviewed this patient's family history and updated it with pertinent information if needed.   Family History   Problem Relation Age of Onset     Other Cancer Father         lung     Other Cancer Mother         lung     Other Cancer Paternal Aunt         unsure     Diabetes No family hx of      Coronary Artery Disease No family hx of      Hypertension No family hx of      Hyperlipidemia No family hx of      Cerebrovascular Disease No family hx of        Review of Systems   The 10 point Review of Systems is negative other than noted in the HPI or here.     Physical Exam   Temp: 97  F (36.1  C) Temp src: Oral BP: 125/62 Pulse: 60   Resp: 18 SpO2: 94 % O2 Device: None (Room air)    Vital Signs with Ranges  Temp:  [97  F (36.1  C)] 97  F (36.1  C)  Pulse:  [60] 60  Resp:  [16-18] 18  BP: (125-128)/(62-75) 125/62  SpO2:  [94 %-97 %] 94 %  0 lbs 0  oz    Constitutional: awake, alert, oriented X3, NAD  HEENT:  MMM, NCAT,   Respiratory: CTA bilaterally, no crackles, no wheezing, no labored breathing  Cardiovascular: RRR S1+, S2+, 3/6 sysotlic murmur  GI: BS+ S/ND/NT no masses, no hepatosplenomegaly  Lymph/Hematologic:  No LAD of anterior and posterior cervical spine  EXT: no C/C/E/T 2+ peripheral pulses  Skin: No rashes, no lacerations; bruise developing over right lower ribs (anterior)  Neurologic: CN 2-12 grossly intact, moving all four ext equally, no focal deficits  Psychiatric: pleasant and cooperative, well appearing    Data   Data reviewed today: .  Recent Labs   Lab 02/23/19  1233   WBC 8.6   HGB 16.7   MCV 91         POTASSIUM 4.2   CHLORIDE 109   CO2 25   BUN 22   CR 0.98   ANIONGAP 9   MARGARET 8.7   *   ALBUMIN 4.0   PROTTOTAL 7.9   BILITOTAL 0.4   ALKPHOS 81   ALT 58   AST 50*       Recent Results (from the past 24 hour(s))   CT Head w/o Contrast    Narrative    CT SCAN OF THE HEAD WITHOUT CONTRAST   2/23/2019 1:05 PM     HISTORY: Headache, post traumatic.    TECHNIQUE:  Axial images of the head and coronal reformations without  IV contrast material. Radiation dose for this scan was reduced using  automated exposure control, adjustment of the mA and/or kV according  to patient size, or iterative reconstruction technique.    COMPARISON: MRI 5/10/2018, 12/8/2006    FINDINGS:  The cerebral hemispheres, brainstem, and cerebellum  demonstrate normal morphology and attenuation. No evidence of acute  ischemia, hemorrhage, mass, mass effect, or hydrocephalus. The  visualized calvarium, skull base, paranasal sinuses, and extracranial  soft tissues are unremarkable. Probable small left frontal scalp  contusion without evidence of underlying skull fracture or  intracranial hemorrhage.      Impression    IMPRESSION: No acute intracranial abnormality.    DIDIER APONTE MD   CT Chest/Abdomen/Pelvis w Contrast    Narrative    Exam: CT  CHEST/ABDOMEN/PELVIS W CONTRAST 2/23/2019 1:15 PM    History: Trauma    Comparison: 4/26/2018    Technique: Volumetric acquisition with reconstruction in the axial,  coronal planes through the chest, abdomen and pelvis with contrast.  Radiation dose for this scan was reduced using automated exposure  control, adjustment of the mA and/or kV according to patient size, or  iterative reconstruction technique.    Contrast: 100mL Isovue-370    Findings:   Chest: Lungs are clear. No pleural or pericardial effusion. No  thoracic lymphadenopathy. Atherosclerotic calcifications of the  descending aorta. Heart size normal.    Abdomen: Liver, spleen, adrenal glands, kidneys, pancreas and  gallbladder appear normal.    No areas of bowel wall thickening or bowel dilatation. Normal  appendix.    No free fluid. Pelvic structures appear normal. No abdominal or pelvic  lymphadenopathy.    Bones: Minimally displaced fractures involving the RIGHT anterolateral  fifth, sixth and seventh ribs.      Impression    Impression: Minimally displaced fractures involving the RIGHT  anterolateral fifth, sixth and seventh ribs. No other evidence of  trauma in the chest, abdomen or pelvis.    EVIE ZAVALETA MD   CT Cervical Spine w/o Contrast    Narrative    CT CERVICAL SPINE WITHOUT CONTRAST   2/23/2019 1:17 PM     HISTORY: Trauma.     TECHNIQUE: Axial images of the cervical spine were obtained without  intravenous contrast. Multiplanar reformations were performed.   Radiation dose for this scan was reduced using automated exposure  control, adjustment of the mA and/or kV according to patient size, or  iterative reconstruction technique.    COMPARISON: None.    FINDINGS: Alignment is significant for slight straightening and slight  loss of the normal cervical lordosis. Degenerative disc disease is  most conspicuous at C5-6 and C6-7. Facet arthropathy is most severe on  the left at C2-3. No evidence of acute fracture or traumatic  subluxation. No  evidence of acute traumatic disc herniation or  epidural hematoma. Disc bulge with left central protrusion at C5-6  results in moderate spinal canal stenosis and indentation on the left  anterior cervical cord (series 4 image 10). Mild atherosclerotic  plaquing is present at the bilateral carotid bifurcations. Paraspinal  soft tissues are otherwise unremarkable.      Impression    IMPRESSION: No evidence of acute fracture or subluxation in the  cervical spine. Degenerative change as detailed.    DIDIER APONTE MD   CT Thoracic Spine w/o Contrast    Narrative    CT THORACIC SPINE WITHOUT CONTRAST   2/23/2019 1:18 PM     HISTORY: Trauma.     TECHNIQUE: Axial images of the thoracic spine were obtained without  intravenous contrast. Multiplanar reformations were performed.   Radiation dose for this scan was reduced using automated exposure  control, adjustment of the mA and/or kV according to patient size, or  iterative reconstruction technique.    COMPARISON: None.    FINDINGS: Alignment is significant for slight levoconvex curvature of  the upper/mid thoracic spine. Multilevel mild degenerative disc  disease and facet arthropathy are present. No evidence of acute  fracture or traumatic subluxation. No evidence of acute traumatic disc  herniation or epidural hematoma.    Nondisplaced fractures are present involving the left L1 and L2  transverse processes. Refer to lumbar spine report for additional  details. Paraspinal soft tissues demonstrate scattered vascular  calcifications. There is slight hypoattenuation of the liver. Refer to  chest, abdomen, and pelvis report for additional details.      Impression    IMPRESSION:  No evidence of acute fracture or traumatic subluxation  within the thoracic spine. Refer to lumbar spine report for additional  details.    DIDIER APONTE MD   CT Lumbar Spine w/o Contrast    Narrative    CT LUMBAR SPINE WITHOUT CONTRAST  2/23/2019 1:22 PM     HISTORY: Trauma.     TECHNIQUE: Axial  images of the lumbar spine were obtained without  intravenous contrast. Multiplanar reformations were performed.   Radiation dose for this scan was reduced using automated exposure  control, adjustment of the mA and/or kV according to patient size, or  iterative reconstruction technique.    COMPARISON: CT abdomen and pelvis 4/26/2018    FINDINGS:  Acute nondisplaced fractures are present involving the left  L1 and L2 transverse processes. Possible nondisplaced left L3  transverse process fracture. Vertebral body heights are maintained.  Alignment is significant for slight levoconvex curvature. Multilevel  mild degenerative disc disease and mild-to-moderate facet arthropathy  are present, most conspicuous at L4-5 and L5-S1. No evidence of acute  traumatic disc herniation or epidural hematoma. Paraspinal soft  tissues demonstrate hepatic hypoattenuation and are otherwise without  appreciable acute abnormality. Refer to abdomen/pelvis report for  additional details.      Impression    IMPRESSION:  Acute fractures of the left L1 and L2 transverse  processes.       DIDIER APONTE MD

## 2019-02-23 NOTE — ED TRIAGE NOTES
Wife states at about 1130 he was up on a ladder and fell 6-8 feet landing on his back on a deck. Wife present. Had LOC for 5 minutes. Alert now. Co pain in lower and right upper back radiating into lateral and right chest. Trauma eval was called on arrival to room and Dr Rosas came to assess pt.

## 2019-02-23 NOTE — ED PROVIDER NOTES
History     Chief Complaint   Patient presents with     Fall     HPI  Damon Manley is a 63 year old male who presents to the ED via EMS after experiencing a fall. Wife reports patient was on a ladder removing snow from his roof when he slipped, subsequently falling 6-8 feet. Wife states she witnessed the fall and immediately went to the patient, who lost consciousness. Wife reports patient remained unconscious for approximately 5 minutes, and upon gaining consciousness seemed slightly confused as he repeatedly asked her what happened. Patient notes diffuse pain over his right back, as well as mild pain over his right chest. He denies pain to his head, neck, or abdomen. Patient further denies numbness, weakness, or difficulty ambulating. Patient has a history of aortic and pulmonary valve replacement and daily medications include a low dose aspirin; however, patient denies taking any aspirin today.      Allergies:  Allergies   Allergen Reactions     No Known Drug Allergies        Problem List:    Patient Active Problem List    Diagnosis Date Noted     Rib fractures 02/23/2019     Priority: Medium     Type 2 diabetes mellitus without complication, without long-term current use of insulin (H) 05/30/2018     Priority: Medium     Elevated lipase 05/25/2018     Priority: Medium     Aortic valve prosthesis present 09/28/2017     Priority: Medium     Pulmonary valve replaced 09/28/2017     Priority: Medium     Aortic valve replaced 09/28/2017     Priority: Medium     ARIAS (dyspnea on exertion) 09/28/2017     Priority: Medium     SOB (shortness of breath) 09/28/2017     Priority: Medium     Elevated LFTs 09/28/2017     Priority: Medium     Essential hypertension with goal blood pressure less than 140/90 06/15/2016     Priority: Medium     Advanced directives, counseling/discussion 03/05/2013     Priority: Medium     Abnormal glucose 03/09/2011     Priority: Medium     Problem list name updated by automated process.  Provider to review       CARDIOVASCULAR SCREENING; LDL GOAL LESS THAN 130 10/31/2010     Priority: Medium     GERD (gastroesophageal reflux disease) 02/10/2010     Priority: Medium        Past Medical History:    Past Medical History:   Diagnosis Date     Coronary artery disease      Depressive disorder      Hypertension      Motion sickness      Obesity (BMI 30-39.9)        Past Surgical History:    Past Surgical History:   Procedure Laterality Date     C ANESTH,DX ARTHROSCOPIC PROC KNEE JOINT  05    Left knee with partial medial meniscectomy.     C ANESTH,OPEN HEART; W/O PUMP OXYEGENATOR       COLONOSCOPY  2007    Colon polyps.     COLONOSCOPY N/A 2017    Procedure: COMBINED COLONOSCOPY, SINGLE OR MULTIPLE BIOPSY/POLYPECTOMY BY BIOPSY;  Surgeon: Dejan Mckee MD;  Location: [facility]     HC KNEE SCOPE,MED/LAT MENISECTOMY  2007    Partial medial meniscectomy, both knees.       Family History:    Family History   Problem Relation Age of Onset     Other Cancer Father         lung     Other Cancer Mother         lung     Other Cancer Paternal Aunt         unsure     Diabetes No family hx of      Coronary Artery Disease No family hx of      Hypertension No family hx of      Hyperlipidemia No family hx of      Cerebrovascular Disease No family hx of        Social History:  Marital Status:   [2]  Social History     Tobacco Use     Smoking status: Former Smoker     Years: 15.00     Last attempt to quit: 1988     Years since quittin.9     Smokeless tobacco: Never Used   Substance Use Topics     Alcohol use: Yes     Alcohol/week: 0.0 oz     Comment: social - glass of wine socially     Drug use: No        Medications:      No current outpatient medications on file.    Review of Systems   Gastrointestinal: Negative for abdominal pain.   Musculoskeletal: Positive for back pain (diffuse over right). Negative for gait problem and neck pain.        Positive for right chest wall pain.  "  Neurological: Positive for syncope (LOC for approximately 5 minutes, per wife). Negative for weakness and numbness.   Psychiatric/Behavioral: Positive for confusion (some confusion immediately following LOC; now resolved).   All other systems reviewed and are negative.      Physical Exam   BP: 128/75  Pulse: 60  Temp: 97  F (36.1  C)  Resp: 16  Height: 180.3 cm (5' 11\")  Weight: 120 kg (264 lb 9.6 oz)  SpO2: 97 %      Physical Exam   Constitutional: He appears well-developed and well-nourished.   HENT:   Head: Normocephalic and atraumatic.   Right Ear: No hemotympanum.   Left Ear: No hemotympanum.   Mouth/Throat: Oropharynx is clear and moist.   Eyes: Conjunctivae and EOM are normal. Pupils are equal, round, and reactive to light. No scleral icterus.   Neck: Normal range of motion. Neck supple.   Cardiovascular: Normal rate, regular rhythm and normal heart sounds.   Pulmonary/Chest: Effort normal and breath sounds normal. No respiratory distress. He exhibits tenderness. He exhibits no crepitus.   No ecchymosis over chest.   Abdominal: Soft. There is tenderness (moderate) in the right upper quadrant and right lower quadrant. There is no rebound and no guarding.   Musculoskeletal: Normal range of motion.        Thoracic back: He exhibits tenderness (generalized over right side) and bony tenderness (upper thoracic).   No crepitus, ecchymosis, or swelling over back.   Neurological: He is alert.   Skin: Skin is warm and dry. Capillary refill takes less than 2 seconds.   Psychiatric: His affect is blunt.   Slow to respond to questions.   Nursing note and vitals reviewed.       ED Course        Procedures               Critical Care time:  was 45 minutes for this patient excluding procedures.       Trauma Summary Disposition     Patient is trauma admission:  Trauma  Evaluation    Spine  Backboard removal time: Backboard not applied   C-collar and immobilization: not clearly indicated  CSpine Clearance: CT  Full Primary " and Secondary survey with appropriate immobilization of spine completed in exam section.     Neuro  GCS at arrival:  Motor 6=Obeys commands   Verbal 5=Oriented   Eye Opening 4=Spontaneous   Total: 15     GCS at disposition: unchanged    ED Procedures completed  none    Admitting Consultants:  Surgery consult with Arcelia HONG at 1800. Plan: admission with pain management and incentive spirometry.   Consult order placed into Epic:   Yes  Imaging reviewed by consultant:  Yes                     Results for orders placed or performed during the hospital encounter of 02/23/19 (from the past 24 hour(s))   CBC with platelets differential   Result Value Ref Range    WBC 8.6 4.0 - 11.0 10e9/L    RBC Count 5.33 4.4 - 5.9 10e12/L    Hemoglobin 16.7 13.3 - 17.7 g/dL    Hematocrit 48.5 40.0 - 53.0 %    MCV 91 78 - 100 fl    MCH 31.3 26.5 - 33.0 pg    MCHC 34.4 31.5 - 36.5 g/dL    RDW 11.9 10.0 - 15.0 %    Platelet Count 179 150 - 450 10e9/L    Diff Method Automated Method     % Neutrophils 59.3 %    % Lymphocytes 25.9 %    % Monocytes 10.4 %    % Eosinophils 2.9 %    % Basophils 0.7 %    % Immature Granulocytes 0.8 %    Nucleated RBCs 0 0 /100    Absolute Neutrophil 5.1 1.6 - 8.3 10e9/L    Absolute Lymphocytes 2.2 0.8 - 5.3 10e9/L    Absolute Monocytes 0.9 0.0 - 1.3 10e9/L    Absolute Basophils 0.1 0.0 - 0.2 10e9/L    Abs Immature Granulocytes 0.1 0 - 0.4 10e9/L    Absolute Nucleated RBC 0.0    Comprehensive metabolic panel   Result Value Ref Range    Sodium 143 133 - 144 mmol/L    Potassium 4.2 3.4 - 5.3 mmol/L    Chloride 109 94 - 109 mmol/L    Carbon Dioxide 25 20 - 32 mmol/L    Anion Gap 9 3 - 14 mmol/L    Glucose 119 (H) 70 - 99 mg/dL    Urea Nitrogen 22 7 - 30 mg/dL    Creatinine 0.98 0.66 - 1.25 mg/dL    GFR Estimate 81 >60 mL/min/[1.73_m2]    GFR Estimate If Black >90 >60 mL/min/[1.73_m2]    Calcium 8.7 8.5 - 10.1 mg/dL    Bilirubin Total 0.4 0.2 - 1.3 mg/dL    Albumin 4.0 3.4 - 5.0 g/dL    Protein Total 7.9 6.8 - 8.8 g/dL     Alkaline Phosphatase 81 40 - 150 U/L    ALT 58 0 - 70 U/L    AST 50 (H) 0 - 45 U/L   Hemoglobin A1c   Result Value Ref Range    Hemoglobin A1C 6.6 (H) 0 - 5.6 %   CT Head w/o Contrast    Narrative    CT SCAN OF THE HEAD WITHOUT CONTRAST   2/23/2019 1:05 PM     HISTORY: Headache, post traumatic.    TECHNIQUE:  Axial images of the head and coronal reformations without  IV contrast material. Radiation dose for this scan was reduced using  automated exposure control, adjustment of the mA and/or kV according  to patient size, or iterative reconstruction technique.    COMPARISON: MRI 5/10/2018, 12/8/2006    FINDINGS:  The cerebral hemispheres, brainstem, and cerebellum  demonstrate normal morphology and attenuation. No evidence of acute  ischemia, hemorrhage, mass, mass effect, or hydrocephalus. The  visualized calvarium, skull base, paranasal sinuses, and extracranial  soft tissues are unremarkable. Probable small left frontal scalp  contusion without evidence of underlying skull fracture or  intracranial hemorrhage.      Impression    IMPRESSION: No acute intracranial abnormality.    DIDIER APONTE MD   CT Chest/Abdomen/Pelvis w Contrast    Narrative    Exam: CT CHEST/ABDOMEN/PELVIS W CONTRAST 2/23/2019 1:15 PM    History: Trauma    Comparison: 4/26/2018    Technique: Volumetric acquisition with reconstruction in the axial,  coronal planes through the chest, abdomen and pelvis with contrast.  Radiation dose for this scan was reduced using automated exposure  control, adjustment of the mA and/or kV according to patient size, or  iterative reconstruction technique.    Contrast: 100mL Isovue-370    Findings:   Chest: Lungs are clear. No pleural or pericardial effusion. No  thoracic lymphadenopathy. Atherosclerotic calcifications of the  descending aorta. Heart size normal.    Abdomen: Liver, spleen, adrenal glands, kidneys, pancreas and  gallbladder appear normal.    No areas of bowel wall thickening or bowel dilatation.  Normal  appendix.    No free fluid. Pelvic structures appear normal. No abdominal or pelvic  lymphadenopathy.    Bones: Minimally displaced fractures involving the RIGHT anterolateral  fifth, sixth and seventh ribs.      Impression    Impression: Minimally displaced fractures involving the RIGHT  anterolateral fifth, sixth and seventh ribs. No other evidence of  trauma in the chest, abdomen or pelvis.    EVIE ZAVALETA MD   CT Cervical Spine w/o Contrast    Narrative    CT CERVICAL SPINE WITHOUT CONTRAST   2/23/2019 1:17 PM     HISTORY: Trauma.     TECHNIQUE: Axial images of the cervical spine were obtained without  intravenous contrast. Multiplanar reformations were performed.   Radiation dose for this scan was reduced using automated exposure  control, adjustment of the mA and/or kV according to patient size, or  iterative reconstruction technique.    COMPARISON: None.    FINDINGS: Alignment is significant for slight straightening and slight  loss of the normal cervical lordosis. Degenerative disc disease is  most conspicuous at C5-6 and C6-7. Facet arthropathy is most severe on  the left at C2-3. No evidence of acute fracture or traumatic  subluxation. No evidence of acute traumatic disc herniation or  epidural hematoma. Disc bulge with left central protrusion at C5-6  results in moderate spinal canal stenosis and indentation on the left  anterior cervical cord (series 4 image 10). Mild atherosclerotic  plaquing is present at the bilateral carotid bifurcations. Paraspinal  soft tissues are otherwise unremarkable.      Impression    IMPRESSION: No evidence of acute fracture or subluxation in the  cervical spine. Degenerative change as detailed.    DIDIER APONTE MD   CT Thoracic Spine w/o Contrast    Narrative    CT THORACIC SPINE WITHOUT CONTRAST   2/23/2019 1:18 PM     HISTORY: Trauma.     TECHNIQUE: Axial images of the thoracic spine were obtained without  intravenous contrast. Multiplanar reformations were  performed.   Radiation dose for this scan was reduced using automated exposure  control, adjustment of the mA and/or kV according to patient size, or  iterative reconstruction technique.    COMPARISON: None.    FINDINGS: Alignment is significant for slight levoconvex curvature of  the upper/mid thoracic spine. Multilevel mild degenerative disc  disease and facet arthropathy are present. No evidence of acute  fracture or traumatic subluxation. No evidence of acute traumatic disc  herniation or epidural hematoma.    Nondisplaced fractures are present involving the left L1 and L2  transverse processes. Refer to lumbar spine report for additional  details. Paraspinal soft tissues demonstrate scattered vascular  calcifications. There is slight hypoattenuation of the liver. Refer to  chest, abdomen, and pelvis report for additional details.      Impression    IMPRESSION:  No evidence of acute fracture or traumatic subluxation  within the thoracic spine. Refer to lumbar spine report for additional  details.    DIDIER APONTE MD   CT Lumbar Spine w/o Contrast    Narrative    CT LUMBAR SPINE WITHOUT CONTRAST  2/23/2019 1:22 PM     HISTORY: Trauma.     TECHNIQUE: Axial images of the lumbar spine were obtained without  intravenous contrast. Multiplanar reformations were performed.   Radiation dose for this scan was reduced using automated exposure  control, adjustment of the mA and/or kV according to patient size, or  iterative reconstruction technique.    COMPARISON: CT abdomen and pelvis 4/26/2018    FINDINGS:  Acute nondisplaced fractures are present involving the left  L1 and L2 transverse processes. Possible nondisplaced left L3  transverse process fracture. Vertebral body heights are maintained.  Alignment is significant for slight levoconvex curvature. Multilevel  mild degenerative disc disease and mild-to-moderate facet arthropathy  are present, most conspicuous at L4-5 and L5-S1. No evidence of acute  traumatic disc  herniation or epidural hematoma. Paraspinal soft  tissues demonstrate hepatic hypoattenuation and are otherwise without  appreciable acute abnormality. Refer to abdomen/pelvis report for  additional details.      Impression    IMPRESSION:  Acute fractures of the left L1 and L2 transverse  processes.       DIDIER APONTE MD   Glucose by meter   Result Value Ref Range    Glucose 133 (H) 70 - 99 mg/dL       Medications   0.9% sodium chloride BOLUS (0 mLs Intravenous Stopped 2/23/19 1423)     Followed by   sodium chloride 0.9% infusion (1,000 mLs Intravenous Rate/Dose Verify 2/23/19 1620)   aspirin (ASA) chewable tablet 81 mg (not administered)   atorvastatin (LIPITOR) tablet 10 mg (not administered)   losartan (COZAAR) tablet 100 mg (not administered)   metoprolol succinate ER (TOPROL-XL) 24 hr tablet 100 mg (not administered)   acetaminophen (TYLENOL) tablet 650 mg (650 mg Oral Given 2/23/19 1555)   acetaminophen (TYLENOL) Suppository 650 mg (not administered)   naloxone (NARCAN) injection 0.1-0.4 mg (not administered)   melatonin tablet 1 mg (not administered)   ondansetron (ZOFRAN-ODT) ODT tab 4 mg (not administered)     Or   ondansetron (ZOFRAN) injection 4 mg (not administered)   oxyCODONE (ROXICODONE) tablet 5-10 mg (5 mg Oral Given 2/23/19 1555)   HYDROmorphone (PF) (DILAUDID) injection 0.3-0.5 mg (0.5 mg Intravenous Given 2/23/19 1555)   docusate sodium (COLACE) capsule 100 mg (not administered)   polyethylene glycol (MIRALAX/GLYCOLAX) Packet 17 g (not administered)   bisacodyl (DULCOLAX) Suppository 10 mg (not administered)   glucose gel 15-30 g (not administered)     Or   dextrose 50 % injection 25-50 mL (not administered)     Or   glucagon injection 1 mg (not administered)   insulin glargine (LANTUS PEN) injection 5 Units (not administered)   insulin aspart (NovoLOG) inj (RAPID ACTING) (1 Units Subcutaneous Not Given 2/23/19 1658)   insulin aspart (NovoLOG) inj (RAPID ACTING) (not administered)   sodium  chloride (PF) 0.9% PF flush 3 mL (3 mLs Intracatheter Given 2/23/19 1241)   iopamidol (ISOVUE-370) solution 500 mL (100 mLs Intravenous Given 2/23/19 1242)   new 100 ml saline bag (57 mLs Intravenous Given 2/23/19 1242)       Assessments & Plan (with Medical Decision Making)  Patient is a 63 year old male who presents to the ED via EMS after experiencing a 6-8 foot fall after slipping off a ladder while removing snow from roof. Wife witnessed the fall and notes approximately 5 minutes of loss of consciousness followed by a period of confusion. Patient notes pain over his right chest and diffuse pain over his right back. History of aortic and pulmonary valve replacement; takes low dose aspirin daily.   Physical examination showed upper thoracic bony tenderness and generalized right paraspinal muscular tenderness as well as right chest wall tenderness. There were no signs of crepitus ecchymosis, or swelling over the back or chest. Exam further showed moderate tenderness over the right upper and right lower quadrants. Head was normocephalic and atraumatic. Upon exam, patient's speech was slow and blunt. We will administer IV fluids. We will order CBC, CMP, A1C, and glucose. We will also order CTs of the head and chest, as well as CTs of the cervical, thoracic, and lumbar spine.   I also reviewed CT results which showed minimally displaced fractures involving the right 5th-7th ribs as well as acute fracture of the left L1 and L2 transverse processes. CT results were otherwise unremarkable including no acute cardio or intracranial abnormalities.  We will administer acetaminophen, oxycodone, and dilaudid for pain. We will admit the patient to the hospital for pain control and incentive spirometry. Patient is agreeable with this plan.  Case was reviewed with the surgeon, Dr. Paniagua who agreed to see him on an inpatient basis in consultation.   He was seen in the emergency department by Dr. Mcknight, with the hospitalist service.        I have reviewed the nursing notes.    I have reviewed the findings, diagnosis, plan and need for follow up with the patient.          Medication List      There are no discharge medications for this visit.         Final diagnoses:   Fall, initial encounter   Closed fracture of multiple ribs of right side, initial encounter   Closed fracture of transverse process of lumbar vertebra, initial encounter (H) - multiple levels       2/23/2019   [facility] EMERGENCY DEPARTMENT    This document serves as a record of services personally performed by Sim Rosas MD. It was created on their behalf by Nicola Simmons, a trained medical scribe. The creation of this record is based on the provider's personal observations and the statements of the patient. This document has been checked and approved by the attending provider.     Sim Rosas MD  02/23/19 2026

## 2019-02-23 NOTE — PLAN OF CARE
S-(situation): Patient registered to Observation. Patient arrived to room 270 via cart from ED    B-(background): fall from ladder with rib and lumbar fractures    A-(assessment): patient is alert and oriented. Reports right sided rib and back pain 8/10 upon arrival from ED.  Pain controlled with IV dilaudid and admin first dose of 5mg oxycodone with patient reporting tolerable pain.  LS clear, on RA o2 sats 95%, encourage use of IS- able to get to 2000.  Up with SBA, slow steady gait.    R-(recommendations): Orders and observation goals reviewed with patient and wife    Nursing Observation criteria listed below was met:    Skin issues/needs documented:NA  Isolation needs addressed, if appropriate: NA  Fall Prevention: Education given and documented: Yes  Education Assessment documented:Yes  Education Documented (Pre-existing chronic infection such as, MRSA/VRE need education on admission): Yes  OBS video/handout Reviewed & Documented Yes in ED  Medication Reconciliation Complete: Yes  New medication patient education completed and documented (Possible Side Effects of Common Medications handout): Yes  Home medications if not able to send immediately home with family stored here: NA  Reminder note placed in discharge instructions: NA  Patient has discharge needs (If yes, please explain): No    Wife brought patients clothes home, has no belongings here.

## 2019-02-24 ENCOUNTER — TELEPHONE (OUTPATIENT)
Dept: EMERGENCY MEDICINE | Facility: CLINIC | Age: 64
End: 2019-02-24

## 2019-02-24 VITALS
OXYGEN SATURATION: 94 % | DIASTOLIC BLOOD PRESSURE: 67 MMHG | TEMPERATURE: 96.9 F | HEART RATE: 62 BPM | HEIGHT: 71 IN | BODY MASS INDEX: 37.04 KG/M2 | SYSTOLIC BLOOD PRESSURE: 116 MMHG | WEIGHT: 264.6 LBS | RESPIRATION RATE: 18 BRPM

## 2019-02-24 LAB
ERYTHROCYTE [DISTWIDTH] IN BLOOD BY AUTOMATED COUNT: 12 % (ref 10–15)
GLUCOSE BLDC GLUCOMTR-MCNC: 135 MG/DL (ref 70–99)
GLUCOSE BLDC GLUCOMTR-MCNC: 169 MG/DL (ref 70–99)
GLUCOSE BLDC GLUCOMTR-MCNC: 275 MG/DL (ref 70–99)
GLUCOSE SERPL-MCNC: 129 MG/DL (ref 70–99)
HCT VFR BLD AUTO: 43 % (ref 40–53)
HGB BLD-MCNC: 14.5 G/DL (ref 13.3–17.7)
MCH RBC QN AUTO: 31.1 PG (ref 26.5–33)
MCHC RBC AUTO-ENTMCNC: 33.7 G/DL (ref 31.5–36.5)
MCV RBC AUTO: 92 FL (ref 78–100)
PLATELET # BLD AUTO: 147 10E9/L (ref 150–450)
RBC # BLD AUTO: 4.66 10E12/L (ref 4.4–5.9)
WBC # BLD AUTO: 9 10E9/L (ref 4–11)

## 2019-02-24 PROCEDURE — 00000146 ZZHCL STATISTIC GLUCOSE BY METER IP

## 2019-02-24 PROCEDURE — 82947 ASSAY GLUCOSE BLOOD QUANT: CPT | Performed by: INTERNAL MEDICINE

## 2019-02-24 PROCEDURE — 25000128 H RX IP 250 OP 636: Performed by: INTERNAL MEDICINE

## 2019-02-24 PROCEDURE — G0378 HOSPITAL OBSERVATION PER HR: HCPCS

## 2019-02-24 PROCEDURE — 36415 COLL VENOUS BLD VENIPUNCTURE: CPT | Performed by: INTERNAL MEDICINE

## 2019-02-24 PROCEDURE — 96376 TX/PRO/DX INJ SAME DRUG ADON: CPT

## 2019-02-24 PROCEDURE — 25000132 ZZH RX MED GY IP 250 OP 250 PS 637: Performed by: INTERNAL MEDICINE

## 2019-02-24 PROCEDURE — 96361 HYDRATE IV INFUSION ADD-ON: CPT

## 2019-02-24 PROCEDURE — 85027 COMPLETE CBC AUTOMATED: CPT | Performed by: INTERNAL MEDICINE

## 2019-02-24 PROCEDURE — 25000132 ZZH RX MED GY IP 250 OP 250 PS 637: Performed by: SURGERY

## 2019-02-24 RX ORDER — IBUPROFEN 600 MG/1
600 TABLET, FILM COATED ORAL EVERY 6 HOURS PRN
Qty: 25 TABLET | Refills: 0 | Status: SHIPPED | OUTPATIENT
Start: 2019-02-24 | End: 2019-08-19

## 2019-02-24 RX ORDER — GABAPENTIN 100 MG/1
100 CAPSULE ORAL 3 TIMES DAILY
Qty: 90 CAPSULE | Refills: 0 | Status: SHIPPED | OUTPATIENT
Start: 2019-02-24 | End: 2020-10-07

## 2019-02-24 RX ORDER — OXYCODONE HYDROCHLORIDE 5 MG/1
5 TABLET ORAL EVERY 6 HOURS PRN
Qty: 10 TABLET | Refills: 0 | Status: SHIPPED | OUTPATIENT
Start: 2019-02-24 | End: 2019-03-15

## 2019-02-24 RX ORDER — OXYCODONE HYDROCHLORIDE 5 MG/1
5 TABLET ORAL
Status: DISCONTINUED | OUTPATIENT
Start: 2019-02-24 | End: 2019-02-24 | Stop reason: HOSPADM

## 2019-02-24 RX ORDER — NALOXONE HYDROCHLORIDE 0.4 MG/ML
.1-.4 INJECTION, SOLUTION INTRAMUSCULAR; INTRAVENOUS; SUBCUTANEOUS
Status: DISCONTINUED | OUTPATIENT
Start: 2019-02-24 | End: 2019-02-24

## 2019-02-24 RX ORDER — ONDANSETRON 4 MG/1
4 TABLET, ORALLY DISINTEGRATING ORAL EVERY 6 HOURS PRN
Qty: 10 TABLET | Refills: 0 | Status: SHIPPED | OUTPATIENT
Start: 2019-02-24 | End: 2019-08-19

## 2019-02-24 RX ADMIN — IBUPROFEN 600 MG: 600 TABLET ORAL at 06:44

## 2019-02-24 RX ADMIN — GABAPENTIN 100 MG: 100 CAPSULE ORAL at 09:43

## 2019-02-24 RX ADMIN — IBUPROFEN 600 MG: 600 TABLET ORAL at 12:47

## 2019-02-24 RX ADMIN — GABAPENTIN 100 MG: 100 CAPSULE ORAL at 14:14

## 2019-02-24 RX ADMIN — ONDANSETRON 4 MG: 2 INJECTION INTRAMUSCULAR; INTRAVENOUS at 09:38

## 2019-02-24 RX ADMIN — ACETAMINOPHEN 650 MG: 325 TABLET ORAL at 12:47

## 2019-02-24 RX ADMIN — METOPROLOL SUCCINATE 100 MG: 100 TABLET, EXTENDED RELEASE ORAL at 09:43

## 2019-02-24 RX ADMIN — LOSARTAN POTASSIUM 100 MG: 50 TABLET, FILM COATED ORAL at 09:43

## 2019-02-24 RX ADMIN — OXYCODONE HYDROCHLORIDE 10 MG: 5 TABLET ORAL at 02:09

## 2019-02-24 NOTE — DISCHARGE INSTRUCTIONS
Dr Foote office will call you for follow up appointment.  Neurosurgery  will call you, for appointment.

## 2019-02-24 NOTE — PROVIDER NOTIFICATION
Patient refuses to take any injectable insulin since he is no to insulin at home. He is on metformin at home.

## 2019-02-24 NOTE — PLAN OF CARE
S-(situation): end of shift note    B-(background): fall with lumbar fractures and rib fractures    A-(assessment): VSS, afebrile.  Patient reports dizziness, nausea, and groggy feeling since 5am after receiving 10mg oxycodone  Has been retching - received Zofran IV and nausea and retching resolved.  Pain controlled with gabapentin, ibuprofen and tylenol. Ambulating in halls and room this afternoon, steady gait.  Using abdominal binder when OOB.  Patient non-compliant with diabetic diet, eating girl  cookies and candy bars- Refused metformin and sliding scale insulin.    R-(recommendations): monitor

## 2019-02-24 NOTE — PROGRESS NOTES
S-(situation): end of shift note    B-(background): Fall LOC/ fx ribs    A-(assessment): pain controlled with tylenol along with oxycodone.  Abdominal binder available when pt ambulates.  VSS   LS clear   using IS  IVF @ 125    R-(recommendations): continue with plan of care

## 2019-02-24 NOTE — CONSULTS
TRAUMA HISTORY AND PHYSICAL    Name:  Damon Manley  Date/Time of Admission: 2019 12:03 PM   CSN: [id number]  Attending Provider: Kathia Mcknight MD   Room/Bed:  270/270-01  : 1955  63 year old      Subjective:     TRAUMATIC EVENT:  Fall from latter    Mode of Arrival: EMS     [] Trauma l      [] Trauma ll   [x] Trauma Consult   []  Trauma transfer from:      HPI:  Damon Manley is a 63 year old male who arrived to [facility] Emergency Department  following fall; history mostly from wife who witnessed the event; reported LOC 5+ mins; pt does not remember event.  Was climbing on latter while removing snow from his roof; slipped and fell ~6-8ft; was confused when he regained conscious; but does not remember event.  ED pan scan patient which showed right rib fractures 3x minimal displaced; acute fractures of left L1/L2 transverse processes.     Pt pain well controlled; got dilaudid and oxy; +pain at lower back only with movement; minimal chest pain on right side.  Neg SOB; neg chest pain; neg HAs; neg neck pain.  PMH - DMII; hx of open heart surgery for valvular disease.      The patient was seen at the request of the ER for trauma consultation.     Primary Care Physician:  Kris Weems     Allergies:    Allergies   Allergen Reactions     No Known Drug Allergies         Outpatient Meds:  Medications Prior to Admission   Medication Sig Dispense Refill Last Dose     ampicillin (PRINCIPEN) 500 MG capsule 4 capsules 1 hour prior to dental work 4 capsule 2 2019 at 0800     aspirin 81 MG tablet Take 1 tablet (81 mg) by mouth daily   2019 at 0800     losartan (COZAAR) 100 MG tablet Take 1 tablet by mouth once daily. 90 tablet 3 2019 at 0600     metFORMIN (GLUCOPHAGE) 500 MG tablet Take 1 tablet (500 mg) by mouth 2 times daily (with meals) 180 tablet 3 2019 at 0600     metoprolol succinate ER (TOPROL-XL) 100 MG 24 hr tablet Take 1 tablet (100 mg) by mouth daily 30 tablet 5  2019 at 0800     atorvastatin (LIPITOR) 10 MG tablet Take 1 tablet (10 mg) by mouth daily 31 tablet 1 Unknown     blood glucose monitoring (ONE TOUCH DELICA) lancets Use to test blood sugars 1-2 times daily or as directed. 100 each 11 Taking     blood glucose monitoring (ONETOUCH VERIO IQ) test strip Use to test blood sugars 1-2 times daily or as directed. 100 strip 11 Taking     blood glucose monitoring (ONETOUCH VERIO) meter device kit Use to test blood sugars 1-2 times daily or as directed. 1 kit 0 Taking       Past Medical History:  Past Medical History:   Diagnosis Date     Coronary artery disease     Valves replaced due to hx of Rheumatic fever. - No mechanical valves     Depressive disorder     situational - resolved     Hypertension      Motion sickness      Obesity (BMI 30-39.9)         Past Surgical History:   Past Surgical History:   Procedure Laterality Date     C ANESTH,DX ARTHROSCOPIC PROC KNEE JOINT  05    Left knee with partial medial meniscectomy.     C ANESTH,OPEN HEART; W/O PUMP OXYEGENATOR       COLONOSCOPY  2007    Colon polyps.     COLONOSCOPY N/A 2017    Procedure: COMBINED COLONOSCOPY, SINGLE OR MULTIPLE BIOPSY/POLYPECTOMY BY BIOPSY;  Surgeon: Dejan Mckee MD;  Location:  GI     HC KNEE SCOPE,MED/LAT MENISECTOMY  2007    Partial medial meniscectomy, both knees.        Social History:  Social History     Socioeconomic History     Marital status:      Spouse name: Wanda Parks     Number of children: 2     Years of education: 14     Highest education level: Not on file   Occupational History     Not on file   Social Needs     Financial resource strain: Not on file     Food insecurity:     Worry: Not on file     Inability: Not on file     Transportation needs:     Medical: Not on file     Non-medical: Not on file   Tobacco Use     Smoking status: Former Smoker     Years: 15.00     Last attempt to quit: 1988     Years since quittin.9      Smokeless tobacco: Never Used   Substance and Sexual Activity     Alcohol use: Yes     Alcohol/week: 0.0 oz     Comment: social - glass of wine socially     Drug use: No     Sexual activity: Yes     Partners: Female     Comment:  - 2 children   Lifestyle     Physical activity:     Days per week: Not on file     Minutes per session: Not on file     Stress: Not on file   Relationships     Social connections:     Talks on phone: Not on file     Gets together: Not on file     Attends Restoration service: Not on file     Active member of club or organization: Not on file     Attends meetings of clubs or organizations: Not on file     Relationship status: Not on file     Intimate partner violence:     Fear of current or ex partner: Not on file     Emotionally abused: Not on file     Physically abused: Not on file     Forced sexual activity: Not on file   Other Topics Concern      Service No     Blood Transfusions Yes     Comment: open heart surgery 1998     Caffeine Concern No     Occupational Exposure No     Hobby Hazards No     Sleep Concern Yes     Comment:  wakes after 4-5 hours of sleep at night     Stress Concern Yes     Weight Concern Yes     Special Diet No     Back Care No     Exercise Yes     Comment: 3-4 times/week     Bike Helmet Not Asked     Comment: n/a     Seat Belt Yes     Self-Exams Not Asked     Comment: n/a     Parent/sibling w/ CABG, MI or angioplasty before 65F 55M? Not Asked   Social History Narrative     Not on file       Family History:  Family History   Problem Relation Age of Onset     Other Cancer Father         lung     Other Cancer Mother         lung     Other Cancer Paternal Aunt         unsure     Diabetes No family hx of      Coronary Artery Disease No family hx of      Hypertension No family hx of      Hyperlipidemia No family hx of      Cerebrovascular Disease No family hx of        Review of Systems:  10 point review of systems reviewed and negative unless noted above in  "the HPI    Objective:     Vital Signs:  /64   Pulse 83   Temp 99.1  F (37.3  C) (Oral)   Resp 16   Ht 1.803 m (5' 11\")   Wt 120 kg (264 lb 9.6 oz)   SpO2 95%   BMI 36.90 kg/m      Primary Survey:     Airway: intact B/P: BP: 118/64   Breathing: intact Pulse: Pulse: 83   Circulation: normotensive Respiration: Resp: 16   Neuro: alert and oriented SaO2: SpO2: 95 %   GCS:   GCS:   Motor 6=Obeys commands   Verbal 5=Oriented   Eye Opening 4=Spontaneous   Total: 15      Temp: Temp: 99.1  F (37.3  C)     Secondary Survey:     General Appearance AOx3, in no acute distress   Head/CSPINE Head atraumatic, CSPINE without tenderness or bony step-off   Ears, Nose, Throat ENT exam normal, conjunctivae/corneas clear. PERRL, EOM's intact. No battles sign or raccoon eyes present.   Neck Supple without obvious injury. No notable JVD   Chest Resp: No chest wall deformities or tenderness, respiratory effort normal, no use of accessory muscles. No wheezing, rhonchi, or rales.   CV: RRR, no murmurs, gallops or rubs   Gastrointestinal Abdomen soft, nontender, nondistended. No rebound, guarding, or rigidity present. No palpable masses. No CVA tenderness. No gross deformities    TSPINE and LSPINE No obvious tenderness or bony step off deformities.  Minimal pain at lower pain on palpation.     Extremities FROM.     and Rectal: deferred    Skin Normal coloration and turgor, no rashes, no suspicious skin lesions noted  Excoriations/Lacerations:    Pelvis Stable with no gross deformities. Without obvious tenderness to palpation         Labs and Radioogy:     Results for orders placed or performed during the hospital encounter of 02/23/19 (from the past 24 hour(s))   CBC with platelets differential   Result Value Ref Range    WBC 8.6 4.0 - 11.0 10e9/L    RBC Count 5.33 4.4 - 5.9 10e12/L    Hemoglobin 16.7 13.3 - 17.7 g/dL    Hematocrit 48.5 40.0 - 53.0 %    MCV 91 78 - 100 fl    MCH 31.3 26.5 - 33.0 pg    MCHC 34.4 31.5 - 36.5 g/dL "    RDW 11.9 10.0 - 15.0 %    Platelet Count 179 150 - 450 10e9/L    Diff Method Automated Method     % Neutrophils 59.3 %    % Lymphocytes 25.9 %    % Monocytes 10.4 %    % Eosinophils 2.9 %    % Basophils 0.7 %    % Immature Granulocytes 0.8 %    Nucleated RBCs 0 0 /100    Absolute Neutrophil 5.1 1.6 - 8.3 10e9/L    Absolute Lymphocytes 2.2 0.8 - 5.3 10e9/L    Absolute Monocytes 0.9 0.0 - 1.3 10e9/L    Absolute Basophils 0.1 0.0 - 0.2 10e9/L    Abs Immature Granulocytes 0.1 0 - 0.4 10e9/L    Absolute Nucleated RBC 0.0    Comprehensive metabolic panel   Result Value Ref Range    Sodium 143 133 - 144 mmol/L    Potassium 4.2 3.4 - 5.3 mmol/L    Chloride 109 94 - 109 mmol/L    Carbon Dioxide 25 20 - 32 mmol/L    Anion Gap 9 3 - 14 mmol/L    Glucose 119 (H) 70 - 99 mg/dL    Urea Nitrogen 22 7 - 30 mg/dL    Creatinine 0.98 0.66 - 1.25 mg/dL    GFR Estimate 81 >60 mL/min/[1.73_m2]    GFR Estimate If Black >90 >60 mL/min/[1.73_m2]    Calcium 8.7 8.5 - 10.1 mg/dL    Bilirubin Total 0.4 0.2 - 1.3 mg/dL    Albumin 4.0 3.4 - 5.0 g/dL    Protein Total 7.9 6.8 - 8.8 g/dL    Alkaline Phosphatase 81 40 - 150 U/L    ALT 58 0 - 70 U/L    AST 50 (H) 0 - 45 U/L   Hemoglobin A1c   Result Value Ref Range    Hemoglobin A1C 6.6 (H) 0 - 5.6 %   CT Head w/o Contrast    Narrative    CT SCAN OF THE HEAD WITHOUT CONTRAST   2/23/2019 1:05 PM     HISTORY: Headache, post traumatic.    TECHNIQUE:  Axial images of the head and coronal reformations without  IV contrast material. Radiation dose for this scan was reduced using  automated exposure control, adjustment of the mA and/or kV according  to patient size, or iterative reconstruction technique.    COMPARISON: MRI 5/10/2018, 12/8/2006    FINDINGS:  The cerebral hemispheres, brainstem, and cerebellum  demonstrate normal morphology and attenuation. No evidence of acute  ischemia, hemorrhage, mass, mass effect, or hydrocephalus. The  visualized calvarium, skull base, paranasal sinuses, and  extracranial  soft tissues are unremarkable. Probable small left frontal scalp  contusion without evidence of underlying skull fracture or  intracranial hemorrhage.      Impression    IMPRESSION: No acute intracranial abnormality.    DIDIER APONTE MD   CT Chest/Abdomen/Pelvis w Contrast    Narrative    Exam: CT CHEST/ABDOMEN/PELVIS W CONTRAST 2/23/2019 1:15 PM    History: Trauma    Comparison: 4/26/2018    Technique: Volumetric acquisition with reconstruction in the axial,  coronal planes through the chest, abdomen and pelvis with contrast.  Radiation dose for this scan was reduced using automated exposure  control, adjustment of the mA and/or kV according to patient size, or  iterative reconstruction technique.    Contrast: 100mL [id number]-370    Findings:   Chest: Lungs are clear. No pleural or pericardial effusion. No  thoracic lymphadenopathy. Atherosclerotic calcifications of the  descending aorta. Heart size normal.    Abdomen: Liver, spleen, adrenal glands, kidneys, pancreas and  gallbladder appear normal.    No areas of bowel wall thickening or bowel dilatation. Normal  appendix.    No free fluid. Pelvic structures appear normal. No abdominal or pelvic  lymphadenopathy.    Bones: Minimally displaced fractures involving the RIGHT anterolateral  fifth, sixth and seventh ribs.      Impression    Impression: Minimally displaced fractures involving the RIGHT  anterolateral fifth, sixth and seventh ribs. No other evidence of  trauma in the chest, abdomen or pelvis.    EVIE ZAVALETA MD   CT Cervical Spine w/o Contrast    Narrative    CT CERVICAL SPINE WITHOUT CONTRAST   2/23/2019 1:17 PM     HISTORY: Trauma.     TECHNIQUE: Axial images of the cervical spine were obtained without  intravenous contrast. Multiplanar reformations were performed.   Radiation dose for this scan was reduced using automated exposure  control, adjustment of the mA and/or kV according to patient size, or  iterative reconstruction  technique.    COMPARISON: None.    FINDINGS: Alignment is significant for slight straightening and slight  loss of the normal cervical lordosis. Degenerative disc disease is  most conspicuous at C5-6 and C6-7. Facet arthropathy is most severe on  the left at C2-3. No evidence of acute fracture or traumatic  subluxation. No evidence of acute traumatic disc herniation or  epidural hematoma. Disc bulge with left central protrusion at C5-6  results in moderate spinal canal stenosis and indentation on the left  anterior cervical cord (series 4 image 10). Mild atherosclerotic  plaquing is present at the bilateral carotid bifurcations. Paraspinal  soft tissues are otherwise unremarkable.      Impression    IMPRESSION: No evidence of acute fracture or subluxation in the  cervical spine. Degenerative change as detailed.    DIDIER APONTE MD   CT Thoracic Spine w/o Contrast    Narrative    CT THORACIC SPINE WITHOUT CONTRAST   2/23/2019 1:18 PM     HISTORY: Trauma.     TECHNIQUE: Axial images of the thoracic spine were obtained without  intravenous contrast. Multiplanar reformations were performed.   Radiation dose for this scan was reduced using automated exposure  control, adjustment of the mA and/or kV according to patient size, or  iterative reconstruction technique.    COMPARISON: None.    FINDINGS: Alignment is significant for slight levoconvex curvature of  the upper/mid thoracic spine. Multilevel mild degenerative disc  disease and facet arthropathy are present. No evidence of acute  fracture or traumatic subluxation. No evidence of acute traumatic disc  herniation or epidural hematoma.    Nondisplaced fractures are present involving the left L1 and L2  transverse processes. Refer to lumbar spine report for additional  details. Paraspinal soft tissues demonstrate scattered vascular  calcifications. There is slight hypoattenuation of the liver. Refer to  chest, abdomen, and pelvis report for additional details.       Impression    IMPRESSION:  No evidence of acute fracture or traumatic subluxation  within the thoracic spine. Refer to lumbar spine report for additional  details.    DIDIER APONTE MD   CT Lumbar Spine w/o Contrast    Narrative    CT LUMBAR SPINE WITHOUT CONTRAST  2/23/2019 1:22 PM     HISTORY: Trauma.     TECHNIQUE: Axial images of the lumbar spine were obtained without  intravenous contrast. Multiplanar reformations were performed.   Radiation dose for this scan was reduced using automated exposure  control, adjustment of the mA and/or kV according to patient size, or  iterative reconstruction technique.    COMPARISON: CT abdomen and pelvis 4/26/2018    FINDINGS:  Acute nondisplaced fractures are present involving the left  L1 and L2 transverse processes. Possible nondisplaced left L3  transverse process fracture. Vertebral body heights are maintained.  Alignment is significant for slight levoconvex curvature. Multilevel  mild degenerative disc disease and mild-to-moderate facet arthropathy  are present, most conspicuous at L4-5 and L5-S1. No evidence of acute  traumatic disc herniation or epidural hematoma. Paraspinal soft  tissues demonstrate hepatic hypoattenuation and are otherwise without  appreciable acute abnormality. Refer to abdomen/pelvis report for  additional details.      Impression    IMPRESSION:  Acute fractures of the left L1 and L2 transverse  processes.       DIDIER APONTE MD   Glucose by meter   Result Value Ref Range    Glucose 133 (H) 70 - 99 mg/dL       Interventions:   Intubation:   Central Access:   Laceration repair:  Chest tube (s):    Thoracotomy:   DPL :   Other:      Consults:     Ortho:    ENT:     Neurosurgery:    Plastics:    Opthal:    OB/Gyn:    Urology:    Other:       Injury Assessment:     63 year old male who presents with:    1. Fall  - 6-8ft  2. Rib fractures 3x - right side 5, 6, 7th   3. Acute transverse fractures  4. Closed head injury - LOC    Plan:     Stable for  discharge in AM from trauma standpoint  Recommend back brace I.e abdominal binder  Follow up with neurosurgery for transverse fractures - no surgical intervention needed  NSAIDs and gabapentin for pain for rib fractures - would try to avoid narcotics      CSPINE ED physician cleared  Cleared by: ED on 2/23/2019 using NEXUS criteria      Electronically signed by:  Leena Paniagua MD  2/23/2019

## 2019-02-24 NOTE — PLAN OF CARE
Patient's pain has been well controlled with Ibuprofen and oxycodone. He was able to ambulate to the bathroom with just stand by assist and get in and out of bed independently. Vitals stable. He is eating and drinking well. IV fluids continue per MD for possible rhabdo. No bruising noted as of this morning.

## 2019-02-24 NOTE — TELEPHONE ENCOUNTER
Reason for call:  Other   Patient called regarding (reason for call): appointment  Additional comments: post hospital follow up (fall), needs with in the next 7 days.     Phone number to reach patient:  Cell number on file:    Telephone Information:   Mobile [phone]       Best Time:  anytime    Can we leave a detailed message on this number?  YES

## 2019-02-24 NOTE — PLAN OF CARE
S-(situation): Patient discharged to home via cart with wife    B-(background): fall with lumbar fractures    A-(assessment): VSS, afebrile.  Pt pain controlled with ibuprofen, gabapentin and tylenol.   Abdominal binder on.      R-(recommendations): Discharge instructions reviewed with patient. Listed belongings gathered and returned to patient.        Discharge Nursing Criteria:     Care Plan and Patient education resolved: Yes    New Medications- pt has been educated about purpose and side effects: Yes    Vaccines  Influenza status verified at discharge:  Yes    MISC  Prescriptions if needed, hard copies sent with patient  Yes  Home and hospital aquired medications returned to patient: NA  Medication Bin checked and emptied on discharge Yes  Patient reports post-discharge pain management plan is effective: Yes

## 2019-02-25 ENCOUNTER — OFFICE VISIT (OUTPATIENT)
Dept: FAMILY MEDICINE | Facility: CLINIC | Age: 64
End: 2019-02-25
Payer: COMMERCIAL

## 2019-02-25 VITALS
DIASTOLIC BLOOD PRESSURE: 70 MMHG | SYSTOLIC BLOOD PRESSURE: 120 MMHG | HEART RATE: 78 BPM | WEIGHT: 279 LBS | OXYGEN SATURATION: 94 % | TEMPERATURE: 98 F | BODY MASS INDEX: 38.91 KG/M2 | RESPIRATION RATE: 14 BRPM

## 2019-02-25 DIAGNOSIS — S09.90XD CLOSED HEAD INJURY, SUBSEQUENT ENCOUNTER: Primary | ICD-10-CM

## 2019-02-25 DIAGNOSIS — S32.009A CLOSED FRACTURE OF TRANSVERSE PROCESS OF LUMBAR VERTEBRA, INITIAL ENCOUNTER (H): ICD-10-CM

## 2019-02-25 DIAGNOSIS — S22.41XA CLOSED FRACTURE OF MULTIPLE RIBS OF RIGHT SIDE, INITIAL ENCOUNTER: ICD-10-CM

## 2019-02-25 PROCEDURE — 99495 TRANSJ CARE MGMT MOD F2F 14D: CPT | Performed by: FAMILY MEDICINE

## 2019-02-25 RX ORDER — HYDROCODONE BITARTRATE AND ACETAMINOPHEN 5; 325 MG/1; MG/1
1 TABLET ORAL EVERY 4 HOURS PRN
Qty: 40 TABLET | Refills: 0 | Status: SHIPPED | OUTPATIENT
Start: 2019-02-25 | End: 2019-03-11

## 2019-02-25 ASSESSMENT — PAIN SCALES - GENERAL: PAINLEVEL: WORST PAIN (10)

## 2019-02-25 NOTE — PROGRESS NOTES
SUBJECTIVE:   Damon Manley is a 63 year old male who presents to clinic today for the following health issues:        Hospital Follow-up Visit:    Hospital/Nursing Home/IP Rehab Facility: [facility]  Date of Admission: 2/23/19  Date of Discharge: 2/24/19  Reason(s) for Admission: fall            Problems taking medications regularly:  None       Medication changes since discharge: None       Problems adhering to non-medication therapy:  None    Summary of hospitalization:  [facility] discharge summary reviewed  Diagnostic Tests/Treatments reviewed.  Follow up needed: none  Other Healthcare Providers Involved in Patient s Care:         None  Update since discharge: fluctuating course.     Post Discharge Medication Reconciliation: discharge medications reconciled, continue medications without change.  Plan of care communicated with patient                  Problem list and histories reviewed & adjusted, as indicated.  Additional history: as documented  She is here today says he is slowly getting better.  He fractured 3 transverse processes in his lumbar vertebrae has 3 fractures rib fractures on the right.  States that now he is experiencing pain on the left side actually is worse than his right side mid chest on the left it feels like something may be moving.  When he changes position going from lying to sitting or sitting to standing.  No shortness of breath.  He has mind is clear he has no chronic headache.  He did have a brief loss of consciousness so he does indeed have a closed head injury.  I did tell him that this can wax and wane for many weeks after the initial injury.  Tell him he will need to be out of work most likely for 3-6 weeks but that will depend on his healing rate.  Does have a follow-up appointment with neurology for his closed head injury and his spinous process fractures.  No other complaints at this time.  He is using his pain meds judiciously.  Patient Active  Problem List   Diagnosis     GERD (gastroesophageal reflux disease)     CARDIOVASCULAR SCREENING; LDL GOAL LESS THAN 130     Abnormal glucose     Advanced directives, counseling/discussion     Essential hypertension with goal blood pressure less than 140/90     Aortic valve prosthesis present     Pulmonary valve replaced     Aortic valve replaced     ARIAS (dyspnea on exertion)     SOB (shortness of breath)     Elevated LFTs     Elevated lipase     Type 2 diabetes mellitus without complication, without long-term current use of insulin (H)     Rib fractures     Past Surgical History:   Procedure Laterality Date     C ANESTH,DX ARTHROSCOPIC PROC KNEE JOINT  05    Left knee with partial medial meniscectomy.     C ANESTH,OPEN HEART; W/O PUMP OXYEGENATOR       COLONOSCOPY  2007    Colon polyps.     COLONOSCOPY N/A 2017    Procedure: COMBINED COLONOSCOPY, SINGLE OR MULTIPLE BIOPSY/POLYPECTOMY BY BIOPSY;  Surgeon: Dejan Mckee MD;  Location: [facility]     HC KNEE SCOPE,MED/LAT MENISECTOMY  2007    Partial medial meniscectomy, both knees.       Social History     Tobacco Use     Smoking status: Former Smoker     Years: 15.00     Last attempt to quit: 1988     Years since quittin.9     Smokeless tobacco: Never Used   Substance Use Topics     Alcohol use: Yes     Alcohol/week: 0.0 oz     Comment: social - glass of wine socially     Family History   Problem Relation Age of Onset     Other Cancer Father         lung     Other Cancer Mother         lung     Other Cancer Paternal Aunt         unsure     Diabetes No family hx of      Coronary Artery Disease No family hx of      Hypertension No family hx of      Hyperlipidemia No family hx of      Cerebrovascular Disease No family hx of          Current Outpatient Medications   Medication Sig Dispense Refill     aspirin 81 MG tablet Take 1 tablet (81 mg) by mouth daily       blood glucose monitoring (ONE TOUCH DELICA) lancets Use to test blood  sugars 1-2 times daily or as directed. 100 each 11     blood glucose monitoring (ONETOUCH VERIO IQ) test strip Use to test blood sugars 1-2 times daily or as directed. 100 strip 11     blood glucose monitoring (ONETOUCH VERIO) meter device kit Use to test blood sugars 1-2 times daily or as directed. 1 kit 0     gabapentin (NEURONTIN) 100 MG capsule Take 1 capsule (100 mg) by mouth 3 times daily 90 capsule 0     HYDROcodone-acetaminophen (NORCO) 5-325 MG tablet Take 1 tablet by mouth every 4 hours as needed for severe pain 40 tablet 0     ibuprofen (ADVIL/MOTRIN) 600 MG tablet Take 1 tablet (600 mg) by mouth every 6 hours as needed for moderate pain 25 tablet 0     losartan (COZAAR) 100 MG tablet Take 1 tablet by mouth once daily. 90 tablet 3     metFORMIN (GLUCOPHAGE) 500 MG tablet Take 1 tablet (500 mg) by mouth 2 times daily (with meals) 180 tablet 3     metoprolol succinate ER (TOPROL-XL) 100 MG 24 hr tablet Take 1 tablet (100 mg) by mouth daily 30 tablet 5     oxyCODONE (ROXICODONE) 5 MG tablet Take 1 tablet (5 mg) by mouth every 6 hours as needed for breakthrough pain 10 tablet 0     ampicillin (PRINCIPEN) 500 MG capsule 4 capsules 1 hour prior to dental work (Patient not taking: Reported on 2/25/2019) 4 capsule 2     ondansetron (ZOFRAN-ODT) 4 MG ODT tab Take 1 tablet (4 mg) by mouth every 6 hours as needed for nausea or vomiting (Patient not taking: Reported on 2/25/2019) 10 tablet 0       Reviewed and updated as needed this visit by clinical staff  Tobacco  Allergies  Meds  Soc Hx      Reviewed and updated as needed this visit by Provider         ROS:  Constitutional, HEENT, cardiovascular, pulmonary, gi and gu systems are negative, except as otherwise noted.    OBJECTIVE:     /70 (BP Location: Left arm, Patient Position: Sitting, Cuff Size: Adult Large)   Pulse 78   Temp 98  F (36.7  C) (Temporal)   Resp 14   Wt 126.6 kg (279 lb)   SpO2 94%   BMI 38.91 kg/m    Body mass index is 38.91 kg/m .    GENERAL: healthy, alert and no distress  EYES: Eyes grossly normal to inspection, PERRL and conjunctivae and sclerae normal  RESP: lungs clear to auscultation - no rales, rhonchi or wheezes  CV: regular rates and rhythm, grade 4/6 systolic murmur unchanged from previous exams patient is status post valve replacement peripheral pulses strong and no peripheral edema  CHEST WALL: Patient has tenderness to palpation of the left lateral chest wall both 3 ribs below the nipple but patient has point tenderness over 2 or 3 ribs.  Crepitus noted    Diagnostic Test Results:  I did review his CT scan I believe I see 3 fractures on the left that were not noted on his initial interpretation I did review the scan with our radiologist who was in the office today he agrees he will addend the CT scan reading.    ASSESSMENT:       PLAN:   1. Closed fracture of multiple ribs of right side, initial encounter  Rib fractures on the left foot that were not diagnosed during his hospitalization.  Time will tell how the healing will take place.  - HYDROcodone-acetaminophen (NORCO) 5-325 MG tablet; Take 1 tablet by mouth every 4 hours as needed for severe pain  Dispense: 40 tablet; Refill: 0    2. Closed fracture of transverse process of lumbar vertebra, initial encounter (H)  As above  - HYDROcodone-acetaminophen (NORCO) 5-325 MG tablet; Take 1 tablet by mouth every 4 hours as needed for severe pain  Dispense: 40 tablet; Refill: 0    3. Closed head injury, subsequent encounter  She does have a neurology appointment tomorrow for his closed head injury and is transverse process fractures I feel he will recover completely for these I did fill out some more paperwork keeping off work at least for the next 3 weeks his return to work is undecided at this time.        She will contact me with any new symptoms any shortness of breath no headache anything like that and need to know about immediately after hours he will report to the emergency  room.      Kris Weems MD, MD  [facility]

## 2019-02-25 NOTE — TELEPHONE ENCOUNTER
Per RF- can see him today at 4:30.    Called pt and scheduled this.  Lori Pond, [facility]

## 2019-02-26 ENCOUNTER — TRANSFERRED RECORDS (OUTPATIENT)
Dept: HEALTH INFORMATION MANAGEMENT | Facility: CLINIC | Age: 64
End: 2019-02-26

## 2019-02-27 ENCOUNTER — DOCUMENTATION ONLY (OUTPATIENT)
Dept: OTHER | Facility: CLINIC | Age: 64
End: 2019-02-27

## 2019-03-07 DIAGNOSIS — S22.41XA CLOSED FRACTURE OF MULTIPLE RIBS OF RIGHT SIDE, INITIAL ENCOUNTER: ICD-10-CM

## 2019-03-07 DIAGNOSIS — S32.009A CLOSED FRACTURE OF TRANSVERSE PROCESS OF LUMBAR VERTEBRA, INITIAL ENCOUNTER (H): ICD-10-CM

## 2019-03-07 NOTE — DISCHARGE SUMMARY
[facility]    Discharge Summary  Hospitalist    Date of Admission:  2/23/2019  Date of Discharge:  2/24/2019  2:15 PM  Discharging Provider: Kathia Mcknight MD  Date of Service (when I saw the patient): 02/24/2019    Discharge Diagnoses   Multiple fractures involving the right lower ribs and L1-2 transverse processes  H/o diabetes Mellitus  H/po hypertension    History of Present Illness   AND  Hospital Course   Damon Manley was admitted on 2/23/2019 after a fall resulting in 3 minimally displaced fractures to right anterolateral 5th,6th, and 7th ribs and L1-2 transverse processes.  He was admitted for pain control  By the following morning, patient was feeling better, pain was controlled on oral medications  He was seen by General Surgery who recommended an abdominal binder for his rib fractures and follow up with neurosurgery.       Kathia Mcknight MD    Significant Results and Procedures   No procedures    CT chest/abd/pelvis  Impression      Impression: Minimally displaced fractures involving the RIGHT  anterolateral fifth, sixth and seventh ribs. No other evidence of  trauma in the chest, abdomen or pelvis       CT lumbar spine  FINDINGS:  Acute nondisplaced fractures are present involving the left  L1 and L2 transverse processes. Possible nondisplaced left L3  transverse process fracture. Vertebral body heights are maintained.  Alignment is significant for slight levoconvex curvature. Multilevel  mild degenerative disc disease and mild-to-moderate facet arthropathy  are present, most conspicuous at L4-5 and L5-S1. No evidence of acute  traumatic disc herniation or epidural hematoma. Paraspinal soft  tissues demonstrate hepatic hypoattenuation and are otherwise without  appreciable acute abnormality. Refer to abdomen/pelvis report for  additional details.      Code Status   Full Code       Primary Care Physician   Kris Weems MD    Discharge Disposition    Discharged to home  Condition at discharge: Good    Consultations This Hospital Stay   SURGERY GENERAL IP CONSULT  SURGERY GENERAL IP CONSULT    Time Spent on this Encounter   I, Kathia Mcknight, personally saw the patient today and spent less than or equal to 30 minutes discharging this patient.    Discharge Orders      NEUROSURGERY REFERRAL      Reason for your hospital stay    Patient fell from ladder resulting in a multiple rib fractures and vertebral fractures.  His pain is better controlled in his back but continues to have some rib discomfort.     Follow-up and recommended labs and tests     Follow up with primary care provider, Kris Weems MD, within 7 days for hospital follow- up.  No follow up labs or test are needed.  Follow up with Neurosurgery within 2 weeks for transverse processes fractures     Activity    Your activity upon discharge: activity as tolerated     Discharge Instructions    --start metformin tomorrow  --continue to wear abdominal binder daily.  Okay to remove at nighttime.     Diet    Follow this diet upon discharge: diabetic diet     Discharge Medications   Discharge Medication List as of 2/24/2019  1:59 PM      START taking these medications    Details   gabapentin (NEURONTIN) 100 MG capsule Take 1 capsule (100 mg) by mouth 3 times daily, Disp-90 capsule, R-0, E-Prescribe      ibuprofen (ADVIL/MOTRIN) 600 MG tablet Take 1 tablet (600 mg) by mouth every 6 hours as needed for moderate pain, Disp-25 tablet, R-0, E-Prescribe      ondansetron (ZOFRAN-ODT) 4 MG ODT tab Take 1 tablet (4 mg) by mouth every 6 hours as needed for nausea or vomiting, Disp-10 tablet, R-0, E-Prescribe      oxyCODONE (ROXICODONE) 5 MG tablet Take 1 tablet (5 mg) by mouth every 6 hours as needed for breakthrough pain, Disp-10 tablet, R-0, Local Print         CONTINUE these medications which have NOT CHANGED    Details   ampicillin (PRINCIPEN) 500 MG capsule 4 capsules 1 hour prior to dental work, Disp-4  capsule, R-2, E-Prescribe      aspirin 81 MG tablet Take 1 tablet (81 mg) by mouth daily, Historical      blood glucose monitoring (ONE TOUCH DELICA) lancets Use to test blood sugars 1-2 times daily or as directed., Disp-100 each, R-11, E-Prescribe      blood glucose monitoring (ONETOUCH VERIO IQ) test strip Use to test blood sugars 1-2 times daily or as directed., Disp-100 strip, R-11, E-Prescribe      blood glucose monitoring (ONETOUCH VERIO) meter device kit Use to test blood sugars 1-2 times daily or as directed.Disp-1 kit, [id number]      losartan (COZAAR) 100 MG tablet Take 1 tablet by mouth once daily., Disp-90 tablet, R-3, E-Prescribe      metFORMIN (GLUCOPHAGE) 500 MG tablet Take 1 tablet (500 mg) by mouth 2 times daily (with meals), Disp-180 tablet, R-3, E-Prescribe      metoprolol succinate ER (TOPROL-XL) 100 MG 24 hr tablet Take 1 tablet (100 mg) by mouth daily, Disp-30 tablet, R-5, E-Prescribe         STOP taking these medications       atorvastatin (LIPITOR) 10 MG tablet Comments:   Reason for Stopping:             Allergies   Allergies   Allergen Reactions     No Known Drug Allergies      Data

## 2019-03-07 NOTE — TELEPHONE ENCOUNTER
Reason for Call:  Medication or medication refill:    Do you use a [facility]?  Name of the pharmacy and phone number for the current request:  [facility]     Name of the medication requested: Norco     Other request: none     Can we leave a detailed message on this number? YES    Phone number patient can be reached at: Home number on file [phone] (home)    Best Time: any     Call taken on 3/7/2019 at 1:46 PM by Ewa Angeles

## 2019-03-11 RX ORDER — HYDROCODONE BITARTRATE AND ACETAMINOPHEN 5; 325 MG/1; MG/1
1 TABLET ORAL EVERY 4 HOURS PRN
Qty: 40 TABLET | Refills: 0 | Status: SHIPPED | OUTPATIENT
Start: 2019-03-11 | End: 2019-08-19

## 2019-03-11 NOTE — TELEPHONE ENCOUNTER
Pt calling on this, told him Dr. Weems was out Thursday and Friday last week and states he would like this taken care of today if possible. Please call pt and advise at [phone]. Was told about the 3 business days

## 2019-03-12 ENCOUNTER — TELEPHONE (OUTPATIENT)
Dept: FAMILY MEDICINE | Facility: CLINIC | Age: 64
End: 2019-03-12

## 2019-03-12 NOTE — TELEPHONE ENCOUNTER
Reason for Call:  Other prescription    Detailed comments: Radha from [facility] is calling and states they do not accept class 2 controlled substance prescriptions by fax.      Phone Number Radha can be reached at: Other phone number: [phone]    Best Time: any    Can we leave a detailed message on this number? YES    Call taken on 3/12/2019 at 3:56 PM by Michela Jones

## 2019-03-12 NOTE — TELEPHONE ENCOUNTER
Spoke to pharmacy they will discard this fax as we never faxed that see 3/7/19 encounter.    Ruby Suh MA 3/12/2019

## 2019-03-12 NOTE — TELEPHONE ENCOUNTER
Patient called back  And merle took message and was upset that this was mailed so patient requested for wife to  at the . [facility] was walked tot the .  Ruby Suh MA 3/12/2019

## 2019-03-15 ENCOUNTER — OFFICE VISIT (OUTPATIENT)
Dept: NEUROSURGERY | Facility: CLINIC | Age: 64
End: 2019-03-15
Payer: COMMERCIAL

## 2019-03-15 VITALS
TEMPERATURE: 98 F | SYSTOLIC BLOOD PRESSURE: 110 MMHG | DIASTOLIC BLOOD PRESSURE: 64 MMHG | WEIGHT: 268.8 LBS | HEIGHT: 71 IN | BODY MASS INDEX: 37.63 KG/M2

## 2019-03-15 DIAGNOSIS — S32.009A CLOSED FRACTURE OF TRANSVERSE PROCESS OF LUMBAR VERTEBRA, INITIAL ENCOUNTER (H): Primary | ICD-10-CM

## 2019-03-15 DIAGNOSIS — E66.01 MORBID OBESITY (H): ICD-10-CM

## 2019-03-15 PROCEDURE — 99203 OFFICE O/P NEW LOW 30 MIN: CPT | Performed by: PHYSICIAN ASSISTANT

## 2019-03-15 RX ORDER — CYCLOBENZAPRINE HCL 5 MG
5-10 TABLET ORAL 3 TIMES DAILY PRN
Qty: 60 TABLET | Refills: 1 | Status: SHIPPED | OUTPATIENT
Start: 2019-03-15 | End: 2019-08-19

## 2019-03-15 RX ORDER — CYCLOBENZAPRINE HCL 5 MG
5 TABLET ORAL 3 TIMES DAILY PRN
Qty: 60 TABLET | Refills: 1 | Status: SHIPPED | OUTPATIENT
Start: 2019-03-15 | End: 2019-03-15

## 2019-03-15 ASSESSMENT — MIFFLIN-ST. JEOR: SCORE: 2041.4

## 2019-03-15 ASSESSMENT — PAIN SCALES - GENERAL: PAINLEVEL: EXTREME PAIN (8)

## 2019-03-15 NOTE — PROGRESS NOTES
Dr. Stan Pyle  [facility]  Neurosurgery Clinic Visit      CC: back pain, transverse process fractures    Primary care Provider: Kris Weems    Referring Provider:  Kris Weems      Reason For Visit:   I was asked to consult on the patient for back pain, transverse fractures.      HPI: Damon Manley is a 63 year old male who presents for evaluation of his chief complaint of low back pain.  He states that he fell from the ladder while trying to get the snow and ice off of his roof on February 23.  He did present to the ER.  Imaging there showed multiple rib fractures, as well as left-sided nondisplaced fractures of the L1 and L2 transverse processes.  He does have a lot of pain with inspiration, and with any twisting motions.  He states that the low back does not give him as much trouble as the ribs.  He did get a soft elastic lumbar support brace, but states that it has not been helpful.  No radiating leg pain or paresthesias.  He has taken Flexeril the last 3 nights, which has been helpful.    Past Medical History:   Diagnosis Date     Coronary artery disease     Valves replaced due to hx of Rheumatic fever. - No mechanical valves     Depressive disorder     situational - resolved     Hypertension      Motion sickness      Obesity (BMI 30-39.9)        Past Medical History reviewed with patient during visit.    Past Surgical History:   Procedure Laterality Date     C ANESTH,DX ARTHROSCOPIC PROC KNEE JOINT  12/12/05    Left knee with partial medial meniscectomy.     C ANESTH,OPEN HEART; W/O PUMP OXYEGENATOR  1998     COLONOSCOPY  05/09/2007    Colon polyps.     COLONOSCOPY N/A 1/13/2017    Procedure: COMBINED COLONOSCOPY, SINGLE OR MULTIPLE BIOPSY/POLYPECTOMY BY BIOPSY;  Surgeon: Dejan Mckee MD;  Location:  GI     HC KNEE SCOPE,MED/LAT MENISECTOMY  09/05/2007    Partial medial meniscectomy, both knees.     Past Surgical History reviewed with patient during visit.    Current  Outpatient Medications   Medication     aspirin 81 MG tablet     blood glucose monitoring (ONE TOUCH DELICA) lancets     blood glucose monitoring (ONETOUCH VERIO IQ) test strip     blood glucose monitoring (ONETOUCH VERIO) meter device kit     cyclobenzaprine (FLEXERIL) 5 MG tablet     gabapentin (NEURONTIN) 100 MG capsule     HYDROcodone-acetaminophen (NORCO) 5-325 MG tablet     ibuprofen (ADVIL/MOTRIN) 600 MG tablet     losartan (COZAAR) 100 MG tablet     metFORMIN (GLUCOPHAGE) 500 MG tablet     metoprolol succinate ER (TOPROL-XL) 100 MG 24 hr tablet     ampicillin (PRINCIPEN) 500 MG capsule     ondansetron (ZOFRAN-ODT) 4 MG ODT tab     No current facility-administered medications for this visit.        Allergies   Allergen Reactions     No Known Drug Allergies        Social History     Socioeconomic History     Marital status:      Spouse name: Wanda Parks     Number of children: 2     Years of education: 14     Highest education level: None   Occupational History     None   Social Needs     Financial resource strain: None     Food insecurity:     Worry: None     Inability: None     Transportation needs:     Medical: None     Non-medical: None   Tobacco Use     Smoking status: Former Smoker     Years: 15.00     Last attempt to quit: 1988     Years since quittin.9     Smokeless tobacco: Never Used   Substance and Sexual Activity     Alcohol use: Yes     Alcohol/week: 0.0 oz     Comment: social - glass of wine socially     Drug use: No     Sexual activity: Yes     Partners: Female     Comment:  - 2 children   Lifestyle     Physical activity:     Days per week: None     Minutes per session: None     Stress: None   Relationships     Social connections:     Talks on phone: None     Gets together: None     Attends Spiritism service: None     Active member of club or organization: None     Attends meetings of clubs or organizations: None     Relationship status: None     Intimate partner violence:      "Fear of current or ex partner: None     Emotionally abused: None     Physically abused: None     Forced sexual activity: None   Other Topics Concern      Service No     Blood Transfusions Yes     Comment: open heart surgery 1998     Caffeine Concern No     Occupational Exposure No     Hobby Hazards No     Sleep Concern Yes     Comment:  wakes after 4-5 hours of sleep at night     Stress Concern Yes     Weight Concern Yes     Special Diet No     Back Care No     Exercise Yes     Comment: 3-4 times/week     Bike Helmet Not Asked     Comment: n/a     Seat Belt Yes     Self-Exams Not Asked     Comment: n/a     Parent/sibling w/ CABG, MI or angioplasty before 65F 55M? Not Asked   Social History Narrative     None       Family History   Problem Relation Age of Onset     Other Cancer Father         lung     Other Cancer Mother         lung     Other Cancer Paternal Aunt         unsure     Diabetes No family hx of      Coronary Artery Disease No family hx of      Hypertension No family hx of      Hyperlipidemia No family hx of      Cerebrovascular Disease No family hx of           ROS: 10 point ROS neg other than the symptoms noted above in the HPI.    Vital Signs: /64   Temp 98  F (36.7  C) (Temporal)   Ht 5' 11\" (1.803 m)   Wt 268 lb 12.8 oz (121.9 kg)   BMI 37.49 kg/m      Examination:  Constitutional:  Alert, well nourished, NAD.  HEENT: Normocephalic, atraumatic.   Pulmonary:  Without shortness of breath, normal effort.   Lymph: no lymphadenopathy to low back or LE.   Integumentary: Skin is free of rashes or lesions.   Cardiovascular:  No pitting edema of BLE.    Psych: Normal affect, no apparent distress    Neurological:  Awake  Alert  Oriented x 3  Speech clear  Cranial nerves II - XII grossly intact  Motor exam   Hip Flexor:                Right: 5/5  Left:  5/5  Hip Adductor:             Right:  5/5  Left:  5/5  Hip Abductor:             Right:  5/5  Left:  5/5  Gastroc Soleus:        Right:  5/5  " Left:  5/5  Tib/Ant:                      Right:  5/5  Left:  5/5  EHL:                          Right:  5/5  Left:  5/5       Sensation normal to bilateral upper and lower extremities.    Reflexes are 2+ in the patellar and Achilles. There is no clonus. Downgoing Babinski.    Musculoskeletal:  Gait: Able to stand from a seated position. Normal non-antalgic, non-myelopathic gait.  Lumbar paraspinous tenderness to palpation.    Imaging:   CT lumbar spine does show the acute nondisplaced fractures of the left L1 and L2 transverse processes, possibly with a L3 transverse process fracture as well.    Assessment/Plan:     Low back pain, related to multiple left-sided transverse process fractures L1, L2, and L3.    Damon Manley is a 63 year old male.  I did have a discussion with the patient regarding his symptoms.  He understands the findings described above.  He understands that there is no further treatment that is necessary, other than giving this  more time.  He should try to limit his strenuous activity as possible, which it sounds like he is doing.  We are happy to see him back for any new or worsening symptoms.  He voiced agreement and understanding.          Richmond Grey PA-C  [facility]  [address] [address]    Tel [phone]  Pager [phone]

## 2019-03-15 NOTE — LETTER
3/15/2019         RE: Damon Manley  [address]  [facility] [address]        Dear Colleague,    Thank you for referring your patient, Damon Manley, to the [facility]. Please see a copy of my visit note below.    Dr. Stan Pyle  [facility]  Neurosurgery Clinic Visit      CC: back pain, transverse process fractures    Primary care Provider: Kris Weems    Referring Provider:  Kris Weems      Reason For Visit:   I was asked to consult on the patient for back pain, transverse fractures.      HPI: Damon Manley is a 63 year old male who presents for evaluation of his chief complaint of low back pain.  He states that he fell from the ladder while trying to get the snow and ice off of his roof on February 23.  He did present to the ER.  Imaging there showed multiple rib fractures, as well as left-sided nondisplaced fractures of the L1 and L2 transverse processes.  He does have a lot of pain with inspiration, and with any twisting motions.  He states that the low back does not give him as much trouble as the ribs.  He did get a soft elastic lumbar support brace, but states that it has not been helpful.  No radiating leg pain or paresthesias.  He has taken Flexeril the last 3 nights, which has been helpful.    Past Medical History:   Diagnosis Date     Coronary artery disease     Valves replaced due to hx of Rheumatic fever. - No mechanical valves     Depressive disorder     situational - resolved     Hypertension      Motion sickness      Obesity (BMI 30-39.9)        Past Medical History reviewed with patient during visit.    Past Surgical History:   Procedure Laterality Date     C ANESTH,DX ARTHROSCOPIC PROC KNEE JOINT  12/12/05    Left knee with partial medial meniscectomy.     C ANESTH,OPEN HEART; W/O PUMP OXYEGENATOR  1998     COLONOSCOPY  05/09/2007    Colon polyps.     COLONOSCOPY N/A 1/13/2017    Procedure: COMBINED COLONOSCOPY, SINGLE OR MULTIPLE  BIOPSY/POLYPECTOMY BY BIOPSY;  Surgeon: Dejan Mckee MD;  Location: [facility] KNEE SCOPE,MED/LAT MENISECTOMY  2007    Partial medial meniscectomy, both knees.     Past Surgical History reviewed with patient during visit.    Current Outpatient Medications   Medication     aspirin 81 MG tablet     blood glucose monitoring (ONE TOUCH DELICA) lancets     blood glucose monitoring (ONETOUCH VERIO IQ) test strip     blood glucose monitoring (ONETOUCH VERIO) meter device kit     cyclobenzaprine (FLEXERIL) 5 MG tablet     gabapentin (NEURONTIN) 100 MG capsule     HYDROcodone-acetaminophen (NORCO) 5-325 MG tablet     ibuprofen (ADVIL/MOTRIN) 600 MG tablet     losartan (COZAAR) 100 MG tablet     metFORMIN (GLUCOPHAGE) 500 MG tablet     metoprolol succinate ER (TOPROL-XL) 100 MG 24 hr tablet     ampicillin (PRINCIPEN) 500 MG capsule     ondansetron (ZOFRAN-ODT) 4 MG ODT tab     No current facility-administered medications for this visit.        Allergies   Allergen Reactions     No Known Drug Allergies        Social History     Socioeconomic History     Marital status:      Spouse name: Wanda Parks     Number of children: 2     Years of education: 14     Highest education level: None   Occupational History     None   Social Needs     Financial resource strain: None     Food insecurity:     Worry: None     Inability: None     Transportation needs:     Medical: None     Non-medical: None   Tobacco Use     Smoking status: Former Smoker     Years: 15.00     Last attempt to quit: 1988     Years since quittin.9     Smokeless tobacco: Never Used   Substance and Sexual Activity     Alcohol use: Yes     Alcohol/week: 0.0 oz     Comment: social - glass of wine socially     Drug use: No     Sexual activity: Yes     Partners: Female     Comment:  - 2 children   Lifestyle     Physical activity:     Days per week: None     Minutes per session: None     Stress: None   Relationships     Social connections:  "    Talks on phone: None     Gets together: None     Attends Sabianism service: None     Active member of club or organization: None     Attends meetings of clubs or organizations: None     Relationship status: None     Intimate partner violence:     Fear of current or ex partner: None     Emotionally abused: None     Physically abused: None     Forced sexual activity: None   Other Topics Concern      Service No     Blood Transfusions Yes     Comment: open heart surgery 1998     Caffeine Concern No     Occupational Exposure No     Hobby Hazards No     Sleep Concern Yes     Comment:  wakes after 4-5 hours of sleep at night     Stress Concern Yes     Weight Concern Yes     Special Diet No     Back Care No     Exercise Yes     Comment: 3-4 times/week     Bike Helmet Not Asked     Comment: n/a     Seat Belt Yes     Self-Exams Not Asked     Comment: n/a     Parent/sibling w/ CABG, MI or angioplasty before 65F 55M? Not Asked   Social History Narrative     None       Family History   Problem Relation Age of Onset     Other Cancer Father         lung     Other Cancer Mother         lung     Other Cancer Paternal Aunt         unsure     Diabetes No family hx of      Coronary Artery Disease No family hx of      Hypertension No family hx of      Hyperlipidemia No family hx of      Cerebrovascular Disease No family hx of           ROS: 10 point ROS neg other than the symptoms noted above in the HPI.    Vital Signs: /64   Temp 98  F (36.7  C) (Temporal)   Ht 5' 11\" (1.803 m)   Wt 268 lb 12.8 oz (121.9 kg)   BMI 37.49 kg/m       Examination:  Constitutional:  Alert, well nourished, NAD.  HEENT: Normocephalic, atraumatic.   Pulmonary:  Without shortness of breath, normal effort.   Lymph: no lymphadenopathy to low back or LE.   Integumentary: Skin is free of rashes or lesions.   Cardiovascular:  No pitting edema of BLE.    Psych: Normal affect, no apparent distress    Neurological:  Awake  Alert  Oriented x " 3  Speech clear  Cranial nerves II - XII grossly intact  Motor exam   Hip Flexor:                Right: 5/5  Left:  5/5  Hip Adductor:             Right:  5/5  Left:  5/5  Hip Abductor:             Right:  5/5  Left:  5/5  Gastroc Soleus:        Right:  5/5  Left:  5/5  Tib/Ant:                      Right:  5/5  Left:  5/5  EHL:                          Right:  5/5  Left:  5/5       Sensation normal to bilateral upper and lower extremities.    Reflexes are 2+ in the patellar and Achilles. There is no clonus. Downgoing Babinski.    Musculoskeletal:  Gait: Able to stand from a seated position. Normal non-antalgic, non-myelopathic gait.  Lumbar paraspinous tenderness to palpation.    Imaging:   CT lumbar spine does show the acute nondisplaced fractures of the left L1 and L2 transverse processes, possibly with a L3 transverse process fracture as well.    Assessment/Plan:     Low back pain, related to multiple left-sided transverse process fractures L1, L2, and L3.    Damon Manley is a 63 year old male.  I did have a discussion with the patient regarding his symptoms.  He understands the findings described above.  He understands that there is no further treatment that is necessary, other than giving this  more time.  He should try to limit his strenuous activity as possible, which it sounds like he is doing.  We are happy to see him back for any new or worsening symptoms.  He voiced agreement and understanding.          Richmond Grey PA-C  [facility]  [address] [address]    Tel [phone]  Pager [phone]      Again, thank you for allowing me to participate in the care of your patient.        Sincerely,        Richmond Grey PA-C

## 2019-03-23 ENCOUNTER — TELEPHONE (OUTPATIENT)
Dept: FAMILY MEDICINE | Facility: CLINIC | Age: 64
End: 2019-03-23

## 2019-03-23 NOTE — TELEPHONE ENCOUNTER
Reason for call:  Other   Patient called regarding (reason for call): appointment  Additional comments: WONDERING IF HE NEEDS A FOLLOW UP APPOINTMENT TO OBTAIN A DOCTORS NOTE TO RETURN TO WORK BY 04/01/2019    Phone number to reach patient:  [phone]     Best Time:  ANYTIME, PREFERABLY MORNING       Can we leave a detailed message on this number?  YES

## 2019-03-25 NOTE — TELEPHONE ENCOUNTER
Made appt for 04/02.  Lori Pond, [facility]        Per RF- he needs an appt with him this week and will give him a note at that time.

## 2019-04-02 ENCOUNTER — HOSPITAL ENCOUNTER (OUTPATIENT)
Dept: GENERAL RADIOLOGY | Facility: CLINIC | Age: 64
Discharge: HOME | End: 2019-04-02
Attending: FAMILY MEDICINE | Admitting: FAMILY MEDICINE
Payer: COMMERCIAL

## 2019-04-02 ENCOUNTER — OFFICE VISIT (OUTPATIENT)
Dept: FAMILY MEDICINE | Facility: CLINIC | Age: 64
End: 2019-04-02
Payer: COMMERCIAL

## 2019-04-02 VITALS
DIASTOLIC BLOOD PRESSURE: 84 MMHG | HEART RATE: 78 BPM | SYSTOLIC BLOOD PRESSURE: 148 MMHG | BODY MASS INDEX: 38.37 KG/M2 | RESPIRATION RATE: 17 BRPM | TEMPERATURE: 98.3 F | WEIGHT: 275.13 LBS

## 2019-04-02 DIAGNOSIS — M25.511 CHRONIC RIGHT SHOULDER PAIN: ICD-10-CM

## 2019-04-02 DIAGNOSIS — R07.89 CHEST WALL PAIN: ICD-10-CM

## 2019-04-02 DIAGNOSIS — M25.511 ACUTE PAIN OF RIGHT SHOULDER: Primary | ICD-10-CM

## 2019-04-02 DIAGNOSIS — G89.29 CHRONIC RIGHT SHOULDER PAIN: ICD-10-CM

## 2019-04-02 DIAGNOSIS — S22.43XG MULTIPLE CLOSED FRACTURES OF RIBS OF BOTH SIDES WITH DELAYED HEALING, SUBSEQUENT ENCOUNTER: ICD-10-CM

## 2019-04-02 PROCEDURE — 99214 OFFICE O/P EST MOD 30 MIN: CPT | Performed by: FAMILY MEDICINE

## 2019-04-02 PROCEDURE — 73010 X-RAY EXAM OF SHOULDER BLADE: CPT | Mod: TC

## 2019-04-02 ASSESSMENT — PAIN SCALES - GENERAL: PAINLEVEL: MILD PAIN (2)

## 2019-04-02 NOTE — PROGRESS NOTES
SUBJECTIVE:   Damon Manley is a 63 year old male who presents to clinic today for the following health issues:      Chief Complaint   Patient presents with     RECHECK     from fall on 02/23. He is still having rt rib pain, he states last night it felt like it was clicking. While lying down he has extreme shoulder blade pain - rt side. It feels like it is going through his chest. He gets off and on ha. He gets dizzy and disoriented at times. He is not taking pain meds at this time, they make him feel sluggish. He is still taking the flexeril.             Problem list and histories reviewed & adjusted, as indicated.  Additional history: as documented  Patient is here today needs to know to go back to work.  He had a fall off a rough on 223 had spinal fractures bilateral rib fractures and is recovering slowly.  He still has a fair amount of pain now is developed some pain in the right scapular region they did not see an obvious fracture with his original CTs but I did state we would get another film of the scapula just to make sure we do not miss anything when he came back for his first follow-up he was having rib pain on the opposite side he was told he had his fractures and I reviewed his CT scan and thought I saw a rib fractures on the other side and indeed he did have some when I reviewed them with the radiologist.  States that the ribs still hurt still feel like they are healing slowly.  Does not feel that he can go back to his job at this time and wants another extension.  Most likely he still is having some healing of the multiple fractures and his job is very physical demands lifting etc. and I do not feel he can go back at this time and when he does go back he would probably have to go back 4 hours a day especially forget to release him back without restrictions.      Patient Active Problem List   Diagnosis     GERD (gastroesophageal reflux disease)     CARDIOVASCULAR SCREENING; LDL GOAL LESS THAN 130      Abnormal glucose     Essential hypertension with goal blood pressure less than 140/90     Aortic valve prosthesis present     Pulmonary valve replaced     Aortic valve replaced     ARIAS (dyspnea on exertion)     SOB (shortness of breath)     Elevated LFTs     Elevated lipase     Type 2 diabetes mellitus without complication, without long-term current use of insulin (H)     Rib fractures     Closed fracture of transverse process of lumbar vertebra, initial encounter (H)     Closed head injury, subsequent encounter     Obesity (BMI 35.0-39.9) with comorbidity (H)     Past Surgical History:   Procedure Laterality Date     C ANESTH,DX ARTHROSCOPIC PROC KNEE JOINT  05    Left knee with partial medial meniscectomy.     C ANESTH,OPEN HEART; W/O PUMP OXYEGENATOR  1998     COLONOSCOPY  2007    Colon polyps.     COLONOSCOPY N/A 2017    Procedure: COMBINED COLONOSCOPY, SINGLE OR MULTIPLE BIOPSY/POLYPECTOMY BY BIOPSY;  Surgeon: Dejan Mckee MD;  Location: [facility]     [facility] KNEE SCOPE,MED/LAT MENISECTOMY  2007    Partial medial meniscectomy, both knees.       Social History     Tobacco Use     Smoking status: Former Smoker     Years: 15.00     Last attempt to quit: 1988     Years since quittin.0     Smokeless tobacco: Never Used   Substance Use Topics     Alcohol use: Yes     Alcohol/week: 0.0 oz     Comment: social - glass of wine socially     Family History   Problem Relation Age of Onset     Other Cancer Father         lung     Other Cancer Mother         lung     Other Cancer Paternal Aunt         unsure     Diabetes No family hx of      Coronary Artery Disease No family hx of      Hypertension No family hx of      Hyperlipidemia No family hx of      Cerebrovascular Disease No family hx of          Current Outpatient Medications   Medication Sig Dispense Refill     blood glucose monitoring (ONE TOUCH DELICA) lancets Use to test blood sugars 1-2 times daily or as directed. 100 each 11      blood glucose monitoring (ONETOUCH VERIO IQ) test strip Use to test blood sugars 1-2 times daily or as directed. 100 strip 11     blood glucose monitoring (ONETOUCH VERIO) meter device kit Use to test blood sugars 1-2 times daily or as directed. 1 kit 0     cyclobenzaprine (FLEXERIL) 5 MG tablet Take 1-2 tablets (5-10 mg) by mouth 3 times daily as needed for muscle spasms 60 tablet 1     losartan (COZAAR) 100 MG tablet Take 1 tablet by mouth once daily. 90 tablet 3     metFORMIN (GLUCOPHAGE) 500 MG tablet Take 1 tablet (500 mg) by mouth 2 times daily (with meals) 180 tablet 3     metoprolol succinate ER (TOPROL-XL) 100 MG 24 hr tablet Take 1 tablet (100 mg) by mouth daily 30 tablet 5     ampicillin (PRINCIPEN) 500 MG capsule 4 capsules 1 hour prior to dental work (Patient not taking: Reported on 2/25/2019) 4 capsule 2     aspirin 81 MG tablet Take 1 tablet (81 mg) by mouth daily       gabapentin (NEURONTIN) 100 MG capsule Take 1 capsule (100 mg) by mouth 3 times daily 90 capsule 0     HYDROcodone-acetaminophen (NORCO) 5-325 MG tablet Take 1 tablet by mouth every 4 hours as needed for severe pain (Patient not taking: Reported on 4/2/2019) 40 tablet 0     ondansetron (ZOFRAN-ODT) 4 MG ODT tab Take 1 tablet (4 mg) by mouth every 6 hours as needed for nausea or vomiting (Patient not taking: Reported on 2/25/2019) 10 tablet 0       Reviewed and updated as needed this visit by clinical staff  Tobacco  Soc Hx      Reviewed and updated as needed this visit by Provider         ROS:  Constitutional, HEENT, cardiovascular, pulmonary, gi and gu systems are negative, except as otherwise noted.    OBJECTIVE:     /84   Pulse 78   Temp 98.3  F (36.8  C) (Tympanic)   Resp 17   Wt 124.8 kg (275 lb 2 oz)   BMI 38.37 kg/m    Body mass index is 38.37 kg/m .   GENERAL: healthy, alert and no distress  MS: No obvious tenderness over the scapula.  Patient does not have any specific right-sided chest wall tenderness where he  feels the intermittent pain from his rib fractures.    Diagnostic Test Results:  Right scapular view I did not appreciate any obvious fractures radiologist interpretation still pending.    ASSESSMENT:       PLAN:   1. Acute pain of right shoulder    - XR Scapula Right; Future    2. Chest wall pain      3. Multiple closed fractures of ribs of both sides with delayed healing, subsequent encounter      In light of all his injuries and the slow healing I do feel we will keep him off work for at least another week and then when he returns he will go half time.  We will release him to go back full duty as far as lifting but just 4 hours a day to start at least for 1 week.  He was in agreement with this return to work plan.  He is off all his narcotic pain medications at this time is not taking his gabapentin either.              Kris Weems MD  [facility]

## 2019-04-16 ENCOUNTER — TELEPHONE (OUTPATIENT)
Dept: FAMILY MEDICINE | Facility: CLINIC | Age: 64
End: 2019-04-16

## 2019-04-16 NOTE — LETTER
[address] [address]  Phone: [phone]  Fax: [phone]    April 18, 2019        Damon Manley  [address] [place] [address]          To whom it may concern:    RE: Damon Manley    Patient was seen and treated today at our clinic and missed work.  When the patient returns to work, the following restrictions apply until The patient will work shortened hours 4 hours per day until 5/1/19.      Please contact me for questions or concerns.      Sincerely,        Kris Weems MD

## 2019-04-16 NOTE — TELEPHONE ENCOUNTER
Reason for Call:  Other letter for work    Detailed comments: currently the patient is working 4 hour days this week and is supposed to be going back to 8 hour days starting 04/22. Patient doesn't feel like he will be ready for that, wondering if for the month of April if he could stay at 4 hours days? He would need a new note stating so. Please advise     Phone Number Patient can be reached at: Home number on file [phone] (home)    Best Time: any    Can we leave a detailed message on this number? YES    Call taken on 4/16/2019 at 11:21 AM by Yolanda Michel

## 2019-04-16 NOTE — TELEPHONE ENCOUNTER
The form for Damon is back at Dr. Weems's Desk.  Just fill out what we are able to and fax back.  We received a 2nd form.  The last office visit Dr. Weems had done something for patient to be able to work only 4 hrs per day.  There is nothing but the office note in the chart about it.     Ayana YANG

## 2019-04-17 NOTE — TELEPHONE ENCOUNTER
I called patient lest message to informed him that Dr. Weems is out of the clinic until Thursday. 04/18/19   Ayana YANG

## 2019-04-17 NOTE — TELEPHONE ENCOUNTER
Please see message below,  Patient is wondering what the status is of his letter for work restrictions. He is requesting to continue half days.     Please call today with an update -   [phone]. -

## 2019-04-18 NOTE — TELEPHONE ENCOUNTER
Called pt and he would like to  completed paperwork. I faxed copy to Heather and sent a copy to scanning. Original brought up front to the registration desk for pickup.  Lori Pond, [facility]

## 2019-04-18 NOTE — TELEPHONE ENCOUNTER
Dank wrote letter for him. Damon's paperwork is with letter on his desk. Per Ayana, 1st page only needs to be filled out. Pt needs to be called after both are done and see if he wants to pick it up or mailed.   Lori Pond, [facility]

## 2019-05-08 ENCOUNTER — TELEPHONE (OUTPATIENT)
Dept: FAMILY MEDICINE | Facility: CLINIC | Age: 64
End: 2019-05-08

## 2019-05-15 DIAGNOSIS — Z95.2 PULMONARY VALVE REPLACED: ICD-10-CM

## 2019-05-15 DIAGNOSIS — Z95.2 AORTIC VALVE PROSTHESIS PRESENT: ICD-10-CM

## 2019-05-15 NOTE — TELEPHONE ENCOUNTER
Damon calls to check on the status of this refill.  Damon states his dentist appointment is this Friday 05/17 at 9:00am.

## 2019-05-17 ENCOUNTER — TELEPHONE (OUTPATIENT)
Dept: FAMILY MEDICINE | Facility: CLINIC | Age: 64
End: 2019-05-17

## 2019-05-17 RX ORDER — AMPICILLIN TRIHYDRATE 500 MG
CAPSULE ORAL
Qty: 4 CAPSULE | Refills: 2 | Status: SHIPPED | OUTPATIENT
Start: 2019-05-17 | End: 2019-05-20

## 2019-05-17 NOTE — TELEPHONE ENCOUNTER
Amipicillin  Last Written Prescription Date:  05/08/2018  Last Fill Quantity: 4,  # refills: 2   Last office visit: 4/2/2019 with prescribing provider:  hudson   Future Office Visit:   Next 5 appointments (look out 90 days)    May 20, 2019  3:30 PM CDT  PHYS[facility] with Kris Weems MD  [facility] [address] [place] [id number] [phone]         Prescription approved per [facility] Refill Protocol.    Elizabeth Robles RN

## 2019-05-17 NOTE — TELEPHONE ENCOUNTER
Called and spoke with pharmacist. Pharmacist states that amoxicillin was called in yesterday on 5/16/19 per Dr. Weems. Per Elizabeth, the ampicillin was cancelled.    Michael Howard, CMA

## 2019-05-17 NOTE — TELEPHONE ENCOUNTER
Reason for Call:  Other call back    Detailed comments: Pharmacy called stated that they received a medication for antibiotic yesterday which the patient  and now today they received another medication (ampicililn) and wanted to double check that  wanted to order 2 different medications.     Phone Number Patient can be reached at: Cell number on file:    Telephone Information:   Mobile [phone]       Best Time: any    Can we leave a detailed message on this number? YES    Call taken on 5/17/2019 at 11:15 AM by Charisse Bustamante

## 2019-05-20 ENCOUNTER — OFFICE VISIT (OUTPATIENT)
Dept: FAMILY MEDICINE | Facility: CLINIC | Age: 64
End: 2019-05-20
Payer: COMMERCIAL

## 2019-05-20 VITALS
WEIGHT: 273.4 LBS | DIASTOLIC BLOOD PRESSURE: 74 MMHG | OXYGEN SATURATION: 97 % | RESPIRATION RATE: 16 BRPM | BODY MASS INDEX: 38.13 KG/M2 | SYSTOLIC BLOOD PRESSURE: 120 MMHG | TEMPERATURE: 98.2 F | HEART RATE: 82 BPM

## 2019-05-20 DIAGNOSIS — Z13.6 CARDIOVASCULAR SCREENING; LDL GOAL LESS THAN 100: ICD-10-CM

## 2019-05-20 DIAGNOSIS — Z00.00 ROUTINE GENERAL MEDICAL EXAMINATION AT A HEALTH CARE FACILITY: Primary | ICD-10-CM

## 2019-05-20 LAB
ALT SERPL W P-5'-P-CCNC: 60 U/L (ref 0–70)
ANION GAP SERPL CALCULATED.3IONS-SCNC: 8 MMOL/L (ref 3–14)
BUN SERPL-MCNC: 23 MG/DL (ref 7–30)
CALCIUM SERPL-MCNC: 9 MG/DL (ref 8.5–10.1)
CHLORIDE SERPL-SCNC: 105 MMOL/L (ref 94–109)
CHOLEST SERPL-MCNC: 182 MG/DL
CO2 SERPL-SCNC: 27 MMOL/L (ref 20–32)
CREAT SERPL-MCNC: 0.9 MG/DL (ref 0.66–1.25)
CREAT UR-MCNC: 194 MG/DL
ERYTHROCYTE [DISTWIDTH] IN BLOOD BY AUTOMATED COUNT: 11.9 % (ref 10–15)
GFR SERPL CREATININE-BSD FRML MDRD: >90 ML/MIN/{1.73_M2}
GLUCOSE SERPL-MCNC: 129 MG/DL (ref 70–99)
HBA1C MFR BLD: 7 % (ref 0–5.6)
HCT VFR BLD AUTO: 48.2 % (ref 40–53)
HDLC SERPL-MCNC: 40 MG/DL
HGB BLD-MCNC: 16.7 G/DL (ref 13.3–17.7)
LDLC SERPL CALC-MCNC: 115 MG/DL
MCH RBC QN AUTO: 31.5 PG (ref 26.5–33)
MCHC RBC AUTO-ENTMCNC: 34.6 G/DL (ref 31.5–36.5)
MCV RBC AUTO: 91 FL (ref 78–100)
MICROALBUMIN UR-MCNC: 26 MG/L
MICROALBUMIN/CREAT UR: 13.56 MG/G CR (ref 0–17)
NONHDLC SERPL-MCNC: 142 MG/DL
PLATELET # BLD AUTO: 198 10E9/L (ref 150–450)
POTASSIUM SERPL-SCNC: 4.2 MMOL/L (ref 3.4–5.3)
RBC # BLD AUTO: 5.31 10E12/L (ref 4.4–5.9)
SODIUM SERPL-SCNC: 140 MMOL/L (ref 133–144)
TRIGL SERPL-MCNC: 134 MG/DL
WBC # BLD AUTO: 10.1 10E9/L (ref 4–11)

## 2019-05-20 PROCEDURE — 99396 PREV VISIT EST AGE 40-64: CPT | Performed by: FAMILY MEDICINE

## 2019-05-20 PROCEDURE — 82043 UR ALBUMIN QUANTITATIVE: CPT | Performed by: FAMILY MEDICINE

## 2019-05-20 PROCEDURE — 83036 HEMOGLOBIN GLYCOSYLATED A1C: CPT | Performed by: FAMILY MEDICINE

## 2019-05-20 PROCEDURE — 85027 COMPLETE CBC AUTOMATED: CPT | Performed by: FAMILY MEDICINE

## 2019-05-20 PROCEDURE — 84460 ALANINE AMINO (ALT) (SGPT): CPT | Performed by: FAMILY MEDICINE

## 2019-05-20 PROCEDURE — 80048 BASIC METABOLIC PNL TOTAL CA: CPT | Performed by: FAMILY MEDICINE

## 2019-05-20 PROCEDURE — 80061 LIPID PANEL: CPT | Performed by: FAMILY MEDICINE

## 2019-05-20 RX ORDER — ATORVASTATIN CALCIUM 10 MG/1
10 TABLET, FILM COATED ORAL DAILY
Qty: 90 TABLET | Refills: 3 | Status: SHIPPED | OUTPATIENT
Start: 2019-05-20 | End: 2020-10-07

## 2019-05-20 RX ORDER — AMPICILLIN TRIHYDRATE 500 MG
CAPSULE ORAL
Qty: 20 CAPSULE | Refills: 3 | Status: SHIPPED | OUTPATIENT
Start: 2019-05-20 | End: 2019-09-10

## 2019-05-20 ASSESSMENT — PAIN SCALES - GENERAL: PAINLEVEL: MODERATE PAIN (4)

## 2019-05-20 NOTE — PROGRESS NOTES
SUBJECTIVE:   CC: Damon Manley is an 64 year old male who presents for preventive health visit.     Healthy Habits:    Do you get at least three servings of calcium containing foods daily (dairy, green leafy vegetables, etc.)? no, taking calcium and/or vitamin D supplement: no    Amount of exercise or daily activities, outside of work: 4 day(s) per week    Problems taking medications regularly No    Medication side effects: Yes soft stools on and off    Have you had an eye exam in the past two years? yes    Do you see a dentist twice per year? yes    Do you have sleep apnea, excessive snoring or daytime drowsiness?no      PROBLEMS TO ADD ON...  Diabetes Follow-up      How often are you checking your blood sugar? Three times daily    What time of day are you checking your blood sugars (select all that apply)?  In the morning before he eats    Have you had any blood sugars above 200?  No    Have you had any blood sugars below 70?  No    What symptoms do you notice when your blood sugar is low?  None    What concerns do you have today about your diabetes?  Other: dizzy spells     Do you have any of these symptoms? (Select all that apply)  Numbness in feet and Burning in feet     Have you had a diabetic eye exam in the last 12 months? Yes- Date of last eye exam: 6 months ago    BP Readings from Last 2 Encounters:   05/20/19 120/74   04/02/19 148/84     Hemoglobin A1C (%)   Date Value   02/23/2019 6.6 (H)   10/02/2018 6.6 (H)     LDL Cholesterol Calculated (mg/dL)   Date Value   10/02/2018 125 (H)   09/28/2017 116 (H)     LDL Cholesterol Direct (mg/dL)   Date Value   11/10/2018 123 (H)       Diabetes Management Resources  Hypertension Follow-up      Do you check your blood pressure regularly outside of the clinic? No     Are you following a low salt diet? No    Are your blood pressures ever more than 140 on the top number (systolic) OR more   than 90 on the bottom number (diastolic), for example 140/90?  Yes    Today's PHQ-2 Score:   PHQ-2 (  [employer]) 2019 10/2/2018   Q1: Little interest or pleasure in doing things 0 0   Q2: Feeling down, depressed or hopeless 0 0   PHQ-2 Score 0 0   Q1: Little interest or pleasure in doing things - -   Q2: Feeling down, depressed or hopeless - -   PHQ-2 Score - -       Abuse: Current or Past(Physical, Sexual or Emotional)- No  Do you feel safe in your environment? Yes    Social History     Tobacco Use     Smoking status: Former Smoker     Years: 15.00     Last attempt to quit: 1988     Years since quittin.1     Smokeless tobacco: Never Used   Substance Use Topics     Alcohol use: Yes     Alcohol/week: 0.0 oz     Comment: social - glass of wine socially     If you drink alcohol do you typically have >3 drinks per day or >7 drinks per week? No                      Last PSA:   PSA   Date Value Ref Range Status   2015 0.48 0 - 4 ug/L Final       Reviewed orders with patient. Reviewed health maintenance and updated orders accordingly - Yes  BP Readings from Last 3 Encounters:   19 120/74   19 148/84   03/15/19 110/64    Wt Readings from Last 3 Encounters:   19 124 kg (273 lb 6.4 oz)   19 124.8 kg (275 lb 2 oz)   03/15/19 121.9 kg (268 lb 12.8 oz)                  Patient Active Problem List   Diagnosis     GERD (gastroesophageal reflux disease)     CARDIOVASCULAR SCREENING; LDL GOAL LESS THAN 130     Abnormal glucose     Essential hypertension with goal blood pressure less than 140/90     Aortic valve prosthesis present     Pulmonary valve replaced     Aortic valve replaced     ARIAS (dyspnea on exertion)     SOB (shortness of breath)     Elevated LFTs     Elevated lipase     Type 2 diabetes mellitus without complication, without long-term current use of insulin (H)     Rib fractures     Closed fracture of transverse process of lumbar vertebra, initial encounter (H)     Closed head injury, subsequent encounter     Obesity (BMI 35.0-39.9) with  comorbidity (H)     Past Surgical History:   Procedure Laterality Date     C ANESTH,DX ARTHROSCOPIC PROC KNEE JOINT  05    Left knee with partial medial meniscectomy.     C ANESTH,OPEN HEART; W/O PUMP OXYEGENATOR       COLONOSCOPY  2007    Colon polyps.     COLONOSCOPY N/A 2017    Procedure: COMBINED COLONOSCOPY, SINGLE OR MULTIPLE BIOPSY/POLYPECTOMY BY BIOPSY;  Surgeon: Dejan Mckee MD;  Location: [facility]     HC KNEE SCOPE,MED/LAT MENISECTOMY  2007    Partial medial meniscectomy, both knees.       Social History     Tobacco Use     Smoking status: Former Smoker     Years: 15.00     Last attempt to quit: 1988     Years since quittin.1     Smokeless tobacco: Never Used   Substance Use Topics     Alcohol use: Yes     Alcohol/week: 0.0 oz     Comment: social - glass of wine socially     Family History   Problem Relation Age of Onset     Other Cancer Father         lung     Other Cancer Mother         lung     Other Cancer Paternal Aunt         unsure     Diabetes No family hx of      Coronary Artery Disease No family hx of      Hypertension No family hx of      Hyperlipidemia No family hx of      Cerebrovascular Disease No family hx of          Current Outpatient Medications   Medication Sig Dispense Refill     ampicillin (PRINCIPEN) 500 MG capsule 4 tabs 1 hour  prior to dental procedure may repeat 20 capsule 3     blood glucose monitoring (ONE TOUCH DELICA) lancets Use to test blood sugars 1-2 times daily or as directed. 100 each 11     blood glucose monitoring (ONETOUCH VERIO IQ) test strip Use to test blood sugars 1-2 times daily or as directed. 100 strip 11     blood glucose monitoring (ONETOUCH VERIO) meter device kit Use to test blood sugars 1-2 times daily or as directed. 1 kit 0     losartan (COZAAR) 100 MG tablet Take 1 tablet by mouth once daily. 90 tablet 3     metFORMIN (GLUCOPHAGE) 500 MG tablet Take 1 tablet (500 mg) by mouth 2 times daily (with meals) 180 tablet  3     metoprolol succinate ER (TOPROL-XL) 100 MG 24 hr tablet Take 1 tablet (100 mg) by mouth daily 30 tablet 5     aspirin 81 MG tablet Take 1 tablet (81 mg) by mouth daily       cyclobenzaprine (FLEXERIL) 5 MG tablet Take 1-2 tablets (5-10 mg) by mouth 3 times daily as needed for muscle spasms (Patient not taking: Reported on 5/20/2019) 60 tablet 1     gabapentin (NEURONTIN) 100 MG capsule Take 1 capsule (100 mg) by mouth 3 times daily 90 capsule 0     HYDROcodone-acetaminophen (NORCO) 5-325 MG tablet Take 1 tablet by mouth every 4 hours as needed for severe pain (Patient not taking: Reported on 4/2/2019) 40 tablet 0     ondansetron (ZOFRAN-ODT) 4 MG ODT tab Take 1 tablet (4 mg) by mouth every 6 hours as needed for nausea or vomiting (Patient not taking: Reported on 2/25/2019) 10 tablet 0       Reviewed and updated as needed this visit by clinical staff  Tobacco  Allergies  Meds  Soc Hx        Reviewed and updated as needed this visit by Provider            ROS:  CONSTITUTIONAL: NEGATIVE for fever, chills, change in weight  INTEGUMENTARY/SKIN: NEGATIVE for worrisome rashes, moles or lesions  EYES: NEGATIVE for vision changes or irritation  ENT: NEGATIVE for ear, mouth and throat problems  RESP: NEGATIVE for significant cough or SOB  CV: NEGATIVE for chest pain, palpitations or peripheral edema  GI: NEGATIVE for nausea, abdominal pain, heartburn, or change in bowel habits   male: negative for dysuria, hematuria, decreased urinary stream, erectile dysfunction, urethral discharge  MUSCULOSKELETAL: NEGATIVE for significant arthralgias or myalgia  NEURO: NEGATIVE for weakness, dizziness or paresthesias  PSYCHIATRIC: NEGATIVE for changes in mood or affect    OBJECTIVE:   /74 (BP Location: Left arm, Patient Position: Sitting, Cuff Size: Adult Regular)   Pulse 82   Temp 98.2  F (36.8  C) (Temporal)   Resp 16   Wt 124 kg (273 lb 6.4 oz)   SpO2 97%   BMI 38.13 kg/m    EXAM:  GENERAL: healthy, alert and no  distress  NECK: no adenopathy, no asymmetry, masses, or scars and thyroid normal to palpation  RESP: lungs clear to auscultation - no rales, rhonchi or wheezes  CV: regular rate and rhythm, normal S1 S2, no S3 or S4, no murmur, click or rub, no peripheral edema and peripheral pulses strong  ABDOMEN: soft, nontender, no hepatosplenomegaly, no masses and bowel sounds normal  MS: no gross musculoskeletal defects noted, no edema  NEURO: Normal strength and tone, mentation intact and speech normal  PSYCH: mentation appears normal, affect normal/bright  Back: Patient does have tenderness to palpation over the past scapular muscles which are medial to the scapula    Diagnostic Test Results:  Labs reviewed in Epic  Results for orders placed or performed in visit on 05/20/19 (from the past 24 hour(s))   Lipid panel reflex to direct LDL Fasting   Result Value Ref Range    Cholesterol 182 <200 mg/dL    Triglycerides 134 <150 mg/dL    HDL Cholesterol 40 >39 mg/dL    LDL Cholesterol Calculated 115 (H) <100 mg/dL    Non HDL Cholesterol 142 (H) <130 mg/dL   CBC with platelets   Result Value Ref Range    WBC 10.1 4.0 - 11.0 10e9/L    RBC Count 5.31 4.4 - 5.9 10e12/L    Hemoglobin 16.7 13.3 - 17.7 g/dL    Hematocrit 48.2 40.0 - 53.0 %    MCV 91 78 - 100 fl    MCH 31.5 26.5 - 33.0 pg    MCHC 34.6 31.5 - 36.5 g/dL    RDW 11.9 10.0 - 15.0 %    Platelet Count 198 150 - 450 10e9/L   Basic metabolic panel  (Ca, Cl, CO2, Creat, Gluc, K, Na, BUN)   Result Value Ref Range    Sodium 140 133 - 144 mmol/L    Potassium 4.2 3.4 - 5.3 mmol/L    Chloride 105 94 - 109 mmol/L    Carbon Dioxide 27 20 - 32 mmol/L    Anion Gap 8 3 - 14 mmol/L    Glucose 129 (H) 70 - 99 mg/dL    Urea Nitrogen 23 7 - 30 mg/dL    Creatinine 0.90 0.66 - 1.25 mg/dL    GFR Estimate >90 >60 mL/min/[1.73_m2]    GFR Estimate If Black >90 >60 mL/min/[1.73_m2]    Calcium 9.0 8.5 - 10.1 mg/dL   Hemoglobin A1c   Result Value Ref Range    Hemoglobin A1C 7.0 (H) 0 - 5.6 %   Albumin  "Random Urine Quantitative with Creat Ratio   Result Value Ref Range    Creatinine Urine 194 mg/dL    Albumin Urine mg/L 26 mg/L    Albumin Urine mg/g Cr 13.56 0 - 17 mg/g Cr       ASSESSMENT/PLAN:   1. Routine general medical examination at a health care facility  Diabetic his LDL was 115 ideal is below 100 we will start him on Lipitor 10 mg daily recheck a ALT and lipids in 6 to 8 weeks.  I did talk to him about starting this medication if his LDL was above 100 so he does know.  - Lipid panel reflex to direct LDL Fasting  - CBC with platelets  - Basic metabolic panel  (Ca, Cl, CO2, Creat, Gluc, K, Na, BUN)  - Albumin Random Urine Quantitative with Creat Ratio  - Hemoglobin A1c  - ampicillin (PRINCIPEN) 500 MG capsule; 4 tabs 1 hour  prior to dental procedure may repeat  Dispense: 20 capsule; Refill: 3    COUNSELING:  Reviewed preventive health counseling, as reflected in patient instructions       Regular exercise       Healthy diet/nutrition    Estimated body mass index is 38.13 kg/m  as calculated from the following:    Height as of 3/15/19: 1.803 m (5' 11\").    Weight as of this encounter: 124 kg (273 lb 6.4 oz).         reports that he quit smoking about 31 years ago. He quit after 15.00 years of use. He has never used smokeless tobacco.      Counseling Resources:  ATP IV Guidelines  Pooled Cohorts Equation Calculator  FRAX Risk Assessment  ICSI Preventive Guidelines  Dietary Guidelines for Americans, 2010  USDA's MyPlate  ASA Prophylaxis  Lung CA Screening    Kris Weems MD, MD  [facility]  "

## 2019-06-03 DIAGNOSIS — E11.9 TYPE 2 DIABETES MELLITUS WITHOUT COMPLICATION, WITHOUT LONG-TERM CURRENT USE OF INSULIN (H): ICD-10-CM

## 2019-06-03 NOTE — TELEPHONE ENCOUNTER
"Metformin  Last Written Prescription Date:  05/30/2018  Last Fill Quantity: 180,  # refills: 3   Last office visit: 5/20/2019 with prescribing provider:  Dank   Future Office Visit:  None  Prescription approved per [facility] Refill Protocol.    Requested Prescriptions   Pending Prescriptions Disp Refills     metFORMIN (GLUCOPHAGE) 500 MG tablet 180 tablet 3     Sig: Take 1 tablet (500 mg) by mouth 2 times daily (with meals)       Biguanide Agents Passed - 6/3/2019  3:37 PM        Passed - Blood pressure less than 140/90 in past 6 months     BP Readings from Last 3 Encounters:   05/20/19 120/74   04/02/19 148/84   03/15/19 110/64           Passed - Patient has documented LDL within the past 12 mos.     Recent Labs   Lab Test 05/20/19  1623   *           Passed - Patient has had a Microalbumin in the past 15 mos.     Recent Labs   Lab Test 05/20/19  1634   MICROL 26   UMALCR 13.56           Passed - Patient is age 10 or older        Passed - Patient has documented A1c within the specified period of time.     If HgbA1C is 8 or greater, it needs to be on file within the past 3 months.  If less than 8, must be on file within the past 6 months.   Recent Labs   Lab Test 05/20/19  1623   A1C 7.0*           Passed - Patient's CR is NOT>1.4 OR Patient's EGFR is NOT<45 within past 12 mos.     Recent Labs   Lab Test 05/20/19  1623   GFRESTIMATED >90   GFRESTBLACK >90     Recent Labs   Lab Test 05/20/19  1623   CR 0.90           Passed - Patient does NOT have a diagnosis of CHF.        Passed - Medication is active on med list        Passed - Recent (6 mo) or future (30 days) visit within the authorizing provider's specialty     Patient had office visit in the last 6 months or has a visit in the next 30 days with authorizing provider or within the authorizing provider's specialty.  See \"Patient Info\" tab in inbasket, or \"Choose Columns\" in Meds & Orders section of the refill encounter.        Elizabeth Mares RN   "

## 2019-06-17 ENCOUNTER — TRANSFERRED RECORDS (OUTPATIENT)
Dept: HEALTH INFORMATION MANAGEMENT | Facility: CLINIC | Age: 64
End: 2019-06-17

## 2019-07-17 ENCOUNTER — TELEPHONE (OUTPATIENT)
Dept: FAMILY MEDICINE | Facility: CLINIC | Age: 64
End: 2019-07-17

## 2019-07-17 NOTE — TELEPHONE ENCOUNTER
LM on id vm for patient to return call. Per Dr Foote, he is asking if patient has returned to work and if so, when has he returned? Will await callback, Attending Physican's Statement form on MA desk for Dr Weems to review. ACase/MA

## 2019-07-17 NOTE — TELEPHONE ENCOUNTER
Pt called back and stated that he was part time for most of April but thinks it was 4/25 he went back to full time.  Elizabeth Franklin MA

## 2019-07-17 NOTE — TELEPHONE ENCOUNTER
Form is in pending file on Lori's desk. Get information and route this and form back to Weems to complete.  Lori [facility]

## 2019-08-19 ENCOUNTER — HOSPITAL ENCOUNTER (EMERGENCY)
Facility: CLINIC | Age: 64
Discharge: HOME | End: 2019-08-19
Attending: PHYSICIAN ASSISTANT | Admitting: PHYSICIAN ASSISTANT
Payer: OTHER MISCELLANEOUS

## 2019-08-19 ENCOUNTER — APPOINTMENT (OUTPATIENT)
Dept: GENERAL RADIOLOGY | Facility: CLINIC | Age: 64
End: 2019-08-19
Attending: PHYSICIAN ASSISTANT
Payer: OTHER MISCELLANEOUS

## 2019-08-19 VITALS
TEMPERATURE: 97.4 F | SYSTOLIC BLOOD PRESSURE: 140 MMHG | DIASTOLIC BLOOD PRESSURE: 80 MMHG | RESPIRATION RATE: 16 BRPM | OXYGEN SATURATION: 95 % | HEART RATE: 67 BPM | WEIGHT: 270 LBS | HEIGHT: 71 IN | BODY MASS INDEX: 37.8 KG/M2

## 2019-08-19 DIAGNOSIS — S29.019A THORACIC MYOFASCIAL STRAIN: ICD-10-CM

## 2019-08-19 DIAGNOSIS — S33.5XXA LUMBAR SPRAIN: ICD-10-CM

## 2019-08-19 DIAGNOSIS — S16.1XXA CERVICAL STRAIN: ICD-10-CM

## 2019-08-19 PROCEDURE — 72040 X-RAY EXAM NECK SPINE 2-3 VW: CPT | Mod: TC

## 2019-08-19 PROCEDURE — 72072 X-RAY EXAM THORAC SPINE 3VWS: CPT | Mod: TC

## 2019-08-19 PROCEDURE — 25000132 ZZH RX MED GY IP 250 OP 250 PS 637: Performed by: PHYSICIAN ASSISTANT

## 2019-08-19 PROCEDURE — 72100 X-RAY EXAM L-S SPINE 2/3 VWS: CPT | Mod: TC

## 2019-08-19 PROCEDURE — 99285 EMERGENCY DEPT VISIT HI MDM: CPT | Mod: Z6 | Performed by: PHYSICIAN ASSISTANT

## 2019-08-19 PROCEDURE — 99285 EMERGENCY DEPT VISIT HI MDM: CPT | Performed by: PHYSICIAN ASSISTANT

## 2019-08-19 RX ORDER — IBUPROFEN 600 MG/1
600 TABLET, FILM COATED ORAL ONCE
Status: COMPLETED | OUTPATIENT
Start: 2019-08-19 | End: 2019-08-19

## 2019-08-19 RX ORDER — IBUPROFEN 600 MG/1
600 TABLET, FILM COATED ORAL EVERY 6 HOURS PRN
Qty: 21 TABLET | Refills: 0 | Status: SHIPPED | OUTPATIENT
Start: 2019-08-19

## 2019-08-19 RX ADMIN — IBUPROFEN 600 MG: 600 TABLET ORAL at 10:49

## 2019-08-19 ASSESSMENT — MIFFLIN-ST. JEOR: SCORE: 2041.84

## 2019-08-19 NOTE — ED NOTES
Upon completing triage process patient stated he needs to provide a post-accident drug screen. He needs to go now and requests to go to lab before being seen. Donna Amaya RN

## 2019-08-19 NOTE — ED TRIAGE NOTES
"WORK COMP; USDP. 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 He states he \"lifted\" him up causing pain in his back and now having neck pain and pain between his shoulder blades. Donna Amaya RN  "
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
## 2019-08-19 NOTE — DISCHARGE INSTRUCTIONS
It was a pleasure working with you today!  I hope your condition improves rapidly!     Please ice your sore muscle areas for 15 to 20 minutes every couple hours today.  Switch to heat tomorrow.  It is okay to use ibuprofen 600 mg every 6 hours as needed for discomfort.  You can use your home Flexeril as needed for muscle spasm.  It is okay to use over-the-counter Biofreeze to help relax the muscles as well.  Please follow the work restrictions at home as well to rest your spine area.  Please keep your follow-up appointment to recheck your condition.

## 2019-08-21 ENCOUNTER — OFFICE VISIT (OUTPATIENT)
Dept: FAMILY MEDICINE | Facility: CLINIC | Age: 64
End: 2019-08-21
Payer: OTHER MISCELLANEOUS

## 2019-08-21 VITALS
HEART RATE: 70 BPM | BODY MASS INDEX: 37.45 KG/M2 | WEIGHT: 268.5 LBS | TEMPERATURE: 96.5 F | OXYGEN SATURATION: 98 % | RESPIRATION RATE: 16 BRPM | SYSTOLIC BLOOD PRESSURE: 110 MMHG | DIASTOLIC BLOOD PRESSURE: 76 MMHG

## 2019-08-21 DIAGNOSIS — M54.2 NECK PAIN: Primary | ICD-10-CM

## 2019-08-21 PROCEDURE — 99213 OFFICE O/P EST LOW 20 MIN: CPT | Performed by: FAMILY MEDICINE

## 2019-08-21 SDOH — HEALTH STABILITY: MENTAL HEALTH: HOW OFTEN DO YOU HAVE A DRINK CONTAINING ALCOHOL?: MONTHLY OR LESS

## 2019-08-21 ASSESSMENT — PAIN SCALES - GENERAL: PAINLEVEL: NO PAIN (1)

## 2019-08-21 NOTE — PROGRESS NOTES
Subjective     Damon Manley is a 64 year old male who presents to clinic today for the following health issues:    HPI   She is here today for follow-up of ER visit.  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  He developed some neck pain the work wanted him to be evaluated in the emergency room.  He was evaluated his exam was completely negative.  He wanted a release to work but the emergency room doctor would not do it wanted him to follow-up with his primary care doctor so he is here today he has been back to work but he does need a form to release him to full duty.  He has no complaints whatsoever at this time.            Patient Active Problem List   Diagnosis     GERD (gastroesophageal reflux disease)     CARDIOVASCULAR SCREENING; LDL GOAL LESS THAN 130     Abnormal glucose     Essential hypertension with goal blood pressure less than 140/90     Aortic valve prosthesis present     Pulmonary valve replaced     Aortic valve replaced     ARIAS (dyspnea on exertion)     SOB (shortness of breath)     Elevated LFTs     Elevated lipase     Type 2 diabetes mellitus without complication, without long-term current use of insulin (H)     Rib fractures     Closed fracture of transverse process of lumbar vertebra, initial encounter (H)     Closed head injury, subsequent encounter     Obesity (BMI 35.0-39.9) with comorbidity (H)     Past Surgical History:   Procedure Laterality Date     C ANESTH,DX ARTHROSCOPIC PROC KNEE JOINT  12/12/05    Left knee with partial medial meniscectomy.     C ANESTH,OPEN HEART; W/O PUMP OXYEGENATOR  1998     COLONOSCOPY  05/09/2007    Colon polyps.     COLONOSCOPY N/A 1/13/2017    Procedure: COMBINED COLONOSCOPY, SINGLE OR MULTIPLE BIOPSY/POLYPECTOMY BY BIOPSY;  Surgeon: Dejan Mckee MD;  Location: [facility]     HC KNEE SCOPE,MED/LAT MENISECTOMY  09/05/2007    Partial medial meniscectomy,  both knees.       Social History     Tobacco Use     Smoking status: Former Smoker     Years: 15.00     Last attempt to quit: 1988     Years since quittin.4     Smokeless tobacco: Never Used   Substance Use Topics     Alcohol use: Yes     Alcohol/week: 0.0 oz     Frequency: Monthly or less     Comment: social - glass of wine socially     Family History   Problem Relation Age of Onset     Other Cancer Father         lung     Other Cancer Mother         lung     Other Cancer Paternal Aunt         unsure     Diabetes No family hx of      Coronary Artery Disease No family hx of      Hypertension No family hx of      Hyperlipidemia No family hx of      Cerebrovascular Disease No family hx of          Current Outpatient Medications   Medication Sig Dispense Refill     aspirin 81 MG tablet Take 1 tablet (81 mg) by mouth daily       atorvastatin (LIPITOR) 10 MG tablet Take 1 tablet (10 mg) by mouth daily 90 tablet 3     blood glucose monitoring (ONE TOUCH DELICA) lancets Use to test blood sugars 1-2 times daily or as directed. 100 each 11     blood glucose monitoring (ONETOUCH VERIO IQ) test strip Use to test blood sugars 1-2 times daily or as directed. 100 strip 11     blood glucose monitoring (ONETOUCH VERIO) meter device kit Use to test blood sugars 1-2 times daily or as directed. 1 kit 0     ibuprofen (ADVIL/MOTRIN) 600 MG tablet Take 1 tablet (600 mg) by mouth every 6 hours as needed for moderate pain 21 tablet 0     losartan (COZAAR) 100 MG tablet Take 1 tablet by mouth once daily. 90 tablet 3     metFORMIN (GLUCOPHAGE) 500 MG tablet Take 1 tablet (500 mg) by mouth 2 times daily (with meals) 180 tablet 1     metoprolol succinate ER (TOPROL-XL) 100 MG 24 hr tablet Take 1 tablet (100 mg) by mouth daily 30 tablet 5     ampicillin (PRINCIPEN) 500 MG capsule 4 tabs 1 hour  prior to dental procedure may repeat (Patient not taking: Reported on 2019) 20 capsule 3     gabapentin (NEURONTIN) 100 MG capsule Take 1  capsule (100 mg) by mouth 3 times daily 90 capsule 0       Reviewed and updated as needed this visit by Provider         Review of Systems   ROS COMP: Constitutional, HEENT, cardiovascular, pulmonary, gi and gu systems are negative, except as otherwise noted.      Objective    /76   Pulse 70   Temp 96.5  F (35.8  C) (Tympanic)   Resp 16   Wt 121.8 kg (268 lb 8 oz)   SpO2 98%   BMI 37.45 kg/m    Body mass index is 37.45 kg/m .  Physical Exam   GENERAL: healthy, alert and no distress  MS: Patient has full range of motion of the cervical spine regards to flexion extension rotation.  No pain whatsoever.            Assessment & Plan   Assessment      Plan  1. Neck pain  Completely resolved we will follow-up as needed work release was given he can return to work full duty      Kris Weems MD, MD  [facility]       is here today for follow-up of ER visit.

## 2019-08-21 NOTE — LETTER
REPORT OF WORK COMP    [address] [id number] [phone]      PATIENT DATA    Employee Name: Damon Manley      : 1955     #: [id number]    Work related injury: Yes  Employer at time of injury: [employer]  Employer contact & phone: ryan - [phone]  Employed elsewhere? No  Workers' Compensation Carrier/Managed Care Plan:  [employer]??? He is unsure    Today's date: 2019  Date of injury: 19  Date of first visit: 19    PROVIDER EVALUATION: Please fill in as needed.  Please give copy to employee for employer.    1. Diagnosis: neck strain    2. Treatment: Rest Observation .  3. Medication: Ibuprofen   NOTE: When ordering a medication, [place] Rules require Work Comp or WC on prescriptions.    4. Off work 19-19  5. Return to work date: 19   ** WITH RESTRICTIONS? No restrictions      RESTRICTIONS: Unlimited unless listed.  Restrictions apply to home and leisure also.  If work restrictions is not available, the employee is totally disabled.    Maximum Medical Improvement (Date): 19  Any Permanent Partial Disability? 0%    Provider comments: Back to baseline health    Medical Examiner:  Kris Weems MD            License or registration: [id number]    CC: Employer, Managed Care Plan/Payor, Patient

## 2019-09-10 ENCOUNTER — OFFICE VISIT (OUTPATIENT)
Dept: FAMILY MEDICINE | Facility: CLINIC | Age: 64
End: 2019-09-10
Payer: COMMERCIAL

## 2019-09-10 VITALS
SYSTOLIC BLOOD PRESSURE: 130 MMHG | DIASTOLIC BLOOD PRESSURE: 78 MMHG | TEMPERATURE: 97.7 F | RESPIRATION RATE: 14 BRPM | BODY MASS INDEX: 37.38 KG/M2 | WEIGHT: 268 LBS | OXYGEN SATURATION: 96 % | HEART RATE: 76 BPM

## 2019-09-10 DIAGNOSIS — R25.2 MUSCLE CRAMPS: ICD-10-CM

## 2019-09-10 DIAGNOSIS — E11.9 TYPE 2 DIABETES MELLITUS WITHOUT COMPLICATION, WITHOUT LONG-TERM CURRENT USE OF INSULIN (H): Primary | ICD-10-CM

## 2019-09-10 PROCEDURE — 99213 OFFICE O/P EST LOW 20 MIN: CPT | Performed by: FAMILY MEDICINE

## 2019-09-10 RX ORDER — METFORMIN HCL 500 MG
1000 TABLET, EXTENDED RELEASE 24 HR ORAL
Qty: 180 TABLET | Refills: 3 | Status: SHIPPED | OUTPATIENT
Start: 2019-09-10 | End: 2020-10-20

## 2019-09-10 ASSESSMENT — PAIN SCALES - GENERAL: PAINLEVEL: NO PAIN (0)

## 2019-09-10 NOTE — PROGRESS NOTES
Subjective     Damon Manley is a 64 year old male who presents to clinic today for the following health issues:    HPI   Since starting his Lipitor the patient states he has had severe inner thigh cramps and calf's and joint pain since starting it. He would like the metformin changed to time release and I did state we would stop his Lipitor and see if his symptoms go away.  He is also been having a lot of aches and pains after he took a fall off the road if he had some severe severe injuries and now hehas persistent aches and pains a some in his back some in his ankles and knees it.  I did state most likely this is just an aggravation of long-standing arthritis and his recent injuries.  He was thinking that might be the case.          Patient Active Problem List   Diagnosis     GERD (gastroesophageal reflux disease)     CARDIOVASCULAR SCREENING; LDL GOAL LESS THAN 130     Abnormal glucose     Essential hypertension with goal blood pressure less than 140/90     Aortic valve prosthesis present     Pulmonary valve replaced     Aortic valve replaced     ARIAS (dyspnea on exertion)     SOB (shortness of breath)     Elevated LFTs     Elevated lipase     Type 2 diabetes mellitus without complication, without long-term current use of insulin (H)     Rib fractures     Closed fracture of transverse process of lumbar vertebra, initial encounter (H)     Closed head injury, subsequent encounter     Obesity (BMI 35.0-39.9) with comorbidity (H)     Past Surgical History:   Procedure Laterality Date     C ANESTH,DX ARTHROSCOPIC PROC KNEE JOINT  12/12/05    Left knee with partial medial meniscectomy.     C ANESTH,OPEN HEART; W/O PUMP OXYEGENATOR  1998     COLONOSCOPY  05/09/2007    Colon polyps.     COLONOSCOPY N/A 1/13/2017    Procedure: COMBINED COLONOSCOPY, SINGLE OR MULTIPLE BIOPSY/POLYPECTOMY BY BIOPSY;  Surgeon: Dejan Mckee MD;  Location: [facility]     HC KNEE SCOPE,MED/LAT MENISECTOMY  09/05/2007    Partial medial  meniscectomy, both knees.       Social History     Tobacco Use     Smoking status: Former Smoker     Years: 15.00     Last attempt to quit: 1988     Years since quittin.4     Smokeless tobacco: Never Used   Substance Use Topics     Alcohol use: Yes     Alcohol/week: 0.0 oz     Frequency: Monthly or less     Comment: social - glass of wine socially     Family History   Problem Relation Age of Onset     Other Cancer Father         lung     Other Cancer Mother         lung     Other Cancer Paternal Aunt         unsure     Diabetes No family hx of      Coronary Artery Disease No family hx of      Hypertension No family hx of      Hyperlipidemia No family hx of      Cerebrovascular Disease No family hx of          Current Outpatient Medications   Medication Sig Dispense Refill     aspirin 81 MG tablet Take 1 tablet (81 mg) by mouth daily       atorvastatin (LIPITOR) 10 MG tablet Take 1 tablet (10 mg) by mouth daily 90 tablet 3     blood glucose monitoring (ONE TOUCH DELICA) lancets Use to test blood sugars 1-2 times daily or as directed. 100 each 11     blood glucose monitoring (ONETOUCH VERIO IQ) test strip Use to test blood sugars 1-2 times daily or as directed. 100 strip 11     blood glucose monitoring (ONETOUCH VERIO) meter device kit Use to test blood sugars 1-2 times daily or as directed. 1 kit 0     ibuprofen (ADVIL/MOTRIN) 600 MG tablet Take 1 tablet (600 mg) by mouth every 6 hours as needed for moderate pain 21 tablet 0     losartan (COZAAR) 100 MG tablet Take 1 tablet by mouth once daily. 90 tablet 3     metFORMIN (GLUCOPHAGE-XR) 500 MG 24 hr tablet Take 2 tablets (1,000 mg) by mouth daily (with dinner) 180 tablet 3     metoprolol succinate ER (TOPROL-XL) 100 MG 24 hr tablet Take 1 tablet (100 mg) by mouth daily 30 tablet 5     gabapentin (NEURONTIN) 100 MG capsule Take 1 capsule (100 mg) by mouth 3 times daily 90 capsule 0         Reviewed and updated as needed this visit by Provider         Review of  Systems   ROS COMP: Constitutional, HEENT, cardiovascular, pulmonary, gi and gu systems are negative, except as otherwise noted.      Objective    /78   Pulse 76   Temp 97.7  F (36.5  C) (Temporal)   Resp 14   Wt 121.6 kg (268 lb)   SpO2 96%   BMI 37.38 kg/m    Body mass index is 37.38 kg/m .  Physical Exam   GENERAL: healthy , alert and no distress  MS: does have some tenderness over the lateral aspect of the right scapula.  There is no point tenderness.    Diagnostic Test Results:  Labs reviewed in Epic        Assessment & Plan   Assessment      Plan  1. Type 2 diabetes mellitus without complication, without long-term current use of insulin (H)    - metFORMIN (GLUCOPHAGE-XR) 500 MG 24 hr tablet; Take 2 tablets (1,000 mg) by mouth daily (with dinner)  Dispense: 180 tablet; Refill: 3      2.  Muscle cramps    Was switched over to long-acting Glucophage.  E will stop his Lipitor and contact me if his symptoms go away we will need to consider alternative medication for his hyperlipidemia.  Kris Weems MD, MD  [facility]

## 2019-11-13 DIAGNOSIS — I10 ESSENTIAL HYPERTENSION WITH GOAL BLOOD PRESSURE LESS THAN 140/90: ICD-10-CM

## 2019-11-13 RX ORDER — METOPROLOL SUCCINATE 100 MG/1
100 TABLET, EXTENDED RELEASE ORAL DAILY
Qty: 30 TABLET | Refills: 5 | Status: SHIPPED | OUTPATIENT
Start: 2019-11-13 | End: 2020-05-28

## 2019-11-13 NOTE — TELEPHONE ENCOUNTER
metoprolol    Last Written Prescription Date:  02/15/19  Last Fill Quantity: 30,  # refills: 5   Last office visit: 9/10/2019 with prescribing provider:  09/10/19   Future Office Visit:

## 2019-11-13 NOTE — TELEPHONE ENCOUNTER
Prescription approved per [facility] Refill Protocol.    Flora Marsh RN on 11/13/2019 at 3:27 PM

## 2019-11-13 NOTE — TELEPHONE ENCOUNTER
"Requested Prescriptions   Pending Prescriptions Disp Refills     metoprolol succinate ER (TOPROL-XL) 100 MG 24 hr tablet 30 tablet 5     Sig: Take 1 tablet (100 mg) by mouth daily       Beta-Blockers Protocol Passed - 11/13/2019  3:19 PM        Passed - Blood pressure under 140/90 in past 12 months     BP Readings from Last 3 Encounters:   09/10/19 130/78   08/21/19 110/76   08/19/19 (!) 140/80             Passed - Patient is age 6 or older        Passed - Recent (12 mo) or future (30 days) visit within the authorizing provider's specialty     Patient has had an office visit with the authorizing provider or a provider within the authorizing providers department within the previous 12 mos or has a future within next 30 days. See \"Patient Info\" tab in inbasket, or \"Choose Columns\" in Meds & Orders section of the refill encounter.              Passed - Medication is active on med list          "

## 2019-11-14 ENCOUNTER — ANCILLARY PROCEDURE (OUTPATIENT)
Dept: GENERAL RADIOLOGY | Facility: OTHER | Age: 64
End: 2019-11-14
Attending: PHYSICIAN ASSISTANT
Payer: COMMERCIAL

## 2019-11-14 ENCOUNTER — OFFICE VISIT (OUTPATIENT)
Dept: FAMILY MEDICINE | Facility: OTHER | Age: 64
End: 2019-11-14
Payer: COMMERCIAL

## 2019-11-14 VITALS
TEMPERATURE: 98.5 F | RESPIRATION RATE: 16 BRPM | WEIGHT: 267 LBS | HEIGHT: 71 IN | SYSTOLIC BLOOD PRESSURE: 118 MMHG | DIASTOLIC BLOOD PRESSURE: 78 MMHG | HEART RATE: 68 BPM | OXYGEN SATURATION: 95 % | BODY MASS INDEX: 37.38 KG/M2

## 2019-11-14 DIAGNOSIS — M54.50 ACUTE RIGHT-SIDED LOW BACK PAIN WITHOUT SCIATICA: Primary | ICD-10-CM

## 2019-11-14 DIAGNOSIS — H81.10 BENIGN PAROXYSMAL POSITIONAL VERTIGO, UNSPECIFIED LATERALITY: ICD-10-CM

## 2019-11-14 DIAGNOSIS — E66.01 MORBID OBESITY (H): ICD-10-CM

## 2019-11-14 DIAGNOSIS — S32.009A CLOSED FRACTURE OF TRANSVERSE PROCESS OF LUMBAR VERTEBRA, INITIAL ENCOUNTER (H): ICD-10-CM

## 2019-11-14 DIAGNOSIS — E11.9 TYPE 2 DIABETES MELLITUS WITHOUT COMPLICATION, WITHOUT LONG-TERM CURRENT USE OF INSULIN (H): ICD-10-CM

## 2019-11-14 DIAGNOSIS — M54.50 ACUTE RIGHT-SIDED LOW BACK PAIN WITHOUT SCIATICA: ICD-10-CM

## 2019-11-14 PROCEDURE — 72100 X-RAY EXAM L-S SPINE 2/3 VWS: CPT

## 2019-11-14 PROCEDURE — 99214 OFFICE O/P EST MOD 30 MIN: CPT | Performed by: PHYSICIAN ASSISTANT

## 2019-11-14 RX ORDER — METHYLPREDNISOLONE 4 MG
TABLET, DOSE PACK ORAL
Qty: 21 TABLET | Refills: 0 | Status: SHIPPED | OUTPATIENT
Start: 2019-11-14 | End: 2020-05-18

## 2019-11-14 RX ORDER — MECLIZINE HYDROCHLORIDE 25 MG/1
25 TABLET ORAL 3 TIMES DAILY PRN
Qty: 30 TABLET | Refills: 3 | Status: SHIPPED | OUTPATIENT
Start: 2019-11-14 | End: 2020-10-07

## 2019-11-14 ASSESSMENT — PAIN SCALES - GENERAL: PAINLEVEL: SEVERE PAIN (6)

## 2019-11-14 ASSESSMENT — MIFFLIN-ST. JEOR: SCORE: 2028.23

## 2019-11-14 NOTE — PATIENT INSTRUCTIONS
1.Dizziness     Meclizine as needed/helpful     Exercises - see attached - to each side three times a day until symptom free for 24 hours     2. Back pain      - Start Medrol dose pack for pain      - If still painful after completed, start Ibuprofen 600 mg three times a day      - Remain active and stretch back      - Ice or heat in 15-20 minute intervals      - X-ray today, await results - if still fractured will get CT scan and have you follow up with neurosurgery                        Back Pain: Self Care at Home Instructions  Conservative Treatment    Remaining active supports a quicker recovery, keep moving. (ex. Walking, increase time & speed as tolerated).    Bed rest is not recommended. Minimize sitting. Do not remain in one position for extended periods of time.    Maintain routine activity with attention to correct posture; stop aggravating activities.    Over-the-counter pain medications for short term symptom control. (See below)    Muscle relaxants are sometimes helpful for few days, but may cause drowsiness. (See below)    Cold packs or heat based upon your preference.    Most people improve within 2 weeks; most have significant improvement within 4 weeks.    If symptoms worsen or you experience numbness, contact your provider immediately.      Medications for Pain Relief  Recommended over-the-counter non-steroidal anti-inflammatories:     Ibuprofen (Advil, Motrin) 600 mg. three times a day as needed for pain      OR   Naproxen Sodium (Aleve) 220-440 mg. two times per day as needed for pain      Back Pain Exercises   Exercises that stretch and strengthen the muscles of your abdomen and spine can help prevent back problems. If your back and abdominal muscles are strong, you can maintain good posture and keep your spine in its correct position.     If your muscles are tight, take a warm shower or bath before doing the exercises. Exercise on a rug or mat. Stop doing any exercise that causes pain until  you have talked with your provider.     These exercises are intended only as suggestions. Ask your provider or physical therapist to help you develop an exercise program. Check with your provider before starting the exercises. Ask your provider how many times a week you need to do the exercises.      Back Pain Exercises                                            Cat and camel: Get down on your hands and knees. Let your stomach sag, allowing your back to curve downward. Hold this position for 5 seconds. Then arch your back and hold for 5 seconds. Do 3 sets of 10.       Pelvic tilt: Lie on your back with your knees bent and your feet flat on the floor. Tighten your abdominal muscles and push your lower back into the floor. Hold this position for 5 seconds, then relax. Do 3 sets of 10.       Extension exercise: Lie face down on the floor for 5 minutes. If this hurts too much, lie face down with a pillow under your stomach. This should relieve your leg or back pain. When you can lie on your stomach for 5 minutes without a pillow, then you can continue with the rest of this exercise.     After lying on your stomach for 5 minutes, prop yourself up on your elbows for another 5 minutes. Lie flat again for 1 minute, then press down on your hands and extend your elbows while keeping your hips flat on the floor. Hold for 1 second and lower yourself to the floor. Repeat 10 times. Do 4 sets. Rest for 2 minutes between sets. You should have no pain in your legs when you do this, but it is normal to feel pain in your lower back. Do this several times a day.          Developed by [employer]   Published by [employer].   Last modified: 2009-02-08   Last reviewed: 2008-07-07   This content is reviewed periodically and is subject to change as new health information becomes available. The information is intended to inform and educate and is not a replacement for medical evaluation, advice, diagnosis or treatment by a healthcare  professional.   Adult Health Advisor 2009.1 Index  Adult Health Advisor 2009.1 Credits     2009 [facility] and/or its affiliates. All Rights Reserved.

## 2019-11-14 NOTE — PROGRESS NOTES
"Subjective     Damon Manley is a 64 year old male who presents to clinic today for the following health issues:    HPI     Back Pain       Duration: 2 days         Specific cause: none    Description:   Location of pain: low back right  Character of pain: dull ache and intense   Pain radiation:none  New numbness or weakness in legs, not attributed to pain:  no     Intensity: Currently 6/10    History:   Pain interferes with job: YES  History of back problems: early this year had a lot of back pain   Any previous MRI or X-rays: Yes- at [facility].  Date Feb 2019   Sees a specialist for back pain:  No  Therapies tried without relief: none    Alleviating factors:   Improved by: being still      Precipitating factors:  Worsened by: Lifting, Bending, Standing, Sitting, Lying Flat, Walking and Coughing    - Pt in chronic today with 2 chronic concerns. Although both are severely worse today and the last 2 days. Concerned about kidneys/ with the new intense back pain and severe dizziness. He is unstable walking today.     - Monday woke up like normal and having low back pain, then throughout the work day worsening and worsen     - When pain gets bad, gets nausea and vertigo, has had vertigo in the past   - Hasn't tried any Tylenol or Ibuprofen   - Took muscle relaxer on Tuesday - then next day feeling sick so didn't take again  - Can't walk without being dizzy   - Getting up to fast or moves head fast gets dizzy   - Lasts until sits back down and relaxes - couple minutes     - No numbness or tingling   - No radicular pain   - Pain lower back to rib cage on right side   - No changes to bowel or bladder function     - BG's - 200 yesterday      Today - 137       Review of Systems   ROS COMP: Constitutional, HEENT, cardiovascular, pulmonary, gi and gu systems are negative, except as otherwise noted.      Objective    /78   Pulse 68   Temp 98.5  F (36.9  C) (Temporal)   Resp 16   Ht 1.803 m (5' 11\")   Wt 121.1 kg (267 " lb)   SpO2 95%   BMI 37.24 kg/m    Body mass index is 37.24 kg/m .  Physical Exam   GENERAL APPEARANCE: healthy, alert and no distress  EYES: Eyes grossly normal to inspection, PERRLA, conjunctivae and sclerae without injection or discharge, EOM intact   MS: No musculoskeletal defects are noted and gait is age appropriate without ataxia   SKIN: No suspicious lesions or rashes, hydration status appears adeuqate with normal skin turgor   NEURO: Strength 5+ bilateral lower extremities, sensation intact in distal bilateral lower extremities, mentation- intact, speech- normal, reflexes- symmetric in bilateral lower extremities, CN II-X grossly intact, positive [place]-Hallpike, negative Rhomberg's   BACK: No CVA tenderness, right paralumbar tenderness with palpable spasm, no midline tenderness, moderately decreased ROM with all movements   PSYCH: Alert and oriented x3; speech- coherent , normal rate and volume; able to articulate logical thoughts, able to abstract reason, no tangential thoughts, no hallucinations or delusions, mentation appears normal, Mood is euthymic. Affect is appropriate for this mood state and bright. Thought content is free of suicidal ideation, hallucinations, and delusions. Dress is adequate and upkept. Eye contact is good during conversation.       Diagnostic Test Results:  Labs reviewed in Epic  none         Assessment & Plan       ICD-10-CM    1. Acute right-sided low back pain without sciatica M54.5 XR Lumbar Spine 2/3 Views     methylPREDNISolone (MEDROL DOSEPAK) 4 MG tablet therapy pack   2. Closed fracture of transverse process of lumbar vertebra, initial encounter (H) S32.009A XR Lumbar Spine 2/3 Views   3. Benign paroxysmal positional vertigo, unspecified laterality H81.10 meclizine (ANTIVERT) 25 MG tablet       1 & 2.   - Patient with acute on chronic low back pain   - History of positive for L1 and L2 fracture of transverse process on CT scan 2/23/19. The L1 fracture was possibly still  present on  X-ray of lumbar spine done 8/19/19  - No known etiology for this acute issue of severe right back pain with no sciatica   - No red flags on exam   - Recommend conservative therapy      Patient declined muscle relaxer or pain medication   - Plan:      - Start Medrol dose pack for pain          Discussed use and side effects including insomnia      - If still painful after completed, start Ibuprofen 600 mg three times a day      - Remain active and stretch back      - Ice or heat in 15-20 minute intervals      - X-ray today, await results - if still fractured will get CT scan and have you follow up with neurosurgery   - Discussed warning signs that would warrant return to clinic/ED    3. Dizziness  - Consistent with BPPV and worsened due to pain (since he has had this in the past), however can't rule out vasovagal reaction due to pain vs. Glucose (patient states normal and compliant with medications) vs. Electrolyte imbalance vs. Other   - Normal neurological exam with no red flags   - Will treat with Meclizine and modified Eply's exercises at home   - Discussed warning signs that would warrant return to clinic/ED       The patient indicates understanding of these issues and agrees with the plan.    Return in about 1 week (around 11/21/2019) for if not improving.    Betty King PA-C  [facility]

## 2019-11-14 NOTE — RESULT ENCOUNTER NOTE
Elvi Jose    Your x-ray results were inconclusive as to if there is still a fracture or not. If you would like me to order the CT scan, let me know. Otherwise, continue with our plan and give it a little while and if the pain is still there we can get the CT scan. Hope you feel better soon.     The results are attached for your review.       Dani King PA-C

## 2020-01-16 DIAGNOSIS — I10 HYPERTENSION GOAL BP (BLOOD PRESSURE) < 140/90: ICD-10-CM

## 2020-01-16 RX ORDER — LOSARTAN POTASSIUM 100 MG/1
TABLET ORAL
Qty: 90 TABLET | Refills: 2 | Status: SHIPPED | OUTPATIENT
Start: 2020-01-16 | End: 2021-03-11

## 2020-01-16 NOTE — TELEPHONE ENCOUNTER
"Cozaar  Last Written Prescription Date:  11/02/2018  Last Fill Quantity: 90,  # refills: 3   Last office visit: 11/14/2019 with prescribing provider:  Fernando   Future Office Visit:  None  Prescription approved per [facility] Refill Protocol.    Requested Prescriptions   Pending Prescriptions Disp Refills     losartan (COZAAR) 100 MG tablet [Pharmacy Med Name: losartan 100 mg tablet (COZAAR)] 90 tablet 3     Sig: Take 1 tablet by mouth once daily.       Angiotensin-II Receptors Passed - 1/16/2020  9:35 AM        Passed - Last blood pressure under 140/90 in past 12 months     BP Readings from Last 3 Encounters:   11/14/19 118/78   09/10/19 130/78   08/21/19 110/76           Passed - Recent (12 mo) or future (30 days) visit within the authorizing provider's specialty     Patient has had an office visit with the authorizing provider or a provider within the authorizing providers department within the previous 12 mos or has a future within next 30 days. See \"Patient Info\" tab in inbasket, or \"Choose Columns\" in Meds & Orders section of the refill encounter.          Passed - Medication is active on med list        Passed - Patient is age 18 or older        Passed - Normal serum creatinine on file in past 12 months     Recent Labs   Lab Test 05/20/19  1623   CR 0.90           Passed - Normal serum potassium on file in past 12 months     Recent Labs   Lab Test 05/20/19  1623   POTASSIUM 4.2         Elizabeth Robles RN   "

## 2020-04-15 ENCOUNTER — VIRTUAL VISIT (OUTPATIENT)
Dept: FAMILY MEDICINE | Facility: OTHER | Age: 65
End: 2020-04-15

## 2020-04-15 NOTE — PROGRESS NOTES
"Date: 04/15/2020 16:40:49  Clinician: Ximena Hannon  Clinician NPI: [id number]  Patient: Damon Manley  Patient : 1955  Patient Address: [address] [place] [address]  Patient Phone: [phone]  Visit Protocol: URI  Patient Summary:  Damon is a 65 year old ( : 1955 ) male who initiated a Visit for cold, sinus infection, or influenza. When asked the question \"Please sign me up to receive news, health information and promotions from [employer].\", Damon responded \"No\".    Damon states his symptoms started suddenly 5-6 days ago.   His symptoms consist of chills, myalgia, nausea, a sore throat, a cough, nasal congestion, and malaise. He is experiencing mild difficulty breathing with activities but can speak normally in full sentences.   Symptom details     Nasal secretions: The color of his mucus is white.    Cough: Damon coughs every 5-10 minutes and his cough is not more bothersome at night. Phlegm does not come into his throat when he coughs. He believes his cough is caused by post-nasal drip.     Sore throat: Damon reports having mild throat pain (1-3 on a 10 point pain scale), does not have exudate on his tonsils, and can swallow liquids. He is not sure if the lymph nodes in his neck are enlarged. A rash has not appeared on the skin since the sore throat started.      Damon denies having rhinitis, diarrhea, facial pain or pressure, vomiting, teeth pain, headache, wheezing, ear pain, and fever. He also denies taking antibiotic medication for the symptoms, double sickening (worsening symptoms after initial improvement), and having recent facial or sinus surgery in the past 60 days.   Precipitating events  Within the past week, Damon has not been exposed to someone with strep throat. He has not recently been exposed to someone with influenza. Damon has been in close contact with the following high risk individuals: adults 65 or older and people with asthma, heart disease or diabetes.   Pertinent " COVID-19 (Coronavirus) information  Damon has not traveled internationally or to the areas where COVID-19 (Coronavirus) is widespread, including cruise ship travel in the last 14 days before the start of his symptoms.   Damon does not work or volunteer as healthcare worker or a  and does not work or volunteer in a healthcare facility.   He lives with a healthcare worker.   Damon has not had a close contact with a laboratory-confirmed COVID-19 patient within 14 days of symptom onset. He also has not had a close contact with a suspected COVID-19 patient within 14 days of symptom onset.   Pertinent medical history  Damon needs a return to work/school note.   Weight: 260 lbs   Damon does not smoke or use smokeless tobacco.   Additional information as reported by the patient (free text): I feel extremely achy, feels like pneumonia (I've had it before)  I have a history of rheumatic fever I believe I have a low   WBC   Weight: 260 lbs    MEDICATIONS: metoprolol succinate oral, losartan oral, metformin oral, ALLERGIES: NKDA  Clinician Response:  Dear Damon,    Dear Damon  Your symptoms show that you may have coronavirus (COVID-19). This illness can cause fever, cough and trouble breathing. Many people get a mild case and get better on their own. Some people can get very sick. Start taking the medications prescribed for a possible pneumonia.   Will I be tested for COVID-19?  Because the virus is spreading, we are no longer testing most patients. You may request testing if:   You are very ill. For example, you're on chemotherapy, dialysis or home hospice care. (Contact your specialty clinic or program.)   You live in a nursing home or other long-term care facility. (Talk to your nurse manager or medical director.)   You're a health care worker. (Contact your employee health office.)   How can I protect others?  Without a test, we can't know for sure that you have COVID-19. For safety, it's very important to  follow these rules.  First, stay home and away from others (self-isolate) until:   You've had no fever---and no medicine that reduces fever---for 3 full days (72 hours). And...    Your other symptoms have gotten better. For example, your cough or breathing has improved. And...   At least 7 days have passed since your symptoms started.   During this time:   Don't go to work, school or anywhere else.    Stay away from others in your home. No hugging, kissing or shaking hands.   Don't let anyone visit.   Cover your mouth and nose with a mask, tissue or wash cloth to avoid spreading germs.   Wash your hands and face often. Use soap and water.   How can I take care of myself?   1.Take Tylenol (acetaminophen) for fever or pain. If you have liver or kidney problems, ask your family doctor if it's okay to take Tylenol.        Adults can take either:    650 mg (two 325 mg pills) every 4 to 6 hours, or...   1,000 mg (two 500 mg pills) every 8 hours as needed.    Note: Don't take more than 3,000 mg in one day.   For children, check the Tylenol bottle for the right dose. The dose is based on the child's age or weight.   2.If you have other health problems (like cancer, heart failure, an organ transplant or severe kidney disease): Call your specialty clinic if you don't feel better in the next 2 days.       3.Know when to call 911: If your breathing is so bad that it keeps you from doing normal activities, call 911 or go to the emergency room. Tell them that you've been staying home and may have COVID-19.       4.Sign up for [employer]. We know it's scary to hear that you might have COVID-19. We want to track your symptoms to make sure you're okay over the next 2 weeks. Please look for an email from [employer]---this is a free, online program that we'll use to keep in touch. To sign up, follow the link in the email. Learn more at [email or web address].   Where can I get more information?  To learn more about  COVID-19 and how to care for yourself at home, please visit the CDC website at https://www.cdc.gov/coronavirus/2019-ncov/about/steps-when-sick.html.  For more options for care at [facility], please visit our website at [email or web address]         Diagnosis: Acute upper respiratory infection, unspecified  Diagnosis ICD: J06.9  Prescription: azithromycin (Zithromax Z-Junior) 250 mg oral tablet 6 tablet, 5 days supply. Take 2 tablets by mouth 1 time per day for 5 days. Refills: 0, Refill as needed: no, Allow substitutions: yes  Pharmacy: [facility] - [phone] - [address] [place] [address]

## 2020-04-24 NOTE — ED AVS SNAPSHOT
Left message for pt to have INR checked  Ruth Filter, Clinical Pharmacist, CDE, CACP     [facility] Emergency Department  [address] [place] [address]  Phone:  [phone]  Fax:  [phone]                                    Damon Manley   MRN: [id number]    Department:  [facility] Emergency Department   Date of Visit:  8/19/2019           After Visit Summary Signature Page    I have received my discharge instructions, and my questions have been answered. I have discussed any challenges I see with this plan with the nurse or doctor.    ..........................................................................................................................................  Patient/Patient Representative Signature      ..........................................................................................................................................  Patient Representative Print Name and Relationship to Patient    ..................................................               ................................................  Date                                   Time    ..........................................................................................................................................  Reviewed by Signature/Title    ...................................................              ..............................................  Date                                               Time          22EPIC Rev 08/18

## 2020-05-18 ENCOUNTER — HOSPITAL ENCOUNTER (OUTPATIENT)
Dept: GENERAL RADIOLOGY | Facility: CLINIC | Age: 65
End: 2020-05-18
Attending: NURSE PRACTITIONER
Payer: OTHER MISCELLANEOUS

## 2020-05-18 ENCOUNTER — OFFICE VISIT (OUTPATIENT)
Dept: FAMILY MEDICINE | Facility: CLINIC | Age: 65
End: 2020-05-18
Payer: OTHER MISCELLANEOUS

## 2020-05-18 VITALS
RESPIRATION RATE: 14 BRPM | WEIGHT: 266.4 LBS | TEMPERATURE: 97 F | OXYGEN SATURATION: 97 % | BODY MASS INDEX: 37.16 KG/M2 | DIASTOLIC BLOOD PRESSURE: 80 MMHG | HEART RATE: 84 BPM | SYSTOLIC BLOOD PRESSURE: 118 MMHG

## 2020-05-18 DIAGNOSIS — S89.92XA ACUTE INJURY OF LEFT KNEE CARTILAGE, INITIAL ENCOUNTER: ICD-10-CM

## 2020-05-18 DIAGNOSIS — S89.92XA ACUTE INJURY OF LEFT KNEE CARTILAGE, INITIAL ENCOUNTER: Primary | ICD-10-CM

## 2020-05-18 PROCEDURE — 99213 OFFICE O/P EST LOW 20 MIN: CPT | Performed by: NURSE PRACTITIONER

## 2020-05-18 PROCEDURE — 73562 X-RAY EXAM OF KNEE 3: CPT | Mod: TC

## 2020-05-18 ASSESSMENT — PAIN SCALES - GENERAL: PAINLEVEL: MILD PAIN (3)

## 2020-05-18 NOTE — PROGRESS NOTES
Subjective     Damon Manley is a 65 year old male who presents to clinic today for the following health issues:    HPI   Joint Pain    Onset: 5/6/20    Description:   Location: left knee  Character: Sharp and Gnawing    Intensity: moderate    Progression of Symptoms: better    Accompanying Signs & Symptoms:  Other symptoms: weakness of left knee    History:   Previous similar pain: no       Precipitating factors:   Trauma or overuse: YES- slipped at work on 5/6/20     Alleviating factors:  Improved by: rest/inactivity, ice, acetaminophen and Ibuprofen    Therapies Tried and outcome: rest/inactivity, ice, acetaminophen and Ibuprofen helps some     Patient states injury happened on 5/6/20 at work. He was walking across warehouse slipped and the right leg slipped forward, the left left held his weight but he felt a burning in the lateral part of his knee.  Pain and stiffness now with sitting and knee being in a static position.     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 He is very stiff and painful but with extra time can do his work duties. He is concerned that the pain is ongoing and struggles a great deal getting out of a seated position. He states ones he is up and moving    Did a online visit though [employer] per his employer on 5/6/20.            Patient Active Problem List   Diagnosis     GERD (gastroesophageal reflux disease)     Abnormal glucose     Essential hypertension with goal blood pressure less than 140/90     Aortic valve prosthesis present     Pulmonary valve replaced     Aortic valve replaced     ARIAS (dyspnea on exertion)     SOB (shortness of breath)     Elevated LFTs     Elevated lipase     Type 2 diabetes mellitus without complication, without long-term current use of insulin (H)     Rib fractures     Closed fracture of transverse process of lumbar vertebra, initial encounter (H)     Closed head injury, subsequent encounter     Obesity (BMI 35.0-39.9) with comorbidity (H)      Past Surgical History:   Procedure Laterality Date     C ANESTH,DX ARTHROSCOPIC PROC KNEE JOINT  05    Left knee with partial medial meniscectomy.     C ANESTH,OPEN HEART; W/O PUMP OXYEGENATOR       COLONOSCOPY  2007    Colon polyps.     COLONOSCOPY N/A 2017    Procedure: COMBINED COLONOSCOPY, SINGLE OR MULTIPLE BIOPSY/POLYPECTOMY BY BIOPSY;  Surgeon: Dejan Mckee MD;  Location: [facility]     HC KNEE SCOPE,MED/LAT MENISECTOMY  2007    Partial medial meniscectomy, both knees.       Social History     Tobacco Use     Smoking status: Former Smoker     Years: 15.00     Last attempt to quit: 1988     Years since quittin.1     Smokeless tobacco: Never Used   Substance Use Topics     Alcohol use: Yes     Alcohol/week: 0.0 standard drinks     Frequency: Monthly or less     Comment: social - glass of wine socially     Family History   Problem Relation Age of Onset     Other Cancer Father         lung     Other Cancer Mother         lung     Other Cancer Paternal Aunt         unsure     Diabetes No family hx of      Coronary Artery Disease No family hx of      Hypertension No family hx of      Hyperlipidemia No family hx of      Cerebrovascular Disease No family hx of          Current Outpatient Medications   Medication Sig Dispense Refill     aspirin 81 MG tablet Take 1 tablet (81 mg) by mouth daily       atorvastatin (LIPITOR) 10 MG tablet Take 1 tablet (10 mg) by mouth daily 90 tablet 3     blood glucose monitoring (ONE TOUCH DELICA) lancets Use to test blood sugars 1-2 times daily or as directed. 100 each 11     blood glucose monitoring (ONETOUCH VERIO IQ) test strip Use to test blood sugars 1-2 times daily or as directed. 100 strip 11     blood glucose monitoring (ONETOUCH VERIO) meter device kit Use to test blood sugars 1-2 times daily or as directed. 1 kit 0     ibuprofen (ADVIL/MOTRIN) 600 MG tablet Take 1 tablet (600 mg) by mouth every 6 hours as needed for moderate pain 21  tablet 0     losartan (COZAAR) 100 MG tablet Take 1 tablet by mouth once daily. 90 tablet 2     meclizine (ANTIVERT) 25 MG tablet Take 1 tablet (25 mg) by mouth 3 times daily as needed for dizziness 30 tablet 3     metFORMIN (GLUCOPHAGE-XR) 500 MG 24 hr tablet Take 2 tablets (1,000 mg) by mouth daily (with dinner) 180 tablet 3     metoprolol succinate ER (TOPROL-XL) 100 MG 24 hr tablet Take 1 tablet (100 mg) by mouth daily 30 tablet 5     gabapentin (NEURONTIN) 100 MG capsule Take 1 capsule (100 mg) by mouth 3 times daily 90 capsule 0     Allergies   Allergen Reactions     No Known Drug Allergies        Reviewed and updated as needed this visit by Provider         Review of Systems   Constitutional, HEENT, cardiovascular, pulmonary, gi and gu systems are negative, except as otherwise noted.      Objective    /80 (BP Location: Right arm, Patient Position: Sitting, Cuff Size: Adult Large)   Pulse 84   Temp 97  F (36.1  C) (Temporal)   Resp 14   Wt 120.8 kg (266 lb 6.4 oz)   SpO2 97%   BMI 37.16 kg/m    Body mass index is 37.16 kg/m .  Physical Exam  Vitals signs reviewed.   Constitutional:       Appearance: Normal appearance.   HENT:      Head: Normocephalic and atraumatic.      Nose: Nose normal.      Mouth/Throat:      Mouth: Mucous membranes are moist.      Pharynx: Oropharynx is clear.   Eyes:      Extraocular Movements: Extraocular movements intact.   Cardiovascular:      Rate and Rhythm: Normal rate.      Pulses: Normal pulses.      Heart sounds: Normal heart sounds.   Pulmonary:      Effort: Pulmonary effort is normal.   Musculoskeletal:      Right knee: Normal.      Left knee: He exhibits no swelling, no deformity, no bony tenderness and normal meniscus. Tenderness found. Lateral joint line tenderness noted.   Neurological:      Mental Status: He is alert.              Assessment & Plan     1. Acute injury of left knee cartilage, initial encounter  - He is tolerating his job now with having some  light duty and taking extra time to complete tasks. Plan now for xray to rule out any bony deformity and then sports medicine to develop rehab plan moving forward.   - XR Knee Standing Left 2 Views; Future  - SPORTS MEDICINE REFERRAL     - Patient verbalized understanding of plan of care and is in agreement with plan .     Return in about 2 weeks (around 6/1/2020), or if symptoms worsen or fail to improve, for Recheck if symptoms worsen or fail to improve.    Reinaldo Roman NP  [facility]

## 2020-05-18 NOTE — PATIENT INSTRUCTIONS
Patient Education     Knee Sprain of the Collateral Ligaments  The knee is a hinge joint supported by four strong ligaments. The two ligaments inside the knee protect this joint from excess forward and backward movement. The ligaments on the outside of the joint prevent side-to-side motion. These are called the collateral ligaments.  The medial collateral ligament is located on the inner side of the joint; and the lateral collateral ligament is on the outer side of the joint.  A sprain is a tearing of a ligament. The tear may be partial or complete. Diagnosis is made by physical exam. In the case of an acute injury, the knee may be too swollen or painful to examine fully. A more accurate exam can be done after the initial swelling goes down.  Symptoms of a knee sprain include immediate knee swelling, pain, and difficulty walking. Initial treatment includes rest, splinting the knee to reduce movement of the joint, and icing the knee to reduce swelling and pain. You may use over-the-counter pain medicine to control pain, unless another medicine was prescribed. Most sprains will heal in 3 to 6 weeks. A severe injury can take 3 to 4 months to heal. You may also need rehab exercises. Surgery is usually not required for sprains involving only the collateral ligaments.  Home care    Stay off the injured leg as much as possible until you can walk on it without pain. If you have a lot of pain while walking, crutches or a walker may be prescribed. These can be rented or purchased at many pharmacies and surgical or orthopedic supply stores. Follow your healthcare provider s advice about when to begin bearing weight on that leg.     If you were given a hook-and-loop closure knee brace, you can remove it to bathe. But leave it in place when walking, sitting, or lying down unless told otherwise.    Apply an ice pack over the injured area for 15 to 20 minutes every 3 to 6 hours. You should do this for the first 24 to 48  hours. You can make an ice pack by filling a plastic bag that seals at the top with ice cubes and then wrapping it with a thin towel. Continue to use ice packs for relief of pain and swelling as needed. As the ice melts, be careful to avoid getting your wrap, splint, or cast wet. You can place the ice pack directly over the splint. If you have to wear a hook-and-loop knee brace, you can open it to apply the ice pack, or heat, directly to the knee. Never put ice directly on the skin. Always wrap the ice in a towel or other type of cloth.    You may use over-the-counter pain medicine to control pain, unless another pain medicine was prescribed. Anti-inflammatory pain medicines, such as ibuprofen or naproxen may be more effective than acetaminophen. If you have chronic liver or kidney disease or ever had a stomach ulcer or gastrointestinal bleeding, talk with your healthcare provider before using these medicines.  Follow-up care  Follow up with your healthcare provider as advised. Any X-rays you had today don t show any broken bones, breaks, or fractures. Sometimes fractures don t show up on the first X-ray. Bruises and sprains can sometimes hurt as much as a fracture. These injuries can take time to heal completely. If your symptoms don t improve or they get worse, talk with your healthcare provider. You may need a repeat X-ray or other tests such as MRI. If X-rays were taken, you will be told of any new findings that may affect your care.  Call 911  Call 911 if you have:     Shortness of breath     Chest pain  When to seek medical advice  Call your healthcare provider right away if any of these occur:    Pain or swelling increases    Swelling, redness, or pain in the calf or thigh  Date Last Reviewed: 5/1/2018 2000-2019 [employer]. [address], [place] [address]. All rights reserved. This information is not intended as a substitute for professional medical care. Always follow your  healthcare professional's instructions.         Xray first then we will send you to sports medicine for complete evaluation and rehabilitation plan moving forward.     Reinaldo Roman CNP

## 2020-05-18 NOTE — NURSING NOTE
Health Maintenance Due   Topic Date Due     DIABETIC FOOT EXAM  1955     URINE DRUG SCREEN  1955     PNEUMOCOCCAL IMMUNIZATION 19-64 MEDIUM RISK (1 of 1 - PPSV23) 04/03/1975     ZOSTER IMMUNIZATION (1 of 2) 04/03/2006     EYE EXAM  08/30/2019     A1C  11/20/2019     PHQ-2  01/01/2020     PREVENTIVE CARE VISIT  05/20/2020     BMP  05/20/2020     LIPID  05/20/2020     MICROALBUMIN  05/20/2020      Yvonne Bernal LPN........5/18/2020 2:53 PM

## 2020-05-19 ENCOUNTER — TELEPHONE (OUTPATIENT)
Dept: FAMILY MEDICINE | Facility: CLINIC | Age: 65
End: 2020-05-19

## 2020-05-19 NOTE — TELEPHONE ENCOUNTER
LMTCB- When patient returns call please relay message from provider. Yvonne Bernal LPN........5/19/2020 9:31 AM

## 2020-05-19 NOTE — TELEPHONE ENCOUNTER
----- Message from Reinaldo Roman NP sent at 5/19/2020  8:38 AM CDT -----  Please call with results. Please ensure that no acute fracture but there does appear to be some joint space narrowing which could indicate some arthritis as well as related to most recent fall. Please present to the visit with the Sports medicine team for further evaluation and management.       Reinaldo Roman NP on 5/19/2020 at 8:34 AM

## 2020-05-21 ENCOUNTER — OFFICE VISIT (OUTPATIENT)
Dept: ORTHOPEDICS | Facility: CLINIC | Age: 65
End: 2020-05-21
Attending: NURSE PRACTITIONER
Payer: OTHER MISCELLANEOUS

## 2020-05-21 VITALS
BODY MASS INDEX: 37.3 KG/M2 | DIASTOLIC BLOOD PRESSURE: 60 MMHG | HEIGHT: 71 IN | WEIGHT: 266.4 LBS | SYSTOLIC BLOOD PRESSURE: 143 MMHG

## 2020-05-21 DIAGNOSIS — S83.422A SPRAIN OF LATERAL COLLATERAL LIGAMENT OF LEFT KNEE, INITIAL ENCOUNTER: Primary | ICD-10-CM

## 2020-05-21 PROCEDURE — 99243 OFF/OP CNSLTJ NEW/EST LOW 30: CPT | Performed by: PHYSICAL MEDICINE & REHABILITATION

## 2020-05-21 ASSESSMENT — MIFFLIN-ST. JEOR: SCORE: 2020.51

## 2020-05-21 NOTE — LETTER
Damon Manley     1955  Encounter date/time:  5/21/2020  [id number]    Report of Workability    Phone number:  [phone]  1955  Physician:  Natasha Durham    Accident/Injury Information:  Slipped at work and full weight went on the left leg, twisted slightly  Date of Injury:  5/6/2020  Diagnosis:  LCL sprain, left      Return to work status: Return to work with NO limitations starting next scheduled work day.    Follow-Up:   Follow up visit on 6/11/2020 to discuss MMI.

## 2020-05-21 NOTE — PATIENT INSTRUCTIONS
-Ice or heat 15-20 minutes as needed (Avoid sleeping on a heating pad or ice)  -Patient's preferred over the counter medications as directed on packaging as needed for pain or soreness.   -Provided home exercises. Please do multiple times per day.  -Avoid aggravating activities.  -Work:  Continue full work.  Has not yet established MMI.  Letter provided.    -Damon to follow up with Primary Care provider regarding elevated blood pressure.    -Follow up in telephone visit 6/11/2020.

## 2020-05-21 NOTE — PROGRESS NOTES
Sports Medicine Clinic Visit    PCP: Kris Weems    CC: Patient presents with:  Left Knee - Pain      HPI:  Damon Manley is a 65 year old male who is seen in consultation at the request of Reinaldo Roman NP.   He notes left knee pain that began 5/6/20 when he was at work, slipped and all the weight went onto the left leg and he twisted slightly.  He notes the knee has improved since the initial injury.  He notes pain over the lateral portion of the knee.  He notes the swelling has improved and the pain has improved as well.  He rates the pain at a 8/10 at its worst and a 0/10 currently.  Symptoms are relieved with ice, heat, ibuprofen, Tylenol, and bracing. Symptoms are worsened by sitting and sit to stand transition. He endorses swelling, grinding and catching.   He denies popping, locking and instability.  Other treatment has included nothing. He is doing full work currently.        Review of Systems:  Musculoskeletal: as above  Remainder of review of systems is negative including constitutional, eyes, ENT, CV, pulmonary, GI, , endocrine, skin, hematologic, and neurologic except as noted in HPI or medical history.    History reviewed. No pertinent past surgical/medical/family/social history other than as mentioned in HPI.    Patient Active Problem List   Diagnosis     GERD (gastroesophageal reflux disease)     Abnormal glucose     Essential hypertension with goal blood pressure less than 140/90     Aortic valve prosthesis present     Pulmonary valve replaced     Aortic valve replaced     ARIAS (dyspnea on exertion)     SOB (shortness of breath)     Elevated LFTs     Elevated lipase     Type 2 diabetes mellitus without complication, without long-term current use of insulin (H)     Rib fractures     Closed fracture of transverse process of lumbar vertebra, initial encounter (H)     Closed head injury, subsequent encounter     Obesity (BMI 35.0-39.9) with comorbidity (H)     Past Medical History:   Diagnosis  Date     Coronary artery disease     Valves replaced due to hx of Rheumatic fever. - No mechanical valves     Depressive disorder     situational - resolved     Hypertension      Motion sickness      Obesity (BMI 30-39.9)      Past Surgical History:   Procedure Laterality Date     C ANESTH,DX ARTHROSCOPIC PROC KNEE JOINT  05    Left knee with partial medial meniscectomy.     C ANESTH,OPEN HEART; W/O PUMP OXYEGENATOR       COLONOSCOPY  2007    Colon polyps.     COLONOSCOPY N/A 2017    Procedure: COMBINED COLONOSCOPY, SINGLE OR MULTIPLE BIOPSY/POLYPECTOMY BY BIOPSY;  Surgeon: Dejan Mckee MD;  Location: [facility]     HC KNEE SCOPE,MED/LAT MENISECTOMY  2007    Partial medial meniscectomy, both knees.     Family History   Problem Relation Age of Onset     Other Cancer Father         lung     Other Cancer Mother         lung     Other Cancer Paternal Aunt         unsure     Diabetes No family hx of      Coronary Artery Disease No family hx of      Hypertension No family hx of      Hyperlipidemia No family hx of      Cerebrovascular Disease No family hx of      Social History     Socioeconomic History     Marital status:      Spouse name: Wanda Parks     Number of children: 2     Years of education: 14     Highest education level: Not on file   Occupational History     Not on file   Social Needs     Financial resource strain: Not on file     Food insecurity     Worry: Not on file     Inability: Not on file     Transportation needs     Medical: Not on file     Non-medical: Not on file   Tobacco Use     Smoking status: Former Smoker     Years: 15.00     Last attempt to quit: 1988     Years since quittin.1     Smokeless tobacco: Never Used   Substance and Sexual Activity     Alcohol use: Yes     Alcohol/week: 0.0 standard drinks     Frequency: Monthly or less     Comment: social - glass of wine socially     Drug use: No     Sexual activity: Yes     Partners: Female     Comment:   - 2 children   Lifestyle     Physical activity     Days per week: Not on file     Minutes per session: Not on file     Stress: Not on file   Relationships     Social connections     Talks on phone: Not on file     Gets together: Not on file     Attends Yazdanism service: Not on file     Active member of club or organization: Not on file     Attends meetings of clubs or organizations: Not on file     Relationship status: Not on file     Intimate partner violence     Fear of current or ex partner: Not on file     Emotionally abused: Not on file     Physically abused: Not on file     Forced sexual activity: Not on file   Other Topics Concern      Service No     Blood Transfusions Yes     Comment: open heart surgery 1998     Caffeine Concern No     Occupational Exposure No     Hobby Hazards No     Sleep Concern Yes     Comment:  wakes after 4-5 hours of sleep at night     Stress Concern Yes     Weight Concern Yes     Special Diet No     Back Care No     Exercise Yes     Comment: 3-4 times/week     Bike Helmet Not Asked     Comment: n/a     Seat Belt Yes     Self-Exams Not Asked     Comment: n/a     Parent/sibling w/ CABG, MI or angioplasty before 65F 55M? Not Asked   Social History Narrative     Not on file       He works as a .     Current Outpatient Medications   Medication     aspirin 81 MG tablet     atorvastatin (LIPITOR) 10 MG tablet     blood glucose monitoring (ONE TOUCH DELICA) lancets     blood glucose monitoring (ONETOUCH VERIO IQ) test strip     blood glucose monitoring (ONETOUCH VERIO) meter device kit     gabapentin (NEURONTIN) 100 MG capsule     ibuprofen (ADVIL/MOTRIN) 600 MG tablet     losartan (COZAAR) 100 MG tablet     meclizine (ANTIVERT) 25 MG tablet     metFORMIN (GLUCOPHAGE-XR) 500 MG 24 hr tablet     metoprolol succinate ER (TOPROL-XL) 100 MG 24 hr tablet     No current facility-administered medications for this visit.      Allergies   Allergen Reactions     No  "Known Drug Allergies          Objective:  BP (!) 143/60   Ht 1.803 m (5' 11\")   Wt 120.8 kg (266 lb 6.4 oz)   BMI 37.16 kg/m      General: Alert and in no distress    Head: Normocephalic, atraumatic  Eyes: no scleral icterus or conjunctival erythema   Oropharynx:  Mucous membranes moist  Skin: no erythema, petechiae, or jaundice  CV: regular rhythm by palpation, 2+ distal pulses  Resp: normal respiratory effort without conversational dyspnea   Psych: normal mood and affect    Gait: Non-antalgic, appropriate coordination and balance   Neuro: Motor strength and sensation as noted below    Musculoskeletal:    Bilateral Knee exam    Inspection:  No erythema, ecchymosis, warmth, or edema    Knee ROM: Full active knee extension and flexion bilaterally    Hip ROM: Full active and passive ROM bilaterally.  Left hip internal rotation causes left lateral knee pain.    Strength:  5/5 Hip flexion/abduction/adduction, knee extension/flexion, ankle plantarflexion/dorsiflexion, great toe extension, toe flexion    Special Tests: Negative log roll, negative anterior/posterior drawer, negative Lachman's, no varus/valgus instability at 0 and 30 degrees, Yanni's test negative for pain or popping at the lateral/medial joint line    Sensation:  Intact to light touch in the bilateral lower extremities      Radiology:  Independent visualization of images performed and reviewed with Damon.    Recent Results (from the past 744 hour(s))   XR Knee Left 3 Views    Narrative    KNEE LEFT THREE VIEW   5/18/2020 3:46 PM     HISTORY:  Acute injury of left knee cartilage, initial encounter,  pain.    COMPARISON: 10/10/2005    FINDINGS: Mild osteophytosis of the lateral tibial plateau. Minimal  patellar osteophytosis.      Impression    IMPRESSION:   1. There is at least moderate medial compartment narrowing. This could  be better assessed with bilateral Batista view if indicated  clinically.  2. No fracture identified.     PRADEEP DIAZ MD "       Assessment:  1. Sprain of lateral collateral ligament of left knee, initial encounter        Plan:  Discussed the assessment with the patient and developed a plan together:  -Suspect pain is due to lateral collateral ligament sprain.  Doing well.  -Ice or heat 15-20 minutes as needed (Avoid sleeping on a heating pad or ice)  -Patient's preferred over the counter medications as directed on packaging as needed for pain or soreness.   -Provided home exercises. Please do multiple times per day.  -Avoid aggravating activities.  -Work:  Continue full work.  Has not yet established MMI.  Letter provided.    -Damon to follow up with Primary Care provider regarding elevated blood pressure.    -Follow up in telephone visit 6/11/2020.        Cassy Durham MD, CAQ Sports Medicine  [facility]

## 2020-05-21 NOTE — LETTER
5/21/2020         RE: Damon Manley  [address]  [facility] [address]        Dear Colleague,    Thank you for referring your patient, Damon Manley, to the [facility]. Please see a copy of my visit note below.    Sports Medicine Clinic Visit    PCP: Kris Weems    CC: Patient presents with:  Left Knee - Pain      HPI:  Damon Manley is a 65 year old male who is seen in consultation at the request of Reinaldo Roman NP.   He notes left knee pain that began 5/6/20 when he was at work, slipped and all the weight went onto the left leg and he twisted slightly.  He notes the knee has improved since the initial injury.  He notes pain over the lateral portion of the knee.  He notes the swelling has improved and the pain has improved as well.  He rates the pain at a 8/10 at its worst and a 0/10 currently.  Symptoms are relieved with ice, heat, ibuprofen, Tylenol, and bracing. Symptoms are worsened by sitting and sit to stand transition. He endorses swelling, grinding and catching.   He denies popping, locking and instability.  Other treatment has included nothing. He is doing full work currently.        Review of Systems:  Musculoskeletal: as above  Remainder of review of systems is negative including constitutional, eyes, ENT, CV, pulmonary, GI, , endocrine, skin, hematologic, and neurologic except as noted in HPI or medical history.    History reviewed. No pertinent past surgical/medical/family/social history other than as mentioned in HPI.    Patient Active Problem List   Diagnosis     GERD (gastroesophageal reflux disease)     Abnormal glucose     Essential hypertension with goal blood pressure less than 140/90     Aortic valve prosthesis present     Pulmonary valve replaced     Aortic valve replaced     ARIAS (dyspnea on exertion)     SOB (shortness of breath)     Elevated LFTs     Elevated lipase     Type 2 diabetes mellitus without complication, without long-term current use of  insulin (H)     Rib fractures     Closed fracture of transverse process of lumbar vertebra, initial encounter (H)     Closed head injury, subsequent encounter     Obesity (BMI 35.0-39.9) with comorbidity (H)     Past Medical History:   Diagnosis Date     Coronary artery disease     Valves replaced due to hx of Rheumatic fever. - No mechanical valves     Depressive disorder     situational - resolved     Hypertension      Motion sickness      Obesity (BMI 30-39.9)      Past Surgical History:   Procedure Laterality Date     C ANESTH,DX ARTHROSCOPIC PROC KNEE JOINT  05    Left knee with partial medial meniscectomy.     C ANESTH,OPEN HEART; W/O PUMP OXYEGENATOR  1998     COLONOSCOPY  2007    Colon polyps.     COLONOSCOPY N/A 2017    Procedure: COMBINED COLONOSCOPY, SINGLE OR MULTIPLE BIOPSY/POLYPECTOMY BY BIOPSY;  Surgeon: Dejan Mckee MD;  Location: [facility]     HC KNEE SCOPE,MED/LAT MENISECTOMY  2007    Partial medial meniscectomy, both knees.     Family History   Problem Relation Age of Onset     Other Cancer Father         lung     Other Cancer Mother         lung     Other Cancer Paternal Aunt         unsure     Diabetes No family hx of      Coronary Artery Disease No family hx of      Hypertension No family hx of      Hyperlipidemia No family hx of      Cerebrovascular Disease No family hx of      Social History     Socioeconomic History     Marital status:      Spouse name: Wanda Parks     Number of children: 2     Years of education: 14     Highest education level: Not on file   Occupational History     Not on file   Social Needs     Financial resource strain: Not on file     Food insecurity     Worry: Not on file     Inability: Not on file     Transportation needs     Medical: Not on file     Non-medical: Not on file   Tobacco Use     Smoking status: Former Smoker     Years: 15.00     Last attempt to quit: 1988     Years since quittin.1     Smokeless tobacco: Never Used    Substance and Sexual Activity     Alcohol use: Yes     Alcohol/week: 0.0 standard drinks     Frequency: Monthly or less     Comment: social - glass of wine socially     Drug use: No     Sexual activity: Yes     Partners: Female     Comment:  - 2 children   Lifestyle     Physical activity     Days per week: Not on file     Minutes per session: Not on file     Stress: Not on file   Relationships     Social connections     Talks on phone: Not on file     Gets together: Not on file     Attends Yarsanism service: Not on file     Active member of club or organization: Not on file     Attends meetings of clubs or organizations: Not on file     Relationship status: Not on file     Intimate partner violence     Fear of current or ex partner: Not on file     Emotionally abused: Not on file     Physically abused: Not on file     Forced sexual activity: Not on file   Other Topics Concern      Service No     Blood Transfusions Yes     Comment: open heart surgery 1998     Caffeine Concern No     Occupational Exposure No     Hobby Hazards No     Sleep Concern Yes     Comment:  wakes after 4-5 hours of sleep at night     Stress Concern Yes     Weight Concern Yes     Special Diet No     Back Care No     Exercise Yes     Comment: 3-4 times/week     Bike Helmet Not Asked     Comment: n/a     Seat Belt Yes     Self-Exams Not Asked     Comment: n/a     Parent/sibling w/ CABG, MI or angioplasty before 65F 55M? Not Asked   Social History Narrative     Not on file       He works as a .     Current Outpatient Medications   Medication     aspirin 81 MG tablet     atorvastatin (LIPITOR) 10 MG tablet     blood glucose monitoring (ONE TOUCH DELICA) lancets     blood glucose monitoring (ONETOUCH VERIO IQ) test strip     blood glucose monitoring (ONETOUCH VERIO) meter device kit     gabapentin (NEURONTIN) 100 MG capsule     ibuprofen (ADVIL/MOTRIN) 600 MG tablet     losartan (COZAAR) 100 MG tablet     meclizine  "(ANTIVERT) 25 MG tablet     metFORMIN (GLUCOPHAGE-XR) 500 MG 24 hr tablet     metoprolol succinate ER (TOPROL-XL) 100 MG 24 hr tablet     No current facility-administered medications for this visit.      Allergies   Allergen Reactions     No Known Drug Allergies          Objective:  BP (!) 143/60   Ht 1.803 m (5' 11\")   Wt 120.8 kg (266 lb 6.4 oz)   BMI 37.16 kg/m      General: Alert and in no distress    Head: Normocephalic, atraumatic  Eyes: no scleral icterus or conjunctival erythema   Oropharynx:  Mucous membranes moist  Skin: no erythema, petechiae, or jaundice  CV: regular rhythm by palpation, 2+ distal pulses  Resp: normal respiratory effort without conversational dyspnea   Psych: normal mood and affect    Gait: Non-antalgic, appropriate coordination and balance   Neuro: Motor strength and sensation as noted below    Musculoskeletal:    Bilateral Knee exam    Inspection:  No erythema, ecchymosis, warmth, or edema    Knee ROM: Full active knee extension and flexion bilaterally    Hip ROM: Full active and passive ROM bilaterally.  Left hip internal rotation causes left lateral knee pain.    Strength:  5/5 Hip flexion/abduction/adduction, knee extension/flexion, ankle plantarflexion/dorsiflexion, great toe extension, toe flexion    Special Tests: Negative log roll, negative anterior/posterior drawer, negative Lachman's, no varus/valgus instability at 0 and 30 degrees, Yanni's test negative for pain or popping at the lateral/medial joint line    Sensation:  Intact to light touch in the bilateral lower extremities      Radiology:  Independent visualization of images performed and reviewed with Damon.    Recent Results (from the past 744 hour(s))   XR Knee Left 3 Views    Narrative    KNEE LEFT THREE VIEW   5/18/2020 3:46 PM     HISTORY:  Acute injury of left knee cartilage, initial encounter,  pain.    COMPARISON: 10/10/2005    FINDINGS: Mild osteophytosis of the lateral tibial plateau. Minimal  patellar " osteophytosis.      Impression    IMPRESSION:   1. There is at least moderate medial compartment narrowing. This could  be better assessed with bilateral Batista view if indicated  clinically.  2. No fracture identified.     PRADEEP DIAZ MD       Assessment:  1. Sprain of lateral collateral ligament of left knee, initial encounter        Plan:  Discussed the assessment with the patient and developed a plan together:  -Suspect pain is due to lateral collateral ligament sprain.  Doing well.  -Ice or heat 15-20 minutes as needed (Avoid sleeping on a heating pad or ice)  -Patient's preferred over the counter medications as directed on packaging as needed for pain or soreness.   -Provided home exercises. Please do multiple times per day.  -Avoid aggravating activities.  -Work:  Continue full work.  Has not yet established MMI.  Letter provided.    -Damon to follow up with Primary Care provider regarding elevated blood pressure.    -Follow up in telephone visit 6/11/2020.        Cassy Durham MD, [facility] Medicine  [facility]    Again, thank you for allowing me to participate in the care of your patient.        Sincerely,        Natasha Durham MD

## 2020-05-28 DIAGNOSIS — I10 ESSENTIAL HYPERTENSION WITH GOAL BLOOD PRESSURE LESS THAN 140/90: ICD-10-CM

## 2020-05-28 RX ORDER — METOPROLOL SUCCINATE 100 MG/1
100 TABLET, EXTENDED RELEASE ORAL DAILY
Qty: 30 TABLET | Refills: 5 | Status: SHIPPED | OUTPATIENT
Start: 2020-05-28 | End: 2020-12-22

## 2020-05-28 NOTE — TELEPHONE ENCOUNTER
Requested Prescriptions   Pending Prescriptions Disp Refills     metoprolol succinate ER (TOPROL-XL) 100 MG 24 hr tablet 30 tablet 5     Sig: Take 1 tablet (100 mg) by mouth daily       There is no refill protocol information for this order        Last Written Prescription Date:  11/13/2019  Last Fill Quantity: 30,  # refills: 5   Last office visit: 5/18/2020 with prescribing provider:     Future Office Visit:

## 2020-05-28 NOTE — TELEPHONE ENCOUNTER
"Metoprolol Succ.  Routing refill request to provider for review/approval because:  Elevated Blood Pressures.     Requested Prescriptions   Pending Prescriptions Disp Refills     metoprolol succinate ER (TOPROL-XL) 100 MG 24 hr tablet 30 tablet 5     Sig: Take 1 tablet (100 mg) by mouth daily       Beta-Blockers Protocol Failed - 5/28/2020  1:53 PM        Failed - Blood pressure under 140/90 in past 12 months     BP Readings from Last 3 Encounters:   05/21/20 (!) 143/60   05/18/20 118/80   11/14/19 118/78           Passed - Patient is age 6 or older        Passed - Recent (12 mo) or future (30 days) visit within the authorizing provider's specialty     Patient has had an office visit with the authorizing provider or a provider within the authorizing providers department within the previous 12 mos or has a future within next 30 days. See \"Patient Info\" tab in inbasket, or \"Choose Columns\" in Meds & Orders section of the refill encounter.          Passed - Medication is active on med list         Elizabeth Robles RN   "

## 2020-07-06 DIAGNOSIS — Z11.59 SCREENING FOR VIRAL DISEASE: ICD-10-CM

## 2020-09-03 ENCOUNTER — OFFICE VISIT (OUTPATIENT)
Dept: FAMILY MEDICINE | Facility: CLINIC | Age: 65
End: 2020-09-03
Payer: COMMERCIAL

## 2020-09-03 VITALS
DIASTOLIC BLOOD PRESSURE: 68 MMHG | SYSTOLIC BLOOD PRESSURE: 134 MMHG | HEART RATE: 75 BPM | OXYGEN SATURATION: 95 % | WEIGHT: 263.38 LBS | RESPIRATION RATE: 16 BRPM | BODY MASS INDEX: 36.73 KG/M2 | TEMPERATURE: 97.8 F

## 2020-09-03 DIAGNOSIS — I10 HYPERTENSION GOAL BP (BLOOD PRESSURE) < 140/90: ICD-10-CM

## 2020-09-03 DIAGNOSIS — E11.9 TYPE 2 DIABETES MELLITUS WITHOUT COMPLICATION, WITHOUT LONG-TERM CURRENT USE OF INSULIN (H): ICD-10-CM

## 2020-09-03 DIAGNOSIS — M54.2 NECK PAIN: Primary | ICD-10-CM

## 2020-09-03 DIAGNOSIS — M54.10 RADICULOPATHY OF ARM: ICD-10-CM

## 2020-09-03 DIAGNOSIS — Z13.6 CARDIOVASCULAR SCREENING; LDL GOAL LESS THAN 100: ICD-10-CM

## 2020-09-03 LAB
ALT SERPL W P-5'-P-CCNC: 63 U/L (ref 0–70)
ANION GAP SERPL CALCULATED.3IONS-SCNC: 6 MMOL/L (ref 3–14)
BUN SERPL-MCNC: 19 MG/DL (ref 7–30)
CALCIUM SERPL-MCNC: 9.3 MG/DL (ref 8.5–10.1)
CHLORIDE SERPL-SCNC: 108 MMOL/L (ref 94–109)
CHOLEST SERPL-MCNC: 186 MG/DL
CO2 SERPL-SCNC: 25 MMOL/L (ref 20–32)
CREAT SERPL-MCNC: 0.86 MG/DL (ref 0.66–1.25)
CREAT UR-MCNC: 182 MG/DL
GFR SERPL CREATININE-BSD FRML MDRD: >90 ML/MIN/{1.73_M2}
GLUCOSE SERPL-MCNC: 149 MG/DL (ref 70–99)
HBA1C MFR BLD: 8 % (ref 0–5.6)
HDLC SERPL-MCNC: 42 MG/DL
LDLC SERPL CALC-MCNC: 112 MG/DL
MICROALBUMIN UR-MCNC: 26 MG/L
MICROALBUMIN/CREAT UR: 14.29 MG/G CR (ref 0–17)
NONHDLC SERPL-MCNC: 144 MG/DL
POTASSIUM SERPL-SCNC: 4 MMOL/L (ref 3.4–5.3)
SODIUM SERPL-SCNC: 139 MMOL/L (ref 133–144)
TRIGL SERPL-MCNC: 160 MG/DL

## 2020-09-03 PROCEDURE — 99214 OFFICE O/P EST MOD 30 MIN: CPT | Performed by: FAMILY MEDICINE

## 2020-09-03 PROCEDURE — 80061 LIPID PANEL: CPT | Performed by: FAMILY MEDICINE

## 2020-09-03 PROCEDURE — 36415 COLL VENOUS BLD VENIPUNCTURE: CPT | Performed by: FAMILY MEDICINE

## 2020-09-03 PROCEDURE — 83036 HEMOGLOBIN GLYCOSYLATED A1C: CPT | Performed by: FAMILY MEDICINE

## 2020-09-03 PROCEDURE — 82043 UR ALBUMIN QUANTITATIVE: CPT | Performed by: FAMILY MEDICINE

## 2020-09-03 PROCEDURE — 80048 BASIC METABOLIC PNL TOTAL CA: CPT | Performed by: FAMILY MEDICINE

## 2020-09-03 PROCEDURE — 84460 ALANINE AMINO (ALT) (SGPT): CPT | Performed by: FAMILY MEDICINE

## 2020-09-03 ASSESSMENT — PAIN SCALES - GENERAL: PAINLEVEL: MILD PAIN (3)

## 2020-09-03 NOTE — PROGRESS NOTES
Subjective     Damon Manley is a 65 year old male who presents to clinic today for the following health issues:    HPI       Chief Complaint   Patient presents with     Neck Pain     02/19 he fell off of a ladder and he has had neck pain. It is now getting alot worse. Pain now 3/10, at its worst 10/10. Pain is in the center of his neck and radiates down his back     Back Pain     02/19 ladder injury. The back pain is getting worse. Pain is bilateral but worse on the left side. Constant pain recently. Sharp at times, steady ache otherwise.     Is here today states that he is just been having lots of problems with neck and back pain since his fall off a ladder on [address].  Now he is having some pain radiating down his right arm.  Sometimes his arm feels numb and feels weak.  He did have a CT scan of his neck after the fall but never had an MRI.  He may be developing some disklike symptoms.  He also has low back pain and paraspinal muscle spasm on the left.  Sometimes it is very debilitating for him.  No real particular pain down either leg.  No bowel or bladder dysfunction.          Review of Systems   Constitutional, HEENT, cardiovascular, pulmonary, gi and gu systems are negative, except as otherwise noted.      Objective    /68   Pulse 75   Temp 97.8  F (36.6  C) (Tympanic)   Resp 16   Wt 119.5 kg (263 lb 6 oz)   SpO2 95%   BMI 36.73 kg/m    Body mass index is 36.73 kg/m .  Physical Exam   GENERAL: healthy, alert and no distress  MS: Patient has full range of motion of the cervical spine regards to flexion extension rotation.  No tenderness to palpation down the cervical spine or the paraspinal muscles.  He has normal sensation in the both upper extremities normal muscle strength.  Low back exam patient has full forward flexion full extension full lateral extension full rotation is had significant paraspinal muscle spasm on the left compared to the right lower extremity exam is unremarkable.         "    Assessment & Plan     Neck pain  I am going to get a MRI of his neck and get him to the spine care clinic.    Radiculopathy of arm      Hypertension goal BP (blood pressure) < 140/90    - **Basic metabolic panel FUTURE 2mo; Future  - Albumin Random Urine Quantitative with Creat Ratio; Future  - Albumin Random Urine Quantitative with Creat Ratio  - **Basic metabolic panel FUTURE 2mo    Type 2 diabetes mellitus without complication, without long-term current use of insulin (H)    - **A1C FUTURE 3mo; Future  - **A1C FUTURE 3mo    CARDIOVASCULAR SCREENING; LDL GOAL LESS THAN 100    - Lipid panel reflex to direct LDL Fasting; Future  - **ALT FUTURE 2mo; Future  - **ALT FUTURE 2mo  - Lipid panel reflex to direct LDL Fasting     BMI:   Estimated body mass index is 36.73 kg/m  as calculated from the following:    Height as of 6/11/20: 1.803 m (5' 11\").    Weight as of this encounter: 119.5 kg (263 lb 6 oz).   Weight management plan: Discussed healthy diet and exercise guidelines      Is coming back for a complete physical exam we will discuss his laboratory studies at that time and any adjustments to medications.  Work on weight loss  Regular exercise      Kris Weems MD  [facility]    "

## 2020-09-09 DIAGNOSIS — E11.9 TYPE 2 DIABETES MELLITUS WITHOUT COMPLICATION, WITHOUT LONG-TERM CURRENT USE OF INSULIN (H): Primary | ICD-10-CM

## 2020-09-09 RX ORDER — GLYBURIDE 5 MG/1
5 TABLET ORAL
Qty: 90 TABLET | Refills: 1 | Status: SHIPPED | OUTPATIENT
Start: 2020-09-09 | End: 2021-03-31

## 2020-09-11 ENCOUNTER — OFFICE VISIT (OUTPATIENT)
Dept: FAMILY MEDICINE | Facility: CLINIC | Age: 65
End: 2020-09-11
Payer: OTHER MISCELLANEOUS

## 2020-09-11 ENCOUNTER — HOSPITAL ENCOUNTER (OUTPATIENT)
Dept: MRI IMAGING | Facility: CLINIC | Age: 65
Discharge: HOME | End: 2020-09-11
Attending: FAMILY MEDICINE | Admitting: FAMILY MEDICINE
Payer: COMMERCIAL

## 2020-09-11 ENCOUNTER — ALLIED HEALTH/NURSE VISIT (OUTPATIENT)
Dept: FAMILY MEDICINE | Facility: CLINIC | Age: 65
End: 2020-09-11
Payer: OTHER MISCELLANEOUS

## 2020-09-11 VITALS
TEMPERATURE: 98.3 F | RESPIRATION RATE: 14 BRPM | HEIGHT: 71 IN | OXYGEN SATURATION: 96 % | SYSTOLIC BLOOD PRESSURE: 120 MMHG | BODY MASS INDEX: 38.22 KG/M2 | HEART RATE: 70 BPM | WEIGHT: 273 LBS | DIASTOLIC BLOOD PRESSURE: 68 MMHG

## 2020-09-11 DIAGNOSIS — S46.212A BICEPS STRAIN, LEFT, INITIAL ENCOUNTER: Primary | ICD-10-CM

## 2020-09-11 DIAGNOSIS — Z23 NEED FOR PROPHYLACTIC VACCINATION AND INOCULATION AGAINST INFLUENZA: ICD-10-CM

## 2020-09-11 DIAGNOSIS — M54.2 NECK PAIN: ICD-10-CM

## 2020-09-11 DIAGNOSIS — Z23 NEED FOR PROPHYLACTIC VACCINATION AND INOCULATION AGAINST INFLUENZA: Primary | ICD-10-CM

## 2020-09-11 DIAGNOSIS — M54.10 RADICULOPATHY OF ARM: ICD-10-CM

## 2020-09-11 PROCEDURE — 72141 MRI NECK SPINE W/O DYE: CPT

## 2020-09-11 PROCEDURE — 90471 IMMUNIZATION ADMIN: CPT

## 2020-09-11 PROCEDURE — 99213 OFFICE O/P EST LOW 20 MIN: CPT | Performed by: FAMILY MEDICINE

## 2020-09-11 PROCEDURE — 90682 RIV4 VACC RECOMBINANT DNA IM: CPT

## 2020-09-11 PROCEDURE — 99207 ZZC NO CHARGE NURSE ONLY: CPT

## 2020-09-11 ASSESSMENT — MIFFLIN-ST. JEOR: SCORE: 2050.45

## 2020-09-11 NOTE — LETTER
September 11, 2020      Damon Manley  [address] [facility] [address]        To Whom It May Concern:    Damon Manley  was seen on September 11, 2020.  Please excuse him until September 14, 2020 due to injury.        Sincerely,        Kevin Toth MD

## 2020-09-11 NOTE — PROGRESS NOTES
"Subjective     Damon Manley is a 65 year old male who presents to clinic today for the following health issues:    HPI       Patient was lifting a case off a pallet at work and left arm was hyperextended.      Review of Systems   Constitutional, HEENT, cardiovascular, pulmonary, gi and gu systems are negative, except as otherwise noted.      Objective    /68   Pulse 70   Temp 98.3  F (36.8  C)   Resp 14   Ht 1.803 m (5' 11\")   Wt 123.8 kg (273 lb)   SpO2 96%   BMI 38.08 kg/m    Body mass index is 38.08 kg/m .     Physical Exam   GENERAL: healthy, alert and no distress  MS: RUE exam shows normal strength and muscle mass, no deformities, no erythema, induration, or nodules, no evidence of joint effusion, ROM of all joints is normal and no evidence of joint instability and LUE exam shows normal strength and muscle mass, no deformities, no erythema, induration, or nodules, no evidence of joint effusion, ROM of all joints is normal and no evidence of joint instability  NEURO: Normal strength and tone, mentation intact and speech normal  PSYCH: mentation appears normal, affect normal/bright            Assessment & Plan     Biceps strain, left, initial encounter  No deformity of bicep, likely strain.  Given note for work, recommended rest, ice, compression.  Follow-up if no improvement or any worsening.    Need for prophylactic vaccination and inoculation against influenza  Due for influenza, conducted today.  - INFLUENZA QUAD, RECOMBINANT, P-FREE (RIV4) (FLUBLOCK) [90130]  - Vaccine Administration, Initial [80755]           Return in about 3 months (around 12/11/2020) for Annual Well Check.    Kevin Toth MD  [facility]    "

## 2020-09-11 NOTE — PATIENT INSTRUCTIONS
Patient Education     Treating Strains and Sprains    Strains and sprains happen when muscles or other soft tissues near your bones stretch or tear. These injuries can cause bruising, swelling, and pain. To ease your discomfort and speed the healing of your strain or sprain, follow the tips below. Remember, a strain or sprain can take 6 to 8 weeks to heal.  Important Note: Do not give aspirin to children or teens without discussing it with your healthcare provider first.  Ice first, heat later    Use ice for the first 24 to 48 hours after injury. Ice helps prevent swelling and reduce pain. Ice the injury for no more than 20 minutes at a time and allow at least 20 minutes between icing sessions.    Apply heat after the first 72 hours, once the swelling has gone down. Heat relaxes muscles and increases blood flow. Soak the injured area in warm water or use a heating pad set on low for no more than 15 minutes at a time.  Wrap and elevate    Wrap an injured limb firmly with an elastic bandage. This provides support and helps prevent swelling. Don t wear an elastic bandage overnight. Watch for tingling, numbness, or increased pain. Remove the bandage immediately if any of these occurs.    Elevate the injured area to help reduce swelling and throbbing. It s best to raise an injured limb above the level of your heart.  Medicines    Over-the-counter medicines such as acetaminophen or ibuprofen can help reduce pain. Some also help reduce swelling.    Take medicine only as directed.    Rest the area even if medicines are controlling the pain.  Rest    Rest the injured area by not using it for 24 hours.    When you re ready, return slowly to your normal activities. Rest the injured area often.    Don t use or walk on an injured limb if it hurts.  Date Last Reviewed: 1/1/2018 2000-2019 [employer]. [address] [facility], [place] [address]. All rights reserved. This information is not intended as a substitute  for professional medical care. Always follow your healthcare professional's instructions.           Patient Education     Self-Care for Strains and Sprains  Most minor strains and sprains can be treated with self-care. Recovering from a strain or sprain may take 6 to 8 weeks. Your self-care goal is to reduce pain and immobilize the injury to speed healing.     A sprain injures ligaments (tissue that connects bones to bones).      A strain injures muscles or tendons (tissue that connects muscles to bones).   Support the injured area  Wrapping the injured area provides support for short, necessary activities. Be careful not to wrap the area too tightly. This could cut off the blood supply.    Support a wrist, elbow, or shoulder with a sling.    Wrap an ankle or knee with an elastic bandage.    Tape a finger or toe to the one next to it.  Use cold and heat  Cold reduces swelling. Both cold and heat reduce pain. Heat should not be used in the initial treatment of the injury. When using cold or heat, always place a thin towel between the pack and your skin.    Apply ice or a cold pack 10 to 15 minutes every hour you re awake for the first 2 days.    After the swelling goes down, use cold or heat to control pain. Don t use heat late in the day, since it can cause swelling when you re not active.  Rest and elevate  Rest and elevation help your injury heal faster.    Raise the injured area above your heart level.    Keep the injured area from moving.    Limit the use of the joint or limb.  Use medicine    Aspirin reduces pain and swelling. (Note: Don t give aspirin to a child 18 or younger unless prescribed by the doctor.)    Non-steroidal anti-inflammatory medicines, such as ibuprofen, may reduce pain and swelling, as well. Ask your healthcare provider for advice.  When to call your healthcare provider  Call your healthcare provider if:    The injured joint won t move, or bones make a grating sound when they move    You  can t put weight on the injured area, even after 24 hours    The injured body part is cold, blue, tingling, or numb    The joint or limb appears bent or crooked.    Pain increases or doesn t improve in 4 days    When pressing along the injured area, you notice a spot that is especially painful  Date Last Reviewed: 5/1/2018 2000-2019 [employer]. [address] [place] [address]. All rights reserved. This information is not intended as a substitute for professional medical care. Always follow your healthcare professional's instructions.

## 2020-09-21 ENCOUNTER — HOSPITAL ENCOUNTER (OUTPATIENT)
Dept: PHYSICAL THERAPY | Facility: CLINIC | Age: 65
Setting detail: THERAPIES SERIES
End: 2020-09-21
Attending: FAMILY MEDICINE
Payer: COMMERCIAL

## 2020-09-21 DIAGNOSIS — M54.10 RADICULOPATHY OF ARM: ICD-10-CM

## 2020-09-21 DIAGNOSIS — M54.2 NECK PAIN: ICD-10-CM

## 2020-09-21 PROCEDURE — 97161 PT EVAL LOW COMPLEX 20 MIN: CPT | Mod: GP | Performed by: PHYSICAL THERAPIST

## 2020-09-21 PROCEDURE — 97140 MANUAL THERAPY 1/> REGIONS: CPT | Mod: GP | Performed by: PHYSICAL THERAPIST

## 2020-09-21 NOTE — PROGRESS NOTES
"   09/21/20 0700   General Information   Type of Visit Initial OP Ortho PT Evaluation   Start of Care Date 09/21/20   Referring Physician Dr. Kris Weems   Patient/Family Goals Statement reduce midback pain, bend and garden and sit better   Orders Evaluate and Treat   Date of Order 09/03/20   Certification Required? No   Medical Diagnosis neck pain, radiculopathy of arm    Surgical/Medical history reviewed Yes   Precautions/Limitations no known precautions/limitations   Body Part(s)   Body Part(s) Cervical Spine   Presentation and Etiology   Pertinent history of current problem (include personal factors and/or comorbidities that impact the POC) Fell 8 ft from a ladder at home in Feb 2019 and that is \"when everything started\".  Things were worsening by the spring but he couldn't get into the dr due to COVID.  No Rx for these areas thus far.  Used to do a lot of ex/stretching at the gym but had to quite due to COVID.  Pt reports he had a LOC, 6 rib fx, and spinal fx and was off work for 4 weeks after the fall.  He then had some work restrictions.  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  Planning on retiring soon - next April hopefully.  Typically rotates body to the R when backs up with forklift.  Pt is R handed. PmHx: DM II, aortic valve replacement x2, obesity, hx of CHI, see EPIC for more.   Impairments A. Pain   Functional Limitations perform activities of daily living;perform required work activities;perform desired leisure / sports activities   Symptom Location B mid thoracic, central and L sided LBP, can radiate to LB, R arm rarely over last few months, denies LE referral, some lower lumbar pain   How/Where did it occur With a fall   Onset date of current episode/exacerbation 02/01/19   Chronicity Chronic   Pain rating (0-10 point scale) Other   Pain rating comment now: 2/10, best: 2/10, worst: 6-7/10   Pain quality B. Dull   Frequency " "of pain/symptoms A. Constant   Pain/symptoms are: Other   Pain symptoms comment worse as day goes on   Pain/symptoms exacerbated by M. Other   Pain exacerbation comment bad posture, sitting too long (> 60 min) especially in a firm chair, at the end of the day, neck motions   Pain/symptoms eased by K. Other   Pain eased by comment change positions, move around, better at work, lay down supine   Progression of symptoms since onset: Unchanged   Current / Previous Interventions   Diagnostic Tests: MRI   MRI Results Results   MRI results 1. Degenerative changes of the cervical spine as detailed above. 2. Multiple small posterior disc herniations as detailed above. 3. No significant spinal canal or neural foraminal stenosis at any level of the cervical spine.   Current Level of Function   Current Community Support Family/friend caregiver   Patient role/employment history A. Employed   Employment Comments , driving forklift, walking, lifting   Fall Risk Screen   Fall screen completed by PT   Have you fallen 2 or more times in the past year? No   Have you fallen and had an injury in the past year? No   Is patient a fall risk? No   Functional Scales   Functional Scales Other   Other Scales  NDI: 18%   Cervical Spine   Observation no acute distres   Integumentary  no issues noted   Posture poor sitting posture, SH internally rotated, forward head, increased thoracic kyphosis, obesity   Cervical Flexion ROM WNL with c/o central cerv pain   Cervical Extension ROM 49 with increased cerv pain   Cervical Right Side Bending ROM 28 with L cerv pain   Cervical Left Side Bending ROM 24 with slight central neck pain   Cervical Right Rotation ROM 61 with \"catch\" on L   Cervical Left Rotation ROM 52 with mild cerv pain   Thoracic Flexion ROM WNL   Thoracic Extension ROM WNL but produced sharp lumb pain and increased thoracic pain   Thoracic Right Side Bending ROM NT   Thoracic Left Sidebending ROM  NT   Thoracic Right " Rotation very painful, WNL   Thoracic Left Rotation mild pain, WNL   Cervical/Thoracic/Shoulder ROM Comments SH AROM WFL (hx of L clavicular fx per pt report)   Palpation painful to palpation over R rhomboids, R > L upper lumbar paraspinals, L sub-occipitals, R cerv paraspinals; very tight throughout upper quarters including post R > L SH   Planned Therapy Interventions   Planned Therapy Interventions joint mobilization;manual therapy;neuromuscular re-education;ROM;strengthening;stretching   Planned Therapy Interventions Comment postural education, possible taping   Planned Modality Interventions   Planned Modality Interventions Comments modalities as needed   Clinical Impression   Criteria for Skilled Therapeutic Interventions Met yes, treatment indicated   PT Diagnosis cervical and thoracic myofascial pain, faulty posture   Influenced by the following impairments pain   Functional limitations due to impairments sitting, driving forklift, lifting, turning head, gardening   Clinical Presentation Stable/Uncomplicated   Clinical Presentation Rationale History: multiple medical co-morbidities, chronicity of sx; Examination: low NDI score, activity and participation restrictions   Clinical Decision Making (Complexity) Low complexity   Therapy Frequency 1 time/week   Predicted Duration of Therapy Intervention (days/wks) 60 days   (as needed)   Risk & Benefits of therapy have been explained Yes   Patient, Family & other staff in agreement with plan of care Yes   Clinical Impression Comments Pt presents to PT with chronic cervical and thoracic sx that began over 1 1/2 years ago after a fall from a ladder.  Sx likely ongoing due to repetitive nature of work duties and faulty posture.  Recommend ongoing skilled PT services to address myofascial limitations/pain, develop HEP and instruct in proper posture/self-care principles.   Education Assessment   Preferred Learning Style Listening;Reading;Demonstration;Pictures/video    Barriers to Learning No barriers   ORTHO GOALS   PT Ortho Eval Goals 1;2   Ortho Goal 1   Goal Identifier 1   Goal Description Pt will improve his score on the NDI (Neck Disability Index) to 8% or less indicating a clinically meaningful improvement in neck pain and function over time.   Target Date 11/19/20   Ortho Goal 2   Goal Identifier 2   Goal Description Pt will be able to sit for one hour or greater with good posture without recreation of cerv or thor sx in order to be more comfortable with watching tv or driving.   Target Date 11/19/20     Total evaluation time - low complexity minutes: 30 min

## 2020-09-29 ENCOUNTER — HOSPITAL ENCOUNTER (OUTPATIENT)
Dept: PHYSICAL THERAPY | Facility: CLINIC | Age: 65
Setting detail: THERAPIES SERIES
End: 2020-09-29
Attending: FAMILY MEDICINE
Payer: COMMERCIAL

## 2020-09-29 PROCEDURE — 97110 THERAPEUTIC EXERCISES: CPT | Mod: GP | Performed by: PHYSICAL THERAPIST

## 2020-09-29 PROCEDURE — 97140 MANUAL THERAPY 1/> REGIONS: CPT | Mod: GP | Performed by: PHYSICAL THERAPIST

## 2020-10-07 ENCOUNTER — OFFICE VISIT (OUTPATIENT)
Dept: FAMILY MEDICINE | Facility: CLINIC | Age: 65
End: 2020-10-07
Payer: COMMERCIAL

## 2020-10-07 VITALS
WEIGHT: 268 LBS | OXYGEN SATURATION: 96 % | DIASTOLIC BLOOD PRESSURE: 66 MMHG | TEMPERATURE: 97.8 F | HEIGHT: 70 IN | HEART RATE: 75 BPM | BODY MASS INDEX: 38.37 KG/M2 | SYSTOLIC BLOOD PRESSURE: 124 MMHG | RESPIRATION RATE: 17 BRPM

## 2020-10-07 DIAGNOSIS — E66.01 MORBID OBESITY (H): ICD-10-CM

## 2020-10-07 DIAGNOSIS — F40.243 FEAR OF FLYING: ICD-10-CM

## 2020-10-07 DIAGNOSIS — Z95.2 AORTIC VALVE PROSTHESIS PRESENT: ICD-10-CM

## 2020-10-07 DIAGNOSIS — E11.8 TYPE 2 DIABETES MELLITUS WITH COMPLICATION, WITHOUT LONG-TERM CURRENT USE OF INSULIN (H): ICD-10-CM

## 2020-10-07 DIAGNOSIS — Z00.00 ROUTINE GENERAL MEDICAL EXAMINATION AT A HEALTH CARE FACILITY: Primary | ICD-10-CM

## 2020-10-07 PROBLEM — I42.8 OTHER CARDIOMYOPATHY (H): Status: ACTIVE | Noted: 2020-10-07

## 2020-10-07 PROBLEM — I42.8 OTHER CARDIOMYOPATHY (H): Status: RESOLVED | Noted: 2020-10-07 | Resolved: 2020-10-07

## 2020-10-07 PROCEDURE — 99396 PREV VISIT EST AGE 40-64: CPT | Performed by: FAMILY MEDICINE

## 2020-10-07 RX ORDER — DIAZEPAM 5 MG
TABLET ORAL
Qty: 6 TABLET | Refills: 0 | Status: SHIPPED | OUTPATIENT
Start: 2020-10-07 | End: 2023-12-19

## 2020-10-07 ASSESSMENT — ENCOUNTER SYMPTOMS
SORE THROAT: 0
WEAKNESS: 0
HEMATURIA: 0
HEADACHES: 0
COUGH: 0
ARTHRALGIAS: 1
SHORTNESS OF BREATH: 1
EYE PAIN: 0
FREQUENCY: 1
DYSURIA: 0
DIZZINESS: 1
ABDOMINAL PAIN: 0
CONSTIPATION: 0
FEVER: 0
PALPITATIONS: 0
MYALGIAS: 1
NAUSEA: 1
HEMATOCHEZIA: 0
DIARRHEA: 0
PARESTHESIAS: 0
NERVOUS/ANXIOUS: 0
HEARTBURN: 0
JOINT SWELLING: 0
CHILLS: 0

## 2020-10-07 ASSESSMENT — PAIN SCALES - GENERAL: PAINLEVEL: MILD PAIN (3)

## 2020-10-07 ASSESSMENT — MIFFLIN-ST. JEOR: SCORE: 2010.3

## 2020-10-07 NOTE — PROGRESS NOTES
SUBJECTIVE:   CC: Damon Manley is an 65 year old male who presents for preventative health visit.       Patient has been advised of split billing requirements and indicates understanding: Yes  Healthy Habits:     Getting at least 3 servings of Calcium per day:  NO    Bi-annual eye exam:  Yes    Dental care twice a year:  Yes    Sleep apnea or symptoms of sleep apnea:  None    Diet:  Low salt    Frequency of exercise:  None    Taking medications regularly:  Yes    Medication side effects:  None    PHQ-2 Total Score: 0    Additional concerns today:  No              Today's PHQ-2 Score:   PHQ-2 (  [employer]) 10/7/2020   Q1: Little interest or pleasure in doing things 0   Q2: Feeling down, depressed or hopeless 0   PHQ-2 Score 0   Q1: Little interest or pleasure in doing things Not at all   Q2: Feeling down, depressed or hopeless Not at all   PHQ-2 Score 0       Abuse: Current or Past(Physical, Sexual or Emotional)- No  Do you feel safe in your environment? Yes        Social History     Tobacco Use     Smoking status: Former Smoker     Years: 15.00     Quit date: 1988     Years since quittin.5     Smokeless tobacco: Never Used   Substance Use Topics     Alcohol use: Yes     Alcohol/week: 0.0 standard drinks     Frequency: Monthly or less     Comment: social - glass of wine socially     If you drink alcohol do you typically have >3 drinks per day or >7 drinks per week? No    Alcohol Use 10/7/2020   Prescreen: >3 drinks/day or >7 drinks/week? No   Prescreen: >3 drinks/day or >7 drinks/week? -       Last PSA:   PSA   Date Value Ref Range Status   2015 0.48 0 - 4 ug/L Final       Reviewed orders with patient. Reviewed health maintenance and updated orders accordingly - Yes  BP Readings from Last 3 Encounters:   10/07/20 124/66   20 120/68   20 134/68    Wt Readings from Last 3 Encounters:   10/07/20 121.6 kg (268 lb)   20 123.8 kg (273 lb)   20 119.5 kg (263 lb 6 oz)                   Patient Active Problem List   Diagnosis     GERD (gastroesophageal reflux disease)     Abnormal glucose     Essential hypertension with goal blood pressure less than 140/90     Aortic valve prosthesis present     Pulmonary valve replaced     Aortic valve replaced     ARIAS (dyspnea on exertion)     SOB (shortness of breath)     Elevated LFTs     Elevated lipase     Type 2 diabetes mellitus without complication, without long-term current use of insulin (H)     Rib fractures     Closed fracture of transverse process of lumbar vertebra, initial encounter (H)     Closed head injury, subsequent encounter     Obesity (BMI 35.0-39.9) with comorbidity (H)     Other cardiomyopathy (H)     Past Surgical History:   Procedure Laterality Date     C ANESTH,DX ARTHROSCOPIC PROC KNEE JOINT  05    Left knee with partial medial meniscectomy.     C ANESTH,OPEN HEART; W/O PUMP OXYEGENATOR       COLONOSCOPY  2007    Colon polyps.     COLONOSCOPY N/A 2017    Procedure: COMBINED COLONOSCOPY, SINGLE OR MULTIPLE BIOPSY/POLYPECTOMY BY BIOPSY;  Surgeon: Dejan Mckee MD;  Location: [facility]     HC KNEE SCOPE,MED/LAT MENISECTOMY  2007    Partial medial meniscectomy, both knees.       Social History     Tobacco Use     Smoking status: Former Smoker     Years: 15.00     Quit date: 1988     Years since quittin.5     Smokeless tobacco: Never Used   Substance Use Topics     Alcohol use: Yes     Alcohol/week: 0.0 standard drinks     Frequency: Monthly or less     Comment: social - glass of wine socially     Family History   Problem Relation Age of Onset     Other Cancer Father         lung     Other Cancer Mother         lung     Other Cancer Paternal Aunt         unsure     Diabetes No family hx of      Coronary Artery Disease No family hx of      Hypertension No family hx of      Hyperlipidemia No family hx of      Cerebrovascular Disease No family hx of          Current Outpatient Medications   Medication  Sig Dispense Refill     aspirin 81 MG tablet Take 1 tablet (81 mg) by mouth daily       glyBURIDE (DIABETA /MICRONASE) 5 MG tablet Take 1 tablet (5 mg) by mouth daily (with breakfast) 90 tablet 1     losartan (COZAAR) 100 MG tablet Take 1 tablet by mouth once daily. 90 tablet 2     metFORMIN (GLUCOPHAGE-XR) 500 MG 24 hr tablet Take 2 tablets (1,000 mg) by mouth daily (with dinner) 180 tablet 3     metoprolol succinate ER (TOPROL-XL) 100 MG 24 hr tablet Take 1 tablet (100 mg) by mouth daily 30 tablet 5     ibuprofen (ADVIL/MOTRIN) 600 MG tablet Take 1 tablet (600 mg) by mouth every 6 hours as needed for moderate pain (Patient not taking: Reported on 10/7/2020) 21 tablet 0     Recent Labs   Lab Test 09/03/20  1337 05/20/19  1623 02/23/19  1233 11/10/18  0811 10/02/18  1247   A1C 8.0* 7.0* 6.6*  --  6.6*   * 115*  --  123* 125*   HDL 42 40  --   --  41   TRIG 160* 134  --   --  146   ALT 63 60 58 51  --    CR 0.86 0.90 0.98  --  0.84   GFRESTIMATED >90 >90 81  --  >90   GFRESTBLACK >90 >90 >90  --  >90   POTASSIUM 4.0 4.2 4.2  --  4.3   TSH  --   --   --   --  1.42        Reviewed and updated as needed this visit by clinical staff  Tobacco  Allergies  Meds              Reviewed and updated as needed this visit by Provider                  CONSTITUTIONAL:fatigue  INTEGUMENTARY/SKIN: NEGATIVE for worrisome rashes, moles or lesions  EYES: NEGATIVE for vision changes or irritation  ENT: NEGATIVE for ear, mouth and throat problems  RESP: NEGATIVE for significant cough or SOB  CV: NEGATIVE for chest pain, palpitations or peripheral edema, patient does have a history of prosthetic aortic valve is not had a cardiology follow-up in over 2 years we will schedule him for echocardiogram and a follow-up with cardiology he should have this yearly.  GI: NEGATIVE for nausea, abdominal pain, heartburn, or change in bowel habits   male: negative for dysuria, hematuria, decreased urinary stream, erectile dysfunction, urethral  "discharge  MUSCULOSKELETAL: Patient is dealing with chronic neck pain issues.  He had a horrible fall and sustained some minor trauma that he is slowly recovering from.  He is scheduled to see neurosurgery next week.  NEURO: NEGATIVE for weakness, dizziness or paresthesias  PSYCHIATRIC: NEGATIVE for changes in mood or affect    OBJECTIVE:   /66   Pulse 75   Temp 97.8  F (36.6  C) (Tympanic)   Resp 17   Ht 1.775 m (5' 9.9\")   Wt 121.6 kg (268 lb)   SpO2 96%   BMI 38.56 kg/m      Physical Exam  GENERAL: healthy, alert and no distress  EYES: Eyes grossly normal to inspection, PERRL and conjunctivae and sclerae normal  HENT: ear canals and TM's normal, nose and mouth without ulcers or lesions  NECK: no adenopathy, no asymmetry, masses, or scars and thyroid normal to palpation  RESP: lungs clear to auscultation - no rales, rhonchi or wheezes  CV: regular rates and rhythm, normal S1 S2, no S3 or S4, grade 3/6 systolic  murmur heard best over the apex , ABDOMEN: soft, nontender, no hepatosplenomegaly, no masses and bowel sounds normal  MS: no gross musculoskeletal defects noted, no edema    Diagnostic Test Results:  Labs reviewed in Epic    ASSESSMENT/PLAN:   1. Routine general medical examination at a health care facility  It does sound that his fatigue becomes a self filling prophecy gets home from work eat supper and then sits in the chair on the couch up.  He does need to remain active he was very active athletic person participated in martial arts almost nightly for many years he needs to get back to some type of daily vigorous physical activity    2. Fear of flying  Refilled his meds for flying  - diazepam (VALIUM) 5 MG tablet; Take 1-2 30 minutes prior to flight  Dispense: 6 tablet; Refill: 0        4. Type 2 diabetes mellitus with complication, without long-term current use of insulin (H)  Repeat is A1c in late November early December- STATIN NOT PRESCRIBED (INTENTIONAL); Please choose reason not " "prescribed, below  - **A1C FUTURE 3mo; Future    5. Morbid obesity (H)  Continues to work on diet and exercise he knows how this will affect his diabetes.    6. Aortic valve prosthesis present  Patient should have a yearly cardiology follow-up with echocardiogram.  - CARDIOLOGY EVAL ADULT REFERRAL; Future  - Echocardiogram Complete; Future    Patient has been advised of split billing requirements and indicates understanding: Yes  COUNSELING:   Reviewed preventive health counseling, as reflected in patient instructions       Regular exercise       Healthy diet/nutrition    Estimated body mass index is 38.56 kg/m  as calculated from the following:    Height as of this encounter: 1.775 m (5' 9.9\").    Weight as of this encounter: 121.6 kg (268 lb).     Weight management plan: Discussed healthy diet and exercise guidelines    He reports that he quit smoking about 32 years ago. He quit after 15.00 years of use. He has never used smokeless tobacco.      Counseling Resources:  ATP IV Guidelines  Pooled Cohorts Equation Calculator  FRAX Risk Assessment  ICSI Preventive Guidelines  Dietary Guidelines for Americans, 2010  USDA's MyPlate  ASA Prophylaxis  Lung CA Screening    Kris Weems MD  [facility]  "

## 2020-10-08 ENCOUNTER — HOSPITAL ENCOUNTER (OUTPATIENT)
Dept: PHYSICAL THERAPY | Facility: CLINIC | Age: 65
Setting detail: THERAPIES SERIES
End: 2020-10-08
Attending: FAMILY MEDICINE
Payer: COMMERCIAL

## 2020-10-08 PROCEDURE — 97110 THERAPEUTIC EXERCISES: CPT | Mod: GP | Performed by: PHYSICAL THERAPIST

## 2020-10-08 PROCEDURE — 97140 MANUAL THERAPY 1/> REGIONS: CPT | Mod: GP | Performed by: PHYSICAL THERAPIST

## 2020-10-12 ENCOUNTER — OFFICE VISIT (OUTPATIENT)
Dept: NEUROSURGERY | Facility: CLINIC | Age: 65
End: 2020-10-12
Payer: COMMERCIAL

## 2020-10-12 VITALS — HEIGHT: 70 IN | WEIGHT: 267.5 LBS | BODY MASS INDEX: 38.3 KG/M2

## 2020-10-12 DIAGNOSIS — M79.18 MYOFASCIAL PAIN: Primary | ICD-10-CM

## 2020-10-12 PROCEDURE — 99214 OFFICE O/P EST MOD 30 MIN: CPT | Performed by: PHYSICIAN ASSISTANT

## 2020-10-12 ASSESSMENT — MIFFLIN-ST. JEOR: SCORE: 2009.62

## 2020-10-12 NOTE — LETTER
"    10/12/2020         RE: Damon Manley  [address]  [facility] [address]        Dear Colleague,    Thank you for referring your patient, Damon Manley, to the [facility] [place]. Please see a copy of my visit note below.    [facility]  Neurosurgery:    HPI: Damon Manley is a 65 year old male who presents for follow up in regards to ongoing midthoracic pain.  His symptoms have been present ever since he had a fall in early 2019.  He has been working with physical therapy, but this got put on hold due to the COVID-19 worldwide pandemic.  He describes pain as just lateral to his spine in the scapular region.  This does not sound like a radiating pain.  This appears to be more muscular.  He did also have a cervical MRI, which does not show any significant pathology.  He denies bowel or bladder changes, or problems with balance or coordination.    The patient denies any changes in bowel or bladder function, or any new problems with balance or coordination.     ROS negative for fever, chills, chest pain or shortness of breath.     Exam:  Ht 5' 10\" (1.778 m)   Wt 267 lb 8 oz (121.3 kg)   BMI 38.38 kg/m    Constitutional:  Alert, well nourished, NAD.  HEENT: Normocephalic, atraumatic.   Pulm:  Without shortness of breath, normal effort.   CV:  No pitting edema of BLE.   Skin: No rashes or lesions.     Neurological:  Awake  Alert  Oriented x 3  CN II-XII grossly intact.     Motor exam:     Shoulder Abduction:  Right:  5/5    Left:  5/5  Biceps:                      Right:  5/5    Left:  5/5  Triceps:                     Right:  5/5    Left:  5/5  Wrist Extensors:       Right:  5/5    Left:  5/5  Wrist Flexors:           Right:  5/5    Left:  5/5  Intrinsics:                  Right:  5/5    Left:  5/5     Hip Flexor:                Right: 5/5  Left:  5/5  Hip Adductor:             Right:  5/5  Left:  5/5  Hip Abductor:             Right:  5/5  Left:  5/5  Gastroc Soleus:  "       Right:  5/5  Left:  5/5  Tib/Ant:                      Right:  5/5  Left:  5/5  EHL:                     Right:  5/5  Left:  5/5     Able to spontaneously move U/E bilaterally  Sensation intact throughout all U/E dermatomes    There is no tenderness to palpation of the cervical/lumbar paraspinous musculature. No tenderness to SI joints or greater trochanters bilaterally.     Imaging:   MRI of the cervical spine was reviewed.  It shows multilevel disc degeneration, with no significant disc herniations or nerve impingement.    A/P:   1) thoracic spine pain  2) myofascial pain    I did have a discussion with the patient and his significant other regarding his symptoms.  He is describing a lot of muscular pain in the mid scapular region, just lateral to the spine.  This does not appear to be radicular.  We will hold off on an MRI.  I discussed with him the potential benefits of trigger point injections, and he did wish to proceed with that.  I encouraged him to continue with his therapy as well.  He voiced agreement and understanding.  If symptoms persist after the trigger point shots, we will then consider getting MRI on his thoracic spine.    The patient voiced agreement and understanding of the plan of care. All questions were answered today.         Richmond Grey PA-C  [facility] [place] [address]    Tel [phone]  Pager [phone]        Again, thank you for allowing me to participate in the care of your patient.        Sincerely,        Richmond Grey PA-C

## 2020-10-12 NOTE — PROGRESS NOTES
"[facility]  Neurosurgery:    HPI: Damon Manley is a 65 year old male who presents for follow up in regards to ongoing midthoracic pain.  His symptoms have been present ever since he had a fall in early 2019.  He has been working with physical therapy, but this got put on hold due to the COVID-19 worldwide pandemic.  He describes pain as just lateral to his spine in the scapular region.  This does not sound like a radiating pain.  This appears to be more muscular.  He did also have a cervical MRI, which does not show any significant pathology.  He denies bowel or bladder changes, or problems with balance or coordination.    The patient denies any changes in bowel or bladder function, or any new problems with balance or coordination.     ROS negative for fever, chills, chest pain or shortness of breath.     Exam:  Ht 5' 10\" (1.778 m)   Wt 267 lb 8 oz (121.3 kg)   BMI 38.38 kg/m    Constitutional:  Alert, well nourished, NAD.  HEENT: Normocephalic, atraumatic.   Pulm:  Without shortness of breath, normal effort.   CV:  No pitting edema of BLE.   Skin: No rashes or lesions.     Neurological:  Awake  Alert  Oriented x 3  CN II-XII grossly intact.     Motor exam:     Shoulder Abduction:  Right:  5/5    Left:  5/5  Biceps:                      Right:  5/5    Left:  5/5  Triceps:                     Right:  5/5    Left:  5/5  Wrist Extensors:       Right:  5/5    Left:  5/5  Wrist Flexors:           Right:  5/5    Left:  5/5  Intrinsics:                  Right:  5/5    Left:  5/5     Hip Flexor:                Right: 5/5  Left:  5/5  Hip Adductor:             Right:  5/5  Left:  5/5  Hip Abductor:             Right:  5/5  Left:  5/5  Gastroc Soleus:        Right:  5/5  Left:  5/5  Tib/Ant:                      Right:  5/5  Left:  5/5  EHL:                     Right:  5/5  Left:  5/5     Able to spontaneously move U/E bilaterally  Sensation intact throughout all U/E dermatomes    There is no tenderness to " palpation of the cervical/lumbar paraspinous musculature. No tenderness to SI joints or greater trochanters bilaterally.     Imaging:   MRI of the cervical spine was reviewed.  It shows multilevel disc degeneration, with no significant disc herniations or nerve impingement.    A/P:   1) thoracic spine pain  2) myofascial pain    I did have a discussion with the patient and his significant other regarding his symptoms.  He is describing a lot of muscular pain in the mid scapular region, just lateral to the spine.  This does not appear to be radicular.  We will hold off on an MRI.  I discussed with him the potential benefits of trigger point injections, and he did wish to proceed with that.  I encouraged him to continue with his therapy as well.  He voiced agreement and understanding.  If symptoms persist after the trigger point shots, we will then consider getting MRI on his thoracic spine.    The patient voiced agreement and understanding of the plan of care. All questions were answered today.         Richmond Grey PA-C  [facility] Neurosurgery  [address] [address]    Tel [phone]  Pager [phone]

## 2020-10-13 ENCOUNTER — HOSPITAL ENCOUNTER (OUTPATIENT)
Dept: PHYSICAL THERAPY | Facility: CLINIC | Age: 65
Setting detail: THERAPIES SERIES
End: 2020-10-13
Attending: FAMILY MEDICINE
Payer: COMMERCIAL

## 2020-10-13 PROCEDURE — 97140 MANUAL THERAPY 1/> REGIONS: CPT | Mod: GP | Performed by: PHYSICAL THERAPIST

## 2020-10-13 PROCEDURE — 97110 THERAPEUTIC EXERCISES: CPT | Mod: GP | Performed by: PHYSICAL THERAPIST

## 2020-10-16 ENCOUNTER — OFFICE VISIT (OUTPATIENT)
Dept: ORTHOPEDICS | Facility: CLINIC | Age: 65
End: 2020-10-16
Attending: PHYSICIAN ASSISTANT
Payer: COMMERCIAL

## 2020-10-16 VITALS
HEIGHT: 70 IN | WEIGHT: 267.5 LBS | SYSTOLIC BLOOD PRESSURE: 124 MMHG | DIASTOLIC BLOOD PRESSURE: 72 MMHG | BODY MASS INDEX: 38.3 KG/M2

## 2020-10-16 DIAGNOSIS — M79.10 MYALGIA: Primary | ICD-10-CM

## 2020-10-16 DIAGNOSIS — M79.18 MYOFASCIAL PAIN: ICD-10-CM

## 2020-10-16 PROCEDURE — 99213 OFFICE O/P EST LOW 20 MIN: CPT | Performed by: PHYSICAL MEDICINE & REHABILITATION

## 2020-10-16 ASSESSMENT — MIFFLIN-ST. JEOR: SCORE: 2009.62

## 2020-10-16 NOTE — PATIENT INSTRUCTIONS
-Continue physical therapy.  Please do 5-6 days of exercises per week between formal sessions and the home exercises they provide.  -Ice or heat 15-20 minutes as needed (Avoid sleeping on a heating pad or ice)  -Patient's preferred over the counter medications as directed on packaging as needed for pain or soreness.    -Over the counter lidocaine cream or Voltaren gel as needed.    -Avoid aggravating activities.    -Also discussed trigger point injections    -Follow up as needed if symptoms fail to improve or worsen.  Please call with questions or concerns.

## 2020-10-16 NOTE — PROGRESS NOTES
"Sports Medicine Clinic Visit    PCP: Kris Weems    CC: Patient presents with:  Middle Back - Pain      HPI:  Damon Manley is a 65 year old male who is seen in consultation at the request of Richmond Grey PA-C.   He notes mid back pain pain that began \"awhile ago.\"  He is in physical therapy and this has helped with the pain.  He rates the pain at a 10/10 at its worst and a 2/10 currently.  Symptoms are relieved with heat, ibuprofen and physical therapy.  Symptoms are worsened by twisting and getting out of bed.  He endorses pain in the arms that has subsided.  He denies popping, grinding, numbness, tingling and weakness.  Other treatment has included nothing.  The pain was about a 4/10 when he saw Richmond, and now it is about a 1/10.      He drives forklift.     Review of Systems:  Musculoskeletal: as above  Remainder of review of systems is negative including constitutional, eyes, ENT, CV, pulmonary, GI, , endocrine, skin, hematologic, and neurologic except as noted in HPI or medical history.    History reviewed. No pertinent past surgical/medical/family/social history other than as mentioned in HPI.    Patient Active Problem List   Diagnosis     GERD (gastroesophageal reflux disease)     Abnormal glucose     Essential hypertension with goal blood pressure less than 140/90     Aortic valve prosthesis present     Pulmonary valve replaced     Aortic valve replaced     ARIAS (dyspnea on exertion)     SOB (shortness of breath)     Elevated LFTs     Elevated lipase     Type 2 diabetes mellitus without complication, without long-term current use of insulin (H)     Rib fractures     Closed fracture of transverse process of lumbar vertebra, initial encounter (H)     Closed head injury, subsequent encounter     Obesity (BMI 35.0-39.9) with comorbidity (H)     Past Medical History:   Diagnosis Date     Coronary artery disease     Valves replaced due to hx of Rheumatic fever. - No mechanical valves     Depressive disorder "     situational - resolved     Hypertension      Motion sickness      Obesity (BMI 30-39.9)      Past Surgical History:   Procedure Laterality Date     C ANESTH,DX ARTHROSCOPIC PROC KNEE JOINT  05    Left knee with partial medial meniscectomy.     C ANESTH,OPEN HEART; W/O PUMP OXYEGENATOR       COLONOSCOPY  2007    Colon polyps.     COLONOSCOPY N/A 2017    Procedure: COMBINED COLONOSCOPY, SINGLE OR MULTIPLE BIOPSY/POLYPECTOMY BY BIOPSY;  Surgeon: Dejan Mckee MD;  Location: [facility]     HC KNEE SCOPE,MED/LAT MENISECTOMY  2007    Partial medial meniscectomy, both knees.     Family History   Problem Relation Age of Onset     Other Cancer Father         lung     Other Cancer Mother         lung     Other Cancer Paternal Aunt         unsure     Diabetes No family hx of      Coronary Artery Disease No family hx of      Hypertension No family hx of      Hyperlipidemia No family hx of      Cerebrovascular Disease No family hx of      Social History     Socioeconomic History     Marital status:      Spouse name: Wanda Parks     Number of children: 2     Years of education: 14     Highest education level: Not on file   Occupational History     Not on file   Social Needs     Financial resource strain: Not on file     Food insecurity     Worry: Not on file     Inability: Not on file     Transportation needs     Medical: Not on file     Non-medical: Not on file   Tobacco Use     Smoking status: Former Smoker     Years: 15.00     Quit date: 1988     Years since quittin.5     Smokeless tobacco: Never Used   Substance and Sexual Activity     Alcohol use: Yes     Alcohol/week: 0.0 standard drinks     Frequency: Monthly or less     Comment: social - glass of wine socially     Drug use: No     Sexual activity: Yes     Partners: Female     Comment:  - 2 children   Lifestyle     Physical activity     Days per week: Not on file     Minutes per session: Not on file     Stress: Not on  "file   Relationships     Social connections     Talks on phone: Not on file     Gets together: Not on file     Attends Zoroastrianism service: Not on file     Active member of club or organization: Not on file     Attends meetings of clubs or organizations: Not on file     Relationship status: Not on file     Intimate partner violence     Fear of current or ex partner: Not on file     Emotionally abused: Not on file     Physically abused: Not on file     Forced sexual activity: Not on file   Other Topics Concern      Service No     Blood Transfusions Yes     Comment: open heart surgery 1998     Caffeine Concern No     Occupational Exposure No     Hobby Hazards No     Sleep Concern Yes     Comment:  wakes after 4-5 hours of sleep at night     Stress Concern Yes     Weight Concern Yes     Special Diet No     Back Care No     Exercise Yes     Comment: 3-4 times/week     Bike Helmet Not Asked     Comment: n/a     Seat Belt Yes     Self-Exams Not Asked     Comment: n/a     Parent/sibling w/ CABG, MI or angioplasty before 65F 55M? Not Asked   Social History Narrative     Not on file           Current Outpatient Medications   Medication     aspirin 81 MG tablet     diazepam (VALIUM) 5 MG tablet     glyBURIDE (DIABETA /MICRONASE) 5 MG tablet     ibuprofen (ADVIL/MOTRIN) 600 MG tablet     losartan (COZAAR) 100 MG tablet     metFORMIN (GLUCOPHAGE-XR) 500 MG 24 hr tablet     metoprolol succinate ER (TOPROL-XL) 100 MG 24 hr tablet     STATIN NOT PRESCRIBED (INTENTIONAL)     No current facility-administered medications for this visit.      Allergies   Allergen Reactions     No Known Drug Allergies          Objective:  /72   Ht 1.778 m (5' 10\")   Wt 121.3 kg (267 lb 8 oz)   BMI 38.38 kg/m      General: Alert and in no distress    Head: Normocephalic, atraumatic  Eyes: no scleral icterus or conjunctival erythema   Skin: no erythema, petechiae, or jaundice  Resp: normal respiratory effort without conversational dyspnea "   Psych: normal mood and affect    Gait: Non-antalgic, appropriate coordination and balance   Musculoskeletal:  -Tenderness over the right thoracic paraspinal muscles  -No tenderness over the left thoracic paraspinal muscles      Assessment:  1. Myalgia    2. Myofascial pain        Plan:  Discussed the assessment with the patient and developed a plan together:  -Damon was sent here by Richmond Grey PA-C for possible trigger point injections.  However, Damon feels like his pain has improved since he saw Richmond.  Discussed the indications, risk, and possible benefits of trigger point injections.  Since he has improved, he would like to hold off at this time.  This makes sense.  Discussed that if the pain were to worsen, he could return for these injections.  -Continue physical therapy.  Please do 5-6 days of exercises per week between formal sessions and the home exercises they provide.  -Ice or heat 15-20 minutes as needed (Avoid sleeping on a heating pad or ice)  -Patient's preferred over the counter medications as directed on packaging as needed for pain or soreness.    -Over the counter lidocaine cream or Voltaren gel as needed.    -Avoid aggravating activities.    -Also discussed trigger point injections    -Follow up as needed if symptoms fail to improve or worsen.  Please call with questions or concerns.      Cassy Durham MD, [facility] Sports Medicine  [facility]

## 2020-10-16 NOTE — LETTER
"    10/16/2020         RE: Damon Manley  [address]  [facility] [address]        Dear Colleague,    Thank you for referring your patient, Damon Manley, to the [facility] SPOR[facility] MEDICINE [facility] [place]. Please see a copy of my visit note below.    Sports Medicine Clinic Visit    PCP: Kris Weems    CC: Patient presents with:  Middle Back - Pain      HPI:  Damon Manley is a 65 year old male who is seen in consultation at the request of Richmond Grey PA-C.   He notes mid back pain pain that began \"awhile ago.\"  He is in physical therapy and this has helped with the pain.  He rates the pain at a 10/10 at its worst and a 2/10 currently.  Symptoms are relieved with heat, ibuprofen and physical therapy.  Symptoms are worsened by twisting and getting out of bed.  He endorses pain in the arms that has subsided.  He denies popping, grinding, numbness, tingling and weakness.  Other treatment has included nothing.  The pain was about a 4/10 when he saw Richmond, and now it is about a 1/10.      He drives forklift.     Review of Systems:  Musculoskeletal: as above  Remainder of review of systems is negative including constitutional, eyes, ENT, CV, pulmonary, GI, , endocrine, skin, hematologic, and neurologic except as noted in HPI or medical history.    History reviewed. No pertinent past surgical/medical/family/social history other than as mentioned in HPI.    Patient Active Problem List   Diagnosis     GERD (gastroesophageal reflux disease)     Abnormal glucose     Essential hypertension with goal blood pressure less than 140/90     Aortic valve prosthesis present     Pulmonary valve replaced     Aortic valve replaced     ARIAS (dyspnea on exertion)     SOB (shortness of breath)     Elevated LFTs     Elevated lipase     Type 2 diabetes mellitus without complication, without long-term current use of insulin (H)     Rib fractures     Closed fracture of transverse process of lumbar vertebra, initial encounter " (H)     Closed head injury, subsequent encounter     Obesity (BMI 35.0-39.9) with comorbidity (H)     Past Medical History:   Diagnosis Date     Coronary artery disease     Valves replaced due to hx of Rheumatic fever. - No mechanical valves     Depressive disorder     situational - resolved     Hypertension      Motion sickness      Obesity (BMI 30-39.9)      Past Surgical History:   Procedure Laterality Date     C ANESTH,DX ARTHROSCOPIC PROC KNEE JOINT  05    Left knee with partial medial meniscectomy.     C ANESTH,OPEN HEART; W/O PUMP OXYEGENATOR       COLONOSCOPY  2007    Colon polyps.     COLONOSCOPY N/A 2017    Procedure: COMBINED COLONOSCOPY, SINGLE OR MULTIPLE BIOPSY/POLYPECTOMY BY BIOPSY;  Surgeon: Dejan Mckee MD;  Location: [facility]     HC KNEE SCOPE,MED/LAT MENISECTOMY  2007    Partial medial meniscectomy, both knees.     Family History   Problem Relation Age of Onset     Other Cancer Father         lung     Other Cancer Mother         lung     Other Cancer Paternal Aunt         unsure     Diabetes No family hx of      Coronary Artery Disease No family hx of      Hypertension No family hx of      Hyperlipidemia No family hx of      Cerebrovascular Disease No family hx of      Social History     Socioeconomic History     Marital status:      Spouse name: Wanda Parks     Number of children: 2     Years of education: 14     Highest education level: Not on file   Occupational History     Not on file   Social Needs     Financial resource strain: Not on file     Food insecurity     Worry: Not on file     Inability: Not on file     Transportation needs     Medical: Not on file     Non-medical: Not on file   Tobacco Use     Smoking status: Former Smoker     Years: 15.00     Quit date: 1988     Years since quittin.5     Smokeless tobacco: Never Used   Substance and Sexual Activity     Alcohol use: Yes     Alcohol/week: 0.0 standard drinks     Frequency: Monthly or less  "    Comment: social - glass of wine socially     Drug use: No     Sexual activity: Yes     Partners: Female     Comment:  - 2 children   Lifestyle     Physical activity     Days per week: Not on file     Minutes per session: Not on file     Stress: Not on file   Relationships     Social connections     Talks on phone: Not on file     Gets together: Not on file     Attends Pentecostal service: Not on file     Active member of club or organization: Not on file     Attends meetings of clubs or organizations: Not on file     Relationship status: Not on file     Intimate partner violence     Fear of current or ex partner: Not on file     Emotionally abused: Not on file     Physically abused: Not on file     Forced sexual activity: Not on file   Other Topics Concern      Service No     Blood Transfusions Yes     Comment: open heart surgery 1998     Caffeine Concern No     Occupational Exposure No     Hobby Hazards No     Sleep Concern Yes     Comment:  wakes after 4-5 hours of sleep at night     Stress Concern Yes     Weight Concern Yes     Special Diet No     Back Care No     Exercise Yes     Comment: 3-4 times/week     Bike Helmet Not Asked     Comment: n/a     Seat Belt Yes     Self-Exams Not Asked     Comment: n/a     Parent/sibling w/ CABG, MI or angioplasty before 65F 55M? Not Asked   Social History Narrative     Not on file           Current Outpatient Medications   Medication     aspirin 81 MG tablet     diazepam (VALIUM) 5 MG tablet     glyBURIDE (DIABETA /MICRONASE) 5 MG tablet     ibuprofen (ADVIL/MOTRIN) 600 MG tablet     losartan (COZAAR) 100 MG tablet     metFORMIN (GLUCOPHAGE-XR) 500 MG 24 hr tablet     metoprolol succinate ER (TOPROL-XL) 100 MG 24 hr tablet     STATIN NOT PRESCRIBED (INTENTIONAL)     No current facility-administered medications for this visit.      Allergies   Allergen Reactions     No Known Drug Allergies          Objective:  /72   Ht 1.778 m (5' 10\")   Wt 121.3 kg " (267 lb 8 oz)   BMI 38.38 kg/m      General: Alert and in no distress    Head: Normocephalic, atraumatic  Eyes: no scleral icterus or conjunctival erythema   Skin: no erythema, petechiae, or jaundice  Resp: normal respiratory effort without conversational dyspnea   Psych: normal mood and affect    Gait: Non-antalgic, appropriate coordination and balance   Musculoskeletal:  -Tenderness over the right thoracic paraspinal muscles  -No tenderness over the left thoracic paraspinal muscles      Assessment:  1. Myalgia    2. Myofascial pain        Plan:  Discussed the assessment with the patient and developed a plan together:  -Damon was sent here by Richmond Grey PA-C for possible trigger point injections.  However, Damon feels like his pain has improved since he saw Richmond.  Discussed the indications, risk, and possible benefits of trigger point injections.  Since he has improved, he would like to hold off at this time.  This makes sense.  Discussed that if the pain were to worsen, he could return for these injections.  -Continue physical therapy.  Please do 5-6 days of exercises per week between formal sessions and the home exercises they provide.  -Ice or heat 15-20 minutes as needed (Avoid sleeping on a heating pad or ice)  -Patient's preferred over the counter medications as directed on packaging as needed for pain or soreness.    -Over the counter lidocaine cream or Voltaren gel as needed.    -Avoid aggravating activities.    -Also discussed trigger point injections    -Follow up as needed if symptoms fail to improve or worsen.  Please call with questions or concerns.      Cassy Durham MD, [facility] Sports Medicine  [facility]      Again, thank you for allowing me to participate in the care of your patient.        Sincerely,        Natasha Durham MD

## 2020-10-20 ENCOUNTER — HOSPITAL ENCOUNTER (OUTPATIENT)
Dept: PHYSICAL THERAPY | Facility: CLINIC | Age: 65
Setting detail: THERAPIES SERIES
End: 2020-10-20
Attending: FAMILY MEDICINE
Payer: COMMERCIAL

## 2020-10-20 DIAGNOSIS — E11.9 TYPE 2 DIABETES MELLITUS WITHOUT COMPLICATION, WITHOUT LONG-TERM CURRENT USE OF INSULIN (H): ICD-10-CM

## 2020-10-20 PROCEDURE — 97140 MANUAL THERAPY 1/> REGIONS: CPT | Mod: GP | Performed by: PHYSICAL THERAPIST

## 2020-10-20 PROCEDURE — 97110 THERAPEUTIC EXERCISES: CPT | Mod: GP | Performed by: PHYSICAL THERAPIST

## 2020-10-20 RX ORDER — METFORMIN HCL 500 MG
TABLET, EXTENDED RELEASE 24 HR ORAL
Qty: 180 TABLET | Refills: 1 | Status: SHIPPED | OUTPATIENT
Start: 2020-10-20 | End: 2021-06-21

## 2020-10-20 NOTE — TELEPHONE ENCOUNTER
Prescription approved per [facility] Refill Protocol.    Elizabeth Robles RN

## 2020-10-26 ENCOUNTER — HOSPITAL ENCOUNTER (OUTPATIENT)
Dept: CARDIOLOGY | Facility: CLINIC | Age: 65
Discharge: HOME | End: 2020-10-26
Attending: FAMILY MEDICINE | Admitting: FAMILY MEDICINE
Payer: COMMERCIAL

## 2020-10-26 DIAGNOSIS — Z95.2 AORTIC VALVE PROSTHESIS PRESENT: ICD-10-CM

## 2020-10-26 PROCEDURE — 999N000208 ECHOCARDIOGRAM COMPLETE

## 2020-10-26 PROCEDURE — 93306 TTE W/DOPPLER COMPLETE: CPT | Mod: 26 | Performed by: INTERNAL MEDICINE

## 2020-10-26 PROCEDURE — 255N000002 HC RX 255 OP 636: Performed by: INTERNAL MEDICINE

## 2020-10-26 RX ADMIN — HUMAN ALBUMIN MICROSPHERES AND PERFLUTREN 5 ML: 10; .22 INJECTION, SOLUTION INTRAVENOUS at 14:39

## 2020-11-05 ENCOUNTER — TELEPHONE (OUTPATIENT)
Dept: FAMILY MEDICINE | Facility: CLINIC | Age: 65
End: 2020-11-05

## 2020-11-05 NOTE — TELEPHONE ENCOUNTER
Busy signal when attempting to reach patient. Will try again later. If patient calls back give results and help set up appt.     Ruby Suh MA 11/5/2020

## 2020-11-05 NOTE — TELEPHONE ENCOUNTER
----- Message from Gregory G Schoen, MD sent at 11/5/2020  3:53 PM CST -----  Please inform patient there is a slight increase in pressures across his artificial valve that appears very minor but he should follow up with cardiology per annual recommendation.  Dr. Weems ordered a referral but no appointment is evident in Epic.  Electronically signed by Greg Schoen, MD

## 2020-11-11 ENCOUNTER — HOSPITAL ENCOUNTER (OUTPATIENT)
Dept: CARDIOLOGY | Facility: CLINIC | Age: 65
Discharge: HOME | End: 2020-11-11
Attending: INTERNAL MEDICINE | Admitting: INTERNAL MEDICINE
Payer: COMMERCIAL

## 2020-11-11 ENCOUNTER — OFFICE VISIT (OUTPATIENT)
Dept: CARDIOLOGY | Facility: CLINIC | Age: 65
End: 2020-11-11
Payer: COMMERCIAL

## 2020-11-11 VITALS
BODY MASS INDEX: 39.87 KG/M2 | SYSTOLIC BLOOD PRESSURE: 132 MMHG | HEART RATE: 74 BPM | HEIGHT: 70 IN | OXYGEN SATURATION: 96 % | WEIGHT: 278.5 LBS | DIASTOLIC BLOOD PRESSURE: 80 MMHG

## 2020-11-11 DIAGNOSIS — Z95.2 AORTIC VALVE PROSTHESIS PRESENT: ICD-10-CM

## 2020-11-11 PROCEDURE — 93010 ELECTROCARDIOGRAM REPORT: CPT | Performed by: INTERNAL MEDICINE

## 2020-11-11 PROCEDURE — 93005 ELECTROCARDIOGRAM TRACING: CPT

## 2020-11-11 PROCEDURE — 99214 OFFICE O/P EST MOD 30 MIN: CPT | Performed by: INTERNAL MEDICINE

## 2020-11-11 ASSESSMENT — MIFFLIN-ST. JEOR: SCORE: 2059.52

## 2020-11-11 NOTE — LETTER
11/11/2020    Kris Weems MD, MD  [address] [place] [address]    RE: Damon Manley       Dear Colleague,    I had the pleasure of seeing Damon Manley in the [facility].    CARDIOLOGY CLINIC VISIT  DATE OF SERVICE: November 11, 2020      PRIMARY CARE PHYSICIAN:  Kris Weems MD    HISTORY OF PRESENT ILLNESS:  Mr. Manley is a 66 y/o gentleman with PMH significant for rheumatic heart disease s/p homograft pulmonary valve and AVR in 1998 who presents to clinic today for follow up.  He was last seen by Dr. Kenney in 2018 and this is my first visit with Mr. Manley.    Today Damon reports feeling well.  He has limited physical activity since the pandemic began.  He denies any exertional chest pain or chest discomfort.  He does note shortness of breath when climbing stairs.  He does note increased fatigue.  He denies any orthopnea, PND or lower extremity edema.  He was recently seen by his primary care physician who ordered a surveillance echocardiogram which demonstrated stable low normal LV function with stable prosthetic aortic valve and pulmonic homograft.     PAST MEDICAL HISTORY:  1.  Rheumatic valve disease:  S/P bioprosthetic homograft pulmonary valve and AVR in 1998  2.  Depression  3.  Hypertension  4.  Obesity      MEDICATIONS:  Current Outpatient Medications   Medication     aspirin 81 MG tablet     glyBURIDE (DIABETA /MICRONASE) 5 MG tablet     losartan (COZAAR) 100 MG tablet     metFORMIN (GLUCOPHAGE-XR) 500 MG 24 hr tablet     metoprolol succinate ER (TOPROL-XL) 100 MG 24 hr tablet     diazepam (VALIUM) 5 MG tablet     ibuprofen (ADVIL/MOTRIN) 600 MG tablet     STATIN NOT PRESCRIBED (INTENTIONAL)     No current facility-administered medications for this visit.        ALLERGIES:  Allergies   Allergen Reactions     No Known Drug Allergies        SOCIAL HISTORY:  I have reviewed this patient's social history and updated it with pertinent information if needed.  Damon Manley  reports that he quit smoking about 32 years ago. He quit after 15.00 years of use. He has never used smokeless tobacco. He reports current alcohol use. He reports that he does not use drugs.    FAMILY HISTORY:  I have reviewed this patient's family history and updated it with pertinent information if needed.   Family History   Problem Relation Age of Onset     Other Cancer Father         lung     Other Cancer Mother         lung     Other Cancer Paternal Aunt         unsure     Diabetes No family hx of      Coronary Artery Disease No family hx of      Hypertension No family hx of      Hyperlipidemia No family hx of      Cerebrovascular Disease No family hx of        REVIEW OF SYSTEMS:  A complete ROS was obtained and the pertinent positives are outlined in the history of present illness above.  The remainder of systems is negative.      PHYSICAL EXAM:                     Vital Signs with Ranges     278 lbs 8 oz    Constitutional: awake, alert, no distress  Eyes: PERRL, sclera nonicteric  ENT: trachea midline  Respiratory: CTAB  Cardiovascular: RRR no m/r/g, no JVD  GI: nondistended, nontender, bowel sounds present  Lymph/Hematologic: no lymphadenopathy  Skin: dry, no rash  Musculoskeletal: good muscle tone, no edema bilaterally  Neurologic: no focal deficits  Neuropsychiatric: appropriate affact    DATA:    Echocardiogram 10/2020  There is a bioprosthetic aortic valve.  There is mild (1+) aortic regurgitation.  Left ventricular systolic function is mildly reduced.  The visual ejection fraction is estimated at 45-50%.  There is mild concentric left ventricular hypertrophy.  The left ventricle is normal in size.  The right ventricle is normal in structure, function and size.       ASSESSMENT:  1.  Status post prosthetic aortic valve replacement and pulmonic valve replacement: Stable function by recent echocardiogram  2.  Hypertension: At goal  3.  Obesity  4.  Dyspnea on exertion: Somewhat vague by  description.  Suspect this is multifactorial in the setting of deconditioning and obesity.  4.  Diabetes: Managed by his primary care doctor    RECOMMENDATIONS:  1.  Reviewed the results of his echocardiogram which demonstrates stable valve and heart function.  We will plan to repeat this in 2 years.  2.  I suspect his dyspnea on exertion and fatigue are related to obesity and deconditioning rather than a specific cardiac cause.  He does have risk factors for coronary artery disease and we discussed the option of proceeding with a stress test which he declined.  3.  Plan for follow-up in 2 years with an echocardiogram prior to that visit.    Alisa Olvera MD  Cardiology - [facility]  November 11, 2020      Thank you for allowing me to participate in the care of your patient.      Sincerely,     Alisa Olvera MD     [facility]    cc:   Kris Weems MD  [address] [place] [address]

## 2020-11-11 NOTE — LETTER
11/11/2020    Kris Weems MD, MD  [address] [place] [address]    RE: Damon Manley       Dear Colleague,    I had the pleasure of seeing Damon Manley in the [facility].    CARDIOLOGY CLINIC VISIT  DATE OF SERVICE: November 11, 2020      PRIMARY CARE PHYSICIAN:  Kris Weems MD    HISTORY OF PRESENT ILLNESS:  Mr. Manley is a 64 y/o gentleman with PMH significant for rheumatic heart disease s/p homograft pulmonary valve and AVR in 1998 who presents to clinic today for follow up.  He was last seen by Dr. Kenney in 2018 and this is my first visit with Mr. Manley.    Today Damon reports feeling well.  He has limited physical activity since the pandemic began.  He denies any exertional chest pain or chest discomfort.  He does note shortness of breath when climbing stairs.  He does note increased fatigue.  He denies any orthopnea, PND or lower extremity edema.  He was recently seen by his primary care physician who ordered a surveillance echocardiogram which demonstrated stable low normal LV function with stable prosthetic aortic valve and pulmonic homograft.     PAST MEDICAL HISTORY:  1.  Rheumatic valve disease:  S/P bioprosthetic homograft pulmonary valve and AVR in 1998  2.  Depression  3.  Hypertension  4.  Obesity      MEDICATIONS:  Current Outpatient Medications   Medication     aspirin 81 MG tablet     glyBURIDE (DIABETA /MICRONASE) 5 MG tablet     losartan (COZAAR) 100 MG tablet     metFORMIN (GLUCOPHAGE-XR) 500 MG 24 hr tablet     metoprolol succinate ER (TOPROL-XL) 100 MG 24 hr tablet     diazepam (VALIUM) 5 MG tablet     ibuprofen (ADVIL/MOTRIN) 600 MG tablet     STATIN NOT PRESCRIBED (INTENTIONAL)     No current facility-administered medications for this visit.        ALLERGIES:  Allergies   Allergen Reactions     No Known Drug Allergies        SOCIAL HISTORY:  I have reviewed this patient's social history and updated it with pertinent information if needed.  Damon aMnley  reports that he quit smoking about 32 years ago. He quit after 15.00 years of use. He has never used smokeless tobacco. He reports current alcohol use. He reports that he does not use drugs.    FAMILY HISTORY:  I have reviewed this patient's family history and updated it with pertinent information if needed.   Family History   Problem Relation Age of Onset     Other Cancer Father         lung     Other Cancer Mother         lung     Other Cancer Paternal Aunt         unsure     Diabetes No family hx of      Coronary Artery Disease No family hx of      Hypertension No family hx of      Hyperlipidemia No family hx of      Cerebrovascular Disease No family hx of        REVIEW OF SYSTEMS:  A complete ROS was obtained and the pertinent positives are outlined in the history of present illness above.  The remainder of systems is negative.      PHYSICAL EXAM:                     Vital Signs with Ranges     278 lbs 8 oz    Constitutional: awake, alert, no distress  Eyes: PERRL, sclera nonicteric  ENT: trachea midline  Respiratory: CTAB  Cardiovascular: RRR no m/r/g, no JVD  GI: nondistended, nontender, bowel sounds present  Lymph/Hematologic: no lymphadenopathy  Skin: dry, no rash  Musculoskeletal: good muscle tone, no edema bilaterally  Neurologic: no focal deficits  Neuropsychiatric: appropriate affact    DATA:    Echocardiogram 10/2020  There is a bioprosthetic aortic valve.  There is mild (1+) aortic regurgitation.  Left ventricular systolic function is mildly reduced.  The visual ejection fraction is estimated at 45-50%.  There is mild concentric left ventricular hypertrophy.  The left ventricle is normal in size.  The right ventricle is normal in structure, function and size.       ASSESSMENT:  1.  Status post prosthetic aortic valve replacement and pulmonic valve replacement: Stable function by recent echocardiogram  2.  Hypertension: At goal  3.  Obesity  4.  Dyspnea on exertion: Somewhat vague by  description.  Suspect this is multifactorial in the setting of deconditioning and obesity.  4.  Diabetes: Managed by his primary care doctor    RECOMMENDATIONS:  1.  Reviewed the results of his echocardiogram which demonstrates stable valve and heart function.  We will plan to repeat this in 2 years.  2.  I suspect his dyspnea on exertion and fatigue are related to obesity and deconditioning rather than a specific cardiac cause.  He does have risk factors for coronary artery disease and we discussed the option of proceeding with a stress test which he declined.  3.  Plan for follow-up in 2 years with an echocardiogram prior to that visit.    Alisa Olvera MD  Cardiology - [facility]  November 11, 2020      Thank you for allowing me to participate in the care of your patient.    Sincerely,     Alisa Olvera MD     [facility]

## 2020-11-11 NOTE — PROGRESS NOTES
CARDIOLOGY CLINIC VISIT  DATE OF SERVICE: November 11, 2020      PRIMARY CARE PHYSICIAN:  Kris Weems MD    HISTORY OF PRESENT ILLNESS:  Mr. Manley is a 66 y/o gentleman with PMH significant for rheumatic heart disease s/p homograft pulmonary valve and AVR in 1998 who presents to clinic today for follow up.  He was last seen by Dr. Kenney in 2018 and this is my first visit with Mr. Manley.    Today Damon reports feeling well.  He has limited physical activity since the pandemic began.  He denies any exertional chest pain or chest discomfort.  He does note shortness of breath when climbing stairs.  He does note increased fatigue.  He denies any orthopnea, PND or lower extremity edema.  He was recently seen by his primary care physician who ordered a surveillance echocardiogram which demonstrated stable low normal LV function with stable prosthetic aortic valve and pulmonic homograft.     PAST MEDICAL HISTORY:  1.  Rheumatic valve disease:  S/P bioprosthetic homograft pulmonary valve and AVR in 1998  2.  Depression  3.  Hypertension  4.  Obesity      MEDICATIONS:  Current Outpatient Medications   Medication     aspirin 81 MG tablet     glyBURIDE (DIABETA /MICRONASE) 5 MG tablet     losartan (COZAAR) 100 MG tablet     metFORMIN (GLUCOPHAGE-XR) 500 MG 24 hr tablet     metoprolol succinate ER (TOPROL-XL) 100 MG 24 hr tablet     diazepam (VALIUM) 5 MG tablet     ibuprofen (ADVIL/MOTRIN) 600 MG tablet     STATIN NOT PRESCRIBED (INTENTIONAL)     No current facility-administered medications for this visit.        ALLERGIES:  Allergies   Allergen Reactions     No Known Drug Allergies        SOCIAL HISTORY:  I have reviewed this patient's social history and updated it with pertinent information if needed. Damon Manley  reports that he quit smoking about 32 years ago. He quit after 15.00 years of use. He has never used smokeless tobacco. He reports current alcohol use. He reports that he does not use drugs.    FAMILY  HISTORY:  I have reviewed this patient's family history and updated it with pertinent information if needed.   Family History   Problem Relation Age of Onset     Other Cancer Father         lung     Other Cancer Mother         lung     Other Cancer Paternal Aunt         unsure     Diabetes No family hx of      Coronary Artery Disease No family hx of      Hypertension No family hx of      Hyperlipidemia No family hx of      Cerebrovascular Disease No family hx of        REVIEW OF SYSTEMS:  A complete ROS was obtained and the pertinent positives are outlined in the history of present illness above.  The remainder of systems is negative.      PHYSICAL EXAM:                     Vital Signs with Ranges     278 lbs 8 oz    Constitutional: awake, alert, no distress  Eyes: PERRL, sclera nonicteric  ENT: trachea midline  Respiratory: CTAB  Cardiovascular: RRR no m/r/g, no JVD  GI: nondistended, nontender, bowel sounds present  Lymph/Hematologic: no lymphadenopathy  Skin: dry, no rash  Musculoskeletal: good muscle tone, no edema bilaterally  Neurologic: no focal deficits  Neuropsychiatric: appropriate affact    DATA:    Echocardiogram 10/2020  There is a bioprosthetic aortic valve.  There is mild (1+) aortic regurgitation.  Left ventricular systolic function is mildly reduced.  The visual ejection fraction is estimated at 45-50%.  There is mild concentric left ventricular hypertrophy.  The left ventricle is normal in size.  The right ventricle is normal in structure, function and size.       ASSESSMENT:  1.  Status post prosthetic aortic valve replacement and pulmonic valve replacement: Stable function by recent echocardiogram  2.  Hypertension: At goal  3.  Obesity  4.  Dyspnea on exertion: Somewhat vague by description.  Suspect this is multifactorial in the setting of deconditioning and obesity.  4.  Diabetes: Managed by his primary care doctor    RECOMMENDATIONS:  1.  Reviewed the results of his echocardiogram which  demonstrates stable valve and heart function.  We will plan to repeat this in 2 years.  2.  I suspect his dyspnea on exertion and fatigue are related to obesity and deconditioning rather than a specific cardiac cause.  He does have risk factors for coronary artery disease and we discussed the option of proceeding with a stress test which he declined.  3.  Plan for follow-up in 2 years with an echocardiogram prior to that visit.    Alisa Olvera MD  Cardiology - [facility]  November 11, 2020

## 2021-01-07 NOTE — PROGRESS NOTES
Outpatient Physical Therapy Discharge Note     Patient: Damon Manley    : 1955    Beginning/End Dates of Reporting Period: 20 - 10/20/20    Referring Provider: Dank Bashir Diagnosis: cervical and thoracic myofascial pain, faulty posture      Client Self Report: Chose not to do the TP injections at this time.  Can tell the mm are still tight but the pain is better overall.  May reconsider TP in the future.  Overall, the neck pain is much better, nearly gone.  The back has felt better and better each week.  Doing the ex quite regularly.  Found a chair at work to do the one.    Objective Measurements:  Objective Measure: pain  Details: 0/10    Objective Measure: NDI  Details: 4%     Goals:  Goal Identifier 1   Goal Description Pt will [id number] his score on the NDI (Neck Disability Index) to 8% or less indicating a clinically meaningful improvement in neck pain and function over time.   Target Date 20   Date Met  10/20/20   Progress:     Goal Identifier 2   Goal Description Pt will be able to sit for one hour or greater with good posture without recreation of cerv or thor sx in order to be more comfortable with watching tv or driving.   Target Date 20   Date Met  10/20/20   Progress:     Progress Toward Goals: Overall, pt was pleased with improvements, he was meeting both goals and wanted to try HEP on own.  His chart was held open in the event that he had further concerns, he did not return a follow up phone call left for him in November, therefore, he will be discharged to [facility].     Plan: Discharge from therapy.    Reason for Discharge: Patient has met all goals.    Equipment Issued: none    Discharge Plan: Patient to continue home program.

## 2021-02-13 DIAGNOSIS — E11.8 TYPE 2 DIABETES MELLITUS WITH COMPLICATION, WITHOUT LONG-TERM CURRENT USE OF INSULIN (H): ICD-10-CM

## 2021-02-13 LAB — HBA1C MFR BLD: 6.7 % (ref 0–5.6)

## 2021-02-13 PROCEDURE — 83036 HEMOGLOBIN GLYCOSYLATED A1C: CPT | Performed by: FAMILY MEDICINE

## 2021-02-13 PROCEDURE — 36415 COLL VENOUS BLD VENIPUNCTURE: CPT | Performed by: FAMILY MEDICINE

## 2021-03-30 DIAGNOSIS — E11.9 TYPE 2 DIABETES MELLITUS WITHOUT COMPLICATION, WITHOUT LONG-TERM CURRENT USE OF INSULIN (H): ICD-10-CM

## 2021-03-30 NOTE — TELEPHONE ENCOUNTER
glyburide    Last Written Prescription Date:  09/09/20  Last Fill Quantity: 90,  # refills: 1   Last office visit: 10/7/2020 with prescribing provider:  Dank Azul Office Visit:

## 2021-03-31 RX ORDER — GLYBURIDE 5 MG/1
5 TABLET ORAL
Qty: 90 TABLET | Refills: 0 | Status: SHIPPED | OUTPATIENT
Start: 2021-03-31 | End: 2021-07-26

## 2021-03-31 NOTE — TELEPHONE ENCOUNTER
Prescription approved per [facility] Refill Protocol.    Elizabeth Robles RN

## 2021-04-29 ENCOUNTER — NURSE TRIAGE (OUTPATIENT)
Dept: NURSING | Facility: CLINIC | Age: 66
End: 2021-04-29

## 2021-04-29 NOTE — PROGRESS NOTES
Damon is a 66 year old who is being evaluated via a billable telephone visit.      What phone number would you like to be contacted at? [phone]  How would you like to obtain your AVS? Barrera    Pattie Jose is a 66 year old who presents for the following health issues     HPI     Acute Illness  Acute illness concerns: nausea and sweats- LLQ pain  Onset/Duration: Tuesday - 4 days  Symptoms:  Fever: no  Chills/Sweats: no  Headache (location?): YES  Sinus Pressure: YES- congestion  Conjunctivitis:  YES- red  Ear Pain: no  Rhinorrhea: no  Congestion: YES  Sore Throat: YES- on Tuesday but not now  Cough: YES  Wheeze: no  Decreased Appetite: no  Nausea: YES  Vomiting: YES Tuesday   Diarrhea: no  Dysuria/Freq.: yes  Dysuria or Hematuria: no  Fatigue/Achiness: YES  Sick/Strep Exposure: YES- son was around someone who tested positive for covid  Therapies tried and outcome: tylenol and ibuprofen    Spoke with patient today via telephone due to current COVID 19 concerns. Symptoms started on Tuesday. Was at work and threw up at that time. Had a bad headache. Once he got home felt more achy and sore. Still nauseated. Throat was sore Tuesday afternoon only. Cough started on Wednesday. Sweats started then too. Yesterday started feeling a little better but then hit with the sweats again. Wife said eyes were red. Not sleeping well. Patient denies chest tightness, pain or shortness of breath. Patient does have diabetes - has been checking and this and was 150 after eating. Son was exposed to someone who tested positive yesterday. Everyone else at home feeling well. Was having pain in his left lower abdomen. This is intermittent and does seem to be improving. He reports looser stools but no diarrhea.      Review of Systems   Constitutional, HEENT, cardiovascular, pulmonary, gi and gu systems are negative, except as otherwise noted.      Objective           Vitals:  No vitals were obtained today due to virtual  visit.    Physical Exam   healthy, alert and no distress  PSYCH: Alert and oriented times 3; coherent speech, normal   rate and volume, able to articulate logical thoughts, able   to abstract reason, no tangential thoughts, no hallucinations   or delusions  His affect is normal and pleasant  RESP: No cough, no audible wheezing, able to talk in full sentences  Remainder of exam unable to be completed due to telephone visits      Assessment & Plan     Suspected COVID-19 virus infection  Patient with symptoms certainly concerning for COVID-19. Orders placed for testing today. Patient will continue quarantine and supportive cares at this time. We discussed red flag symptoms that should prompt immediate care in the ER. We also discussed close monitoring of the LLQ pain as this has a possibility of being diverticulitis however patient has no history of this.   - Symptomatic COVID-19 Virus (Coronavirus) by PCR; Future    The patient indicates understanding of these issues and agrees with the plan.    Bonnie Candelario PA-C  [facility]    Phone call duration: 9 minutes

## 2021-04-29 NOTE — TELEPHONE ENCOUNTER
Fever - hasn't used a thermometer.  Sweats  Body aches  Cough - breathing is okay.  Started about 7 a.m. 4/27/21.    I recommended a virtual visit.    Damon agreed. He prefers a telephone visit. I transferred him to [facility].    COVID 19 Nurse Triage Plan/Patient Instructions    Please be aware that novel coronavirus (COVID-19) may be circulating in the community. If you develop symptoms such as fever, cough, or SOB or if you have concerns about the presence of another infection including coronavirus (COVID-19), please contact your health care provider or visit [email or web address].     Disposition/Instructions    Virtual Visit with provider recommended. Reference Visit Selection Guide.    Thank you for taking steps to prevent the spread of this virus.  o Limit your contact with others.  o Wear a simple mask to cover your cough.  o Wash your hands well and often.    Resources    [facility]: About COVID-19: [email or web address]/    CDC: What to Do If You're Sick: www.cdc.gov/coronavirus/2019-ncov/about/steps-when-sick.html    CDC: Ending Home Isolation: www.cdc.gov/coronavirus/2019-ncov/hcp/disposition-in-home-patients.html     CDC: Caring for Someone: www.cdc.gov/coronavirus/2019-ncov/if-you-are-sick/care-for-someone.html     [facility]: Interim Guidance for Hospital Discharge to Home: www.health.[facility].[place]/diseases/coronavirus/hcp/hospdischarge.pdf    [facility] clinical trials (COVID-19 research studies): clinicalaffairs.[facility]-clinical-trials     Below are the COVID-19 hotlines at the [place] Department of Health ([facility]). Interpreters are available.   o For health questions: Call 125-889-6792 or 1-881.645.4837 (7 a.m. to 7 p.m.)  o For questions about schools and childcare: Call 142-542-2501 or 1-429.634.2117 (7 a.m. to 7 p.m.)     Reason for Disposition    [1] HIGH RISK patient (e.g., age > 64 years, diabetes, heart or lung disease, weak immune system) AND [2] new or  worsening symptoms    Additional Information    Negative: SEVERE difficulty breathing (e.g., struggling for each breath, speaks in single words)    Negative: Difficult to awaken or acting confused (e.g., disoriented, slurred speech)    Negative: Bluish (or gray) lips or face now    Negative: Shock suspected (e.g., cold/pale/clammy skin, too weak to stand, low BP, rapid pulse)    Negative: Sounds like a life-threatening emergency to the triager    Negative: SEVERE or constant chest pain or pressure (Exception: mild central chest pain, present only when coughing)    Negative: MODERATE difficulty breathing (e.g., speaks in phrases, SOB even at rest, pulse 100-120)    Negative: MILD difficulty breathing (e.g., minimal/no SOB at rest, SOB with walking, pulse <100)    Negative: Chest pain or pressure    Negative: Patient sounds very sick or weak to the triager    Protocols used: CORONAVIRUS (COVID-19) DIAGNOSED OR [id number] 1.3    Arabella DONOHUE RN [facility]

## 2021-04-30 ENCOUNTER — VIRTUAL VISIT (OUTPATIENT)
Dept: FAMILY MEDICINE | Facility: CLINIC | Age: 66
End: 2021-04-30
Payer: COMMERCIAL

## 2021-04-30 ENCOUNTER — ALLIED HEALTH/NURSE VISIT (OUTPATIENT)
Dept: FAMILY MEDICINE | Facility: CLINIC | Age: 66
End: 2021-04-30
Payer: COMMERCIAL

## 2021-04-30 DIAGNOSIS — Z20.822 SUSPECTED COVID-19 VIRUS INFECTION: ICD-10-CM

## 2021-04-30 DIAGNOSIS — Z20.822 SUSPECTED COVID-19 VIRUS INFECTION: Primary | ICD-10-CM

## 2021-04-30 LAB
LABORATORY COMMENT REPORT: NORMAL
SARS-COV-2 RNA RESP QL NAA+PROBE: NEGATIVE
SARS-COV-2 RNA RESP QL NAA+PROBE: NORMAL
SPECIMEN SOURCE: NORMAL
SPECIMEN SOURCE: NORMAL

## 2021-04-30 PROCEDURE — U0003 INFECTIOUS AGENT DETECTION BY NUCLEIC ACID (DNA OR RNA); SEVERE ACUTE RESPIRATORY SYNDROME CORONAVIRUS 2 (SARS-COV-2) (CORONAVIRUS DISEASE [COVID-19]), AMPLIFIED PROBE TECHNIQUE, MAKING USE OF HIGH THROUGHPUT TECHNOLOGIES AS DESCRIBED BY CMS-2020-01-R: HCPCS | Performed by: PHYSICIAN ASSISTANT

## 2021-04-30 PROCEDURE — 99213 OFFICE O/P EST LOW 20 MIN: CPT | Mod: 95 | Performed by: PHYSICIAN ASSISTANT

## 2021-04-30 PROCEDURE — 99207 PR NO CHARGE NURSE ONLY: CPT

## 2021-04-30 PROCEDURE — U0005 INFEC AGEN DETEC AMPLI PROBE: HCPCS | Performed by: PHYSICIAN ASSISTANT

## 2021-06-21 DIAGNOSIS — E11.9 TYPE 2 DIABETES MELLITUS WITHOUT COMPLICATION, WITHOUT LONG-TERM CURRENT USE OF INSULIN (H): ICD-10-CM

## 2021-06-21 RX ORDER — METFORMIN HCL 500 MG
TABLET, EXTENDED RELEASE 24 HR ORAL
Qty: 180 TABLET | Refills: 0 | Status: SHIPPED | OUTPATIENT
Start: 2021-06-21 | End: 2021-11-04

## 2021-07-02 DIAGNOSIS — I10 HYPERTENSION GOAL BP (BLOOD PRESSURE) < 140/90: ICD-10-CM

## 2021-07-02 RX ORDER — LOSARTAN POTASSIUM 100 MG/1
TABLET ORAL
Qty: 90 TABLET | Refills: 1 | Status: SHIPPED | OUTPATIENT
Start: 2021-07-02 | End: 2022-03-17

## 2021-07-26 DIAGNOSIS — E11.9 TYPE 2 DIABETES MELLITUS WITHOUT COMPLICATION, WITHOUT LONG-TERM CURRENT USE OF INSULIN (H): ICD-10-CM

## 2021-07-26 RX ORDER — GLYBURIDE 5 MG/1
TABLET ORAL
Qty: 90 TABLET | Refills: 0 | Status: SHIPPED | OUTPATIENT
Start: 2021-07-26 | End: 2021-12-09

## 2021-08-16 DIAGNOSIS — I10 ESSENTIAL HYPERTENSION WITH GOAL BLOOD PRESSURE LESS THAN 140/90: ICD-10-CM

## 2021-08-16 RX ORDER — METOPROLOL SUCCINATE 100 MG/1
TABLET, EXTENDED RELEASE ORAL
Qty: 30 TABLET | Refills: 5 | Status: SHIPPED | OUTPATIENT
Start: 2021-08-16 | End: 2022-04-28

## 2021-08-28 ENCOUNTER — HEALTH MAINTENANCE LETTER (OUTPATIENT)
Age: 66
End: 2021-08-28

## 2021-09-07 ENCOUNTER — LAB (OUTPATIENT)
Dept: LAB | Facility: CLINIC | Age: 66
End: 2021-09-07
Payer: COMMERCIAL

## 2021-09-07 DIAGNOSIS — E11.9 TYPE 2 DIABETES MELLITUS WITHOUT COMPLICATION, WITHOUT LONG-TERM CURRENT USE OF INSULIN (H): Primary | ICD-10-CM

## 2021-09-07 DIAGNOSIS — E11.9 TYPE 2 DIABETES MELLITUS WITHOUT COMPLICATION, WITHOUT LONG-TERM CURRENT USE OF INSULIN (H): ICD-10-CM

## 2021-09-07 LAB — HBA1C MFR BLD: 7.1 % (ref 0–5.6)

## 2021-09-07 PROCEDURE — 83036 HEMOGLOBIN GLYCOSYLATED A1C: CPT

## 2021-09-07 PROCEDURE — 36415 COLL VENOUS BLD VENIPUNCTURE: CPT

## 2021-10-23 ENCOUNTER — HEALTH MAINTENANCE LETTER (OUTPATIENT)
Age: 66
End: 2021-10-23

## 2021-11-03 DIAGNOSIS — E11.9 TYPE 2 DIABETES MELLITUS WITHOUT COMPLICATION, WITHOUT LONG-TERM CURRENT USE OF INSULIN (H): ICD-10-CM

## 2021-11-04 RX ORDER — METFORMIN HCL 500 MG
TABLET, EXTENDED RELEASE 24 HR ORAL
Qty: 180 TABLET | Refills: 0 | Status: SHIPPED | OUTPATIENT
Start: 2021-11-04 | End: 2022-03-21

## 2021-11-04 NOTE — TELEPHONE ENCOUNTER
Pending Prescriptions:                       Disp   Refills    metFORMIN (GLUCOPHAGE-XR) 500 MG 24 hr tab*180 ta*0        Sig: Take 2 tablets by mouth once daily with dinner.    Routing refill request to provider for review/approval because:  Labs not current:    Creatinine   Date Value Ref Range Status   09/03/2020 0.86 0.66 - 1.25 mg/dL Final

## 2021-12-07 DIAGNOSIS — E11.9 TYPE 2 DIABETES MELLITUS WITHOUT COMPLICATION, WITHOUT LONG-TERM CURRENT USE OF INSULIN (H): ICD-10-CM

## 2021-12-09 RX ORDER — GLYBURIDE 5 MG/1
TABLET ORAL
Qty: 90 TABLET | Refills: 0 | Status: SHIPPED | OUTPATIENT
Start: 2021-12-09 | End: 2022-04-14

## 2021-12-09 NOTE — TELEPHONE ENCOUNTER
Routing refill request to provider for review/approval because:  Labs not current:  CRE  Patient needs to be seen because:  Diabetic follow up      Brednon Tyler RN

## 2021-12-18 ENCOUNTER — HEALTH MAINTENANCE LETTER (OUTPATIENT)
Age: 66
End: 2021-12-18

## 2022-01-21 NOTE — PROGRESS NOTES
Sports Medicine Clinic Visit    PCP: Kris Weems    CC: No chief complaint on file.      HPI:  Damon Manley is a 65 year old male who is seen in consultation at the request of Richmond Grey PA-C.   He notes {Laterality:199877} {BODY PART:5261} pain that began *** {DAY/WEEK/MONTH/YEAR:182825} ago when he ***.   He rates the pain at a  {PAIN SCALE:587085} at its worst and a {PAIN SCALE:998749} currently.  Symptoms are relieved with {BS relievin}. Symptoms are worsened by {BSworsesymptoms:272561}. He endorses {BSadditionalfeatures:876896}.   He denies {BSadditionalfeatures:084991}.  Other treatment has included {BSothertreatments:271136}. He notes difficulty with ***.       He {BSsocialhistory:991392}    Review of Systems:  Musculoskeletal: as above  Remainder of review of systems is negative including constitutional, eyes, ENT, CV, pulmonary, GI, , endocrine, skin, hematologic, and neurologic except as noted in HPI or medical history.    History reviewed. No pertinent past surgical/medical/family/social history other than as mentioned in HPI.    Patient Active Problem List   Diagnosis     GERD (gastroesophageal reflux disease)     Abnormal glucose     Essential hypertension with goal blood pressure less than 140/90     Aortic valve prosthesis present     Pulmonary valve replaced     Aortic valve replaced     ARIAS (dyspnea on exertion)     SOB (shortness of breath)     Elevated LFTs     Elevated lipase     Type 2 diabetes mellitus without complication, without long-term current use of insulin (H)     Rib fractures     Closed fracture of transverse process of lumbar vertebra, initial encounter (H)     Closed head injury, subsequent encounter     Obesity (BMI 35.0-39.9) with comorbidity (H)     Past Medical History:   Diagnosis Date     Coronary artery disease     Valves replaced due to hx of Rheumatic fever. - No mechanical valves     Depressive disorder     situational - resolved     Hypertension       Writer faxed to [employer] at [phone] completed and signed enteral nutrition form for [facility]  Laurie Angel LPN     Motion sickness      Obesity (BMI 30-39.9)      Past Surgical History:   Procedure Laterality Date     C ANESTH,DX ARTHROSCOPIC PROC KNEE JOINT  05    Left knee with partial medial meniscectomy.     C ANESTH,OPEN HEART; W/O PUMP OXYEGENATOR       COLONOSCOPY  2007    Colon polyps.     COLONOSCOPY N/A 2017    Procedure: COMBINED COLONOSCOPY, SINGLE OR MULTIPLE BIOPSY/POLYPECTOMY BY BIOPSY;  Surgeon: Dejan Mckee MD;  Location: [facility]     HC KNEE SCOPE,MED/LAT MENISECTOMY  2007    Partial medial meniscectomy, both knees.     Family History   Problem Relation Age of Onset     Other Cancer Father         lung     Other Cancer Mother         lung     Other Cancer Paternal Aunt         unsure     Diabetes No family hx of      Coronary Artery Disease No family hx of      Hypertension No family hx of      Hyperlipidemia No family hx of      Cerebrovascular Disease No family hx of      Social History     Socioeconomic History     Marital status:      Spouse name: Wanda Parks     Number of children: 2     Years of education: 14     Highest education level: Not on file   Occupational History     Not on file   Social Needs     Financial resource strain: Not on file     Food insecurity     Worry: Not on file     Inability: Not on file     Transportation needs     Medical: Not on file     Non-medical: Not on file   Tobacco Use     Smoking status: Former Smoker     Years: 15.00     Quit date: 1988     Years since quittin.5     Smokeless tobacco: Never Used   Substance and Sexual Activity     Alcohol use: Yes     Alcohol/week: 0.0 standard drinks     Frequency: Monthly or less     Comment: social - glass of wine socially     Drug use: No     Sexual activity: Yes     Partners: Female     Comment:  - 2 children   Lifestyle     Physical activity     Days per week: Not on file     Minutes per session: Not on file     Stress: Not on file   Relationships     Social connections      Talks on phone: Not on file     Gets together: Not on file     Attends Oriental orthodox service: Not on file     Active member of club or organization: Not on file     Attends meetings of clubs or organizations: Not on file     Relationship status: Not on file     Intimate partner violence     Fear of current or ex partner: Not on file     Emotionally abused: Not on file     Physically abused: Not on file     Forced sexual activity: Not on file   Other Topics Concern      Service No     Blood Transfusions Yes     Comment: open heart surgery 1998     Caffeine Concern No     Occupational Exposure No     Hobby Hazards No     Sleep Concern Yes     Comment:  wakes after 4-5 hours of sleep at night     Stress Concern Yes     Weight Concern Yes     Special Diet No     Back Care No     Exercise Yes     Comment: 3-4 times/week     Bike Helmet Not Asked     Comment: n/a     Seat Belt Yes     Self-Exams Not Asked     Comment: n/a     Parent/sibling w/ CABG, MI or angioplasty before 65F 55M? Not Asked   Social History Narrative     Not on file           Current Outpatient Medications   Medication     aspirin 81 MG tablet     diazepam (VALIUM) 5 MG tablet     glyBURIDE (DIABETA /MICRONASE) 5 MG tablet     ibuprofen (ADVIL/MOTRIN) 600 MG tablet     losartan (COZAAR) 100 MG tablet     metFORMIN (GLUCOPHAGE-XR) 500 MG 24 hr tablet     metoprolol succinate ER (TOPROL-XL) 100 MG 24 hr tablet     STATIN NOT PRESCRIBED (INTENTIONAL)     No current facility-administered medications for this visit.      Allergies   Allergen Reactions     No Known Drug Allergies          Objective:  There were no vitals taken for this visit.    General: Alert and in no distress    Head: Normocephalic, atraumatic  Eyes: no scleral icterus or conjunctival erythema   Skin: no erythema, petechiae, or jaundice  CV: regular rhythm by palpation, 2+ distal pulses  Resp: normal respiratory effort without conversational dyspnea   Psych: normal mood and affect     Gait: Non-antalgic, appropriate coordination and balance   Neuro: Motor strength and sensation as noted below    Musculoskeletal:      ***    Radiology:  Independent visualization of images performed.    {diagnostic yes/no:133218}    Assessment:  No diagnosis found.    Plan:  Discussed the assessment with the patient and developed a plan together:  {FS Plan:607792}      Time spent in one-on-one evaluation and discussion with patient regarding nature of problem, course, prior treatments, and therapeutic options, at least 50% of which was spent in counseling and coordination of care:  *** minutes.      Cassy Durham MD, CAQ Sports Medicine  [facility]

## 2022-02-02 ENCOUNTER — OFFICE VISIT (OUTPATIENT)
Dept: FAMILY MEDICINE | Facility: CLINIC | Age: 67
End: 2022-02-02
Payer: COMMERCIAL

## 2022-02-02 ENCOUNTER — TELEPHONE (OUTPATIENT)
Dept: FAMILY MEDICINE | Facility: CLINIC | Age: 67
End: 2022-02-02

## 2022-02-02 VITALS
DIASTOLIC BLOOD PRESSURE: 80 MMHG | SYSTOLIC BLOOD PRESSURE: 128 MMHG | WEIGHT: 282 LBS | HEIGHT: 71 IN | TEMPERATURE: 98.3 F | RESPIRATION RATE: 20 BRPM | HEART RATE: 77 BPM | OXYGEN SATURATION: 94 % | BODY MASS INDEX: 39.48 KG/M2

## 2022-02-02 DIAGNOSIS — Z95.2 AORTIC VALVE PROSTHESIS PRESENT: ICD-10-CM

## 2022-02-02 DIAGNOSIS — Z13.6 CARDIOVASCULAR SCREENING; LDL GOAL LESS THAN 100: ICD-10-CM

## 2022-02-02 DIAGNOSIS — I10 HYPERTENSION GOAL BP (BLOOD PRESSURE) < 140/90: ICD-10-CM

## 2022-02-02 DIAGNOSIS — E66.01 MORBID OBESITY (H): ICD-10-CM

## 2022-02-02 DIAGNOSIS — R47.01 APHASIA: Primary | ICD-10-CM

## 2022-02-02 DIAGNOSIS — Z95.2 H/O PULMONIC VALVE REPLACEMENT: ICD-10-CM

## 2022-02-02 DIAGNOSIS — E11.9 TYPE 2 DIABETES MELLITUS WITHOUT COMPLICATION, WITHOUT LONG-TERM CURRENT USE OF INSULIN (H): ICD-10-CM

## 2022-02-02 DIAGNOSIS — H93.13 TINNITUS, BILATERAL: ICD-10-CM

## 2022-02-02 DIAGNOSIS — E11.8 TYPE 2 DIABETES MELLITUS WITH COMPLICATION, WITHOUT LONG-TERM CURRENT USE OF INSULIN (H): ICD-10-CM

## 2022-02-02 DIAGNOSIS — Z23 NEED FOR VACCINATION: ICD-10-CM

## 2022-02-02 LAB
ALT SERPL W P-5'-P-CCNC: 55 U/L (ref 0–70)
ANION GAP SERPL CALCULATED.3IONS-SCNC: 5 MMOL/L (ref 3–14)
BUN SERPL-MCNC: 19 MG/DL (ref 7–30)
CALCIUM SERPL-MCNC: 9.3 MG/DL (ref 8.5–10.1)
CHLORIDE BLD-SCNC: 109 MMOL/L (ref 94–109)
CHOLEST SERPL-MCNC: 209 MG/DL
CO2 SERPL-SCNC: 28 MMOL/L (ref 20–32)
CREAT SERPL-MCNC: 0.89 MG/DL (ref 0.66–1.25)
CREAT UR-MCNC: 175 MG/DL
ERYTHROCYTE [DISTWIDTH] IN BLOOD BY AUTOMATED COUNT: 12.2 % (ref 10–15)
FASTING STATUS PATIENT QL REPORTED: NO
GFR SERPL CREATININE-BSD FRML MDRD: >90 ML/MIN/1.73M2
GLUCOSE BLD-MCNC: 87 MG/DL (ref 70–99)
HCT VFR BLD AUTO: 51.7 % (ref 40–53)
HDLC SERPL-MCNC: 39 MG/DL
HGB BLD-MCNC: 17.4 G/DL (ref 13.3–17.7)
LDLC SERPL CALC-MCNC: 135 MG/DL
MCH RBC QN AUTO: 31.6 PG (ref 26.5–33)
MCHC RBC AUTO-ENTMCNC: 33.7 G/DL (ref 31.5–36.5)
MCV RBC AUTO: 94 FL (ref 78–100)
MICROALBUMIN UR-MCNC: 23 MG/L
MICROALBUMIN/CREAT UR: 13.14 MG/G CR (ref 0–17)
NONHDLC SERPL-MCNC: 170 MG/DL
PLATELET # BLD AUTO: 161 10E3/UL (ref 150–450)
POTASSIUM BLD-SCNC: 3.9 MMOL/L (ref 3.4–5.3)
RBC # BLD AUTO: 5.51 10E6/UL (ref 4.4–5.9)
SODIUM SERPL-SCNC: 142 MMOL/L (ref 133–144)
TRIGL SERPL-MCNC: 175 MG/DL
WBC # BLD AUTO: 11.7 10E3/UL (ref 4–11)

## 2022-02-02 PROCEDURE — 85027 COMPLETE CBC AUTOMATED: CPT | Performed by: FAMILY MEDICINE

## 2022-02-02 PROCEDURE — 80048 BASIC METABOLIC PNL TOTAL CA: CPT | Performed by: FAMILY MEDICINE

## 2022-02-02 PROCEDURE — 82043 UR ALBUMIN QUANTITATIVE: CPT | Performed by: FAMILY MEDICINE

## 2022-02-02 PROCEDURE — 90471 IMMUNIZATION ADMIN: CPT | Performed by: FAMILY MEDICINE

## 2022-02-02 PROCEDURE — 90750 HZV VACC RECOMBINANT IM: CPT | Performed by: FAMILY MEDICINE

## 2022-02-02 PROCEDURE — 80061 LIPID PANEL: CPT | Performed by: FAMILY MEDICINE

## 2022-02-02 PROCEDURE — 36415 COLL VENOUS BLD VENIPUNCTURE: CPT | Performed by: FAMILY MEDICINE

## 2022-02-02 PROCEDURE — 99214 OFFICE O/P EST MOD 30 MIN: CPT | Mod: 25 | Performed by: FAMILY MEDICINE

## 2022-02-02 PROCEDURE — 84460 ALANINE AMINO (ALT) (SGPT): CPT | Performed by: FAMILY MEDICINE

## 2022-02-02 ASSESSMENT — PAIN SCALES - GENERAL: PAINLEVEL: NO PAIN (0)

## 2022-02-02 ASSESSMENT — MIFFLIN-ST. JEOR: SCORE: 2089.44

## 2022-02-02 NOTE — TELEPHONE ENCOUNTER
[facility] Call Center    Phone Message    May a detailed message be left on voicemail: yes     Reason for Call: Appointment Intake    Referring Provider Name: Kris Weems  Diagnosis and/or Symptoms: Aortic valve prosthesis present. H/O pulmonic valve replacement    Action Taken: Message routed to:  Other: [facility] Scheduling pool    Travel Screening: Not Applicable

## 2022-02-02 NOTE — PROGRESS NOTES
"  Assessment & Plan     Aphasia    - Adult Neurology  Referral  - CBC with platelets  - **A1C FUTURE 3mo  - CBC with platelets    Tinnitus, bilateral    - Otolaryngology Referral    Aortic valve replacement  - Echocardiogram Complete  - Adult Cardiology Eval Referral  - CBC with platelets  - CBC with platelets    H/O pulmonic valve replacement    - Echocardiogram Complete  - Adult Cardiology Eval Referral  - CBC with platelets  - CBC with platelets    Hypertension goal BP (blood pressure) < 140/90    - Basic metabolic panel  (Ca, Cl, CO2, Creat, Gluc, K, Na, BUN)  - CBC with platelets  - Albumin Random Urine Quantitative with Creat Ratio  - Basic metabolic panel  (Ca, Cl, CO2, Creat, Gluc, K, Na, BUN)  - CBC with platelets  - Albumin Random Urine Quantitative with Creat Ratio    Type 2 diabetes mellitus without complication, without long-term current use of insulin (H)    - Basic metabolic panel  (Ca, Cl, CO2, Creat, Gluc, K, Na, BUN)  - Basic metabolic panel  (Ca, Cl, CO2, Creat, Gluc, K, Na, BUN)    CARDIOVASCULAR SCREENING; LDL GOAL LESS THAN 100    - Lipid panel reflex to direct LDL Fasting  - ALT  - Lipid panel reflex to direct LDL Fasting  - ALT    Need for vaccination    - SHINGRIX [[id number]]    Type 2 diabetes mellitus with complication, without long-term current use of insulin (H)      Morbid obesity (H)                 BMI:   Estimated body mass index is 39.11 kg/m  as calculated from the following:    Height as of this encounter: 1.808 m (5' 11.2\").    Weight as of this encounter: 127.9 kg (282 lb).   Weight management plan: Patient referred to endocrine and/or weight management specialty    Work on weight loss  Regular exercise    Kris Weems MD  [facility]    Pattie Jose is a 66 year old who presents for the following health issues     HPI     Ringing in both ear  Patient has been having some ringing in his ears.  Looking at his past medical history is not had " "evaluation for his aortic or pulmonic valve and sometimes not seen cardiology in some time.  Got lots of issues that need to be addressed today.  He also stated that he has had a couple episodes of aphasia where he cannot get his words out sometimes it will last for for more than a few minutes.  It is always return to normal but he has never been evaluated for this never come in when it has been happening.  Talk to him about cerebrovascular accidents and strokes.  I talked about transient ischemic attacks.  He does have risk for these especially embolic risk because of his valve replacements.  He does need to be seen by neurology and cardiology as soon as possible.  He does need to have an echocardiogram done he is in agreement with this.  Is completely asymptomatic at this time.        Review of Systems   Constitutional, HEENT, cardiovascular, pulmonary, gi and gu systems are negative, except as otherwise noted.      Objective    /80   Pulse 77   Temp 98.3  F (36.8  C) (Temporal)   Resp 20   Ht 1.808 m (5' 11.2\")   Wt 127.9 kg (282 lb)   SpO2 94%   BMI 39.11 kg/m    Body mass index is 39.11 kg/m .  Physical Exam   GENERAL: healthy, alert and no distress  EYES: Eyes grossly normal to inspection, PERRL and conjunctivae and sclerae normal  HENT: ear canals and TM's normal, nose and mouth without ulcers or lesions  NECK: no adenopathy, no asymmetry, masses, or scars and thyroid normal to palpation  RESP: lungs clear to auscultation - no rales, rhonchi or wheezes  CV: regular rate and rhythm, normal S1 S2, no S3 or S4, no murmur, click or rub, no peripheral edema and peripheral pulses strong  ABDOMEN: soft, nontender, no hepatosplenomegaly, no masses and bowel sounds normal  MS: no gross musculoskeletal defects noted, no edema  NEURO: Normal strength and tone, sensory exam grossly normal, mentation intact, cranial nerves 2-12 intact and gait normal including heel/toe/tandem walking    Results for orders " placed or performed in visit on 02/02/22   Basic metabolic panel  (Ca, Cl, CO2, Creat, Gluc, K, Na, BUN)     Status: Normal   Result Value Ref Range    Sodium 142 133 - 144 mmol/L    Potassium 3.9 3.4 - 5.3 mmol/L    Chloride 109 94 - 109 mmol/L    Carbon Dioxide (CO2) 28 20 - 32 mmol/L    Anion Gap 5 3 - 14 mmol/L    Urea Nitrogen 19 7 - 30 mg/dL    Creatinine 0.89 0.66 - 1.25 mg/dL    Calcium 9.3 8.5 - 10.1 mg/dL    Glucose 87 70 - 99 mg/dL    GFR Estimate >90 >60 mL/min/1.73m2   Lipid panel reflex to direct LDL Fasting     Status: Abnormal   Result Value Ref Range    Cholesterol 209 (H) <200 mg/dL    Triglycerides 175 (H) <150 mg/dL    Direct Measure HDL 39 (L) >=40 mg/dL    LDL Cholesterol Calculated 135 (H) <=100 mg/dL    Non HDL Cholesterol 170 (H) <130 mg/dL    Patient Fasting > 8hrs? No     Narrative    Cholesterol  Desirable:  <200 mg/dL    Triglycerides  Normal:  Less than 150 mg/dL  Borderline High:  150-199 mg/dL  High:  200-499 mg/dL  Very High:  Greater than or equal to 500 mg/dL    Direct Measure HDL  Female:  Greater than or equal to 50 mg/dL   Male:  Greater than or equal to 40 mg/dL    LDL Cholesterol  Desirable:  <100mg/dL  Above Desirable:  100-129 mg/dL   Borderline High:  130-159 mg/dL   High:  160-189 mg/dL   Very High:  >= 190 mg/dL    Non HDL Cholesterol  Desirable:  130 mg/dL  Above Desirable:  130-159 mg/dL  Borderline High:  160-189 mg/dL  High:  190-219 mg/dL  Very High:  Greater than or equal to 220 mg/dL   ALT     Status: Normal   Result Value Ref Range    ALT 55 0 - 70 U/L   CBC with platelets     Status: Abnormal   Result Value Ref Range    WBC Count 11.7 (H) 4.0 - 11.0 10e3/uL    RBC Count 5.51 4.40 - 5.90 10e6/uL    Hemoglobin 17.4 13.3 - 17.7 g/dL    Hematocrit 51.7 40.0 - 53.0 %    MCV 94 78 - 100 fL    MCH 31.6 26.5 - 33.0 pg    MCHC 33.7 31.5 - 36.5 g/dL    RDW 12.2 10.0 - 15.0 %    Platelet Count 161 150 - 450 10e3/uL   Albumin Random Urine Quantitative with Creat Ratio      Status: None   Result Value Ref Range    Creatinine Urine mg/dL 175 mg/dL    Albumin Urine mg/L 23 mg/L    Albumin Urine mg/g Cr 13.14 0.00 - 17.00 mg/g Cr

## 2022-02-03 PROBLEM — E11.8 TYPE 2 DIABETES MELLITUS WITH COMPLICATION, WITHOUT LONG-TERM CURRENT USE OF INSULIN (H): Status: ACTIVE | Noted: 2022-02-03

## 2022-02-08 ENCOUNTER — HOSPITAL ENCOUNTER (OUTPATIENT)
Dept: CARDIOLOGY | Facility: CLINIC | Age: 67
Discharge: HOME | End: 2022-02-08
Attending: FAMILY MEDICINE | Admitting: FAMILY MEDICINE
Payer: COMMERCIAL

## 2022-02-08 DIAGNOSIS — Z95.2 H/O PULMONIC VALVE REPLACEMENT: ICD-10-CM

## 2022-02-08 DIAGNOSIS — Z95.2 AORTIC VALVE PROSTHESIS PRESENT: ICD-10-CM

## 2022-02-08 LAB — LVEF ECHO: NORMAL

## 2022-02-08 PROCEDURE — 255N000002 HC RX 255 OP 636: Performed by: INTERNAL MEDICINE

## 2022-02-08 PROCEDURE — 999N000208 ECHOCARDIOGRAM COMPLETE

## 2022-02-08 PROCEDURE — 93306 TTE W/DOPPLER COMPLETE: CPT | Mod: 26 | Performed by: INTERNAL MEDICINE

## 2022-02-08 RX ADMIN — HUMAN ALBUMIN MICROSPHERES AND PERFLUTREN 5 ML: 10; .22 INJECTION, SOLUTION INTRAVENOUS at 09:37

## 2022-02-09 ENCOUNTER — OFFICE VISIT (OUTPATIENT)
Dept: CARDIOLOGY | Facility: CLINIC | Age: 67
End: 2022-02-09
Attending: FAMILY MEDICINE
Payer: COMMERCIAL

## 2022-02-09 VITALS
WEIGHT: 285.3 LBS | HEIGHT: 71 IN | SYSTOLIC BLOOD PRESSURE: 124 MMHG | OXYGEN SATURATION: 96 % | BODY MASS INDEX: 39.94 KG/M2 | HEART RATE: 72 BPM | DIASTOLIC BLOOD PRESSURE: 82 MMHG

## 2022-02-09 DIAGNOSIS — Z95.2 H/O PULMONIC VALVE REPLACEMENT: ICD-10-CM

## 2022-02-09 DIAGNOSIS — Z95.2 AORTIC VALVE PROSTHESIS PRESENT: ICD-10-CM

## 2022-02-09 PROCEDURE — 99214 OFFICE O/P EST MOD 30 MIN: CPT | Performed by: INTERNAL MEDICINE

## 2022-02-09 ASSESSMENT — MIFFLIN-ST. JEOR: SCORE: 2104.41

## 2022-02-09 NOTE — LETTER
2/9/2022    Kris Weems MD, MD  [address] [place] [address]    RE: Damon Manley       Dear Colleague,     I had the pleasure of seeing Damon Manley in the [facility].    CARDIOLOGY CLINIC VISIT  DATE OF SERVICE: November 11, 2020      PRIMARY CARE PHYSICIAN:  Kris eWems MD    HISTORY OF PRESENT ILLNESS:  Mr. Manley is a 66 y/o gentleman with PMH significant for rheumatic heart disease s/p homograft pulmonary valve and AVR in 1998 who presents to clinic today for follow up.  He was last seen by me in 11/2020.    In follow up today, Damon reports feeling well from a cardiac standpoint.  He retired this past year and stopped going to the gym because of COVID.  He has put on about 20 pounds or so.  He is now returning to the gym.  He is active at home and does yard work, mowing, snow removal, vacuuming etc.  He feels well doing these activities. He denies any exertional chest pain, chest discomfort or shortness of breath.  He denies any orthopnea PND.  He has ongoing issues with dizzy spells that have been worked up in the past without any obvious cause.  It has not been felt to be cardiac in origin by my colleagues who have seen him previously.  He now has buzzing in his ears and will be seeing ENT at some point.  He also notes periods of word finding difficulty.  He has been referred to neurology which has yet to be scheduled.  He had a follow up echocardiogram which demonstrates EF 45-50%, bioprosthetic AVR with mean gradient 22 mmHg (previously 20 mmHg)and pulmonic homograft with peak gradient 23 mmHg (previously 24 mmHg); overall stable findings.         PAST MEDICAL HISTORY:  1.  Rheumatic valve disease:  S/P bioprosthetic homograft pulmonary valve and AVR in 1998  2.  Depression  3.  Hypertension  4.  Obesity      MEDICATIONS:  Current Outpatient Medications   Medication     aspirin 81 MG tablet     diazepam (VALIUM) 5 MG tablet     glyBURIDE (DIABETA /MICRONASE) 5 MG  tablet     ibuprofen (ADVIL/MOTRIN) 600 MG tablet     losartan (COZAAR) 100 MG tablet     metFORMIN (GLUCOPHAGE-XR) 500 MG 24 hr tablet     metoprolol succinate ER (TOPROL-XL) 100 MG 24 hr tablet     STATIN NOT PRESCRIBED (INTENTIONAL)     No current facility-administered medications for this visit.       ALLERGIES:  Allergies   Allergen Reactions     No Known Drug Allergies        SOCIAL HISTORY:  I have reviewed this patient's social history and updated it with pertinent information if needed. Damon Manley  reports that he quit smoking about 33 years ago. He quit after 15.00 years of use. He has never used smokeless tobacco. He reports current alcohol use. He reports that he does not use drugs.    FAMILY HISTORY:  I have reviewed this patient's family history and updated it with pertinent information if needed.   Family History   Problem Relation Age of Onset     Other Cancer Father         lung     Other Cancer Mother         lung     Other Cancer Paternal Aunt         unsure     Diabetes No family hx of      Coronary Artery Disease No family hx of      Hypertension No family hx of      Hyperlipidemia No family hx of      Cerebrovascular Disease No family hx of        REVIEW OF SYSTEMS:  A complete ROS was obtained and the pertinent positives are outlined in the history of present illness above.  The remainder of systems is negative.      PHYSICAL EXAM:                     Vital Signs with Ranges     0 lbs 0 oz    Constitutional: awake, alert, no distress  Eyes: PERRL, sclera nonicteric  ENT: trachea midline  Respiratory: CTAB  Cardiovascular: RRR no m/r/g, no JVD  GI: nondistended, nontender, bowel sounds present  Lymph/Hematologic: no lymphadenopathy  Skin: dry, no rash  Musculoskeletal: good muscle tone, no edema bilaterally  Neurologic: no focal deficits  Neuropsychiatric: appropriate affact    DATA:  Echocardiogram dated 2/8/22  There is a bioprosthetic aortic valve. (not well visualized) mean 22mmHg,  peak  39mmHg  There is a pulmonic homograft that appears thickened with reduced mobility.  Peak gradient 23mmHg  Left ventricular systolic function is mildly reduced.  The visual ejection fraction is 45-50%.  ______________________________________________________________________________      ASSESSMENT:  1.  Status post prosthetic aortic valve replacement and pulmonic valve replacement in 1998: Mild increased gradients of both valves consistent with mild prosthetic stenosis; stable compared with echocardiogram 2020.    2.  Hypertension: At goal  3.  Obesity  4.  Dizziness:  Chronic, previous cardiac evaluation has been unremarkable.    5.  Episodes of aphasia:  Referred to neurology.      RECOMMENDATIONS:  1.  Reviewed the results of his echocardiogram which demonstrates mild increased gradients of the bioprosthetic AVR and pulmonic homograft.  These findings are stable compared with his echocardiogram from 2020.  No symptoms of concern.  Continue low dose aspirin  2.  Plan for follow up in one year with an echocardiogram prior to that visit.      Alisa Olvera MD [facility]    cc:   Kris Weems MD  [address] [place] [address]

## 2022-02-09 NOTE — PROGRESS NOTES
CARDIOLOGY CLINIC VISIT  DATE OF SERVICE: November 11, 2020      PRIMARY CARE PHYSICIAN:  Kris Weems MD    HISTORY OF PRESENT ILLNESS:  Mr. Manley is a 64 y/o gentleman with PMH significant for rheumatic heart disease s/p homograft pulmonary valve and AVR in 1998 who presents to clinic today for follow up.  He was last seen by me in 11/2020.    In follow up today, Damon reports feeling well from a cardiac standpoint.  He retired this past year and stopped going to the gym because of COVID.  He has put on about 20 pounds or so.  He is now returning to the gym.  He is active at home and does yard work, mowing, snow removal, vacuuming etc.  He feels well doing these activities. He denies any exertional chest pain, chest discomfort or shortness of breath.  He denies any orthopnea PND.  He has ongoing issues with dizzy spells that have been worked up in the past without any obvious cause.  It has not been felt to be cardiac in origin by my colleagues who have seen him previously.  He now has buzzing in his ears and will be seeing ENT at some point.  He also notes periods of word finding difficulty.  He has been referred to neurology which has yet to be scheduled.  He had a follow up echocardiogram which demonstrates EF 45-50%, bioprosthetic AVR with mean gradient 22 mmHg (previously 20 mmHg)and pulmonic homograft with peak gradient 23 mmHg (previously 24 mmHg); overall stable findings.         PAST MEDICAL HISTORY:  1.  Rheumatic valve disease:  S/P bioprosthetic homograft pulmonary valve and AVR in 1998  2.  Depression  3.  Hypertension  4.  Obesity      MEDICATIONS:  Current Outpatient Medications   Medication     aspirin 81 MG tablet     diazepam (VALIUM) 5 MG tablet     glyBURIDE (DIABETA /MICRONASE) 5 MG tablet     ibuprofen (ADVIL/MOTRIN) 600 MG tablet     losartan (COZAAR) 100 MG tablet     metFORMIN (GLUCOPHAGE-XR) 500 MG 24 hr tablet     metoprolol succinate ER (TOPROL-XL) 100 MG 24 hr tablet     STATIN  NOT PRESCRIBED (INTENTIONAL)     No current facility-administered medications for this visit.       ALLERGIES:  Allergies   Allergen Reactions     No Known Drug Allergies        SOCIAL HISTORY:  I have reviewed this patient's social history and updated it with pertinent information if needed. Damon Manley  reports that he quit smoking about 33 years ago. He quit after 15.00 years of use. He has never used smokeless tobacco. He reports current alcohol use. He reports that he does not use drugs.    FAMILY HISTORY:  I have reviewed this patient's family history and updated it with pertinent information if needed.   Family History   Problem Relation Age of Onset     Other Cancer Father         lung     Other Cancer Mother         lung     Other Cancer Paternal Aunt         unsure     Diabetes No family hx of      Coronary Artery Disease No family hx of      Hypertension No family hx of      Hyperlipidemia No family hx of      Cerebrovascular Disease No family hx of        REVIEW OF SYSTEMS:  A complete ROS was obtained and the pertinent positives are outlined in the history of present illness above.  The remainder of systems is negative.      PHYSICAL EXAM:                     Vital Signs with Ranges     0 lbs 0 oz    Constitutional: awake, alert, no distress  Eyes: PERRL, sclera nonicteric  ENT: trachea midline  Respiratory: CTAB  Cardiovascular: RRR no m/r/g, no JVD  GI: nondistended, nontender, bowel sounds present  Lymph/Hematologic: no lymphadenopathy  Skin: dry, no rash  Musculoskeletal: good muscle tone, no edema bilaterally  Neurologic: no focal deficits  Neuropsychiatric: appropriate affact    DATA:  Echocardiogram dated 2/8/22  There is a bioprosthetic aortic valve. (not well visualized) mean 22mmHg, peak  39mmHg  There is a pulmonic homograft that appears thickened with reduced mobility.  Peak gradient 23mmHg  Left ventricular systolic function is mildly reduced.  The visual ejection fraction is  45-50%.  ______________________________________________________________________________      ASSESSMENT:  1.  Status post prosthetic aortic valve replacement and pulmonic valve replacement in 1998: Mild increased gradients of both valves consistent with mild prosthetic stenosis; stable compared with echocardiogram 2020.    2.  Hypertension: At goal  3.  Obesity  4.  Dizziness:  Chronic, previous cardiac evaluation has been unremarkable.    5.  Episodes of aphasia:  Referred to neurology.      RECOMMENDATIONS:  1.  Reviewed the results of his echocardiogram which demonstrates mild increased gradients of the bioprosthetic AVR and pulmonic homograft.  These findings are stable compared with his echocardiogram from 2020.  No symptoms of concern.  Continue low dose aspirin  2.  Plan for follow up in one year with an echocardiogram prior to that visit.      Alisa Olvera MD [facility]

## 2022-03-14 NOTE — PROGRESS NOTES
"ENT Consultation    Damon Manley who is a 66 year old male seen in consultation at the request of Kris Weems.      History of Present Illness - Damon Manley is a 66 year old male presents for evaluation of \"humming\" in his ears for the last couple years most of the time and episodes of dizziness disorientation eventual vertigo had some emesis with it.  Problem started mostly after he fell and hit his head may suffer the concussion had some amnesia right before the symptoms started.  He has worked in a loud  environment but tried to protect his ears.  He has episodes of disorientation to space still.  Had MRI done at the time.    MRI BRAIN WITHOUT AND WITH CONTRAST  5/10/2018 9:48 AM     HISTORY:  ; Dizziness      TECHNIQUE:  Multiplanar, multisequence MRI of the brain without and  with 12 mL Gadavist     COMPARISON: CT dated August 2006     FINDINGS:  The brain parenchyma, ventricles and subarachnoid spaces  appear normal. There is no evidence of hemorrhage, mass, acute  infarct, or anomaly.  There are no gadolinium enhancing lesions.   The  facial structures appear normal. The arteries at the base of the brain  and the dural venous sinuses appear patent.                                                                       IMPRESSION:  No structural abnormalities are identified. No bleed,  mass, or infarcts are seen    BP Readings from Last 1 Encounters:   02/09/22 124/82       BP noted to be well controlled today in office.     Damon IS NOT a smoker/uses chewing tobacco.  Damon already quit      Past Medical History -   Past Medical History:   Diagnosis Date     Coronary artery disease     Valves replaced due to hx of Rheumatic fever. - No mechanical valves     Depressive disorder     situational - resolved     Hypertension      Motion sickness      Obesity (BMI 30-39.9)        Current Medications -   Current Outpatient Medications:      aspirin 81 MG tablet, Take 1 tablet (81 mg) by mouth daily, Disp: , " Rfl:      diazepam (VALIUM) 5 MG tablet, Take 1-2 30 minutes prior to flight (Patient not taking: Reported on 10/12/2020), Disp: 6 tablet, Rfl: 0     glyBURIDE (DIABETA /MICRONASE) 5 MG tablet, Take 1 Tablet (5 mg) by mouth once daily with breakfast., Disp: 90 tablet, Rfl: 0     ibuprofen (ADVIL/MOTRIN) 600 MG tablet, Take 1 tablet (600 mg) by mouth every 6 hours as needed for moderate pain (Patient not taking: Reported on 10/7/2020), Disp: 21 tablet, Rfl: 0     losartan (COZAAR) 100 MG tablet, Take 1 tablet by mouth once daily., Disp: 90 tablet, Rfl: 1     metFORMIN (GLUCOPHAGE-XR) 500 MG 24 hr tablet, Take 2 tablets by mouth once daily with dinner., Disp: 180 tablet, Rfl: 0     metoprolol succinate ER (TOPROL-XL) 100 MG 24 hr tablet, Take 1 tablet by mouth once daily., Disp: 30 tablet, Rfl: 5     STATIN NOT PRESCRIBED (INTENTIONAL), Please choose reason not prescribed, below (Patient not taking: Reported on 2021), Disp: , Rfl:     Allergies -   Allergies   Allergen Reactions     No Known Drug Allergies        Social History -   Social History     Socioeconomic History     Marital status:      Spouse name: Wanda Parks     Number of children: 2     Years of education: 14     Highest education level: Not on file   Occupational History     Not on file   Tobacco Use     Smoking status: Former Smoker     Years: 15.00     Quit date: 1988     Years since quittin.9     Smokeless tobacco: Never Used   Substance and Sexual Activity     Alcohol use: Yes     Alcohol/week: 0.0 standard drinks     Comment: social - glass of wine socially     Drug use: No     Sexual activity: Yes     Partners: Female     Comment:  - 2 children   Other Topics Concern      Service No     Blood Transfusions Yes     Comment: open heart surgery      Caffeine Concern No     Occupational Exposure No     Hobby Hazards No     Sleep Concern Yes     Comment:  wakes after 4-5 hours of sleep at night     Stress Concern Yes      Weight Concern Yes     Special Diet No     Back Care No     Exercise Yes     Comment: 3-4 times/week     Bike Helmet Not Asked     Comment: n/a     Seat Belt Yes     Self-Exams Not Asked     Comment: n/a     Parent/sibling w/ CABG, MI or angioplasty before 65F 55M? Not Asked   Social History Narrative     Not on file     Social Determinants of Health     Financial Resource Strain: Not on file   Food Insecurity: Not on file   Transportation Needs: Not on file   Physical Activity: Not on file   Stress: Not on file   Social Connections: Not on file   Intimate Partner Violence: Not on file   Housing Stability: Not on file       Family History -   Family History   Problem Relation Age of Onset     Other Cancer Father         lung     Other Cancer Mother         lung     Other Cancer Paternal Aunt         unsure     Diabetes No family hx of      Coronary Artery Disease No family hx of      Hypertension No family hx of      Hyperlipidemia No family hx of      Cerebrovascular Disease No family hx of        Review of Systems - As per HPI and PMHx, otherwise review of system review of the head and neck negative. Otherwise 10+ review of system is negative    Physical Exam  There were no vitals taken for this visit.  BMI: There is no height or weight on file to calculate BMI.    General - The patient is well nourished and well developed, and appears to have good nutritional status.  Alert and oriented to person and place, answers questions and cooperates with examination appropriately.    SKIN - No suspicious lesions or rashes.  Respiration - No respiratory distress.  Head and Face - Normocephalic and atraumatic, with no gross asymmetry noted of the contour of the facial features.  The facial nerve is intact, with strong symmetric movements.    Voice and Breathing - The patient was breathing comfortably without the use of accessory muscles. The patients voice was clear and strong, and had appropriate pitch and  quality.    Ears - Bilateral pinna and EACs with normal appearing overlying skin. Tympanic membrane intact with good mobility on pneumatic otoscopy bilaterally. Bony landmarks of the ossicular chain are normal. The tympanic membranes are normal in appearance. No retraction, perforation, or masses.  No fluid or purulence was seen in the external canal or the middle ear.     Eyes - Extraocular movements intact.  Sclera were not icteric or injected, conjunctiva were pink and moist.    Mouth - Examination of the oral cavity showed pink, healthy oral mucosa. No lesions or ulcerations noted.  The tongue was mobile and midline, and the dentition were in good condition.      Throat - The walls of the oropharynx were smooth, pink, moist, symmetric, and had no lesions or ulcerations.  The tonsillar pillars and soft palate were symmetric.  The uvula was midline on elevation.    Neck - Normal midline excursion of the laryngotracheal complex during swallowing.  Full range of motion on passive movement.  Palpation of the occipital, submental, submandibular, internal jugular chain, and supraclavicular nodes did not demonstrate any abnormal lymph nodes or masses.  The carotid pulse was palpable bilaterally.  Palpation of the thyroid was soft and smooth, with no nodules or goiter appreciated.  The trachea was mobile and midline.    Nose - External contour is symmetric, no gross deflection or scars.  Nasal mucosa is pink and moist with no abnormal mucus.  The septum was midline and non-obstructive, turbinates of normal size and position.  No polyps, masses, or purulence noted on examination.    Neuro - Nonfocal neuro exam is normal, CN 2 through 12 intact, normal gait and muscle tone.  Romberg with eyes closed somewhat unsteady without specific direction of fall.  No spontaneous nystagmus noted.      Performed in clinic today:  Audiologic Studies - An audiogram and tympanogram were performed today as part of the evaluation and  personally reviewed. The tympanogram shows Type A curves on the right and Type A curves on the left, with Normal canal volumes and middle ear pressures.  The audiogram showed Mild sloping high-frequency SNHL on the right and Mild sloping high-frequency SNHLon the left.    100% word recognition score in the right and 100% on the left.    A/P - Damon Manley is a 66 year old male with nonspecific dizziness as well as vertigo possibly postconcussive.  Patient never had full evaluation.  I would like to get the further testing at the [facility].  However as his primary care provider was also insinuating he should benefit from neurology evaluation.  Therefore will order neurology consult as well.  In regard to his tinnitus we discussed tinnitus coping strategies by avoidance of caffeine, salt, dark chocolate, alcohol.  We also discussed white noise masking with either a free savanah or white noise making device.  We discussed hearing protection.  Patient will see him back in a couple months at which point we will review he is vestibular testing data as well as assessment by neurology and current status of his tinnitus.      Con Carlos MD

## 2022-03-27 ENCOUNTER — HOSPITAL ENCOUNTER (EMERGENCY)
Facility: CLINIC | Age: 67
Discharge: HOME | End: 2022-03-27
Attending: STUDENT IN AN ORGANIZED HEALTH CARE EDUCATION/TRAINING PROGRAM | Admitting: STUDENT IN AN ORGANIZED HEALTH CARE EDUCATION/TRAINING PROGRAM
Payer: COMMERCIAL

## 2022-03-27 VITALS
BODY MASS INDEX: 39.53 KG/M2 | RESPIRATION RATE: 18 BRPM | DIASTOLIC BLOOD PRESSURE: 107 MMHG | TEMPERATURE: 98.4 F | OXYGEN SATURATION: 93 % | WEIGHT: 285 LBS | HEART RATE: 82 BPM | SYSTOLIC BLOOD PRESSURE: 159 MMHG

## 2022-03-27 DIAGNOSIS — M62.838 NECK MUSCLE SPASM: ICD-10-CM

## 2022-03-27 PROCEDURE — 99283 EMERGENCY DEPT VISIT LOW MDM: CPT | Performed by: STUDENT IN AN ORGANIZED HEALTH CARE EDUCATION/TRAINING PROGRAM

## 2022-03-27 PROCEDURE — 99284 EMERGENCY DEPT VISIT MOD MDM: CPT | Performed by: STUDENT IN AN ORGANIZED HEALTH CARE EDUCATION/TRAINING PROGRAM

## 2022-03-27 PROCEDURE — 250N000013 HC RX MED GY IP 250 OP 250 PS 637: Performed by: STUDENT IN AN ORGANIZED HEALTH CARE EDUCATION/TRAINING PROGRAM

## 2022-03-27 RX ORDER — OXYCODONE HYDROCHLORIDE 5 MG/1
5 TABLET ORAL ONCE
Status: COMPLETED | OUTPATIENT
Start: 2022-03-27 | End: 2022-03-27

## 2022-03-27 RX ORDER — OXYCODONE HYDROCHLORIDE 5 MG/1
5 TABLET ORAL EVERY 4 HOURS PRN
Qty: 12 TABLET | Refills: 0 | Status: SHIPPED | OUTPATIENT
Start: 2022-03-27 | End: 2022-03-30

## 2022-03-27 RX ORDER — ONDANSETRON 4 MG/1
4-8 TABLET, ORALLY DISINTEGRATING ORAL EVERY 8 HOURS PRN
Qty: 10 TABLET | Refills: 0 | Status: SHIPPED | OUTPATIENT
Start: 2022-03-27 | End: 2022-03-30

## 2022-03-27 RX ADMIN — OXYCODONE HYDROCHLORIDE 5 MG: 5 TABLET ORAL at 15:34

## 2022-03-27 NOTE — ED PROVIDER NOTES
History     Chief Complaint   Patient presents with     Torticollis     HPI  Damon Manley is a 66 year old male who presents department for evaluation of left-sided neck pain after some yard work yesterday.  Patient explains that he was using a saw on a pole to trim branches on [employer] within his yard yesterday but denies any falls or traumatic injuries, had no pain yesterday when he went to bed but awoke with severe left-sided pains.  He localizes intermittent spasming pains along the left posterior neck seemingly refractory to ibuprofen and previously prescribed Flexeril at 9 AM this morning in addition to acetaminophen at 12 PM noon.  He recalls similar symptoms a few years ago, also without trauma, but does not recall how the symptoms were treated.  He is without headache, visual changes, sore throat, extremity pain, weakness or sensory deficits.      Allergies:  Allergies   Allergen Reactions     No Known Drug Allergies        Problem List:    Patient Active Problem List    Diagnosis Date Noted     Type 2 diabetes mellitus with complication, without long-term current use of insulin (H) 02/03/2022     Priority: Medium     Obesity (BMI 35.0-39.9) with comorbidity (H) 03/15/2019     Priority: Medium     Closed fracture of transverse process of lumbar vertebra, initial encounter (H) 02/25/2019     Priority: Medium     multiple levels       Closed head injury, subsequent encounter 02/25/2019     Priority: Medium     Rib fractures 02/23/2019     Priority: Medium     Type 2 diabetes mellitus without complication, without long-term current use of insulin (H) 05/30/2018     Priority: Medium     Elevated lipase 05/25/2018     Priority: Medium     Aortic valve prosthesis present 09/28/2017     Priority: Medium     Pulmonary valve replaced 09/28/2017     Priority: Medium     Aortic valve replaced 09/28/2017     Priority: Medium     ARIAS (dyspnea on exertion) 09/28/2017     Priority: Medium     SOB (shortness of  breath) 2017     Priority: Medium     Elevated LFTs 2017     Priority: Medium     Essential hypertension with goal blood pressure less than 140/90 06/15/2016     Priority: Medium     Abnormal glucose 2011     Priority: Medium     Problem list name updated by automated process. Provider to review       GERD (gastroesophageal reflux disease) 02/10/2010     Priority: Medium        Past Medical History:    Past Medical History:   Diagnosis Date     Coronary artery disease      Depressive disorder      Hypertension      Motion sickness      Obesity (BMI 30-39.9)        Past Surgical History:    Past Surgical History:   Procedure Laterality Date     COLONOSCOPY  2007    Colon polyps.     COLONOSCOPY N/A 2017    Procedure: COMBINED COLONOSCOPY, SINGLE OR MULTIPLE BIOPSY/POLYPECTOMY BY BIOPSY;  Surgeon: Dejan Mckee MD;  Location: [facility]     HC KNEE SCOPE,MED/LAT MENISECTOMY  2007    Partial medial meniscectomy, both knees.     ZZC ANESTH,DX ARTHROSCOPIC PROC KNEE JOINT  05    Left knee with partial medial meniscectomy.     ZZC ANESTH,OPEN HEART; W/O PUMP OXYEGENATOR  1998       Family History:    Family History   Problem Relation Age of Onset     Other Cancer Father         lung     Other Cancer Mother         lung     Other Cancer Paternal Aunt         unsure     Diabetes No family hx of      Coronary Artery Disease No family hx of      Hypertension No family hx of      Hyperlipidemia No family hx of      Cerebrovascular Disease No family hx of        Social History:  Marital Status:   [2]  Social History     Tobacco Use     Smoking status: Former Smoker     Years: 15.00     Quit date: 1988     Years since quittin.0     Smokeless tobacco: Never Used   Substance Use Topics     Alcohol use: Yes     Alcohol/week: 0.0 standard drinks     Comment: social - glass of wine socially     Drug use: No        Medications:    oxyCODONE (ROXICODONE) 5 MG tablet  aspirin 81  MG tablet  diazepam (VALIUM) 5 MG tablet  glyBURIDE (DIABETA /MICRONASE) 5 MG tablet  ibuprofen (ADVIL/MOTRIN) 600 MG tablet  losartan (COZAAR) 100 MG tablet  metFORMIN (GLUCOPHAGE-XR) 500 MG 24 hr tablet  metoprolol succinate ER (TOPROL-XL) 100 MG 24 hr tablet  STATIN NOT PRESCRIBED (INTENTIONAL)        Review of Systems  Constitutional:  Negative for fever or recent illness.  Respiratory:  Negative for cough or respiratory infectious symptoms.   Gastrointestinal:  Negative for abdominal pain, nausea or vomiting.   Musculoskeletal: Positive for posterior left-sided neck pain.  Negative for back pain.  Neurological:  Negative for dizziness, weakness or sensory deficit.    All others reviewed and are negative.      Physical Exam   BP: (!) 159/107  Pulse: 82  Temp: 98.4  F (36.9  C)  Resp: 18  Weight: 129.3 kg (285 lb)  SpO2: 93 %      Physical Exam  Constitutional:  Well developed, well nourished.  Appears nontoxic and in no acute distress.  Resting comfortably on the gurney.  HENT:  Normocephalic and atraumatic.  Symmetric in appearance.  Eyes:  Conjunctivae are normal.  Neck:  No step-offs noted and no tenderness to palpation of midline cervical, thoracic or lumbosacral vertebra.  Reproducible tenderness of left-sided paraspinal cervical musculature, and also with attempting to turn head to the left.  5/5 strength of bilateral shoulder shrug shoulder flexion/abduction.  Sensation intact and equal along both lateral shoulders, lateral and medial forearm, palmar aspect of hands including digits 2-4.    Cardiovascular:  No cyanosis.    Respiratory:  Effort normal without sign of respiratory distress.    Musculoskeletal:  Moves extremities spontaneously.  Neurological:  Patient is alert.  Skin:  Skin is warm and dry.  Psychiatric:  Normal mood and affect.      ED Course                 Procedures              Critical Care time:  none               No results found for this or any previous visit (from the past 24  hour(s)).    Medications   oxyCODONE (ROXICODONE) tablet 5 mg (has no administration in time range)       Assessments & Plan (with Medical Decision Making)   Damon Manley is a 66 year old male who presents to the department for evaluation of left posterior neck pain and spasming which she noted upon awakening early this morning.  He is without recent injury or trauma, suspects it is related to staring up at trees while trimming yesterday.  On examination he has reproducible muscular tenderness all of the left paraspinal cervical region, low suspicion for neurovascular compromise, radiographic imaging likely to be of low utility.  Patient says that he has a few tablets of cyclobenzaprine left at home, may take a tablet every 8 hours but also be prescribed a very short course of oxycodone to take only as absolutely necessary for severe breakthrough pain.  Also recommend he follow-up with primary care provider later this week for reevaluation.      Disclaimer:  This note consists of symbols derived from keyboarding, dictation, and/or voice recognition software.  As a result, there may be errors in the script that have gone undetected.  Please consider this when interpreting information found in the chart.        I have reviewed the nursing notes.    I have reviewed the findings, diagnosis, plan and need for follow up with the patient.       New Prescriptions    OXYCODONE (ROXICODONE) 5 MG TABLET    Take 1 tablet (5 mg) by mouth every 4 hours as needed for severe pain       Final diagnoses:   Neck muscle spasm       3/27/2022   [facility] EMERGENCY DEPT     Volodymyr Linton DO  03/27/22 [id number]

## 2022-03-27 NOTE — ED TRIAGE NOTES
Pt here with pain to left side of neck, after limbing trees yesterday.  Pain woke him up around 0130

## 2022-03-28 ENCOUNTER — OFFICE VISIT (OUTPATIENT)
Dept: AUDIOLOGY | Facility: CLINIC | Age: 67
End: 2022-03-28
Payer: COMMERCIAL

## 2022-03-28 ENCOUNTER — OFFICE VISIT (OUTPATIENT)
Dept: OTOLARYNGOLOGY | Facility: CLINIC | Age: 67
End: 2022-03-28
Payer: COMMERCIAL

## 2022-03-28 VITALS
WEIGHT: 285 LBS | SYSTOLIC BLOOD PRESSURE: 134 MMHG | BODY MASS INDEX: 39.9 KG/M2 | DIASTOLIC BLOOD PRESSURE: 74 MMHG | TEMPERATURE: 98 F | HEIGHT: 71 IN

## 2022-03-28 DIAGNOSIS — H90.3 SENSORINEURAL HEARING LOSS, BILATERAL: ICD-10-CM

## 2022-03-28 DIAGNOSIS — R41.842 VISUAL-SPATIAL DISORIENTATION: ICD-10-CM

## 2022-03-28 DIAGNOSIS — H93.13 TINNITUS, BILATERAL: ICD-10-CM

## 2022-03-28 DIAGNOSIS — R42 DIZZINESS: Primary | ICD-10-CM

## 2022-03-28 DIAGNOSIS — H93.13 TINNITUS OF BOTH EARS: Primary | ICD-10-CM

## 2022-03-28 PROCEDURE — 99204 OFFICE O/P NEW MOD 45 MIN: CPT | Performed by: OTOLARYNGOLOGY

## 2022-03-28 PROCEDURE — 99207 PR NO CHARGE LOS: CPT | Performed by: AUDIOLOGIST

## 2022-03-28 PROCEDURE — 92567 TYMPANOMETRY: CPT | Performed by: AUDIOLOGIST

## 2022-03-28 PROCEDURE — 92557 COMPREHENSIVE HEARING TEST: CPT | Performed by: AUDIOLOGIST

## 2022-03-28 NOTE — LETTER
"    3/28/2022         RE: Damon Manley  [address]  [facility] [address]        Dear Colleague,    Thank you for referring your patient, Damon Manley, to the [facility]. Please see a copy of my visit note below.    ENT Consultation    Damno Manley who is a 66 year old male seen in consultation at the request of Kris Weems.      History of Present Illness - Damon Manley is a 66 year old male presents for evaluation of \"humming\" in his ears for the last couple years most of the time and episodes of dizziness disorientation eventual vertigo had some emesis with it.  Problem started mostly after he fell and hit his head may suffer the concussion had some amnesia right before the symptoms started.  He has worked in a loud  environment but tried to protect his ears.  He has episodes of disorientation to space still.  Had MRI done at the time.    MRI BRAIN WITHOUT AND WITH CONTRAST  5/10/2018 9:48 AM     HISTORY:  ; Dizziness      TECHNIQUE:  Multiplanar, multisequence MRI of the brain without and  with 12 mL Gadavist     COMPARISON: CT dated August 2006     FINDINGS:  The brain parenchyma, ventricles and subarachnoid spaces  appear normal. There is no evidence of hemorrhage, mass, acute  infarct, or anomaly.  There are no gadolinium enhancing lesions.   The  facial structures appear normal. The arteries at the base of the brain  and the dural venous sinuses appear patent.                                                                       IMPRESSION:  No structural abnormalities are identified. No bleed,  mass, or infarcts are seen    BP Readings from Last 1 Encounters:   02/09/22 124/82       BP noted to be well controlled today in office.     Damon IS NOT a smoker/uses chewing tobacco.  Damon already quit      Past Medical History -   Past Medical History:   Diagnosis Date     Coronary artery disease     Valves replaced due to hx of Rheumatic fever. - No mechanical valves     " Depressive disorder     situational - resolved     Hypertension      Motion sickness      Obesity (BMI 30-39.9)        Current Medications -   Current Outpatient Medications:      aspirin 81 MG tablet, Take 1 tablet (81 mg) by mouth daily, Disp: , Rfl:      diazepam (VALIUM) 5 MG tablet, Take 1-2 30 minutes prior to flight (Patient not taking: Reported on 10/12/2020), Disp: 6 tablet, Rfl: 0     glyBURIDE (DIABETA /MICRONASE) 5 MG tablet, Take 1 Tablet (5 mg) by mouth once daily with breakfast., Disp: 90 tablet, Rfl: 0     ibuprofen (ADVIL/MOTRIN) 600 MG tablet, Take 1 tablet (600 mg) by mouth every 6 hours as needed for moderate pain (Patient not taking: Reported on 10/7/2020), Disp: 21 tablet, Rfl: 0     losartan (COZAAR) 100 MG tablet, Take 1 tablet by mouth once daily., Disp: 90 tablet, Rfl: 1     metFORMIN (GLUCOPHAGE-XR) 500 MG 24 hr tablet, Take 2 tablets by mouth once daily with dinner., Disp: 180 tablet, Rfl: 0     metoprolol succinate ER (TOPROL-XL) 100 MG 24 hr tablet, Take 1 tablet by mouth once daily., Disp: 30 tablet, Rfl: 5     STATIN NOT PRESCRIBED (INTENTIONAL), Please choose reason not prescribed, below (Patient not taking: Reported on 2021), Disp: , Rfl:     Allergies -   Allergies   Allergen Reactions     No Known Drug Allergies        Social History -   Social History     Socioeconomic History     Marital status:      Spouse name: Wanda Parks     Number of children: 2     Years of education: 14     Highest education level: Not on file   Occupational History     Not on file   Tobacco Use     Smoking status: Former Smoker     Years: 15.00     Quit date: 1988     Years since quittin.9     Smokeless tobacco: Never Used   Substance and Sexual Activity     Alcohol use: Yes     Alcohol/week: 0.0 standard drinks     Comment: social - glass of wine socially     Drug use: No     Sexual activity: Yes     Partners: Female     Comment:  - 2 children   Other Topics Concern       Service No     Blood Transfusions Yes     Comment: open heart surgery 1998     Caffeine Concern No     Occupational Exposure No     Hobby Hazards No     Sleep Concern Yes     Comment:  wakes after 4-5 hours of sleep at night     Stress Concern Yes     Weight Concern Yes     Special Diet No     Back Care No     Exercise Yes     Comment: 3-4 times/week     Bike Helmet Not Asked     Comment: n/a     Seat Belt Yes     Self-Exams Not Asked     Comment: n/a     Parent/sibling w/ CABG, MI or angioplasty before 65F 55M? Not Asked   Social History Narrative     Not on file     Social Determinants of Health     Financial Resource Strain: Not on file   Food Insecurity: Not on file   Transportation Needs: Not on file   Physical Activity: Not on file   Stress: Not on file   Social Connections: Not on file   Intimate Partner Violence: Not on file   Housing Stability: Not on file       Family History -   Family History   Problem Relation Age of Onset     Other Cancer Father         lung     Other Cancer Mother         lung     Other Cancer Paternal Aunt         unsure     Diabetes No family hx of      Coronary Artery Disease No family hx of      Hypertension No family hx of      Hyperlipidemia No family hx of      Cerebrovascular Disease No family hx of        Review of Systems - As per HPI and PMHx, otherwise review of system review of the head and neck negative. Otherwise 10+ review of system is negative    Physical Exam  There were no vitals taken for this visit.  BMI: There is no height or weight on file to calculate BMI.    General - The patient is well nourished and well developed, and appears to have good nutritional status.  Alert and oriented to person and place, answers questions and cooperates with examination appropriately.    SKIN - No suspicious lesions or rashes.  Respiration - No respiratory distress.  Head and Face - Normocephalic and atraumatic, with no gross asymmetry noted of the contour of the facial features.   The facial nerve is intact, with strong symmetric movements.    Voice and Breathing - The patient was breathing comfortably without the use of accessory muscles. The patients voice was clear and strong, and had appropriate pitch and quality.    Ears - Bilateral pinna and EACs with normal appearing overlying skin. Tympanic membrane intact with good mobility on pneumatic otoscopy bilaterally. Bony landmarks of the ossicular chain are normal. The tympanic membranes are normal in appearance. No retraction, perforation, or masses.  No fluid or purulence was seen in the external canal or the middle ear.     Eyes - Extraocular movements intact.  Sclera were not icteric or injected, conjunctiva were pink and moist.    Mouth - Examination of the oral cavity showed pink, healthy oral mucosa. No lesions or ulcerations noted.  The tongue was mobile and midline, and the dentition were in good condition.      Throat - The walls of the oropharynx were smooth, pink, moist, symmetric, and had no lesions or ulcerations.  The tonsillar pillars and soft palate were symmetric.  The uvula was midline on elevation.    Neck - Normal midline excursion of the laryngotracheal complex during swallowing.  Full range of motion on passive movement.  Palpation of the occipital, submental, submandibular, internal jugular chain, and supraclavicular nodes did not demonstrate any abnormal lymph nodes or masses.  The carotid pulse was palpable bilaterally.  Palpation of the thyroid was soft and smooth, with no nodules or goiter appreciated.  The trachea was mobile and midline.    Nose - External contour is symmetric, no gross deflection or scars.  Nasal mucosa is pink and moist with no abnormal mucus.  The septum was midline and non-obstructive, turbinates of normal size and position.  No polyps, masses, or purulence noted on examination.    Neuro - Nonfocal neuro exam is normal, CN 2 through 12 intact, normal gait and muscle tone.  Romberg with eyes  closed somewhat unsteady without specific direction of fall.  No spontaneous nystagmus noted.      Performed in clinic today:  Audiologic Studies - An audiogram and tympanogram were performed today as part of the evaluation and personally reviewed. The tympanogram shows Type A curves on the right and Type A curves on the left, with Normal canal volumes and middle ear pressures.  The audiogram showed Mild sloping high-frequency SNHL on the right and Mild sloping high-frequency SNHLon the left.    100% word recognition score in the right and 100% on the left.    A/P - Damon Manley is a 66 year old male with nonspecific dizziness as well as vertigo possibly postconcussive.  Patient never had full evaluation.  I would like to get the further testing at the [facility] [facility] [facility].  However as his primary care provider was also insinuating he should benefit from neurology evaluation.  Therefore will order neurology consult as well.  In regard to his tinnitus we discussed tinnitus coping strategies by avoidance of caffeine, salt, dark chocolate, alcohol.  We also discussed white noise masking with either a free savanah or white noise making device.  We discussed hearing protection.  Patient will see him back in a couple months at which point we will review he is vestibular testing data as well as assessment by neurology and current status of his tinnitus.      Con Carlos MD        Again, thank you for allowing me to participate in the care of your patient.        Sincerely,        Con Carlos MD, MD

## 2022-03-28 NOTE — PROGRESS NOTES
AUDIOLOGY REPORT     SUMMARY: Audiology visit completed. See audiogram for results.     RECOMMENDATIONS: Follow-up with ENT    Rah Vale Licensed Audiologist #[id number]

## 2022-03-30 ENCOUNTER — ALLIED HEALTH/NURSE VISIT (OUTPATIENT)
Dept: FAMILY MEDICINE | Facility: CLINIC | Age: 67
End: 2022-03-30
Payer: COMMERCIAL

## 2022-03-30 DIAGNOSIS — Z23 NEED FOR VACCINATION: Primary | ICD-10-CM

## 2022-03-30 PROCEDURE — 90750 HZV VACC RECOMBINANT IM: CPT

## 2022-03-30 PROCEDURE — 90471 IMMUNIZATION ADMIN: CPT

## 2022-03-30 PROCEDURE — 99207 PR NO CHARGE NURSE ONLY: CPT

## 2022-03-30 NOTE — PROGRESS NOTES
Prior to immunization administration, verified patients identity using patient s name and date of birth. Please see Immunization Activity for additional information.     Screening Questionnaire for Adult Immunization    Are you sick today?   No   Do you have allergies to medications, food, a vaccine component or latex?   No   Have you ever had a serious reaction after receiving a vaccination?   No   Do you have a long-term health problem with heart, lung, kidney, or metabolic disease (e.g., diabetes), asthma, a blood disorder, no spleen, complement component deficiency, a cochlear implant, or a spinal fluid leak?  Are you on long-term aspirin therapy?   No   Do you have cancer, leukemia, HIV/AIDS, or any other immune system problem?   No   Do you have a parent, brother, or sister with an immune system problem?   No   In the past 3 months, have you taken medications that affect  your immune system, such as prednisone, other steroids, or anticancer drugs; drugs for the treatment of rheumatoid arthritis, Crohn s disease, or psoriasis; or have you had radiation treatments?   No   Have you had a seizure, or a brain or other nervous system problem?   No   During the past year, have you received a transfusion of blood or blood    products, or been given immune (gamma) globulin or antiviral drug?   No   For women: Are you pregnant or is there a chance you could become       pregnant during the next month?   No   Have you received any vaccinations in the past 4 weeks?   No     Immunization questionnaire answers were all negative.       Patient instructed to remain in clinic for 15 minutes afterwards, and to report any adverse reaction to me immediately.       Screening performed by Rosa Shah CMA on 3/30/2022 at 9:23 AM.

## 2022-04-09 ENCOUNTER — HEALTH MAINTENANCE LETTER (OUTPATIENT)
Age: 67
End: 2022-04-09

## 2022-04-13 DIAGNOSIS — E11.9 TYPE 2 DIABETES MELLITUS WITHOUT COMPLICATION, WITHOUT LONG-TERM CURRENT USE OF INSULIN (H): ICD-10-CM

## 2022-04-13 NOTE — TELEPHONE ENCOUNTER
Routing refill request to provider for review/approval because:  Labs not current:  A1c      Brendon Tyler RN

## 2022-04-14 RX ORDER — GLYBURIDE 5 MG/1
TABLET ORAL
Qty: 90 TABLET | Refills: 0 | Status: SHIPPED | OUTPATIENT
Start: 2022-04-14 | End: 2022-08-11

## 2022-04-27 DIAGNOSIS — I10 ESSENTIAL HYPERTENSION WITH GOAL BLOOD PRESSURE LESS THAN 140/90: ICD-10-CM

## 2022-04-28 RX ORDER — METOPROLOL SUCCINATE 100 MG/1
TABLET, EXTENDED RELEASE ORAL
Qty: 30 TABLET | Refills: 2 | Status: SHIPPED | OUTPATIENT
Start: 2022-04-28 | End: 2022-08-24

## 2022-04-28 NOTE — TELEPHONE ENCOUNTER
Pending Prescriptions:                       Disp   Refills    metoprolol succinate ER (TOPROL-XL) 100 M*30 tab*2            Sig: Take 1 tablet by mouth once daily.    Medication is being filled for 1 time scooby refill only due to:  Patient is due for annual    Please call and help schedule.  Thank you!

## 2022-06-30 NOTE — TELEPHONE ENCOUNTER
.1. Have you been to the ER, urgent care clinic since your last visit? Hospitalized since your last visit? Yes When: may 2022    2. Have you seen or consulted any other health care providers outside of the [address] [place] [address] since your last visit? Include any pap smears or colon screening.  No         Em lpn I have attempted to contact this patient by phone with the following results: left message to return my call on answering machine.  Lori Pond, [facility]      Pt has overdue fasting labs from 10/10/18 appt with [facility]. He needs a fasting lab only appt.

## 2022-07-18 ENCOUNTER — TELEPHONE (OUTPATIENT)
Dept: NEUROLOGY | Facility: CLINIC | Age: 67
End: 2022-07-18

## 2022-07-18 NOTE — TELEPHONE ENCOUNTER
RECORDS RECEIVED FROM: Internal   REASON FOR VISIT: Dizziness  Visual-spatial disorientation   Date of Appt: 08/02/2022   NOTES (FOR ALL VISITS) STATUS DETAILS   OFFICE NOTE from referring provider Internal  03/28/2022-Con Carlos MD  ENT provider   OFFICE NOTE from other specialist N/A    DISCHARGE SUMMARY from hospital N/A    DISCHARGE REPORT from the ER N/A    OPERATIVE REPORT N/A    MEDICATION LIST Internal    IMAGING  (FOR ALL VISITS)     EMG N/A    EEG N/A    LUMBAR PUNCTURE N/A    PATEL SCAN N/A    ULTRASOUND (CAROTID BILAT) *VASCULAR* N/A    MRI (HEAD, NECK, SPINE) Internal MR Cervical 09/11/20202   CT (HEAD, NECK, SPINE) N/A

## 2022-08-02 ENCOUNTER — OFFICE VISIT (OUTPATIENT)
Dept: NEUROLOGY | Facility: CLINIC | Age: 67
End: 2022-08-02
Attending: OTOLARYNGOLOGY
Payer: COMMERCIAL

## 2022-08-02 VITALS — SYSTOLIC BLOOD PRESSURE: 142 MMHG | DIASTOLIC BLOOD PRESSURE: 74 MMHG | HEART RATE: 61 BPM

## 2022-08-02 DIAGNOSIS — R42 DIZZINESS: ICD-10-CM

## 2022-08-02 DIAGNOSIS — M54.2 NECK PAIN: Primary | ICD-10-CM

## 2022-08-02 PROCEDURE — 99204 OFFICE O/P NEW MOD 45 MIN: CPT | Performed by: INTERNAL MEDICINE

## 2022-08-02 ASSESSMENT — PAIN SCALES - GENERAL: PAINLEVEL: MODERATE PAIN (4)

## 2022-08-02 NOTE — PROGRESS NOTES
"[facility] Neurology Consultation    Damon Manley MRN# [id number]   Age: 67 year old YOB: 1955     Requesting physician: Con Aquino     Reason for Consultation: dizzy spells      History of Presenting Symptoms:   Damon Manley is a 67 year old male who presents today for evaluation of dizzy spells.    Patient took a fall in 2018 off a ladder. He had head trauma (knocked out) and broke several ribs. He had about 15 minutes of nausea and throwing up. He went to the hospital and had a normal MRI brain. About 3-4 months later he had a similar episode of nausea, vomiting, and a \"disorienting feeling\".     Since then he has gotten similar recurrent episodes, but they last on the matter of seconds. Symptoms aren't as bothersome now, partly because he thinks he is more used to them. He previously had spells about every day. Now they are 3-4 times per week. Being out on a boat can provoke it. Other times it is more random. He denies triggers of head movement or standing up too quickly.     The first thing he will feel is nausea and a \"feeling a depression\". Things feel like \"out of focus\" with movement. The longest it will last now is about 15 seconds. He denies any room spinning sensations. He is not throwing up with the episodes.    He [facility] to the [facility] and had a VNG, but didn't hear the results.    Patient hears a humming noise in both ears, which started sometime after the concussion. He hears a constant noise, which will vary in intensity. He saw ENT and had a normal hearing test.    He has issues with chronic left sided neck pain since the concussion. He thinks maybe these dizzy episodes may be more frequent if he has worse neck pain.        Past Medical History:     Patient Active Problem List   Diagnosis     GERD (gastroesophageal reflux disease)     Abnormal glucose     Essential hypertension with goal blood pressure less than 140/90     Aortic valve prosthesis " present     Pulmonary valve replaced     Aortic valve replaced     ARIAS (dyspnea on exertion)     SOB (shortness of breath)     Elevated LFTs     Elevated lipase     Type 2 diabetes mellitus without complication, without long-term current use of insulin (H)     Rib fractures     Closed fracture of transverse process of lumbar vertebra, initial encounter (H)     Closed head injury, subsequent encounter     Obesity (BMI 35.0-39.9) with comorbidity (H)     Type 2 diabetes mellitus with complication, without long-term current use of insulin (H)     Past Medical History:   Diagnosis Date     Coronary artery disease     Valves replaced due to hx of Rheumatic fever. - No mechanical valves     Depressive disorder     situational - resolved     Hypertension      Motion sickness      Obesity (BMI 30-39.9)         Past Surgical History:     Past Surgical History:   Procedure Laterality Date     COLONOSCOPY  2007    Colon polyps.     COLONOSCOPY N/A 2017    Procedure: COMBINED COLONOSCOPY, SINGLE OR MULTIPLE BIOPSY/POLYPECTOMY BY BIOPSY;  Surgeon: Dejan Mckee MD;  Location: [facility]     HC KNEE SCOPE,MED/LAT MENISECTOMY  2007    Partial medial meniscectomy, both knees.     ZZC ANESTH,DX ARTHROSCOPIC PROC KNEE JOINT  05    Left knee with partial medial meniscectomy.     ZZC ANESTH,OPEN HEART; W/O PUMP OXYEGENATOR  1998        Social History:     Social History     Tobacco Use     Smoking status: Former Smoker     Years: 15.00     Quit date: 1988     Years since quittin.3     Smokeless tobacco: Never Used   Substance Use Topics     Alcohol use: Yes     Alcohol/week: 0.0 standard drinks     Comment: social - glass of wine socially     Drug use: No        Family History:     Family History   Problem Relation Age of Onset     Other Cancer Father         lung     Other Cancer Mother         lung     Other Cancer Paternal Aunt         unsure     Diabetes No family hx of      Coronary Artery Disease  No family hx of      Hypertension No family hx of      Hyperlipidemia No family hx of      Cerebrovascular Disease No family hx of         Medications:     Current Outpatient Medications   Medication Sig     aspirin 81 MG tablet Take 1 tablet (81 mg) by mouth daily     diazepam (VALIUM) 5 MG tablet Take 1-2 30 minutes prior to flight (Patient not taking: Reported on 10/12/2020)     glyBURIDE (DIABETA /MICRONASE) 5 MG tablet Take 1 Tablet (5 mg) by mouth once daily with breakfast.     ibuprofen (ADVIL/MOTRIN) 600 MG tablet Take 1 tablet (600 mg) by mouth every 6 hours as needed for moderate pain (Patient not taking: Reported on 10/7/2020)     losartan (COZAAR) 100 MG tablet Take 1 tablet by mouth once daily.     metFORMIN (GLUCOPHAGE-XR) 500 MG 24 hr tablet Take 2 tablets by mouth once daily with dinner.     metoprolol succinate ER (TOPROL-XL) 100 MG 24 hr tablet Take 1 tablet by mouth once daily.     STATIN NOT PRESCRIBED (INTENTIONAL) Please choose reason not prescribed, below (Patient not taking: Reported on 4/30/2021)     No current facility-administered medications for this visit.        Allergies:     Allergies   Allergen Reactions     No Known Drug Allergies         Review of Systems:   As above     Physical Exam:   Vitals: BP (!) 142/74 (BP Location: Right arm, Patient Position: Sitting, Cuff Size: Adult Large)   Pulse 61    General: Seated comfortably in no acute distress.  HEENT: Mild left sided paracervical muscle tenderness and tightness.  Optic discs sharp and vasculature normal on funduscopic exam.   Lungs: breathing comfortably  Extremities: no edema  Skin: No rashes  Neurologic:     Mental Status: Fully alert, attentive. Normal memory and fund of knowledge. Language normal, speech clear and fluent, no paraphasic errors.      Cranial Nerves: Visual fields intact. PERRL. EOMI with normal smooth pursuit. Facial sensation intact/symmetric. Facial movements symmetric. Hearing not formally tested but  "intact to conversation. Palate elevation symmetric, uvula midline. No dysarthria. Shoulder shrug strong bilaterally. Tongue protrusion midline.     Motor: No tremors or other abnormal movements observed. Muscle tone normal throughout. Normal/symmetric rapid finger tapping. Strength 5/5 throughout upper and lower extremities.     Deep Tendon Reflexes: 1+/symmetric throughout upper and lower extremities. No clonus. Toes downgoing bilaterally.     Sensory: Intact/symmetric to proprioception throughout upper and lower extremities. Decreased sensation to light touch and temperature in the feet compared to hands. Vibration is ~1-2 seconds in bilateral great toes, normal in the hands. Negative Romberg.      Coordination: Finger-nose-finger and heel-shin intact without dysmetria. Rapid alternating movements intact/symmetric with normal speed and rhythm.     Gait: Normal, steady casual gait. Able to walk on toes, heels and tandem with mild difficulty.    HINTS exam normal         Data: Pertinent prior to visit   Imaging:  MRI brain 2018  IMPRESSION:  No structural abnormalities are identified. No bleed,  mass, or infarcts are seen.  Personally reviewed and agree with above impression    MRI cervical 2020  IMPRESSION:  1. Degenerative changes of the cervical spine as detailed above.  2. Multiple small posterior disc herniations as detailed above.  3. No significant spinal canal or neural foraminal stenosis at any  level of the cervical spine.    Procedures:  None    Laboratory:  None         Assessment and Plan:   Assessment:  Damon Manley is a 67 year old male who presents today for evaluation of dizzy spells. In 2018 patient fell off a ladder and suffered a concussion. MRI brain was performed after the injury and was unremarkable. Since then he has had brief dizzy spells described as feeling of nausea, \"depression\", \"out of focus\", movement lasting < 15 seconds. Frequency of episodes is currently not as severe as " previously. Neurological examination is unrevealing as to an etiology of symptoms. I suspect symptoms may be a migraine equivalent symptoms. He has significant chronic left sided neck pain since the concussion, which may be triggering these episodes. Referral was placed to physical therapy for management of neck pains. Trial of migraine preventative could be considered in the future if symptoms are worsening or not improving.      Plan:  - PT referral  - Call with worsening symptoms or if interested in trial of migraine preventative    Follow up in Neurology clinic as needed should new concerns arise.    Mandeep Duarte MD   of Neurology  [facility]      The total time of this encounter today amounted to 46 minutes. This time included time spent with the patient, prep work, ordering tests, and performing post visit documentation.

## 2022-08-02 NOTE — PATIENT INSTRUCTIONS
"I suspect some of the dizzy spells you are experiencing are related to \"migrainous symptoms\", which are more common in people with history of head trauma, neck pain, and family history of migraines.       I've placed a physical therapy referral for the neck pain, which I hope will also help the dizzy spells. Someone from their office will call you to schedule      Let us know if symptoms are getting worse and you are wanting to start a migraine medication  "

## 2022-08-02 NOTE — LETTER
"    8/2/2022         RE: Damon Manley  [address]  [facility] [address]        Dear Colleague,    Thank you for referring your patient, Damon Manley, to the [facility] [place]. Please see a copy of my visit note below.    [facility] Neurology Consultation    Damon Manley MRN# [id number]   Age: 67 year old YOB: 1955     Requesting physician: Con Aquino     Reason for Consultation: dizzy spells      History of Presenting Symptoms:   Damon Manley is a 67 year old male who presents today for evaluation of dizzy spells.    Patient took a fall in 2018 off a ladder. He had head trauma (knocked out) and broke several ribs. He had about 15 minutes of nausea and throwing up. He went to the hospital and had a normal MRI brain. About 3-4 months later he had a similar episode of nausea, vomiting, and a \"disorienting feeling\".     Since then he has gotten similar recurrent episodes, but they last on the matter of seconds. Symptoms aren't as bothersome now, partly because he thinks he is more used to them. He previously had spells about every day. Now they are 3-4 times per week. Being out on a boat can provoke it. Other times it is more random. He denies triggers of head movement or standing up too quickly.     The first thing he will feel is nausea and a \"feeling a depression\". Things feel like \"out of focus\" with movement. The longest it will last now is about 15 seconds. He denies any room spinning sensations. He is not throwing up with the episodes.    He [facility] the [facility] and had a VNG, but didn't hear the results.    Patient hears a humming noise in both ears, which started sometime after the concussion. He hears a constant noise, which will vary in intensity. He saw ENT and had a normal hearing test.    He has issues with chronic left sided neck pain since the concussion. He thinks maybe these dizzy episodes may be more frequent if " he has worse neck pain.        Past Medical History:     Patient Active Problem List   Diagnosis     GERD (gastroesophageal reflux disease)     Abnormal glucose     Essential hypertension with goal blood pressure less than 140/90     Aortic valve prosthesis present     Pulmonary valve replaced     Aortic valve replaced     ARIAS (dyspnea on exertion)     SOB (shortness of breath)     Elevated LFTs     Elevated lipase     Type 2 diabetes mellitus without complication, without long-term current use of insulin (H)     Rib fractures     Closed fracture of transverse process of lumbar vertebra, initial encounter (H)     Closed head injury, subsequent encounter     Obesity (BMI 35.0-39.9) with comorbidity (H)     Type 2 diabetes mellitus with complication, without long-term current use of insulin (H)     Past Medical History:   Diagnosis Date     Coronary artery disease     Valves replaced due to hx of Rheumatic fever. - No mechanical valves     Depressive disorder     situational - resolved     Hypertension      Motion sickness      Obesity (BMI 30-39.9)         Past Surgical History:     Past Surgical History:   Procedure Laterality Date     COLONOSCOPY  2007    Colon polyps.     COLONOSCOPY N/A 2017    Procedure: COMBINED COLONOSCOPY, SINGLE OR MULTIPLE BIOPSY/POLYPECTOMY BY BIOPSY;  Surgeon: Dejan Mckee MD;  Location: [facility]     HC KNEE SCOPE,MED/LAT MENISECTOMY  2007    Partial medial meniscectomy, both knees.     ZZC ANESTH,DX ARTHROSCOPIC PROC KNEE JOINT  05    Left knee with partial medial meniscectomy.     ZZC ANESTH,OPEN HEART; W/O PUMP OXYEGENATOR  1998        Social History:     Social History     Tobacco Use     Smoking status: Former Smoker     Years: 15.00     Quit date: 1988     Years since quittin.3     Smokeless tobacco: Never Used   Substance Use Topics     Alcohol use: Yes     Alcohol/week: 0.0 standard drinks     Comment: social - glass of wine socially     Drug  use: No        Family History:     Family History   Problem Relation Age of Onset     Other Cancer Father         lung     Other Cancer Mother         lung     Other Cancer Paternal Aunt         unsure     Diabetes No family hx of      Coronary Artery Disease No family hx of      Hypertension No family hx of      Hyperlipidemia No family hx of      Cerebrovascular Disease No family hx of         Medications:     Current Outpatient Medications   Medication Sig     aspirin 81 MG tablet Take 1 tablet (81 mg) by mouth daily     diazepam (VALIUM) 5 MG tablet Take 1-2 30 minutes prior to flight (Patient not taking: Reported on 10/12/2020)     glyBURIDE (DIABETA /MICRONASE) 5 MG tablet Take 1 Tablet (5 mg) by mouth once daily with breakfast.     ibuprofen (ADVIL/MOTRIN) 600 MG tablet Take 1 tablet (600 mg) by mouth every 6 hours as needed for moderate pain (Patient not taking: Reported on 10/7/2020)     losartan (COZAAR) 100 MG tablet Take 1 tablet by mouth once daily.     metFORMIN (GLUCOPHAGE-XR) 500 MG 24 hr tablet Take 2 tablets by mouth once daily with dinner.     metoprolol succinate ER (TOPROL-XL) 100 MG 24 hr tablet Take 1 tablet by mouth once daily.     STATIN NOT PRESCRIBED (INTENTIONAL) Please choose reason not prescribed, below (Patient not taking: Reported on 4/30/2021)     No current facility-administered medications for this visit.        Allergies:     Allergies   Allergen Reactions     No Known Drug Allergies         Review of Systems:   As above     Physical Exam:   Vitals: BP (!) 142/74 (BP Location: Right arm, Patient Position: Sitting, Cuff Size: Adult Large)   Pulse 61    General: Seated comfortably in no acute distress.  HEENT: Mild left sided paracervical muscle tenderness and tightness.  Optic discs sharp and vasculature normal on funduscopic exam.   Lungs: breathing comfortably  Extremities: no edema  Skin: No rashes  Neurologic:     Mental Status: Fully alert, attentive. Normal memory and fund  of knowledge. Language normal, speech clear and fluent, no paraphasic errors.      Cranial Nerves: Visual fields intact. PERRL. EOMI with normal smooth pursuit. Facial sensation intact/symmetric. Facial movements symmetric. Hearing not formally tested but intact to conversation. Palate elevation symmetric, uvula midline. No dysarthria. Shoulder shrug strong bilaterally. Tongue protrusion midline.     Motor: No tremors or other abnormal movements observed. Muscle tone normal throughout. Normal/symmetric rapid finger tapping. Strength 5/5 throughout upper and lower extremities.     Deep Tendon Reflexes: 1+/symmetric throughout upper and lower extremities. No clonus. Toes downgoing bilaterally.     Sensory: Intact/symmetric to proprioception throughout upper and lower extremities. Decreased sensation to light touch and temperature in the feet compared to hands. Vibration is ~1-2 seconds in bilateral great toes, normal in the hands. Negative Romberg.      Coordination: Finger-nose-finger and heel-shin intact without dysmetria. Rapid alternating movements intact/symmetric with normal speed and rhythm.     Gait: Normal, steady casual gait. Able to walk on toes, heels and tandem with mild difficulty.    HINTS exam normal         Data: Pertinent prior to visit   Imaging:  MRI brain 2018  IMPRESSION:  No structural abnormalities are identified. No bleed,  mass, or infarcts are seen.  Personally reviewed and agree with above impression    MRI cervical 2020  IMPRESSION:  1. Degenerative changes of the cervical spine as detailed above.  2. Multiple small posterior disc herniations as detailed above.  3. No significant spinal canal or neural foraminal stenosis at any  level of the cervical spine.    Procedures:  None    Laboratory:  None         Assessment and Plan:   Assessment:  Damon Manley is a 67 year old male who presents today for evaluation of dizzy spells. In 2018 patient fell off a ladder and suffered a concussion.  "MRI brain was performed after the injury and was unremarkable. Since then he has had brief dizzy spells described as feeling of nausea, \"depression\", \"out of focus\", movement lasting < 15 seconds. Frequency of episodes is currently not as severe as previously. Neurological examination is unrevealing as to an etiology of symptoms. I suspect symptoms may be a migraine equivalent symptoms. He has significant chronic left sided neck pain since the concussion, which may be triggering these episodes. Referral was placed to physical therapy for management of neck pains. Trial of migraine preventative could be considered in the future if symptoms are worsening or not improving.      Plan:  - PT referral  - Call with worsening symptoms or if interested in trial of migraine preventative    Follow up in Neurology clinic as needed should new concerns arise.    Mandeep Duarte MD   of Neurology  [facility]      The total time of this encounter today amounted to 46 minutes. This time included time spent with the patient, prep work, ordering tests, and performing post visit documentation.        Again, thank you for allowing me to participate in the care of your patient.        Sincerely,        Mandeep Duarte MD    "

## 2022-08-08 DIAGNOSIS — E11.9 TYPE 2 DIABETES MELLITUS WITHOUT COMPLICATION, WITHOUT LONG-TERM CURRENT USE OF INSULIN (H): ICD-10-CM

## 2022-08-11 RX ORDER — GLYBURIDE 5 MG/1
TABLET ORAL
Qty: 90 TABLET | Refills: 0 | Status: SHIPPED | OUTPATIENT
Start: 2022-08-11 | End: 2022-11-09

## 2022-08-11 NOTE — TELEPHONE ENCOUNTER
"   Requested Prescriptions   Pending Prescriptions Disp Refills    glyBURIDE (DIABETA /MICRONASE) 5 MG tablet [Pharmacy Med Name: glyBURIDE 5 mg tablet (MICRONASE; DIABETA)] 90 tablet 0     Sig: Take 1 Tablet (5 mg) by mouth once daily with breakfast.        Sulfonylurea Agents Failed - 8/11/2022  2:00 PM        Failed - Patient has documented A1c within the specified period of time.     If HgbA1C is 8 or greater, it needs to be on file within the past 3 months.  If less than 8, must be on file within the past 6 months.     Recent Labs   Lab Test 09/07/21  0850   A1C 7.1*             Failed - Recent (6 mo) or future (30 days) visit within the authorizing provider's specialty     Patient had office visit in the last 6 months or has a visit in the next 30 days with authorizing provider or within the authorizing provider's specialty.  See \"Patient Info\" tab in inbasket, or \"Choose Columns\" in Meds & Orders section of the refill encounter.            Passed - Medication is active on med list        Passed - Patient is age 18 or older        Passed - Patient has a recent creatinine (normal) within the past 12 mos.     Recent Labs   Lab Test 02/02/22  1342   CR 0.89       Ok to refill medication if creatinine is low                MIMA RondonN, RN     "

## 2022-08-24 DIAGNOSIS — I10 ESSENTIAL HYPERTENSION WITH GOAL BLOOD PRESSURE LESS THAN 140/90: ICD-10-CM

## 2022-08-24 RX ORDER — METOPROLOL SUCCINATE 100 MG/1
100 TABLET, EXTENDED RELEASE ORAL DAILY
Qty: 30 TABLET | Refills: 2 | Status: SHIPPED | OUTPATIENT
Start: 2022-08-24 | End: 2022-12-20

## 2022-08-24 NOTE — TELEPHONE ENCOUNTER
"Requested Prescriptions   Pending Prescriptions Disp Refills    metoprolol succinate ER (TOPROL XL) 100 MG 24 hr tablet 30 tablet 2     Sig: Take 1 tablet (100 mg) by mouth daily        Beta-Blockers Protocol Failed - 8/24/2022 12:33 PM        Failed - Blood pressure under 140/90 in past 12 months       BP Readings from Last 3 Encounters:   08/02/22 (!) 142/74   03/28/22 134/74   03/27/22 (!) 159/107                 Passed - Patient is age 6 or older        Passed - Recent (12 mo) or future (30 days) visit within the authorizing provider's specialty     Patient has had an office visit with the authorizing provider or a provider within the authorizing providers department within the previous 12 mos or has a future within next 30 days. See \"Patient Info\" tab in inbasket, or \"Choose Columns\" in Meds & Orders section of the refill encounter.              Passed - Medication is active on med list              SHARAD Rondon, RN     "

## 2022-08-29 RX ORDER — METOPROLOL SUCCINATE 100 MG/1
TABLET, EXTENDED RELEASE ORAL
Qty: 30 TABLET | Refills: 2 | OUTPATIENT
Start: 2022-08-29

## 2022-08-29 NOTE — TELEPHONE ENCOUNTER
Prescription was sent 8/24/22 for #30 with 2 refills.  Pharmacy notified via E-Prescribe refusal.    MIMA RondonN, RN

## 2022-10-09 ENCOUNTER — HEALTH MAINTENANCE LETTER (OUTPATIENT)
Age: 67
End: 2022-10-09

## 2022-11-09 DIAGNOSIS — E11.9 TYPE 2 DIABETES MELLITUS WITHOUT COMPLICATION, WITHOUT LONG-TERM CURRENT USE OF INSULIN (H): ICD-10-CM

## 2022-11-09 DIAGNOSIS — I10 HYPERTENSION GOAL BP (BLOOD PRESSURE) < 140/90: ICD-10-CM

## 2022-11-09 RX ORDER — LOSARTAN POTASSIUM 100 MG/1
100 TABLET ORAL DAILY
Qty: 90 TABLET | Refills: 3 | Status: SHIPPED | OUTPATIENT
Start: 2022-11-09 | End: 2024-02-06

## 2022-11-09 RX ORDER — GLYBURIDE 5 MG/1
TABLET ORAL
Qty: 90 TABLET | Refills: 3 | Status: SHIPPED | OUTPATIENT
Start: 2022-11-09 | End: 2024-03-05

## 2022-11-09 NOTE — TELEPHONE ENCOUNTER
"Requested Prescriptions   Pending Prescriptions Disp Refills    losartan (COZAAR) 100 MG tablet 90 tablet 1     Sig: Take 1 tablet (100 mg) by mouth daily       Angiotensin-II Receptors Failed - 11/9/2022  3:04 PM        Failed - Last blood pressure under 140/90 in past 12 months     BP Readings from Last 3 Encounters:   08/02/22 (!) 142/74   03/28/22 134/74   03/27/22 (!) 159/107                 Passed - Recent (12 mo) or future (30 days) visit within the authorizing provider's specialty     Patient has had an office visit with the authorizing provider or a provider within the authorizing providers department within the previous 12 mos or has a future within next 30 days. See \"Patient Info\" tab in inbasket, or \"Choose Columns\" in Meds & Orders section of the refill encounter.              Passed - Medication is active on med list        Passed - Patient is age 18 or older        Passed - Normal serum creatinine on file in past 12 months     Recent Labs   Lab Test 02/02/22  1342   CR 0.89       Ok to refill medication if creatinine is low          Passed - Normal serum potassium on file in past 12 months     Recent Labs   Lab Test 02/02/22  1342   POTASSIUM 3.9                      glyBURIDE (DIABETA /MICRONASE) 5 MG tablet 90 tablet 0     Sig: Take 1 Tablet (5 mg) by mouth once daily with breakfast.  Strength: 5 mg       Sulfonylurea Agents Failed - 11/9/2022  3:04 PM        Failed - Patient has documented A1c within the specified period of time.     If HgbA1C is 8 or greater, it needs to be on file within the past 3 months.  If less than 8, must be on file within the past 6 months.     Recent Labs   Lab Test 09/07/21  0850   A1C 7.1*             Failed - Recent (6 mo) or future (30 days) visit within the authorizing provider's specialty     Patient had office visit in the last 6 months or has a visit in the next 30 days with authorizing provider or within the authorizing provider's specialty.  See \"Patient Info\" " "tab in inbasket, or \"Choose Columns\" in Meds & Orders section of the refill encounter.            Passed - Medication is active on med list        Passed - Patient is age 18 or older        Passed - Patient has a recent creatinine (normal) within the past 12 mos.     Recent Labs   Lab Test 02/02/22  1342   CR 0.89       Ok to refill medication if creatinine is low               MIMA RondonN, RN     "

## 2022-11-26 ENCOUNTER — HEALTH MAINTENANCE LETTER (OUTPATIENT)
Age: 67
End: 2022-11-26

## 2022-12-08 ENCOUNTER — VIRTUAL VISIT (OUTPATIENT)
Dept: INTERNAL MEDICINE | Facility: CLINIC | Age: 67
End: 2022-12-08
Payer: COMMERCIAL

## 2022-12-08 DIAGNOSIS — E11.8 TYPE 2 DIABETES MELLITUS WITH COMPLICATION, WITHOUT LONG-TERM CURRENT USE OF INSULIN (H): ICD-10-CM

## 2022-12-08 DIAGNOSIS — Z00.00 PREVENTATIVE HEALTH CARE: ICD-10-CM

## 2022-12-08 DIAGNOSIS — U07.1 COVID-19 VIRUS INFECTION: Primary | ICD-10-CM

## 2022-12-08 PROCEDURE — 99214 OFFICE O/P EST MOD 30 MIN: CPT | Mod: CS | Performed by: INTERNAL MEDICINE

## 2022-12-08 RX ORDER — CODEINE PHOSPHATE AND GUAIFENESIN 10; 100 MG/5ML; MG/5ML
1-2 SOLUTION ORAL EVERY 6 HOURS PRN
Qty: 240 ML | Refills: 0 | Status: SHIPPED | OUTPATIENT
Start: 2022-12-08 | End: 2023-12-19

## 2022-12-08 RX ORDER — PREDNISONE 20 MG/1
20 TABLET ORAL EVERY MORNING
Qty: 4 TABLET | Refills: 0 | Status: SHIPPED | OUTPATIENT
Start: 2022-12-08 | End: 2022-12-12

## 2022-12-08 RX ORDER — LIDOCAINE HYDROCHLORIDE 20 MG/ML
15 SOLUTION OROPHARYNGEAL
Qty: 100 ML | Refills: 3 | Status: SHIPPED | OUTPATIENT
Start: 2022-12-08 | End: 2023-12-19

## 2022-12-08 NOTE — PATIENT INSTRUCTIONS
Advise against Paxlovid    Prednisone 20 mg daily for 4 days    Viscous lidocaine    Robitussin with codeine as needed    Increase ibuprofen to 800 mg 3 times daily until better    MyChart communication initiated    Diabetic eye exam this week noted    Follow-up with primary

## 2022-12-08 NOTE — PROGRESS NOTES
"Damon is a 67 year old who is being evaluated via a billable telephone visit.      What phone number would you like to be contacted at? [phone]  How would you like to obtain your AVS? Fabianahart    {PROVIDER CHARTING PREFERENCE:841705}    Subjective   Damon is a 67 year old{ACCOMPANIED BY STATEMENT (Optional):664142}, presenting for the following health issues:  Covid Concern (Test Positive for covid 12/7/22- Sore throat, cough, SOB x 5 days)      HPI     {SUPERLIST (Optional):321692}  {additonal problems for provider to add (Optional):815239}    Review of Systems   {ROS COMP (Optional):212149}      Objective    Vitals - Patient Reported  Systolic (Patient Reported): 112  Diastolic (Patient Reported): 72  Weight (Patient Reported): 170.1 kg (375 lb)  SpO2 (Patient Reported): 93  Temperature (Patient Reported): 96.6  F (35.9  C)        Physical Exam   {GENERAL APPEARANCE:50::\"healthy\",\"alert\",\"no distress\"}  PSYCH: Alert and oriented times 3; coherent speech, normal   rate and volume, able to articulate logical thoughts, able   to abstract reason, no tangential thoughts, no hallucinations   or delusions  His affect is { :3056312::\"normal\"}  RESP: No cough, no audible wheezing, able to talk in full sentences  Remainder of exam unable to be completed due to telephone visits    {Diagnostic Test Results (Optional):757034}    {AMBULATORY ATTESTATION (Optional):441774}        Phone call duration: *** minutes    "

## 2022-12-08 NOTE — PROGRESS NOTES
Damon is a 67 year old male being evaluated via a billable phone visit, and would like to be contacted via the following  Cell number on file:    Telephone Information:   Mobile [phone]       ASSESSMENT and PLAN:  1. COVID-19 virus infection  Advise against Paxlovid.  Treat symptomatically.  Email correspondence initiated  - lidocaine, viscous, (XYLOCAINE) 2 % solution; Swish and spit 15 mLs in mouth every 3 hours as needed for moderate pain (4-6) ; Max 8 doses/24 hour period.  Dispense: 100 mL; Refill: 3  - guaiFENesin-codeine (ROBITUSSIN AC) 100-10 MG/5ML solution; Take 5-10 mLs by mouth every 6 hours as needed for cough  Dispense: 240 mL; Refill: 0  - predniSONE (DELTASONE) 20 MG tablet; Take 1 tablet (20 mg) by mouth every morning for 4 days  Dispense: 4 tablet; Refill: 0    2. Type 2 diabetes mellitus with complication, without long-term current use of insulin (H)  Due for updated labs  - HEMOGLOBIN A1C; Future    3. Preventative health care  - REVIEW OF HEALTH MAINTENANCE PROTOCOL ORDERS  - Pneumococcal 20 Valent Conjugate (PCV20); Future       Patient Instructions   Advise against Paxlovid    Prednisone 20 mg daily for 4 days    Viscous lidocaine    Robitussin with codeine as needed    Increase ibuprofen to 800 mg 3 times daily until better    MyChart communication initiated    Diabetic eye exam this week noted    Follow-up with primary            Return in about 6 months (around 6/8/2023) for using a video visit.       CHIEF COMPLAINT:  Chief Complaint   Patient presents with     Covid Concern     Test Positive for covid 12/7/22- Sore throat, cough, SOB x 5 days       HPI    HISTORY OF PRESENT ILLNESS:  Damon is a 67 year old male contacting the clinic today via phone for Covid    Became symptomatic with viral symptoms on Dec 4.  Tested positive for COVID on December 7.  Primary symptomatic complaints include sweats, intermittent fever to 101, raw throat, breathing worse, weak, some cough    Vaccinated  against Covid x 3 with Moderna    Discussed that omicron is much more contagious and much less virulent than previous versions of COVID and that we are seeing very few cases go to the hospital, or end up in the ICU or on ventilators.  The listed MASSBP score is 8 and renal function is normal as below:  Lab Results   Component Value Date    CR 0.89 02/02/2022    CR 0.86 09/03/2020    and   Lab Results   Component Value Date    GFRESTIMATED >90 02/02/2022    GFRESTIMATED >90 09/03/2020       {The MASSBP is calculated on a scale of 0-25: age 65 years and older (2 points), BMI 35 kg/m2 and higher (2), diabetes mellitus (2), chronic kidney disease (3), cardiovascular disease in a patient 55 years and older (2), chronic respiratory disease in a patient 55 years and older (3), hypertension in a patient 55 years and older (1), and immunocompromised status (4), pregnancy (4), or BIPOC status (2).    {Reviewed medications, and discussed that medicines like Lisinopril and Losartan can contribute to cough and inflammation when combined with a viral infection due to inflammatory nature of these meds.    Discussed that infection with omicron is the equivalent of a vaccination to omicron, and that this infection/vaccination will supplement the original COVID vaccines.  Discussed that natural immunity to natural infection has always been superior to artificial immunity from vaccination throughout history and that this illness and subsequent antibody levels when taken in conjunction with previous vaccination to original COVID should result in superior immunity to that provided by ongoing COVID vaccine    --Discussed that Paxlovid was studied and brought to market on patients who were infected with delta COVID, at high risk of COVID decline, and were not vaccinated, thus the benefit of Paxlovid in vaccinated people with omicron is less clear.    --Paxlovid can be associated with approximately 10-20% risk of rebound.      --In  "addition Paxlovid interacts with many medications including cholesterol medicine, blood pressure medicine, and blood thinners.    Discussed that home antigen tests can sometimes be negative in the setting of COVID which means that you are probably not contagious.      Home test can be used as a marker of contagiousness, and contagiousness usually begins 2 days before symptoms, and lasts for about 5 days after symptoms, in those who have been vaccinated.      With onset of symptoms December 4, then contagious days would be Dec 2 thru Dec 9.      If testing positive past this time a mask should be worn, but you can resume normal activities per CDC guidelines.  If testing negative, no mask is needed     REVIEW OF SYSTEMS:  Adama this week      PFSH:  Social History     Social History Narrative     Not on file     Wife works as [employer]     TOBACCO USE:  History   Smoking Status     Former     Years: 15.00     Quit date: 4/1/1988   Smokeless Tobacco     Never       VITALS:  There were no vitals filed for this visit.  There were no vitals taken for this visit. Estimated body mass index is 39.75 kg/m  as calculated from the following:    Height as of 3/28/22: 1.803 m (5' 11\").    Weight as of 3/28/22: 129.3 kg (285 lb).    PHYSICAL EXAM:  (observations via Phone)  Hoarse voice and occasional cough    MEDICATIONS  Current Outpatient Medications   Medication Sig Dispense Refill     aspirin 81 MG tablet Take 1 tablet (81 mg) by mouth daily       diazepam (VALIUM) 5 MG tablet Take 1-2 30 minutes prior to flight 6 tablet 0     glyBURIDE (DIABETA /MICRONASE) 5 MG tablet Take 1 Tablet (5 mg) by mouth once daily with breakfast. 90 tablet 3     guaiFENesin-codeine (ROBITUSSIN AC) 100-10 MG/5ML solution Take 5-10 mLs by mouth every 6 hours as needed for cough 240 mL 0     ibuprofen (ADVIL/MOTRIN) 600 MG tablet Take 1 tablet (600 mg) by mouth every 6 hours as needed for moderate pain 21 tablet 0     lidocaine, viscous, (XYLOCAINE) 2 " % solution Swish and spit 15 mLs in mouth every 3 hours as needed for moderate pain (4-6) ; Max 8 doses/24 hour period. 100 mL 3     losartan (COZAAR) 100 MG tablet Take 1 tablet (100 mg) by mouth daily 90 tablet 3     metFORMIN (GLUCOPHAGE-XR) 500 MG 24 hr tablet Take 2 tablets by mouth once daily with dinner. 180 tablet 3     metoprolol succinate ER (TOPROL XL) 100 MG 24 hr tablet Take 1 tablet (100 mg) by mouth daily 30 tablet 2     predniSONE (DELTASONE) 20 MG tablet Take 1 tablet (20 mg) by mouth every morning for 4 days 4 tablet 0     STATIN NOT PRESCRIBED (INTENTIONAL) Please choose reason not prescribed, below         Notes summarized:   Labs, x-rays, cardiology, GI tests reviewed: Well-controlled diabetes.  Normal renal function  Recent Labs   Lab Test 02/02/22  1342 09/07/21  0850 02/13/21  0830   HGB 17.4  --   --    WBC 11.7*  --   --      --   --    POTASSIUM 3.9  --   --    CR 0.89  --   --    A1C  --  7.1* 6.7*     Lab Results   Component Value Date    [id number]  04/30/2021     Test received-See reflex to IDDL test SARS CoV2 (COVID-19) Virus RT-PCR    [id number] NEGATIVE 04/30/2021     Lab Results   Component Value Date    CHOL 209 02/02/2022    CHOL 186 09/03/2020     New orders:   Orders Placed This Encounter   Procedures     REVIEW OF HEALTH MAINTENANCE PROTOCOL ORDERS     Pneumococcal 20 Valent Conjugate (PCV20)     HEMOGLOBIN A1C       Independent review of:  Supplemental history by:      Patient would like to receive their AVS by Barrera Rosenbaum MD  [facility]    Phone-Visit Details  Type of service:  Phone Visit  Patient has given verbal consent to a Phone visit?  Yes  How would you like to obtain your AVS?  Fabianaharrochelle  Will anyone else be joining your phone visit? No    Originating Location (pt. Location): Home    Distant Location (provider location):  Off-site    Phone Start Time: 9:07 AM  Phone End time:  9:36 AM  Conversation plus orders: 29  minutes  Dictation time:  3 minutes    The visit lasted a total of 32 minutes

## 2023-02-09 ENCOUNTER — HOSPITAL ENCOUNTER (OUTPATIENT)
Dept: CARDIOLOGY | Facility: CLINIC | Age: 68
Discharge: HOME | End: 2023-02-09
Attending: INTERNAL MEDICINE | Admitting: INTERNAL MEDICINE
Payer: COMMERCIAL

## 2023-02-09 DIAGNOSIS — Z95.2 AORTIC VALVE PROSTHESIS PRESENT: ICD-10-CM

## 2023-02-09 DIAGNOSIS — Z95.2 H/O PULMONIC VALVE REPLACEMENT: ICD-10-CM

## 2023-02-09 LAB — BI-PLANE LVEF ECHO: NORMAL

## 2023-02-09 PROCEDURE — 255N000002 HC RX 255 OP 636: Performed by: INTERNAL MEDICINE

## 2023-02-09 PROCEDURE — 999N000208 ECHOCARDIOGRAM COMPLETE

## 2023-02-09 PROCEDURE — 93306 TTE W/DOPPLER COMPLETE: CPT | Mod: 26 | Performed by: INTERNAL MEDICINE

## 2023-02-09 RX ADMIN — HUMAN ALBUMIN MICROSPHERES AND PERFLUTREN 3 ML: 10; .22 INJECTION, SOLUTION INTRAVENOUS at 09:47

## 2023-02-18 ENCOUNTER — HEALTH MAINTENANCE LETTER (OUTPATIENT)
Age: 68
End: 2023-02-18

## 2023-02-21 ENCOUNTER — OFFICE VISIT (OUTPATIENT)
Dept: CARDIOLOGY | Facility: CLINIC | Age: 68
End: 2023-02-21
Payer: COMMERCIAL

## 2023-02-21 VITALS
OXYGEN SATURATION: 96 % | WEIGHT: 281.3 LBS | HEART RATE: 76 BPM | DIASTOLIC BLOOD PRESSURE: 80 MMHG | SYSTOLIC BLOOD PRESSURE: 144 MMHG | HEIGHT: 71 IN | BODY MASS INDEX: 39.38 KG/M2

## 2023-02-21 DIAGNOSIS — Z95.2 S/P AVR: Primary | ICD-10-CM

## 2023-02-21 PROCEDURE — 99213 OFFICE O/P EST LOW 20 MIN: CPT | Performed by: INTERNAL MEDICINE

## 2023-02-21 NOTE — LETTER
2/21/2023    Kris Weems MD, MD  [address] [place] [address]    RE: Damon Manley       Dear Colleague,     I had the pleasure of seeing Damon Manley in the [facility].  CARDIOLOGY CLINIC VISIT  DATE OF SERVICE:  February 21, 2023  PRIMARY CARE PHYSICIAN:  Kris Weems MD    HISTORY OF PRESENT ILLNESS:  Mr. Manley is a 68 y/o gentleman with PMH significant for rheumatic heart disease s/p homograft pulmonary valve and AVR in 1998 who presents to clinic today for follow up.  He was last seen by me in 2/2022.    In follow up today, Damon reports feeling well from a cardiac standpoint.  He denies any exertional chest pain, chest discomfort or shortness of breath.  He denies any orthopnea, PND or lower extremity edema.   Repeat echocardiogram demonstrated mild increase in gradients across the prosthetic aortic and pulmonic valves, although remain mild.         PAST MEDICAL HISTORY:  1.  Rheumatic valve disease:  S/P bioprosthetic homograft pulmonary valve and AVR in 1998  2.  Depression  3.  Hypertension  4.  Obesity      MEDICATIONS:  Current Outpatient Medications   Medication     aspirin 81 MG tablet     diazepam (VALIUM) 5 MG tablet     glyBURIDE (DIABETA /MICRONASE) 5 MG tablet     guaiFENesin-codeine (ROBITUSSIN AC) 100-10 MG/5ML solution     ibuprofen (ADVIL/MOTRIN) 600 MG tablet     lidocaine, viscous, (XYLOCAINE) 2 % solution     losartan (COZAAR) 100 MG tablet     metFORMIN (GLUCOPHAGE-XR) 500 MG 24 hr tablet     metoprolol succinate ER (TOPROL XL) 100 MG 24 hr tablet     STATIN NOT PRESCRIBED (INTENTIONAL)     No current facility-administered medications for this visit.       ALLERGIES:  Allergies   Allergen Reactions     No Known Drug Allergies        SOCIAL HISTORY:  I have reviewed this patient's social history and updated it with pertinent information if needed. Damon Manley  reports that he quit smoking about 34 years ago. He has never used smokeless tobacco. He  reports current alcohol use. He reports that he does not use drugs.    FAMILY HISTORY:  I have reviewed this patient's family history and updated it with pertinent information if needed.   Family History   Problem Relation Age of Onset     Other Cancer Father         lung     Other Cancer Mother         lung     Other Cancer Paternal Aunt         unsure     Diabetes No family hx of      Coronary Artery Disease No family hx of      Hypertension No family hx of      Hyperlipidemia No family hx of      Cerebrovascular Disease No family hx of        REVIEW OF SYSTEMS:  A complete ROS was obtained and the pertinent positives are outlined in the history of present illness above.  The remainder of systems is negative.      PHYSICAL EXAM:                     Vital Signs with Ranges     0 lbs 0 oz    Constitutional: awake, alert, no distress  Eyes: PERRL, sclera nonicteric  ENT: trachea midline  Respiratory: CTAB  Cardiovascular: RRR no m/r/g, no JVD  GI: nondistended, nontender, bowel sounds present  Lymph/Hematologic: no lymphadenopathy  Skin: dry, no rash  Musculoskeletal: good muscle tone, no edema bilaterally  Neurologic: no focal deficits  Neuropsychiatric: appropriate affact    DATA:  Echocardiogram dated 2/8/22  The left ventricle is mildly dilated.  Left ventricular systolic function is mildly reduced.  Biplane LVEF is 44%.  Prosthetic aortic valve is not well visualized.  mean gradient 26mmHg, peak 43mmHg (increased from mean 22mHg, peak 39mmHg  2/2022)  h/o PVR  mean 16mmHg peak 30mmHg ( previous study peak 23mmHg) Compared to prior study,  changes are noted.      ASSESSMENT:  1.  Status post prosthetic aortic valve replacement and pulmonic valve replacement in 1998: Mild increased gradients of both valves consistent with mild prosthetic stenosis.  No symptoms of concern  2.  Hypertension: At goal  3.  Obesity  4.  Dizziness:  Chronic, previous cardiac evaluation has been unremarkable.         RECOMMENDATIONS:  -Doing well from a cardiac standpoint.  No changes made today  -Follow up in 12 months with an echocardiogram prior to that visit    Alisa Olvera MD [facility]  February 21, 2023        Thank you for allowing me to participate in the care of your patient.      Sincerely,     Alisa Olvera MD     [facility]  cc:   No referring provider defined for this encounter.

## 2023-02-21 NOTE — PROGRESS NOTES
CARDIOLOGY CLINIC VISIT  DATE OF SERVICE:  February 21, 2023  PRIMARY CARE PHYSICIAN:  Kris Weems MD    HISTORY OF PRESENT ILLNESS:  Mr. Manley is a 68 y/o gentleman with PMH significant for rheumatic heart disease s/p homograft pulmonary valve and AVR in 1998 who presents to clinic today for follow up.  He was last seen by me in 2/2022.    In follow up today, Damon reports feeling well from a cardiac standpoint.  He denies any exertional chest pain, chest discomfort or shortness of breath.  He denies any orthopnea, PND or lower extremity edema.   Repeat echocardiogram demonstrated mild increase in gradients across the prosthetic aortic and pulmonic valves, although remain mild.         PAST MEDICAL HISTORY:  1.  Rheumatic valve disease:  S/P bioprosthetic homograft pulmonary valve and AVR in 1998  2.  Depression  3.  Hypertension  4.  Obesity      MEDICATIONS:  Current Outpatient Medications   Medication     aspirin 81 MG tablet     diazepam (VALIUM) 5 MG tablet     glyBURIDE (DIABETA /MICRONASE) 5 MG tablet     guaiFENesin-codeine (ROBITUSSIN AC) 100-10 MG/5ML solution     ibuprofen (ADVIL/MOTRIN) 600 MG tablet     lidocaine, viscous, (XYLOCAINE) 2 % solution     losartan (COZAAR) 100 MG tablet     metFORMIN (GLUCOPHAGE-XR) 500 MG 24 hr tablet     metoprolol succinate ER (TOPROL XL) 100 MG 24 hr tablet     STATIN NOT PRESCRIBED (INTENTIONAL)     No current facility-administered medications for this visit.       ALLERGIES:  Allergies   Allergen Reactions     No Known Drug Allergies        SOCIAL HISTORY:  I have reviewed this patient's social history and updated it with pertinent information if needed. Damon Manley  reports that he quit smoking about 34 years ago. He has never used smokeless tobacco. He reports current alcohol use. He reports that he does not use drugs.    FAMILY HISTORY:  I have reviewed this patient's family history and updated it with pertinent information if needed.   Family History    Problem Relation Age of Onset     Other Cancer Father         lung     Other Cancer Mother         lung     Other Cancer Paternal Aunt         unsure     Diabetes No family hx of      Coronary Artery Disease No family hx of      Hypertension No family hx of      Hyperlipidemia No family hx of      Cerebrovascular Disease No family hx of        REVIEW OF SYSTEMS:  A complete ROS was obtained and the pertinent positives are outlined in the history of present illness above.  The remainder of systems is negative.      PHYSICAL EXAM:                     Vital Signs with Ranges     0 lbs 0 oz    Constitutional: awake, alert, no distress  Eyes: PERRL, sclera nonicteric  ENT: trachea midline  Respiratory: CTAB  Cardiovascular: RRR no m/r/g, no JVD  GI: nondistended, nontender, bowel sounds present  Lymph/Hematologic: no lymphadenopathy  Skin: dry, no rash  Musculoskeletal: good muscle tone, no edema bilaterally  Neurologic: no focal deficits  Neuropsychiatric: appropriate affact    DATA:  Echocardiogram dated 2/8/22  The left ventricle is mildly dilated.  Left ventricular systolic function is mildly reduced.  Biplane LVEF is 44%.  Prosthetic aortic valve is not well visualized.  mean gradient 26mmHg, peak 43mmHg (increased from mean 22mHg, peak 39mmHg  2/2022)  h/o PVR  mean 16mmHg peak 30mmHg ( previous study peak 23mmHg) Compared to prior study,  changes are noted.      ASSESSMENT:  1.  Status post prosthetic aortic valve replacement and pulmonic valve replacement in 1998: Mild increased gradients of both valves consistent with mild prosthetic stenosis.  No symptoms of concern  2.  Hypertension: At goal  3.  Obesity  4.  Dizziness:  Chronic, previous cardiac evaluation has been unremarkable.        RECOMMENDATIONS:  -Doing well from a cardiac standpoint.  No changes made today  -Follow up in 12 months with an echocardiogram prior to that visit        Alisa Olvera MD [facility]  February 21,  2023

## 2023-02-27 ENCOUNTER — TELEPHONE (OUTPATIENT)
Dept: CARDIOLOGY | Facility: CLINIC | Age: 68
End: 2023-02-27
Payer: COMMERCIAL

## 2023-02-27 NOTE — TELEPHONE ENCOUNTER
2:13pm patient returned call. Gave him Dr. Olvera's recommendations. He did not have any further questions or concerns.     2:03pm Unable to reach patient. His voicemail box is full and could not leave a message. Will try again later.       Home BP's at goal; continue meds unchanged.   Alisa Olvera MD

## 2023-02-27 NOTE — TELEPHONE ENCOUNTER
Patient brought in a list of his blood pressure readings requested by Dr. Olvera at the last office visit. Patients blood pressure at last office visit on 2/21/23 was 144/80.     2/21/23 4pm 112/62  2/22/23 8am 123/64 shoveling  2/22/23 3pm 104/88 sitting on cough  2/23/23 1pm 103/63 after shoveling  2/24/23 6am 130/82 before meds  2/25/23 6am 118/69 before meds  2/26/23 3:30pm 111/76    Will forward onto Dr. Olvera for review.

## 2023-03-21 ENCOUNTER — NURSE TRIAGE (OUTPATIENT)
Dept: FAMILY MEDICINE | Facility: CLINIC | Age: 68
End: 2023-03-21
Payer: COMMERCIAL

## 2023-03-21 NOTE — TELEPHONE ENCOUNTER
Patient calling regarding some R side flank pain he has been having since Saturday. Patient states pain comes and goes and is 3-4/10 when present. Patient stated that on Saturday he was in a lot of pain and urine was darker, his wife was bringing him into ED but once he got to ED he was able to urinate and felt relief so patient thought he passed a stone. Patient states pain since then has been the mild 3-4/10 and comes and goes. He describes that his urine is clear to light yellow in color now. Denies fever, pain when urinating or any abdominal pain.     Patient requested to be seen in clinic and not at . RN reviewed red flag symptoms with patient and when to see emergency care. Patient agreed and understood.     Patient is scheduled in clinic for soonest appointment on 3/24 at 1:40. RN advised him if at all symptoms worsen or new symptoms arise then he should be seen sooner in clinic if there is availability or at . Patient understood and agreed.     SHARAD Rondon, RN       Reason for Disposition    MILD pain (i.e., scale 1-3; does not interfere with normal activities) and present > 3 days    Additional Information    Negative: Passed out (i.e., lost consciousness, collapsed and was not responding)    Negative: Shock suspected (e.g., cold/pale/clammy skin, too weak to stand, low BP, rapid pulse)    Negative: Sounds like a life-threatening emergency to the triager    Negative: Followed a major injury to the back (e.g., MVA, fall > 10 feet or 3 meters, penetrating injury, etc.)    Negative: Upper, mid or lower back pain that occurs mainly in the midline    Negative: SEVERE pain (e.g., excruciating, scale 8-10) and present > 1 hour    Negative: Sudden onset of severe flank pain and age > 60 years    Negative: Abdominal pain and age > 60 years    Negative: Unable to urinate (or only a few drops) > 4 hours and bladder feels very full (e.g., palpable bladder or strong urge to urinate)    Negative: Pain  radiates into groin, scrotum    Negative: Blood in urine (red, pink, or tea-colored)    Negative: Vomiting    Negative: Weakness of a leg or foot (e.g., unable to bear weight, dragging foot)    Negative: Patient sounds very sick or weak to the triager    Negative: Fever > 100.4 F (38.0 C)    Negative: Pain or burning with passing urine (urination)    Negative: MODERATE pain (e.g., interferes with normal activities or awakens from sleep)    Negative: Painful rash with multiple small blisters grouped together (i.e., dermatomal distribution or 'band' or 'stripe')    Negative: Pregnant  (Exception: Mild pain that is only present with movement.)    Negative: Diabetes mellitus or weak immune system (e.g., HIV positive, cancer chemo, splenectomy, organ transplant, chronic steroids)  (Exception: Mild pain that is only present with movement.)    Protocols used: FLANK PAIN-A-OH

## 2023-03-24 ENCOUNTER — OFFICE VISIT (OUTPATIENT)
Dept: FAMILY MEDICINE | Facility: CLINIC | Age: 68
End: 2023-03-24
Payer: COMMERCIAL

## 2023-03-24 VITALS
WEIGHT: 278.1 LBS | OXYGEN SATURATION: 95 % | TEMPERATURE: 97.2 F | SYSTOLIC BLOOD PRESSURE: 115 MMHG | DIASTOLIC BLOOD PRESSURE: 72 MMHG | BODY MASS INDEX: 39.81 KG/M2 | HEART RATE: 80 BPM | RESPIRATION RATE: 16 BRPM | HEIGHT: 70 IN

## 2023-03-24 DIAGNOSIS — E66.01 MORBID OBESITY (H): ICD-10-CM

## 2023-03-24 DIAGNOSIS — R35.0 INCREASED URINARY FREQUENCY: Primary | ICD-10-CM

## 2023-03-24 DIAGNOSIS — E11.8 TYPE 2 DIABETES MELLITUS WITH COMPLICATION, WITHOUT LONG-TERM CURRENT USE OF INSULIN (H): ICD-10-CM

## 2023-03-24 DIAGNOSIS — I10 ESSENTIAL HYPERTENSION WITH GOAL BLOOD PRESSURE LESS THAN 140/90: ICD-10-CM

## 2023-03-24 LAB
ALBUMIN UR-MCNC: NEGATIVE MG/DL
ANION GAP SERPL CALCULATED.3IONS-SCNC: 13 MMOL/L (ref 7–15)
APPEARANCE UR: CLEAR
BILIRUB UR QL STRIP: NEGATIVE
BUN SERPL-MCNC: 19.1 MG/DL (ref 8–23)
CALCIUM SERPL-MCNC: 9.5 MG/DL (ref 8.8–10.2)
CHLORIDE SERPL-SCNC: 103 MMOL/L (ref 98–107)
CHOLEST SERPL-MCNC: 226 MG/DL
COLOR UR AUTO: YELLOW
CREAT SERPL-MCNC: 0.98 MG/DL (ref 0.67–1.17)
CREAT UR-MCNC: 204 MG/DL
DEPRECATED HCO3 PLAS-SCNC: 25 MMOL/L (ref 22–29)
ERYTHROCYTE [DISTWIDTH] IN BLOOD BY AUTOMATED COUNT: 12.2 % (ref 10–15)
GFR SERPL CREATININE-BSD FRML MDRD: 85 ML/MIN/1.73M2
GLUCOSE SERPL-MCNC: 81 MG/DL (ref 70–99)
GLUCOSE UR STRIP-MCNC: NEGATIVE MG/DL
HBA1C MFR BLD: 7 %
HCT VFR BLD AUTO: 51 % (ref 40–53)
HDLC SERPL-MCNC: 41 MG/DL
HGB BLD-MCNC: 17.3 G/DL (ref 13.3–17.7)
HGB UR QL STRIP: NEGATIVE
KETONES UR STRIP-MCNC: NEGATIVE MG/DL
LDLC SERPL CALC-MCNC: 149 MG/DL
LEUKOCYTE ESTERASE UR QL STRIP: NEGATIVE
MCH RBC QN AUTO: 31 PG (ref 26.5–33)
MCHC RBC AUTO-ENTMCNC: 33.9 G/DL (ref 31.5–36.5)
MCV RBC AUTO: 91 FL (ref 78–100)
MICROALBUMIN UR-MCNC: 20.6 MG/L
MICROALBUMIN/CREAT UR: 10.1 MG/G CR (ref 0–17)
NITRATE UR QL: NEGATIVE
NONHDLC SERPL-MCNC: 185 MG/DL
PH UR STRIP: 5 [PH] (ref 5–7)
PLATELET # BLD AUTO: 159 10E3/UL (ref 150–450)
POTASSIUM SERPL-SCNC: 4 MMOL/L (ref 3.4–5.3)
RBC # BLD AUTO: 5.58 10E6/UL (ref 4.4–5.9)
SODIUM SERPL-SCNC: 141 MMOL/L (ref 136–145)
SP GR UR STRIP: 1.02 (ref 1–1.03)
TRIGL SERPL-MCNC: 181 MG/DL
UROBILINOGEN UR STRIP-MCNC: NORMAL MG/DL
WBC # BLD AUTO: 10.5 10E3/UL (ref 4–11)

## 2023-03-24 PROCEDURE — 83036 HEMOGLOBIN GLYCOSYLATED A1C: CPT | Performed by: STUDENT IN AN ORGANIZED HEALTH CARE EDUCATION/TRAINING PROGRAM

## 2023-03-24 PROCEDURE — 99214 OFFICE O/P EST MOD 30 MIN: CPT | Performed by: STUDENT IN AN ORGANIZED HEALTH CARE EDUCATION/TRAINING PROGRAM

## 2023-03-24 PROCEDURE — 80048 BASIC METABOLIC PNL TOTAL CA: CPT | Performed by: STUDENT IN AN ORGANIZED HEALTH CARE EDUCATION/TRAINING PROGRAM

## 2023-03-24 PROCEDURE — 82043 UR ALBUMIN QUANTITATIVE: CPT | Performed by: STUDENT IN AN ORGANIZED HEALTH CARE EDUCATION/TRAINING PROGRAM

## 2023-03-24 PROCEDURE — 36415 COLL VENOUS BLD VENIPUNCTURE: CPT | Performed by: STUDENT IN AN ORGANIZED HEALTH CARE EDUCATION/TRAINING PROGRAM

## 2023-03-24 PROCEDURE — 80061 LIPID PANEL: CPT | Performed by: STUDENT IN AN ORGANIZED HEALTH CARE EDUCATION/TRAINING PROGRAM

## 2023-03-24 PROCEDURE — 85027 COMPLETE CBC AUTOMATED: CPT | Performed by: STUDENT IN AN ORGANIZED HEALTH CARE EDUCATION/TRAINING PROGRAM

## 2023-03-24 PROCEDURE — 82570 ASSAY OF URINE CREATININE: CPT | Performed by: STUDENT IN AN ORGANIZED HEALTH CARE EDUCATION/TRAINING PROGRAM

## 2023-03-24 PROCEDURE — 81003 URINALYSIS AUTO W/O SCOPE: CPT | Performed by: STUDENT IN AN ORGANIZED HEALTH CARE EDUCATION/TRAINING PROGRAM

## 2023-03-24 ASSESSMENT — PAIN SCALES - GENERAL: PAINLEVEL: MODERATE PAIN (4)

## 2023-03-24 NOTE — PROGRESS NOTES
"  Assessment & Plan     Increased urinary frequency  Urine sample today without signs of infection or blood.  Possible he did pass a kidney stone previously.  Some discomfort now may be musculoskeletal.  Kidney function stable.  Patient feeling good and he will let us know if new symptoms develop or things not improving.  - UA Macro with Reflex to Micro and Culture - lab collect  - CBC with platelets  - UA Macro with Reflex to Micro and Culture - lab collect  - CBC with platelets    Essential hypertension with goal blood pressure less than 140/90  Home blood pressure stable.  Continue losartan and metoprolol.  - BASIC METABOLIC PANEL  - BASIC METABOLIC PANEL    Type 2 diabetes mellitus with complication, without long-term current use of insulin (H)  A1c stable at 7.  Continue metformin and glyburide.  Encourage diet and exercise statin not prescribed.  Is on ARB.  Taking daily aspirin.  - Lipid panel reflex to direct LDL Non-fasting  - Albumin Random Urine Quantitative with Creat Ratio  - Hemoglobin A1c  - Lipid panel reflex to direct LDL Non-fasting  - Albumin Random Urine Quantitative with Creat Ratio  - Hemoglobin A1c         BMI:   Estimated body mass index is 39.9 kg/m  as calculated from the following:    Height as of this encounter: 1.778 m (5' 10\").    Weight as of this encounter: 126.1 kg (278 lb 1.6 oz).   Weight management plan: Discussed healthy diet and exercise guidelines      George Pham MD  [facility] CARIDAD Jose is a 67 year old, presenting for the following health issues:  Flank Pain  No flowsheet data found.  History of Present Illness       Reason for visit:  Think i passed a kidney stone last satureday and still feel efects on my right side and feeling slugish also peeing more then normal  Symptom onset:  3-7 days ago    He eats 2-3 servings of fruits and vegetables daily.He consumes 0 sweetened beverage(s) daily.He exercises with enough effort to " "increase his heart rate 60 or more minutes per day.  He exercises with enough effort to increase his heart rate 4 days per week.   He is taking medications regularly.     Patient with history of kidney stone years ago.  Does think he had a history of bladder infection in the past as well but unsure.  Notes 1 week ago having severe right-sided back and side pain and when his wife is bringing him to the ER this pain resolved so he was not seen.  Since then he has not had further severe pain but does have right-sided achiness and increased urinary frequency.  No significant pain with urination or discharge.  No other abdominal pain.  No nausea vomiting constipation or diarrhea.  No other fevers but does feel kind of fatigued.    Review of Systems   Constitutional, HEENT, cardiovascular, pulmonary, gi and gu systems are negative, except as otherwise noted.      Objective    BP (!) 140/86   Pulse 80   Temp 97.2  F (36.2  C) (Temporal)   Resp 16   Ht 1.778 m (5' 10\")   Wt 126.1 kg (278 lb 1.6 oz)   SpO2 95%   BMI 39.90 kg/m    Body mass index is 39.9 kg/m .  Physical Exam   GENERAL: healthy, alert and no distress  EYES: Eyes grossly normal to inspection, PERRL and conjunctivae and sclerae normal  RESP: lungs clear to auscultation - no rales, rhonchi or wheezes  CV: regular rate and rhythm, systolic murmur, no peripheral edema and peripheral pulses strong  ABDOMEN: soft, nontender, no hepatosplenomegaly, no masses and bowel sounds normal  MS: no gross musculoskeletal defects noted, no edema, CVA tenderness on the right  SKIN: no suspicious lesions or rashes  NEURO:mentation intact and speech normal  PSYCH: mentation appears normal, affect normal/bright              "

## 2023-04-13 DIAGNOSIS — Z95.2 AORTIC VALVE PROSTHESIS PRESENT: Primary | ICD-10-CM

## 2023-04-13 RX ORDER — AMOXICILLIN 500 MG/1
2000 CAPSULE ORAL
Qty: 20 CAPSULE | Refills: 0 | Status: SHIPPED | OUTPATIENT
Start: 2023-04-13

## 2023-04-13 RX ORDER — AMPICILLIN TRIHYDRATE 500 MG
CAPSULE ORAL
Qty: 20 CAPSULE | Refills: 3 | Status: SHIPPED | OUTPATIENT
Start: 2023-04-13

## 2023-04-13 NOTE — TELEPHONE ENCOUNTER
Message handled by Nurse Triage with [facility] - provider name: Rogelio.    Provider sent medication to the pharmacy for the patient. Patient notified.  Flora Marsh RN on 4/13/2023 at 5:43 PM

## 2023-04-13 NOTE — TELEPHONE ENCOUNTER
Patient states he has had rheumatic fever as a child.  He has also had a heart valve replaced.    He usually gets Ampicillin before dental work.    He has an appointment tomorrow.    Flora Marsh RN on 4/13/2023 at 5:07 PM

## 2023-10-28 ENCOUNTER — HEALTH MAINTENANCE LETTER (OUTPATIENT)
Age: 68
End: 2023-10-28

## 2023-11-09 DIAGNOSIS — I10 ESSENTIAL HYPERTENSION WITH GOAL BLOOD PRESSURE LESS THAN 140/90: ICD-10-CM

## 2023-11-09 RX ORDER — METOPROLOL SUCCINATE 100 MG/1
100 TABLET, EXTENDED RELEASE ORAL DAILY
Qty: 90 TABLET | Refills: 0 | Status: SHIPPED | OUTPATIENT
Start: 2023-11-09 | End: 2024-02-29

## 2023-12-19 ENCOUNTER — OFFICE VISIT (OUTPATIENT)
Dept: FAMILY MEDICINE | Facility: CLINIC | Age: 68
End: 2023-12-19
Payer: COMMERCIAL

## 2023-12-19 VITALS
DIASTOLIC BLOOD PRESSURE: 72 MMHG | TEMPERATURE: 98.1 F | SYSTOLIC BLOOD PRESSURE: 146 MMHG | RESPIRATION RATE: 18 BRPM | HEIGHT: 71 IN | HEART RATE: 71 BPM | WEIGHT: 273.13 LBS | OXYGEN SATURATION: 95 % | BODY MASS INDEX: 38.24 KG/M2

## 2023-12-19 DIAGNOSIS — Z95.2 HISTORY OF PROSTHETIC PULMONIC VALVE REPLACEMENT: ICD-10-CM

## 2023-12-19 DIAGNOSIS — Z00.00 ROUTINE GENERAL MEDICAL EXAMINATION AT A HEALTH CARE FACILITY: ICD-10-CM

## 2023-12-19 DIAGNOSIS — R06.09 DOE (DYSPNEA ON EXERTION): ICD-10-CM

## 2023-12-19 DIAGNOSIS — Z86.0100 HISTORY OF COLONIC POLYPS: ICD-10-CM

## 2023-12-19 DIAGNOSIS — E11.8 TYPE 2 DIABETES MELLITUS WITH COMPLICATION, WITHOUT LONG-TERM CURRENT USE OF INSULIN (H): Primary | ICD-10-CM

## 2023-12-19 DIAGNOSIS — Z95.2 H/O MITRAL VALVE REPLACEMENT: ICD-10-CM

## 2023-12-19 DIAGNOSIS — R19.7 DIARRHEA, UNSPECIFIED TYPE: ICD-10-CM

## 2023-12-19 DIAGNOSIS — Z29.11 NEED FOR VACCINATION AGAINST RESPIRATORY SYNCYTIAL VIRUS: ICD-10-CM

## 2023-12-19 LAB — HBA1C MFR BLD: 6.6 %

## 2023-12-19 PROCEDURE — 36415 COLL VENOUS BLD VENIPUNCTURE: CPT | Performed by: FAMILY MEDICINE

## 2023-12-19 PROCEDURE — 90677 PCV20 VACCINE IM: CPT | Performed by: FAMILY MEDICINE

## 2023-12-19 PROCEDURE — 83036 HEMOGLOBIN GLYCOSYLATED A1C: CPT | Performed by: FAMILY MEDICINE

## 2023-12-19 PROCEDURE — 99213 OFFICE O/P EST LOW 20 MIN: CPT | Mod: 25 | Performed by: FAMILY MEDICINE

## 2023-12-19 PROCEDURE — G0009 ADMIN PNEUMOCOCCAL VACCINE: HCPCS | Performed by: FAMILY MEDICINE

## 2023-12-19 PROCEDURE — G0008 ADMIN INFLUENZA VIRUS VAC: HCPCS | Performed by: FAMILY MEDICINE

## 2023-12-19 PROCEDURE — 99397 PER PM REEVAL EST PAT 65+ YR: CPT | Mod: 25 | Performed by: FAMILY MEDICINE

## 2023-12-19 PROCEDURE — 90662 IIV NO PRSV INCREASED AG IM: CPT | Performed by: FAMILY MEDICINE

## 2023-12-19 RX ORDER — RESPIRATORY SYNCYTIAL VIRUS VACCINE 120MCG/0.5
0.5 KIT INTRAMUSCULAR ONCE
Qty: 1 EACH | Refills: 0 | Status: CANCELLED | OUTPATIENT
Start: 2023-12-19 | End: 2023-12-19

## 2023-12-19 ASSESSMENT — ENCOUNTER SYMPTOMS
HEADACHES: 0
MYALGIAS: 1
WEAKNESS: 1
JOINT SWELLING: 0
EYE PAIN: 0
PALPITATIONS: 0
CONSTIPATION: 0
DIZZINESS: 1
SHORTNESS OF BREATH: 1
PARESTHESIAS: 1
COUGH: 0
ABDOMINAL PAIN: 0
FEVER: 0
SORE THROAT: 0
NERVOUS/ANXIOUS: 0
ARTHRALGIAS: 1
CHILLS: 0
NAUSEA: 1
HEARTBURN: 0
FREQUENCY: 0
DYSURIA: 0
DIARRHEA: 1
HEMATURIA: 0
HEMATOCHEZIA: 0

## 2023-12-19 ASSESSMENT — ACTIVITIES OF DAILY LIVING (ADL): CURRENT_FUNCTION: NO ASSISTANCE NEEDED

## 2023-12-19 NOTE — PROGRESS NOTES
"SUBJECTIVE:   Damon is a 68 year old, presenting for the following:  Wellness Visit, Derm Problem (Nose history of this,  also has a spot on his leg), Shortness of Breath (When going up inclines), and Flank Pain (Around the ribs )        12/19/2023    12:46 PM   Additional Questions   Roomed by Ai MORELAND MA       Are you in the first 12 months of your Medicare coverage?  No    Shortness of Breath  Associated symptoms include arthralgias, myalgias, nausea and weakness. Pertinent negatives include no abdominal pain, chills, coughing, fever, headaches, joint swelling, rash or sore throat.   Healthy Habits:     In general, how would you rate your overall health?  Fair    Frequency of exercise:  4-5 days/week    Duration of exercise:  Greater than 60 minutes    Do you usually eat at least 4 servings of fruit and vegetables a day, include whole grains    & fiber and avoid regularly eating high fat or \"junk\" foods?  No    Taking medications regularly:  Yes    Medication side effects:  Other    Ability to successfully perform activities of daily living:  No assistance needed    Home Safety:  No safety concerns identified    Hearing Impairment:  Difficulty following a conversation in a noisy restaurant or crowded room and difficulty understanding soft or whispered speech    In the past 6 months, have you been bothered by leaking of urine?  No    In general, how would you rate your overall mental or emotional health?  Good    Additional concerns today:  Yes    For past few months has had SOB when walking at an incline. Describes it has \"heavy feeing on chest\" similar to right before his heart surgery 25 years ago. Walks 3 miles a day on treadmill without issues. No history of asthma or COPD. No inhalers. No wheezing.    Chest wall muscle pain that is bilateral and wraps around to his shoulder blades. Describes it as achy. Onset 2 years ago and gradually worsening, now almost every day towards the evening. Lasts until he " goes to be and then wakes up normal. Ibuprofen helps. Weight lifts and does other activities like water volleyball.     1 year history of recurring lesion on his nose that bleeds, scabs, and then heals every month. Currently not present.     Also diarrhea for past year, now occurs 1-2x per week. Had diarrhea when he first started metformin. No n/v. No blood in his stool.     Today's PHQ-2 Score:       2023    12:40 PM   PHQ-2 (  [employer])   Q1: Little interest or pleasure in doing things 0   Q2: Feeling down, depressed or hopeless 0   PHQ-2 Score 0   Q1: Little interest or pleasure in doing things Not at all   Q2: Feeling down, depressed or hopeless Not at all   PHQ-2 Score 0           Have you ever done Advance Care Planning? (For example, a Health Directive, POLST, or a discussion with a medical provider or your loved ones about your wishes): Yes, advance care planning is on file.       Fall risk  Fallen 2 or more times in the past year?: No  Any fall with injury in the past year?: No    Cognitive Screening   1) Repeat 3 items (Leader, Season, Table)    2) Clock draw: NORMAL  3) 3 item recall: Recalls 1 object   Results: NORMAL clock, 1-2 items recalled: COGNITIVE IMPAIRMENT LESS LIKELY    Mini-CogTM Copyright MORRIS Veras. Licensed by the author for use in [facility]; reprinted with permission (ashley@.[facility]). All rights reserved.      Do you have sleep apnea, excessive snoring or daytime drowsiness? : no    Reviewed and updated as needed this visit by clinical staff   Tobacco  Allergies  Meds              Reviewed and updated as needed this visit by Provider                 Social History     Tobacco Use    Smoking status: Former     Years: 15     Types: Cigarettes     Quit date: 1988     Years since quittin.7    Smokeless tobacco: Never   Substance Use Topics    Alcohol use: Yes     Alcohol/week: 0.0 standard drinks of alcohol     Comment: social - glass of wine socially              12/19/2023    12:39 PM   Alcohol Use   Prescreen: >3 drinks/day or >7 drinks/week? No     Do you have a current opioid prescription? No  Do you use any other controlled substances or medications that are not prescribed by a provider? None              Current providers sharing in care for this patient include:   Patient Care Team:  Kris Weems MD as PCP - General (Family Medicine)  Alisa Olvera MD as Assigned Heart and Vascular Provider  Mandeep Duarte MD as MD (Neurology)  Mandeep Duarte MD as Assigned Neuroscience Provider  George Pham MD as Assigned PCP    The following health maintenance items are reviewed in Epic and correct as of today:  Health Maintenance   Topic Date Due    DIABETIC FOOT EXAM  Never done    URINE DRUG SCREEN  Never done    Pneumococcal Vaccine: 65+ Years (1 - PCV) Never done    RSV VACCINE (Pregnancy & 60+) (1 - 1-dose 60+ series) Never done    MEDICARE ANNUAL WELLNESS VISIT  10/07/2021    INFLUENZA VACCINE (1) 09/01/2023    COVID-19 Vaccine (4 - 2023-24 season) 09/01/2023    A1C  09/24/2023    ANNUAL REVIEW OF HM ORDERS  12/08/2023    ADVANCE CARE PLANNING  02/27/2024    BMP  03/24/2024    LIPID  03/24/2024    MICROALBUMIN  03/24/2024    EYE EXAM  04/19/2024    DTAP/TDAP/TD IMMUNIZATION (2 - Td or Tdap) 09/30/2024    FALL RISK ASSESSMENT  12/19/2024    COLORECTAL CANCER SCREENING  01/13/2027    HEPATITIS C SCREENING  Completed    PHQ-2 (once per calendar year)  Completed    ZOSTER IMMUNIZATION  Completed    AORTIC ANEURYSM SCREENING (SYSTEM ASSIGNED)  Completed    IPV IMMUNIZATION  Aged Out    HPV IMMUNIZATION  Aged Out    MENINGITIS IMMUNIZATION  Aged Out    RSV MONOCLONAL ANTIBODY  Aged Out       Review of Systems   Constitutional:  Negative for chills and fever.   HENT:  Positive for hearing loss. Negative for ear pain and sore throat.    Eyes:  Negative for pain and visual disturbance.   Respiratory:  Positive for shortness of breath. Negative for cough.   "  Cardiovascular:  Negative for palpitations and peripheral edema.   Gastrointestinal:  Positive for diarrhea and nausea. Negative for abdominal pain, constipation, heartburn and hematochezia.   Genitourinary:  Negative for dysuria, frequency, genital sores, hematuria, impotence, penile discharge and urgency.   Musculoskeletal:  Positive for arthralgias and myalgias. Negative for joint swelling.   Skin:  Negative for rash.   Neurological:  Positive for dizziness, weakness and paresthesias. Negative for headaches.   Psychiatric/Behavioral:  Negative for mood changes. The patient is not nervous/anxious.      OBJECTIVE:   BP (!) 146/72   Pulse 71   Temp 98.1  F (36.7  C) (Temporal)   Resp 18   Ht 1.81 m (5' 11.26\")   Wt 123.9 kg (273 lb 2 oz)   SpO2 95%   BMI 37.82 kg/m   Estimated body mass index is 37.82 kg/m  as calculated from the following:    Height as of this encounter: 1.81 m (5' 11.26\").    Weight as of this encounter: 123.9 kg (273 lb 2 oz).  Physical Exam  GENERAL: healthy, alert and no distress  EYES: Eyes grossly normal to inspection, PERRL and conjunctivae and sclerae normal  HENT: ear canals and TM's normal, nose and mouth without ulcers or lesions  RESP: lungs clear to auscultation - no rales, rhonchi or wheezes  CV: regular rate and rhythm, normal S1 S2, no S3 or S4, no murmur, click or rub, no peripheral edema and peripheral pulses strong  ABDOMEN: soft, nontender, no hepatosplenomegaly, no masses and bowel sounds normal  MS: no gross musculoskeletal defects noted, no edema  NEURO: Normal strength and tone, mentation intact and speech normal    Diagnostic Test Results:  Labs reviewed in Epic    ASSESSMENT / PLAN:   (Z00.00) Routine general medical examination at health care facility  Comment: Overall doing well. Discussed having him return when his nose lesion is present and will get a punch biopsy at that time. Received pneumococcal and flu vaccinations in clinic today; declined RSV and COVID " "vaccines. CBC and BMP recently done in March and were all normal, so no need to recheck today. Lipids at that time were elevated. Discussed starting Lipitor today but patient did not tolerate it in the past.   Plan:   -REVIEW OF HEALTH MAINTENANCE PROTOCOL ORDERS    (E11.8) Type 2 diabetes mellitus with complication, without long-term current use of insulin (H)  (primary encounter diagnosis)  Comment: Last A1c was 7 on 3/24/23. He does not check his blood glucose levels at home. Sees eye doctor routinely. Will recheck A1c today.  Plan:   -HEMOGLOBIN A1C, STATIN NOT PRESCRIBED         (INTENTIONAL)    (Z29.11) Need for vaccination against respiratory syncytial virus  Comment: Declined today    (Z86.010) History of colonic polyps  Comment: Diarrhea for past 1 year. Last colonoscopy was in 2017 and 12 polyps removed at that time.   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 Given his history of valve replacements, will have him see cardiology and also repeat an echo.   Plan:   -Adult Cardiology Eval  Referral,         Echocardiogram Complete      COUNSELING:  Reviewed preventive health counseling, as reflected in patient instructions    BMI:   Estimated body mass index is 37.82 kg/m  as calculated from the following:    Height as of this encounter: 1.81 m (5' 11.26\").    Weight as of this encounter: 123.9 kg (273 lb 2 oz).     He reports that he quit smoking about 35 years ago. His smoking use included cigarettes. He has never used smokeless tobacco.    Appropriate preventive services were discussed with this patient, including applicable screening as appropriate for fall prevention, nutrition, physical activity, Tobacco-use cessation, weight loss and cognition.  Checklist reviewing " preventive services available has been given to the patient.    Reviewed patients plan of care and provided an AVS. The Basic Care Plan (routine screening as documented in Health Maintenance) for Damon meets the Care Plan requirement. This Care Plan has been established and reviewed with the Patient.        Manjula Yanez MS3    I was present with the medical student who participated in the service and in the documentation of the note. I have verified the history and personally performed the physical exam and medical decision making. I reviewed the note in detail and edited it appropriately. I agree with the assessment and plan of care as documented in the note.     Kris Weems MD  [facility]    Identified Health Risks:

## 2023-12-20 ENCOUNTER — TELEPHONE (OUTPATIENT)
Dept: GASTROENTEROLOGY | Facility: CLINIC | Age: 68
End: 2023-12-20
Payer: COMMERCIAL

## 2023-12-20 NOTE — TELEPHONE ENCOUNTER
"Endoscopy Scheduling Screen    Have you had a positive Covid test in the last 14 days?  No    Are you active on MyChart?   Yes    What insurance is in the chart?  Other:   / MEDICARE    Ordering/Referring Provider:   DANIEL RODRIGUEZ        (If ordering provider performs procedure, schedule with ordering provider unless otherwise instructed. )    BMI: Estimated body mass index is 37.82 kg/m  as calculated from the following:    Height as of 12/19/23: 1.81 m (5' 11.26\").    Weight as of 12/19/23: 123.9 kg (273 lb 2 oz).     Sedation Ordered  moderate sedation.   If patient BMI > 50 do not schedule in [facility].    If patient BMI > 45 do not schedule at [facility].    Are you taking methadone or Suboxone?  No    Are you taking any prescription medications for pain 3 or more times per week?   NO - No RN review required.    Do you have a history of malignant hyperthermia or adverse reaction to anesthesia?  No     Have you been diagnosed or told you have pulmonary hypertension?   No    Do you have an LVAD?  No    Have you been told you have moderate to severe sleep apnea?  No    Have you been told you have COPD, asthma, or any other lung disease?  No    Do you have any heart conditions?  Yes     In the past 6 months, have you had any hospitalizations for heart related issues including cardiomyopathy, heart attack, or stent placement?  No    Do you have any implantable devices in your body (pacemaker, ICD)?  No    Do you take nitroglycerine?  No    Have you ever had an organ transplant?   No    Have you ever had or are you awaiting a heart or lung transplant?   No    Have you had a stroke or transient ischemic attack (TIA aka \"mini stroke\" in the last 6 months?   No    Have you been diagnosed with or been told you have cirrhosis of the liver?   No    Are you currently on dialysis?   No    Do you need assistance transferring?   No    BMI: Estimated body mass index is 37.82 kg/m  as calculated from the following:    Height as of " "12/19/23: 1.81 m (5' 11.26\").    Weight as of 12/19/23: 123.9 kg (273 lb 2 oz).     Is patients BMI > 40 and scheduling location [facility]?  No    Do you take an injectable medication for weight loss or diabetes (excluding insulin)?  No    Do you take the medication Naltrexone?  No    Do you take blood thinners?  No       Prep   Are you currently on dialysis or do you have chronic kidney disease?  No    Do you have a diagnosis of diabetes?  Yes (Golytely Prep)    Do you have a diagnosis of cystic fibrosis (CF)?  No    On a regular basis do you go 3 -5 days between bowel movements?  No    BMI > 40?  No    Preferred Pharmacy:    [facility] [address] [facility]  [address] [facility] [address]  Phone: [phone] Fax: [phone]      Final Scheduling Details   Colonoscopy prep sent?  Standard Golytely    Procedure scheduled  Colonoscopy    Surgeon:  KARLA     Date of procedure:  02/12/2024     Pre-OP / PAC:   No - Not required for this site.    Location  [facility] - Patient preference.    Sedation   MAC/Deep Sedation  Per Location      Patient Reminders:   You will receive a call from a Nurse to review instructions and health history.  This assessment must be completed prior to your procedure.  Failure to complete the Nurse assessment may result in the procedure being cancelled.      On the day of your procedure, please designate an adult(s) who can drive you home stay with you for the next 24 hours. The medicines used in the exam will make you sleepy. You will not be able to drive.      You cannot take public transportation, ride share services, or non-medical taxi service without a responsible caregiver.  Medical transport services are allowed with the requirement that a responsible caregiver will receive you at your destination.  We require that drivers and caregivers are confirmed prior to your procedure.  "

## 2024-01-10 ENCOUNTER — TELEPHONE (OUTPATIENT)
Dept: FAMILY MEDICINE | Facility: CLINIC | Age: 69
End: 2024-01-10
Payer: COMMERCIAL

## 2024-01-10 NOTE — TELEPHONE ENCOUNTER
Patient and patient's wife are calling and stated that Dr. Dank MD would place orders for a culture on patient's nose if it started to bleed and scab up again.    Patient and patient's wife stated his nose did start to bleed and is now scabbed over again and would like to have the culture that Dr. Dank MD has offered.    Will forward to PCP for review.    Kiana Onofre RN    Last office visit 12/19/2023  Noted:  1 year history of recurring lesion on his nose that bleeds, scabs, and then heals every month. Currently not present.

## 2024-01-12 NOTE — TELEPHONE ENCOUNTER
Called and notified patient and spouse of the below response from covering provider.     Patient stated it is almost gone now but he will call back on Wednesday when his PCP returns, if the scab is still present, to see if he can get in.    Patient declined setting up an appointment due to his PCP telling him previously that the scab would need to be present to do it.     Sharmaine Olvera, BSN, RN

## 2024-01-12 NOTE — TELEPHONE ENCOUNTER
Dr. Weems wanted a punch biopsy that is in his note- would need appointment with him in person for that.  Ayala Chen, CNP

## 2024-01-17 NOTE — TELEPHONE ENCOUNTER
Patient calling back that spot on his nose has been back since last week and is scabbed up and last time he spoke with Dr. Weems about it he would need a punch biopsy done. He is calling to see if he can get an appointment scheduled this week with Dr. Weems to get this done.    Please advise.    Alisha Mccullough RN on 1/17/2024 at 9:56 AM

## 2024-01-18 NOTE — TELEPHONE ENCOUNTER
Kris Weems MD  [place] [id number] hours ago (4:10 PM)     RF  Okay to work the patient in for a punch biopsy in the next 2 to 3 weeks

## 2024-01-23 ENCOUNTER — HOSPITAL ENCOUNTER (OUTPATIENT)
Dept: CARDIOLOGY | Facility: CLINIC | Age: 69
Discharge: HOME | End: 2024-01-23
Attending: FAMILY MEDICINE | Admitting: FAMILY MEDICINE
Payer: COMMERCIAL

## 2024-01-23 DIAGNOSIS — Z95.2 HISTORY OF PROSTHETIC PULMONIC VALVE REPLACEMENT: ICD-10-CM

## 2024-01-23 DIAGNOSIS — R06.09 DOE (DYSPNEA ON EXERTION): ICD-10-CM

## 2024-01-23 DIAGNOSIS — Z95.2 H/O MITRAL VALVE REPLACEMENT: ICD-10-CM

## 2024-01-23 LAB — LVEF ECHO: NORMAL

## 2024-01-23 PROCEDURE — 255N000002 HC RX 255 OP 636: Performed by: INTERNAL MEDICINE

## 2024-01-23 PROCEDURE — 93306 TTE W/DOPPLER COMPLETE: CPT | Mod: 26 | Performed by: INTERNAL MEDICINE

## 2024-01-23 PROCEDURE — C8929 TTE W OR WO FOL WCON,DOPPLER: HCPCS

## 2024-01-23 RX ADMIN — PERFLUTREN 4 ML: 6.52 INJECTION, SUSPENSION INTRAVENOUS at 13:54

## 2024-01-25 ENCOUNTER — PATIENT OUTREACH (OUTPATIENT)
Dept: GASTROENTEROLOGY | Facility: CLINIC | Age: 69
End: 2024-01-25
Payer: COMMERCIAL

## 2024-02-01 ENCOUNTER — OFFICE VISIT (OUTPATIENT)
Dept: FAMILY MEDICINE | Facility: CLINIC | Age: 69
End: 2024-02-01
Payer: COMMERCIAL

## 2024-02-01 VITALS
DIASTOLIC BLOOD PRESSURE: 76 MMHG | SYSTOLIC BLOOD PRESSURE: 128 MMHG | HEIGHT: 71 IN | RESPIRATION RATE: 14 BRPM | OXYGEN SATURATION: 95 % | HEART RATE: 66 BPM | WEIGHT: 279.2 LBS | BODY MASS INDEX: 39.09 KG/M2 | TEMPERATURE: 97.9 F

## 2024-02-01 DIAGNOSIS — J34.89 LESION OF NOSE: Primary | ICD-10-CM

## 2024-02-01 PROCEDURE — 11104 PUNCH BX SKIN SINGLE LESION: CPT | Performed by: FAMILY MEDICINE

## 2024-02-01 PROCEDURE — 88305 TISSUE EXAM BY PATHOLOGIST: CPT | Performed by: DERMATOLOGY

## 2024-02-01 RX ORDER — RESPIRATORY SYNCYTIAL VIRUS VACCINE 120MCG/0.5
0.5 KIT INTRAMUSCULAR ONCE
Qty: 1 EACH | Refills: 0 | Status: CANCELLED | OUTPATIENT
Start: 2024-02-01 | End: 2024-02-01

## 2024-02-01 ASSESSMENT — PAIN SCALES - GENERAL: PAINLEVEL: NO PAIN (0)

## 2024-02-01 NOTE — PROGRESS NOTES
"  Assessment & Plan     Lesion of nose  Will keep the area moist with bacitracin ointment until it heals completely I will inform him of the test results and further follow-up or referral to dermatology.  - Dermatological path order and indications      Pattie Jose is a 68 year old, presenting for the following health issues:  Biopsy (nose)      2/1/2024     8:20 AM   Additional Questions   Roomed by Jaden Cartagena CMA     History of Present Illness       Reason for visit:  Soar on nose    He eats 2-3 servings of fruits and vegetables daily.He consumes 0 sweetened beverage(s) daily.He exercises with enough effort to increase his heart rate 60 or more minutes per day.  He exercises with enough effort to increase his heart rate 4 days per week.      Is here today he has a lesion on his nose that has been not healing.  We decided we will going to do a punch biopsy he is here today for that procedure informed consent was obtained.          Review of Systems  Constitutional, HEENT, cardiovascular, pulmonary, gi and gu systems are negative, except as otherwise noted.      Objective    /76 (BP Location: Left arm, Patient Position: Chair, Cuff Size: Adult Large)   Pulse 66   Temp 97.9  F (36.6  C) (Temporal)   Resp 14   Ht 1.803 m (5' 11\")   Wt 126.6 kg (279 lb 3.2 oz)   SpO2 95%   BMI 38.94 kg/m    Body mass index is 38.94 kg/m .  Physical Exam   A small 0.3 x 0.3 cm skin lesion on the tip of the nose was prepped with alcohol and anesthetized with 1% lidocaine without epinephrine.  A small 2 mm punch produced a specimen that was sent to pathology.  The defect was hyfrecated for hemostasis and bacitracin ointment was applied.  Patient tolerated the procedure well.            Signed Electronically by: Kris Weems MD, MD    "

## 2024-02-02 LAB
PATH REPORT.COMMENTS IMP SPEC: ABNORMAL
PATH REPORT.COMMENTS IMP SPEC: ABNORMAL
PATH REPORT.COMMENTS IMP SPEC: YES
PATH REPORT.FINAL DX SPEC: ABNORMAL
PATH REPORT.GROSS SPEC: ABNORMAL
PATH REPORT.MICROSCOPIC SPEC OTHER STN: ABNORMAL
PATH REPORT.RELEVANT HX SPEC: ABNORMAL

## 2024-02-05 ENCOUNTER — TELEPHONE (OUTPATIENT)
Dept: DERMATOLOGY | Facility: CLINIC | Age: 69
End: 2024-02-05
Payer: COMMERCIAL

## 2024-02-05 DIAGNOSIS — C44.310 BCC (BASAL CELL CARCINOMA), FACE: Primary | ICD-10-CM

## 2024-02-05 NOTE — TELEPHONE ENCOUNTER
Called patient to schedule surgery with Dr. Kwan    Date of Surgery: 03/12    Surgery type: Mohs    Consult scheduled: Yes    Has patient had mohs with us before? No    Additional comments: sunil Sung on 2/5/2024 at 2:03 PM

## 2024-02-06 DIAGNOSIS — I10 HYPERTENSION GOAL BP (BLOOD PRESSURE) < 140/90: ICD-10-CM

## 2024-02-06 RX ORDER — LOSARTAN POTASSIUM 100 MG/1
100 TABLET ORAL DAILY
Qty: 90 TABLET | Refills: 3 | Status: SHIPPED | OUTPATIENT
Start: 2024-02-06 | End: 2024-08-13

## 2024-02-12 ENCOUNTER — ANESTHESIA EVENT (OUTPATIENT)
Dept: GASTROENTEROLOGY | Facility: CLINIC | Age: 69
End: 2024-02-12
Payer: COMMERCIAL

## 2024-02-12 ENCOUNTER — HOSPITAL ENCOUNTER (OUTPATIENT)
Facility: CLINIC | Age: 69
Discharge: HOME | End: 2024-02-12
Attending: SURGERY | Admitting: SURGERY
Payer: COMMERCIAL

## 2024-02-12 ENCOUNTER — ANESTHESIA (OUTPATIENT)
Dept: GASTROENTEROLOGY | Facility: CLINIC | Age: 69
End: 2024-02-12
Payer: COMMERCIAL

## 2024-02-12 VITALS
SYSTOLIC BLOOD PRESSURE: 147 MMHG | TEMPERATURE: 97.9 F | RESPIRATION RATE: 18 BRPM | OXYGEN SATURATION: 96 % | HEART RATE: 70 BPM | DIASTOLIC BLOOD PRESSURE: 87 MMHG

## 2024-02-12 LAB
COLONOSCOPY: NORMAL
GLUCOSE BLDC GLUCOMTR-MCNC: 133 MG/DL (ref 70–99)

## 2024-02-12 PROCEDURE — 250N000011 HC RX IP 250 OP 636: Performed by: NURSE ANESTHETIST, CERTIFIED REGISTERED

## 2024-02-12 PROCEDURE — 45385 COLONOSCOPY W/LESION REMOVAL: CPT | Performed by: SURGERY

## 2024-02-12 PROCEDURE — 45380 COLONOSCOPY AND BIOPSY: CPT | Performed by: SURGERY

## 2024-02-12 PROCEDURE — 82962 GLUCOSE BLOOD TEST: CPT

## 2024-02-12 PROCEDURE — 258N000003 HC RX IP 258 OP 636: Performed by: NURSE ANESTHETIST, CERTIFIED REGISTERED

## 2024-02-12 PROCEDURE — 370N000017 HC ANESTHESIA TECHNICAL FEE, PER MIN: Performed by: SURGERY

## 2024-02-12 PROCEDURE — 250N000009 HC RX 250: Performed by: SURGERY

## 2024-02-12 PROCEDURE — 250N000009 HC RX 250: Performed by: NURSE ANESTHETIST, CERTIFIED REGISTERED

## 2024-02-12 PROCEDURE — 88305 TISSUE EXAM BY PATHOLOGIST: CPT | Mod: TC | Performed by: SURGERY

## 2024-02-12 RX ORDER — LIDOCAINE 40 MG/G
CREAM TOPICAL
Status: DISCONTINUED | OUTPATIENT
Start: 2024-02-12 | End: 2024-02-12 | Stop reason: HOSPADM

## 2024-02-12 RX ORDER — ONDANSETRON 2 MG/ML
4 INJECTION INTRAMUSCULAR; INTRAVENOUS EVERY 6 HOURS PRN
Status: CANCELLED | OUTPATIENT
Start: 2024-02-12

## 2024-02-12 RX ORDER — PROPOFOL 10 MG/ML
INJECTION, EMULSION INTRAVENOUS PRN
Status: DISCONTINUED | OUTPATIENT
Start: 2024-02-12 | End: 2024-02-12

## 2024-02-12 RX ORDER — FLUMAZENIL 0.1 MG/ML
0.2 INJECTION, SOLUTION INTRAVENOUS
Status: CANCELLED | OUTPATIENT
Start: 2024-02-12 | End: 2024-02-13

## 2024-02-12 RX ORDER — ONDANSETRON 4 MG/1
4 TABLET, ORALLY DISINTEGRATING ORAL EVERY 6 HOURS PRN
Status: CANCELLED | OUTPATIENT
Start: 2024-02-12

## 2024-02-12 RX ORDER — NALOXONE HYDROCHLORIDE 0.4 MG/ML
0.4 INJECTION, SOLUTION INTRAMUSCULAR; INTRAVENOUS; SUBCUTANEOUS
Status: CANCELLED | OUTPATIENT
Start: 2024-02-12

## 2024-02-12 RX ORDER — LIDOCAINE HYDROCHLORIDE 20 MG/ML
INJECTION, SOLUTION INFILTRATION; PERINEURAL PRN
Status: DISCONTINUED | OUTPATIENT
Start: 2024-02-12 | End: 2024-02-12

## 2024-02-12 RX ORDER — ONDANSETRON 2 MG/ML
4 INJECTION INTRAMUSCULAR; INTRAVENOUS
Status: DISCONTINUED | OUTPATIENT
Start: 2024-02-12 | End: 2024-02-12 | Stop reason: HOSPADM

## 2024-02-12 RX ORDER — PROCHLORPERAZINE MALEATE 5 MG
5 TABLET ORAL EVERY 6 HOURS PRN
Status: CANCELLED | OUTPATIENT
Start: 2024-02-12

## 2024-02-12 RX ORDER — SODIUM CHLORIDE, SODIUM LACTATE, POTASSIUM CHLORIDE, CALCIUM CHLORIDE 600; 310; 30; 20 MG/100ML; MG/100ML; MG/100ML; MG/100ML
INJECTION, SOLUTION INTRAVENOUS CONTINUOUS
Status: DISCONTINUED | OUTPATIENT
Start: 2024-02-12 | End: 2024-02-12 | Stop reason: HOSPADM

## 2024-02-12 RX ORDER — PROPOFOL 10 MG/ML
INJECTION, EMULSION INTRAVENOUS CONTINUOUS PRN
Status: DISCONTINUED | OUTPATIENT
Start: 2024-02-12 | End: 2024-02-12

## 2024-02-12 RX ORDER — NALOXONE HYDROCHLORIDE 0.4 MG/ML
0.2 INJECTION, SOLUTION INTRAMUSCULAR; INTRAVENOUS; SUBCUTANEOUS
Status: CANCELLED | OUTPATIENT
Start: 2024-02-12

## 2024-02-12 RX ADMIN — PROPOFOL 150 MCG/KG/MIN: 10 INJECTION, EMULSION INTRAVENOUS at 13:12

## 2024-02-12 RX ADMIN — LIDOCAINE HYDROCHLORIDE 50 MG: 20 INJECTION, SOLUTION INFILTRATION; PERINEURAL at 13:12

## 2024-02-12 RX ADMIN — PROPOFOL 80 MG: 10 INJECTION, EMULSION INTRAVENOUS at 13:12

## 2024-02-12 RX ADMIN — LIDOCAINE HYDROCHLORIDE 1 ML: 10 INJECTION, SOLUTION EPIDURAL; INFILTRATION; INTRACAUDAL; PERINEURAL at 13:02

## 2024-02-12 RX ADMIN — SODIUM CHLORIDE, POTASSIUM CHLORIDE, SODIUM LACTATE AND CALCIUM CHLORIDE: 600; 310; 30; 20 INJECTION, SOLUTION INTRAVENOUS at 13:01

## 2024-02-12 ASSESSMENT — ACTIVITIES OF DAILY LIVING (ADL): ADLS_ACUITY_SCORE: 36

## 2024-02-12 NOTE — ANESTHESIA PREPROCEDURE EVALUATION
Anesthesia Pre-Procedure Evaluation    Patient: Damon Manley   MRN: [id number] : 1955        Procedure : Procedure(s):  Colonoscopy          Past Medical History:   Diagnosis Date    Coronary artery disease     Valves replaced due to hx of Rheumatic fever. - No mechanical valves    Depressive disorder     situational - resolved    Hypertension     Motion sickness     Obesity (BMI 30-39.9)       Past Surgical History:   Procedure Laterality Date    COLONOSCOPY  2007    Colon polyps.    COLONOSCOPY N/A 2017    Procedure: COMBINED COLONOSCOPY, SINGLE OR MULTIPLE BIOPSY/POLYPECTOMY BY BIOPSY;  Surgeon: Dejan Mckee MD;  Location: [facility]    HC KNEE SCOPE,MED/LAT MENISECTOMY  2007    Partial medial meniscectomy, both knees.    ZZC ANESTH,DX ARTHROSCOPIC PROC KNEE JOINT  05    Left knee with partial medial meniscectomy.    ZZC ANESTH,OPEN HEART; W/O PUMP OXYEGENATOR  1998      Allergies   Allergen Reactions    No Known Drug Allergy       Social History     Tobacco Use    Smoking status: Former     Years: 15     Types: Cigarettes     Quit date: 1988     Years since quittin.8    Smokeless tobacco: Never   Substance Use Topics    Alcohol use: Yes     Alcohol/week: 0.0 standard drinks of alcohol     Comment: social - glass of wine socially      Wt Readings from Last 1 Encounters:   24 126.6 kg (279 lb 3.2 oz)        Anesthesia Evaluation   Pt has had prior anesthetic. Type: General and MAC.    History of anesthetic complications  - motion sickness.      ROS/MED HX  ENT/Pulmonary:  - neg pulmonary ROS     Neurologic:  - neg neurologic ROS     Cardiovascular: Comment: - Hx of Pulmonary valve and aortic  valve replacement     (+)  hypertension- -  CAD -  - -                                 Previous cardiac testing   Echo: Date: 24 Results:  Interpretation Summary     Left ventricular systolic function is mild to moderately reduced.  The visual ejection fraction is  40-45%.  h/o PVR  mean gradient 15mmHg, peak 24mmHg  There is a bioprosthetic aortic valve. (not well visualized)  mean gradient 22mmHg, peak 40mmHg  Compared to prior study, there is no significant change.    Stress Test:  Date: Results:    ECG Reviewed:  Date: Results:    Cath:  Date: Results:      METS/Exercise Tolerance:     Hematologic:       Musculoskeletal:       GI/Hepatic:     (+) GERD,                   Renal/Genitourinary:       Endo:     (+)  type II DM, Last HgA1c: 6.6, date: 12/19/23,           Obesity,       Psychiatric/Substance Use:  - neg psychiatric ROS     Infectious Disease:       Malignancy:       Other:            Physical Exam    Airway        Mallampati: II   TM distance: > 3 FB   Neck ROM: full   Mouth opening: > 3 cm    Respiratory Devices and Support         Dental       (+) Minor Abnormalities - some fillings, tiny chips      Cardiovascular   cardiovascular exam normal          Pulmonary   pulmonary exam normal                OUTSIDE LABS:  CBC:   Lab Results   Component Value Date    WBC 10.5 03/24/2023    WBC 11.7 (H) 02/02/2022    HGB 17.3 03/24/2023    HGB 17.4 02/02/2022    HCT 51.0 03/24/2023    HCT 51.7 02/02/2022     03/24/2023     02/02/2022     BMP:   Lab Results   Component Value Date     03/24/2023     02/02/2022    POTASSIUM 4.0 03/24/2023    POTASSIUM 3.9 02/02/2022    CHLORIDE 103 03/24/2023    CHLORIDE 109 02/02/2022    CO2 25 03/24/2023    CO2 28 02/02/2022    BUN 19.1 03/24/2023    BUN 19 02/02/2022    CR 0.98 03/24/2023    CR 0.89 02/02/2022    GLC 81 03/24/2023    GLC 87 02/02/2022     COAGS:   Lab Results   Component Value Date    PTT 29 03/28/2007    INR 1.05 03/28/2007     POC:   Lab Results   Component Value Date     (H) 02/24/2019     HEPATIC:   Lab Results   Component Value Date    ALBUMIN 4.0 02/23/2019    PROTTOTAL 7.9 02/23/2019    ALT 55 02/02/2022    AST 50 (H) 02/23/2019    ALKPHOS 81 02/23/2019    BILITOTAL 0.4 02/23/2019  "    OTHER:   Lab Results   Component Value Date    A1C 6.6 (H) 12/19/2023    MARGARET 9.5 03/24/2023    MAG 2.3 04/26/2018    LIPASE 248 06/14/2018    AMYLASE 167 (H) 05/08/2018    TSH 1.42 10/02/2018    SED 4 03/05/2013       Anesthesia Plan    ASA Status:  3    NPO Status:  NPO Appropriate    Anesthesia Type: MAC.     - Reason for MAC: immobility needed   Induction: Propofol, Intravenous.   Maintenance: TIVA.        Consents    Anesthesia Plan(s) and associated risks, benefits, and realistic alternatives discussed. Questions answered and patient/representative(s) expressed understanding.     - Discussed: Risks, Benefits and Alternatives for BOTH SEDATION and the PROCEDURE were discussed     - Discussed with:  Patient      - Extended Intubation/Ventilatory Support Discussed: No.      - Patient is DNR/DNI Status: No     Use of blood products discussed: No .     Postoperative Care            Comments:    Other Comments: The risks and benefits of anesthesia, and the alternatives where applicable, have been discussed with the patient, and they wish to proceed.              ISRAEL Donaldson CRNA    I have reviewed the pertinent notes and labs in the chart from the past 30 days and (re)examined the patient.  Any updates or changes from those notes are reflected in this note.              # DMII: A1C = 6.6 % (Ref range: <5.7 %) within past 6 months  # Obesity: Estimated body mass index is 38.94 kg/m  as calculated from the following:    Height as of 2/1/24: 1.803 m (5' 11\").    Weight as of 2/1/24: 126.6 kg (279 lb 3.2 oz).      "

## 2024-02-12 NOTE — ANESTHESIA POSTPROCEDURE EVALUATION
Patient: Damon Manley    Procedure: Procedure(s):  Colonoscopy with Ascending Colon Polypectomies and Transverse Colon Polypectomies and Sigmoid Colon Polypectomies.       Anesthesia Type:  MAC    Note:  Disposition: Outpatient   Postop Pain Control: Uneventful            Sign Out: Well controlled pain   PONV: No   Neuro/Psych: Uneventful            Sign Out: Acceptable/Baseline neuro status   Airway/Respiratory: Uneventful            Sign Out: Acceptable/Baseline resp. status   CV/Hemodynamics: Uneventful            Sign Out: Acceptable CV status   Other NRE: NONE   DID A NON-ROUTINE EVENT OCCUR? No    Event details/Postop Comments:  Pt was happy with anesthesia care.  No complications.  I will follow up with the pt if needed.           Last vitals:  Vitals Value Taken Time   /91 02/12/24 1340   Temp 97.9  F (36.6  C) 02/12/24 1337   Pulse 71 02/12/24 1340   Resp 14 02/12/24 1337   SpO2 95 % 02/12/24 1348   Vitals shown include unfiled device data.    Electronically Signed By: ISRAEL Meek CRNA  February 12, 2024  1:49 PM

## 2024-02-12 NOTE — ANESTHESIA CARE TRANSFER NOTE
Patient: Damon Manley    Procedure: Procedure(s):  Colonoscopy with Ascending Colon Polypectomies and Transverse Colon Polypectomies and Sigmoid Colon Polypectomies.       Diagnosis: History of colonic polyps [Z86.010]  Diagnosis Additional Information: No value filed.    Anesthesia Type:   MAC     Note:    Oropharynx: oropharynx clear of all foreign objects and spontaneously breathing  Level of Consciousness: drowsy  Oxygen Supplementation: face mask    Independent Airway: airway patency satisfactory and stable  Dentition: dentition unchanged  Vital Signs Stable: post-procedure vital signs reviewed and stable  Report to RN Given: handoff report given  Patient transferred to: Phase II    Handoff Report: Identifed the Patient, Identified the Reponsible Provider, Reviewed the pertinent medical history, Discussed the surgical course, Reviewed Intra-OP anesthesia mangement and issues during anesthesia, Set expectations for post-procedure period and Allowed opportunity for questions and acknowledgement of understanding      Vitals:  Vitals Value Taken Time   BP     Temp     Pulse     Resp     SpO2         Electronically Signed By: ISRAEL Meek CRNA  February 12, 2024  1:35 PM

## 2024-02-12 NOTE — LETTER
Damon Manley  [address] [place] [id number]  February 14, 2024    Dear Damon,  This letter is to inform you of the results of your pathology report from your colonoscopy.  Your pathology report was:  Final Diagnosis   A: Large intestine, ascending, polyps, biopsy/polypectomy:  - Tubular adenomas negative for high-grade dysplasia and malignancy.  B: Large intestine, transverse, polyps, biopsy/polypectomy:  - Tubular adenomas negative for high-grade dysplasia and malignancy.  C: Large intestine, sigmoid, polyps, biopsy/polypectomy:  - Tubular adenomas negative for high-grade dysplasia and malignancy.    These are benign polyps. These types of polyps do carry a small risk of developing into a cancer over time if not removed. Yours were completely removed at the time of your colonoscopy. You should have another surveillance colonoscopy in 3 years.  If you have further questions please don t hesitate to call our clinic at [phone].   Sincerely,     Yung Owusu, DO                   Would consider ILR to check AF burden.  In the meantime monitor in hospital. Check echo and TFT.

## 2024-02-12 NOTE — H&P
Patient seen for Endoscopy    HPI:  Patient is a 68 year old male here for endoscopy. Not taking blood thinning medications. No MI or CVA history. No issues with previous sedation. No recent acute illness.    Review Of Systems    Skin: negative  Ears/Nose/Throat: negative  Respiratory: No shortness of breath, dyspnea on exertion, cough, or hemoptysis  Cardiovascular: negative  Gastrointestinal: negative  Genitourinary: negative  Musculoskeletal: negative  Neurologic: negative  Hematologic/Lymphatic/Immunologic: negative  Endocrine: negative      Past Medical History:   Diagnosis Date    Coronary artery disease     Valves replaced due to hx of Rheumatic fever. - No mechanical valves    Depressive disorder     situational - resolved    Hypertension     Motion sickness     Obesity (BMI 30-39.9)        Past Surgical History:   Procedure Laterality Date    COLONOSCOPY  05/09/2007    Colon polyps.    COLONOSCOPY N/A 1/13/2017    Procedure: COMBINED COLONOSCOPY, SINGLE OR MULTIPLE BIOPSY/POLYPECTOMY BY BIOPSY;  Surgeon: Dejan Mckee MD;  Location: [facility]    HC KNEE SCOPE,MED/LAT MENISECTOMY  09/05/2007    Partial medial meniscectomy, both knees.    ZZC ANESTH,DX ARTHROSCOPIC PROC KNEE JOINT  12/12/05    Left knee with partial medial meniscectomy.    ZZC ANESTH,OPEN HEART; W/O PUMP OXYEGENATOR  1998       Family History   Problem Relation Age of Onset    Other Cancer Father         lung    Other Cancer Mother         lung    Other Cancer Paternal Aunt         unsure    Diabetes No family hx of     Coronary Artery Disease No family hx of     Hypertension No family hx of     Hyperlipidemia No family hx of     Cerebrovascular Disease No family hx of        Social History     Socioeconomic History    Marital status:      Spouse name: Wanda Parks    Number of children: 2    Years of education: 14    Highest education level: Not on file   Occupational History    Not on file   Tobacco Use    Smoking status: Former      Years: 15     Types: Cigarettes     Quit date: 1988     Years since quittin.8    Smokeless tobacco: Never   Vaping Use    Vaping Use: Never used   Substance and Sexual Activity    Alcohol use: Yes     Alcohol/week: 0.0 standard drinks of alcohol     Comment: social - glass of wine socially    Drug use: No    Sexual activity: Yes     Partners: Female     Comment:  - 2 children   Other Topics Concern     Service No    Blood Transfusions Yes     Comment: open heart surgery     Caffeine Concern No    Occupational Exposure No    Hobby Hazards No    Sleep Concern Yes     Comment:  wakes after 4-5 hours of sleep at night    Stress Concern Yes    Weight Concern Yes    Special Diet No    Back Care No    Exercise Yes     Comment: 3-4 times/week    Bike Helmet Not Asked     Comment: n/a    Seat Belt Yes    Self-Exams Not Asked     Comment: n/a    Parent/sibling w/ CABG, MI or angioplasty before 65F 55M? Not Asked   Social History Narrative    Not on file     Social Determinants of Health     Financial Resource Strain: Low Risk  (2023)    Financial Resource Strain     Within the past 12 months, have you or your family members you live with been unable to get utilities (heat, electricity) when it was really needed?: No   Food Insecurity: Low Risk  (2023)    Food Insecurity     Within the past 12 months, did you worry that your food would run out before you got money to buy more?: No     Within the past 12 months, did the food you bought just not last and you didn t have money to get more?: No   Transportation Needs: Low Risk  (2023)    Transportation Needs     Within the past 12 months, has lack of transportation kept you from medical appointments, getting your medicines, non-medical meetings or appointments, work, or from getting things that you need?: No   Physical Activity: Not on file   Stress: Not on file   Social Connections: Not on file   Interpersonal Safety: Low Risk   (2/1/2024)    Interpersonal Safety     Do you feel physically and emotionally safe where you currently live?: Yes     Within the past 12 months, have you been hit, slapped, kicked or otherwise physically hurt by someone?: No     Within the past 12 months, have you been humiliated or emotionally abused in other ways by your partner or ex-partner?: No   Housing Stability: Low Risk  (12/19/2023)    Housing Stability     Do you have housing? : Yes     Are you worried about losing your housing?: No       No current outpatient medications on file.       Medications and history reviewed    Physical exam:  Vitals: BP (!) 173/92   Temp 98.1  F (36.7  C) (Oral)   Resp 16   SpO2 95%   BMI= There is no height or weight on file to calculate BMI.    Constitutional: Healthy, alert, non-distressed   Head: Normo-cephalic, atraumatic, no lesions, masses or tenderness   Cardiovascular: RRR, no new murmurs, +S1, +S2 heart sounds, no clicks, rubs or gallops   Respiratory: CTAB, no rales, rhonchi or wheezing, equal chest rise, good respiratory effort   Gastrointestinal: Soft, non-tender, non distended, no rebound rigidity or guarding, no masses or hernias palpated   : Deferred  Musculoskeletal: Moves all extremities, normal  strength, no deformities noted   Skin: No suspicious lesions or rashes   Psychiatric: Mentation appears normal, affect appropriate   Hematologic/Lymphatic/Immunologic: Normal cervical and supraclavicular lymph nodes   Patient able to get up on table without difficulty.    Labs show:  No results found for this or any previous visit (from the past 24 hour(s)).    Assessment: Endoscopy  Plan: Pt cleared for anesthesia for proposed procedure.    Yung Owusu DO

## 2024-02-12 NOTE — DISCHARGE INSTRUCTIONS
[facility]    Home Care Following Endoscopy          Activity:  You have just undergone an endoscopic procedure usually performed with conscious sedation.  Do not work or operate machinery (including a car) for at least 12 hours.    I encourage you to walk and attempt to pass this air as soon as possible.    Diet:  Return to the diet you were on before your procedure but eat lightly for the first 12-24 hours.  Drink plenty of water.  Resume any regular medications unless otherwise advised by your physician.  Please begin any new medication prescribed as a result of your procedure as directed by your physician.   If you had any biopsy or polyp removed please refrain from aspirin or aspirin products for 2 days.    Pain:  You may take Tylenol as needed for pain.  Expected Recovery:  You can expect some mild abdominal fullness and/or discomfort due to the air used to inflate your intestinal tract    Call Your Physician if You Have:    After Colonoscopy:  Worsening persisting abdominal pain which is worse with activity.  Fevers (>101 degrees F), chills or shakes.  Passage of continued blood with bowel movements.     Any questions or concerns about your recovery, please call [phone] or after [facility] [phone] ([phone]) Nurse Advice Line.    Follow-up Care:  You did have polyps/biopsy tissue sample(s) removed.  The polyps/biopsy tissue sample(s) will be sent to pathology.    You should receive letter in your My Chart from Dr. Owusu with your results within 1-2 weeks. If you do not participate in My Chart a physical letter will come in the mail in 2-3 weeks.  Please call if you have not received a notification of your results.  If asked to return to clinic please make an appointment 1 week after your procedure.  Call [phone].

## 2024-02-14 LAB
PATH REPORT.COMMENTS IMP SPEC: NORMAL
PATH REPORT.COMMENTS IMP SPEC: NORMAL
PATH REPORT.FINAL DX SPEC: NORMAL
PATH REPORT.GROSS SPEC: NORMAL
PATH REPORT.MICROSCOPIC SPEC OTHER STN: NORMAL
PATH REPORT.RELEVANT HX SPEC: NORMAL
PHOTO IMAGE: NORMAL

## 2024-02-14 PROCEDURE — 88305 TISSUE EXAM BY PATHOLOGIST: CPT | Mod: 26 | Performed by: PATHOLOGY

## 2024-02-27 ENCOUNTER — VIRTUAL VISIT (OUTPATIENT)
Dept: DERMATOLOGY | Facility: CLINIC | Age: 69
End: 2024-02-27
Payer: COMMERCIAL

## 2024-02-27 DIAGNOSIS — C44.311 BASAL CELL CARCINOMA (BCC) OF SKIN OF NOSE: Primary | ICD-10-CM

## 2024-02-27 PROCEDURE — 99204 OFFICE O/P NEW MOD 45 MIN: CPT | Performed by: DERMATOLOGY

## 2024-02-27 NOTE — PROGRESS NOTES
[facility] Dermatology Note  Encounter Date: Feb 27, 2024  Store-and-Forward and Telephone. Location of teledermatologist: [facility].  Start time: 1:15. End time: 1:20.    Dermatologic Surgery Telemedicine Consult Note    Dermatology Problem List:  nBCC, tip of nose, s/p shave 2/1/24, MMS scheduled 3/12/24 @ 830a MG     CC: Consult (Mohs consult - BCC tip of the nose.)      Subjective: Damon Manley is a 68 year old male who presents today for Mohs micrographic surgery consultation for a recent diagnosis of skin cancer.  - Skin cancer(s): BCC  - Location(s): tip of nose  - first BCC, no prior treatment of this leesion  - no other concerns today         Objective:   Skin: Focused examination of the tip of nose within the teledermatology photograph(s) on 2/16/24 was performed.   - 7 mm depressed papule on R nasal tip    Path report:   Case: [id number]     Collected: 02/01/2024     Final Diagnosis   Tip of nose:  - Basal cell carcinoma, nodular type - (see description)     Assessment and Plan:     1. Plan for Mohs micrographic surgery for skin cancer(s) above:  *Review lab result(s): Dermpath report   - We discussed the nature of the diagnosis/condition above. We discussed the treatment options, including the risks benefits and expectations of these options. We recommend micrographic surgery as the most effective and most tissue sparing option for treatment, and the patient agrees to proceed with this.  The patient is aware of the risks, benefits and expectations of this procedure. The patient will be scheduled for this procedure, if not already done so.  - We anticipate the following closure type: Sliding or lifting flap    The patient was discussed with and evaluated by attending physician, Cosme Kwan MD.    Staff Involved:   Scribe/Staff    Scribe Disclosure:   VIMAL LUDWIG, am serving as a scribe; to document services personally performed by Cosme Kwan MD -based  on data collection and the provider's statements to me.     Attending Attestation  I attest that the Scribe recorded the interview and exam that I personally performed.  I have reviewed the note and edited it as necessary.    Cosme Kwan M.D.  Professor  Director of Dermatologic Surgery  Department of Dermatology  [facility]

## 2024-02-27 NOTE — PATIENT INSTRUCTIONS
In advance of your upcoming dermatologic surgery appointment, we wanted to send you a patient handout (see below) on Mohs micrographic surgery, so that you may have an idea of what to expect. This handout is quite detailed as it contains many of the most frequently asked questions that our patients ask.    Mohs Surgery Quick Tips    Please reserve the half day that you are with us and do not schedule any other appointments the morning of your surgery date. We cannot guarantee what time the surgery will be done as it will depend on how many times the Mohs surgeon has to go back and remove more tissue to completely remove your skin cancer.  If you suspect you will experience excess anxiety or claustrophobia during the procedure, please let the Mohs surgeon know prior to surgery to allow us time to address this. If an anti-anxiety medication is required, then you will need a designated  to drive you home.  Mohs surgery is done under local anesthesia, so you should eat breakfast and take your regularly scheduled medications. You do NOT need to fast.  Wear comfortable, loose-fitting clothing that can easily be taken off and put back on before and after surgery.  Most of your time with us will be spent waiting for tissue to be processed and carefully looked at under the microscope by the Mohs surgeon. Bring with you a book, tablet, or smartphone that you can use to spend the waiting time. You are allowed to eat lunch.  You should have a designated  if surgery will involve an area that can occlude vision. This is because you can get swelling/bruising immediately after surgery and will go home with a bulky bandage that could obstruct your view of driving safely.  In most cases, you will go home with a bulky pressure dressing over the site that will need to remain on, clean, and dry for the first 48 hours. You will not be able to get the site wet for the first 48 hours. This bulky pressure dressing is to help  prevent post-op bleeding. Your nurse will provide detailed wound care instructions verbally and in writing on the day of surgery.  The overwhelming majority of patients manage their normal post-op pain with Tylenol alternating with ibuprofen.   Any time the skin is cut surgically, including with Mohs surgery, a scar forms. Scars are unavoidable but are minimized with the Mohs surgery approach. A scar is thought to be better than a skin cancer that could become a serious risk to your future health. The goal with Mohs surgery is for the scar to be barely noticeable by an acquaintance talking with you at a normal conversational distance (say, 4-5 feet away) by 3 months after your surgery. In the meantime, please be patient as it takes about 3 months for scars to mature in appearance and begin to fade.  Do NOT wear make-up on the day of surgery if the skin cancer is on your face.  Do NOT use Neosporin on your wound. It is not effective at preventing infections and can lead to irritation similar to an allergic reaction at the wound site.  Do not use harsh soaps like Dial soap or [place] on your wound as this will also lead to irritation.    What is  Mohs micrographic surgery ?   Mohs micrographic surgery is considered the most effective treatment for many skin cancers. It is named after the late Dr. Frederic Mohs, who pioneered this technique. Note that  Mohs  surgery is not an abbreviation or acronym, but is named after Dr. Mohs.    Overview of Mohs Micrographic Surgery  Skin cancer often resembles the  tip of the iceberg  with more tumor cells growing downward and outward into the skin like the roots of a tree. These  roots  are not visible with the naked eye, but instead can be seen under a microscope. With the Mohs technique, the dermatologic surgeon can precisely identify and remove an entire tumor while leaving the surrounding healthy tissue intact and unharmed. Mohs surgery has the highest success rate of all  treatments for many skin cancers - up to 99%.  Mohs Surgeons: Fellowship-trained Mohs surgeons are specially trained as a cancer surgeon, pathologist, and reconstructive surgeon all in one.    Advantages of Mohs Surgery  Mohs surgery is unique and so effective because of the way the removed tissue is microscopically examined, evaluating 100% of the surgical margins. The pathologic interpretation of the tissue margins is done on site by the Mohs surgeon, who is specially trained in the reading of these slides.  Advantages of Mohs surgery include:  - Ensuring complete cancer removal during surgery to provide the best cure rate and protect against cancer recurrence  - Minimizing the amount of healthy tissue lost  - Maximizing the functional and cosmetic outcome resulting from surgery  - Repairing the site of the cancer the same day the cancer is removed (in most cases)  - Curing skin cancer when other methods have failed  Other skin cancer treatment methods require the provider to often blindly estimate the amount of tissue to treat and remove, which can result in the unnecessary removal of healthy skin tissue, as well as the skin cancer coming back if any part of it is left behind.     Your Mohs Procedure  Mohs skin cancer surgery is an outpatient procedure, which takes place in our on-site surgical suite. Our suite is equipped with a dedicated Mohs laboratory for microscopic examination of tissue. Surgeries start early in the morning and are completed the same day, depending on the extent of the tumor and the amount of reconstruction necessary.  First, you will receive local anesthesia (i.e., injections with lidocaine anesthetic) around the area of the tumor, so you are awake during the entire procedure. The use of local anesthesia versus general anesthesia provides many benefits, including preventing a lengthy recovery, possible side effects from general anesthesia, and cost. You are completely numb in the area of  the surgery, however, so the procedure is comfortable.      After the area has been numbed, all visible tumor, along with a thin layer of surrounding tissue, is taken out with a scalpel blade. After this, patients are bandaged by our nursing staff and can rest in the reception area, cafe [place] first [place] [facility] or can remain in the patient room while awaiting testing results. A specially trained technician prepares the tissue and puts it on slides for your Mohs surgeon to examine under a microscope. Waiting time can range from 60-90 minutes while the tissue is being processed. If cancer involving the edges of the removed tissue is seen by the Mohs surgeon under the microscope, then another layer of tissue is removed from the area where the cancer was detected using a scalpel blade. This way only cancerous tissue is targeted during the procedure, minimizing the loss of healthy tissue. These steps are repeated until no cancer is remaining. Most skin cancers require 1 to 3 rounds for complete removal (all performed on the same day). Aggressive cancers can take several rounds of surgery to cure. Sometimes skin cancer can appear to be small to the naked eye but in fact is larger in reality. This means that the wound left behind after skin cancer removal could be larger than what you may expect, but remember that this may be necessary to remove all the skin cancer present. You do not want any skin cancer to be left behind as that will lead to the skin cancer coming back within months to years and may require a larger surgery.    After Your Skin Cancer is Removed  When your surgery is complete, your Mohs surgeon, who has expertise in reconstructive surgery, assesses the wound and discusses your options for the best functional and cosmetic outcome.      Closing the wound:  Depending on the size of the wound and what your Mohs surgeon decides, your wound will be closed using one of the following options:  Letting the  wound heal on its own from the edges and without stitches  Using stitches to close the wound in a line  Using stitches to close the wound by shifting skin from a nearby area of skin (called a  skin flap )  Using stitches to place a skin graft from another part of the body on the wound     Every wound is specially managed to maximize the functional and cosmetic outcome. For reconstruction on the head, we take great care to make sure stitches, if needed, do not interfere with the resting positions of the eyelids, nose, mouth, and ears, so we sometimes have to use facial plastic surgery techniques to close wounds. Patients are sometimes surprised by how many stitches are used, but this is because we want to do a good job at carefully lining up the wound edges to minimize scarring and prevent the wound from opening up later on, which could lead to an infection. We try to use stitches that self-dissolve whenever possible. In most cases, you will go home with a bulky pressure dressing over the surgical site that will need to remain on, clean, and dry for the first 48 hours. You will not be able to get the site wet for the first 48 hours. This bulky pressure dressing is to help prevent post-op bleeding. Your nurse will give you detailed wound care instructions verbally and in writing on the day of your surgery.     Post-op pain  Tylenol and ibuprofen are sufficient for pain control for the overwhelming majority of patients. If you have been told by another physician not to take Tylenol (also called acetaminophen) or ibuprofen (also called Motrin or Advil), then let your Mohs surgeon know. We typically suggest taking Tylenol 1,000 mg (i.e., two extra strength tabs) every 8 hours. In between those doses of Tylenol, you can take ibuprofen 400 mg (two tabs) every eight hours. This essentially means that you would take either Tylenol or ibuprofen alternating every four hours. Do NOT take more than 4,000 mg of Tylenol or 3,200  mg of ibuprofen per 24 hour period. Icing for 15 minutes every hour will help with pain and swelling as well, and it is especially helpful for surgeries around the eyes. Keeping the wound area elevated will also help with swelling and pain. If your wound is on your lower leg, then wearing compression stockings can reduce swelling and speed healing.     Expectations About Surgical Scars  Any time the skin is cut, the body forms a scar. This is normal and natural, and a scar is thought to be better than a skin cancer that could become a serious risk to your health. The goal with Mohs surgery is for the scar to be barely noticeable by an acquaintance talking with you at a normal conversational distance (say, 4-5 feet away) by 3 months after your surgery. In the meantime, please be patient as it takes about 3 months for scars to mature in appearance and begin to fade. It is important to wear sunscreen over a scar starting about 1 month after surgery as scars do not tan normally and can become discolored, compared with the surrounding skin, after sun exposure. Additionally, gentle scar massage can often be started about 1 month after surgery. This means gentle, kneading massage on the scar for a couple minutes several times a day. After three months of waiting to ensure all inflammation has resolved and the scar has matured, we are happy to see you if you have persistent concerns with scarring. On rare occasions, we perform steroid injections or use a laser to treat a scar. These types of treatments are not  one-and-done  and multiple sessions are usually necessary to help scar appearance.    Risks:    Scar formation at the site of tumor removal. You may need further treatment with steroid injections/lasers for scarring.  Larger than expected wound created upon removal of the skin cancer.   Poor wound healing, which may be due to the patient's underlying health conditions or failure of the wound repair method.    Excessive bleeding from the wound, which could affect wound healing and/or result in the need for more office visits.    Wound that becomes infected (an uncommon occurrence, minimized by using sterile technique).   Loss of nerve function (muscle or feeling) if a tumor invades a nerve, which can be temporary or permanent.   Regrowth of tumor after removal (more common with previously treated tumors and large, longstanding tumors).   Cosmetic or functional deformities if tumor is near or on an important structure such as eyes, eyelids, nose, ears, lips, forehead, scalp, fingers, or genital area.     If you have any questions, please feel free to contact us.  During business hours (M-F 8:00-5:00 p.m.)    Dermatology Clinic  [phone]

## 2024-02-27 NOTE — NURSING NOTE
Damon Manley's goals for this visit include:   Chief Complaint   Patient presents with    Consult     Mohs consult - BCC tip of the nose.       He requests these members of his care team be copied on today's visit information:       PCP: Kris Weems    Referring Provider:  No referring provider defined for this encounter.    There were no vitals taken for this visit.    Do you need any medication refills at today's visit?       Sarah Quigley EMT      Excision/Mohs previsit information                                                    Diagnosis: basal cell carcinoma  Site(s): tip of nose    Over the counter Chlorhexidine surgical soap to wash all skin below the belly button twice before surgery should be recommended for the following:  - Surgical sites below the waist  - Immunosuppressed  - Previous surgical site infection  - Anticipated wound care challenges    Medication & Allergy Information                                                      Review and update allergy and medication list.    Do you take the following medications:  Coumadin, Eliquis, Pradaxa, Xarelto:  NO   -If on Coumadin, INR should be checked within 7 days of surgery.  Range should be 3.5 or less or within therapeutic range.    Past Medical History                                                    Do you currently or have you previously had any of the following conditions:    Hepatitis:  NO  HIV/AIDS:  NO  Prolonged bleeding or bleeding disorder:  NO  Pacemaker or Defibrillator:  NO.    History of artificial or heart valve replacement:  YES  Endocarditis (inflammation of the inner lining of the heart's chambers and valves):  NO  Have you ever had a prosthetic joint infection:  NO  Pregnant or Breastfeeding:  N/A  Mobility device (wheelchair, transfer difficulty): NO    Important Reminders:                                                      Ok to take all of their medications as prescribed  Patients can eat, no need to be  fasting  Patient will not be able to get the site wet for 48 hrs  No submerging wound in standing water (lake, pool, bathtub, hot tub) for 2 weeks  No physical activity for 48 hrs (further restrictions will be discussed by MD at time of visit)    If any positives, send to RN for further review  Sarah Quigley

## 2024-02-27 NOTE — LETTER
2/27/2024         RE: Damon Manley  [address]  [place] [address]        Dear Colleague,    Thank you for referring your patient, Damon Manley, to the [facility]. Please see a copy of my visit note below.    [facility] Dermatology Note  Encounter Date: Feb 27, 2024  Store-and-Forward and Telephone. Location of teledermatologist: [facility].  Start time: 1:15. End time: 1:20.    Dermatologic Surgery Telemedicine Consult Note    Dermatology Problem List:  nBCC, tip of nose, s/p shave 2/1/24, MMS scheduled 3/12/24 @ 830a MG     CC: Consult (Mohs consult - BCC tip of the nose.)      Subjective: Damon Manley is a 68 year old male who presents today for Mohs micrographic surgery consultation for a recent diagnosis of skin cancer.  - Skin cancer(s): BCC  - Location(s): tip of nose  - first BCC, no prior treatment of this leesion  - no other concerns today         Objective:   Skin: Focused examination of the tip of nose within the teledermatology photograph(s) on 2/16/24 was performed.   - 7 mm depressed papule on R nasal tip    Path report:   Case: [id number]     Collected: 02/01/2024     Final Diagnosis   Tip of nose:  - Basal cell carcinoma, nodular type - (see description)     Assessment and Plan:     1. Plan for Mohs micrographic surgery for skin cancer(s) above:  *Review lab result(s): Dermpath report   - We discussed the nature of the diagnosis/condition above. We discussed the treatment options, including the risks benefits and expectations of these options. We recommend micrographic surgery as the most effective and most tissue sparing option for treatment, and the patient agrees to proceed with this.  The patient is aware of the risks, benefits and expectations of this procedure. The patient will be scheduled for this procedure, if not already done so.  - We anticipate the following closure type: Sliding or lifting  flap    The patient was discussed with and evaluated by attending physician, Cosme Kwan MD.    Staff Involved:   Scribe/Staff    Scribe Disclosure:   I, VIMAL ELVIS, am serving as a scribe; to document services personally performed by Cosme Kwan MD -based on data collection and the provider's statements to me.     Attending Attestation  I attest that the Scribe recorded the interview and exam that I personally performed.  I have reviewed the note and edited it as necessary.    Cosme Kwan M.D.  Professor  Director of Dermatologic Surgery  Department of Dermatology  [facility]          Again, thank you for allowing me to participate in the care of your patient.        Sincerely,        Cosme Kwan MD

## 2024-02-28 DIAGNOSIS — I10 ESSENTIAL HYPERTENSION WITH GOAL BLOOD PRESSURE LESS THAN 140/90: ICD-10-CM

## 2024-02-29 RX ORDER — METOPROLOL SUCCINATE 100 MG/1
100 TABLET, EXTENDED RELEASE ORAL DAILY
Qty: 90 TABLET | Refills: 0 | Status: SHIPPED | OUTPATIENT
Start: 2024-02-29 | End: 2024-05-13

## 2024-02-29 NOTE — TELEPHONE ENCOUNTER
Is due for his yearly follow up with Dr. Weems and lab work. Please schedule.    Medication Aproved in Dr. Weems's absence.      Electronically signed by:  Jc Obando M.D.  2/29/2024

## 2024-02-29 NOTE — TELEPHONE ENCOUNTER
Patient informed via mychart of note below. 3/5 reminder if not read to send letter.     Closing encounter.   Trish Carlson MA

## 2024-03-05 DIAGNOSIS — E11.9 TYPE 2 DIABETES MELLITUS WITHOUT COMPLICATION, WITHOUT LONG-TERM CURRENT USE OF INSULIN (H): ICD-10-CM

## 2024-03-05 RX ORDER — GLYBURIDE 5 MG/1
TABLET ORAL
Qty: 90 TABLET | Refills: 3 | Status: SHIPPED | OUTPATIENT
Start: 2024-03-05 | End: 2024-08-13

## 2024-03-05 NOTE — TELEPHONE ENCOUNTER
"Patient is calling, states that he has been :trying to get this refilled for weeks\"    Patient has been out of the medication for 3 days.    He would like a refill TODAY.        Laura ROBISONO/  "

## 2024-03-12 ENCOUNTER — TRANSFERRED RECORDS (OUTPATIENT)
Dept: MULTI SPECIALTY CLINIC | Facility: CLINIC | Age: 69
End: 2024-03-12

## 2024-03-12 ENCOUNTER — OFFICE VISIT (OUTPATIENT)
Dept: DERMATOLOGY | Facility: CLINIC | Age: 69
End: 2024-03-12
Payer: COMMERCIAL

## 2024-03-12 VITALS — DIASTOLIC BLOOD PRESSURE: 82 MMHG | SYSTOLIC BLOOD PRESSURE: 154 MMHG

## 2024-03-12 DIAGNOSIS — C44.311 BASAL CELL CARCINOMA (BCC) OF NASAL TIP: ICD-10-CM

## 2024-03-12 LAB — RETINOPATHY: NORMAL

## 2024-03-12 PROCEDURE — 17311 MOHS 1 STAGE H/N/HF/G: CPT | Mod: GC | Performed by: DERMATOLOGY

## 2024-03-12 PROCEDURE — 14061 TIS TRNFR E/N/E/L10.1-30SQCM: CPT | Mod: GC | Performed by: DERMATOLOGY

## 2024-03-12 NOTE — PATIENT INSTRUCTIONS
Excision/Mohs Wound Care Instructions  I will experience scar, altered skin color, bleeding, swelling, pain, crusting and redness. I may experience altered sensation. Risks are excessive bleeding, infection, muscle weakness, thick (hypertrophic or keloidal) scar, and recurrence. A second procedure may be recommended to obtain the best cosmetic or functional result.  Possible complications of any surgical procedure are bleeding, infection, scarring, alteration in skin color and sensation, muscle weakness in the area, wound dehiscence or seperation, or recurrence of the lesion or disease. On occasion, after healing, a secondary procedure or revision may be recommended in order to obtain the best cosmetic or functional result.   After your surgery, a pressure bandage will be placed over the area that has sutures. This will help prevent bleeding. Please follow these instructions as they will help you to prevent complications as your wound heals.  For the First 48 hours After Surgery:  Leave the pressure bandage on and keep it dry. If it should come loose, you may retape it, but do not take it off.  Relax and take it easy. Do not do any vigorous exercise, heavy lifting, or bending forward. This could cause the wound to bleed.  Post-operative pain is usually mild. You may alternate between 1000 mg of Tylenol (acetaminophen) and 400 mg of Ibuprofen every 4 hours.  Do not take more than 4,000mg of acetaminophen in a 24 hour period or 3200 mg of Ibuprofen in a 24 hr period.  Avoid alcohol and vitamin E as these may increase your tendency to bleed.  You may put an ice pack around the bandaged area for 20 minutes every 2-3 hours. This may help reduce swelling, bruising, and pain. Make sure the ice pack is waterproof so that the pressure bandage does not get wet.   You may see a small amount of drainage or blood on your pressure bandage. This is normal. However, if drainage or bleeding continues or saturates the bandage, you  will need to apply firm pressure over the bandage with a washcloth for 15 minutes. If bleeding continues after applying pressure for 15 minutes then go to the nearest emergency room.  48 Hours After Surgery  Carefully remove the bandage and start daily wound care and dressing changes. You may also now shower and get the wound wet.  Daily Wound Care:  Wash wound with a mild soap and water.  Use caution when washing the wound, be gentle and do not let the forceful shower stream hit the wound directly.  Pat dry.  Apply Vaseline (from a new container or tube) over the suture line with a Q-tip. It is very important to keep the wound continuously moist, as wounds heal best in a moist environment.  Keep the site covered until sutures have dissolved.  You can cover it with a Telfa (non-stick) dressing and tape or a band-aid.    If you are unable to keep wound covered, you must apply Vaseline every 2-3 hours (while awake) to ensure it is being kept moist for optimal healing. A dressing overnight is recommended to keep the area moist.  Call Us If:  You have pain that is not controlled with Tylenol/Ibuprofen  You have signs or symptoms of an infection, such as: fever over 100 degrees F, redness, warmth, or foul-smelling or yellow drainage from the wound.  Who should I call with questions?  [facility]: [phone]   [facility]: [phone]  For urgent needs outside of business hours call the [facility] at [phone] and ask to speak with the dermatology resident on call

## 2024-03-12 NOTE — NURSING NOTE
The following medication was given:     MEDICATION:  Lidocaine with epinephrine 1% [id number]  ROUTE: SQ  SITE: see procedure note  DOSE: 15 mL  LOT #: [id number]  : [employer]  EXPIRATION DATE: 1/31/25  NDC#: 14622-108-15  Was there drug waste? No  Multi-dose vial: Yes    Surgifoam, Vaseline and pressure dressing applied to Mohs site on nose.  Wound care instructions reviewed with patient and AVS provided.  Patient verbalized understanding.  Patient will follow up for suture removal: N/A.  No further questions or concerns at this time.    Lianna Farfan RN  March 12, 2024

## 2024-03-12 NOTE — NURSING NOTE
Damon Manley's goals for this visit include:   Chief Complaint   Patient presents with    Procedure     Mohs - BCC tip of nose       He requests these members of his care team be copied on today's visit information: n/a    PCP: Kris Weems    Referring Provider:  Kris Weems MD  [address] [place] [address] [place] [address]    There were no vitals taken for this visit.    Do you need any medication refills at today's visit? No  Lianna Farfan RN

## 2024-03-12 NOTE — PROGRESS NOTES
[facility] Dermatologic Surgery Clinic [place] Procedure Note    Date of Service:  Mar 12, 2024  Surgery: Mohs micrographic surgery    Case 1  Repair Type: Island Pedicle Nasalis Sling Flap  Repair Size: 5.0 x 2.5 cm  Suture Material: 4-0 Monocryl / 5-0 Monocryl / 5-0 Fast Absorbing Gut   Tumor Type: BCC - Basal Cell Carcinoma  Location: Tip of Nose  Derm-Path Accession #: [id number]   PreOp Size: 0.5 x 0.5 cm  PostOp Size: 1.6 x 1.0 cm  Mohs Accession #: [id number]  Level of Defect: Fascia      Procedure:  We discussed the principles of treatment and most likely complications including scarring, bleeding, infection, swelling, pain, crusting, nerve damage, large wound,  incomplete excision, wound dehiscence,  nerve damage, recurrence, and a second procedure such as laser may be recommended to obtain the best cosmetic or functional result.    Informed consent was obtained and the patient underwent the procedure as follows:  The patient was placed supine on the operating table.  The cancer was identified, outlined with a marker, and verified by the patient.  The entire surgical field was prepped with Hibiclens.  The surgical site was anesthetized using Lidocaine 1% with epi 1 : 100,000.    The area of clinically apparent tumor was not debulked. The layer of tissue was then surgically excised using a #15 blade and was then transferred onto a specimen sheet maintaining the orientation of the specimen. Hemostasis was obtained using hyfrecator. The wound site was then covered with a dressing while the tissue samples were processed for examination.    The excised tissue was transported to the Mohs histology laboratory maintaining the tissue orientation.  The tissue specimen was relaxed so that the entire surgical margin was in a single horizontal plane for sectioning and inked for precise mapping.  A precise reference map was drawn to reflect the sectioning of the specimen, colored inking of the margins, and  orientation on the patient.  The tissue was processed using horizontal sectioning of the base and continuous peripheral margins.  The histopathologic sections were reviewed in conjunction with the reference map.    Total blocks: 1    Total slides:  2    Residual tumor was identified and indicated in red on the reference map, identifying the location where further tissue excision was necessary. Therefore, an additional stage of Mohs Micrographic surgery was deemed necessary.     Stage II   The patient was returned to the operating room, and the area prepped in the usual manner. The residual tumor was excised using the reference map as a guide. The specimen was transferred to a labeled specimen sheet maintaining the orientation of the specimen. Hemostasis was obtained and the wound site was covered with a dressing while the tissue was processed for examination.     The excised tissue was transported to the Mohs histology laboratory maintaining orientation. The specimen margins were inked for precise mapping and a reference map was prepared for the is additional stage to maintain precise orientation as described above. The tissue was processed using horizontal sectioning of the base and continuous peripheral margins. The histopathologic sections were reviewed in conjunction with the reference map.     Total blocks: 1    Total slides:  1    There were no cancer cells visualized on examination, therefore Mohs surgery was complete.       Island Pedicle Flap (Nasalis sling flap)    PROCEDURE:  The patient was taken to the operative suite and placed recumbent position on the operating room table.  The wound was identified and the planned Nasalis sling flap island pedicle reconstruction was outlined with a marker.  The area was then infiltrated with 1% lidocaine with epinephrine.  The area was then prepped in a sterile fashion using Chlorhexidine and rinsed with sterile saline and sterile drapes were placed.  The wound was  debeveled and undermined within the supraperichondrial plane.The flap was incised with a #15 scalpel, down to the perichondrium over the medial and inferior aspect of the flap, and superficially over the lateral aspect. Laterally, bilevel undermining was then performed, with planes both above and below nasalis muscle fibers. The nasalis musculocutaneous pedicle was narrowed and released thoughtfully until appropriate movement was obtained; an appropriately sized pedical remained at the completion of this step (>1/3 of lateral margin).  Hemostasis was obtained with electrocoagulation.  The flap was then advanced into the defect and secured with buried dermal 4-0 and 5-0 monocryl sutures.  The wound edges were then carefully approximated using simple interrupted and simple running 5-0 fast absorbing gut epiderma sutures.  The wound was cleansed with saline and ointment was applied.  A non-adherent pressure dressing was placed. Wound care was reviewed verbally and in writing.  The patient left the operative suite in stable condition.     Dr. Sofy Posada (Mohs micrographic surgery fellow) performed the Mohs micrographic surgery and reconstruction under the direct supervision of Cosme Kwan MD, who was present for the entire micrographic surgery and key portions of the reconstruction, and always immediately available.    Suture removal: N/A (all dissolving sutures used)    Follow-up with dermatologic surgery: 2 week wound check, 3 month scar eval    Sofy Posada MD  Micrographic Surgery and Dermatologic Oncology (MSDO) Fellow, PGY-5    Staff Involved:   Scribe/Fellow/Staff    Scribe Disclosure:   I, VIMAL LEAL, am serving as a scribe; to document services personally performed by Cosme Kwan MD -based on data collection and the provider's statements to me.       Attending attestation:  I was present for key elements of the procedure and immediately available for all other portions of the procedure.  I have  reviewed the note and edited it as necessary.    Cosme Kwan M.D.  Professor  Director of Dermatologic Surgery  Department of Dermatology  [facility]    Dermatology Surgery Clinic  [facility] and [facility] [place] [id number]

## 2024-03-12 NOTE — LETTER
3/12/2024         RE: Damon Manley  [address]  [place] [address]        Dear Colleague,    Thank you for referring your patient, Damon Manley, to the [facility]. Please see a copy of my visit note below.    [facility] Dermatologic Surgery Clinic [place] Procedure Note    Date of Service:  Mar 12, 2024  Surgery: Mohs micrographic surgery    Case 1  Repair Type: Island Pedicle Nasalis Sling Flap  Repair Size: 5.0 x 2.5 cm  Suture Material: 4-0 Monocryl / 5-0 Monocryl / 5-0 Fast Absorbing Gut   Tumor Type: BCC - Basal Cell Carcinoma  Location: Tip of Nose  Derm-Path Accession #: [id number]   PreOp Size: 0.5 x 0.5 cm  PostOp Size: 1.6 x 1.0 cm  Mohs Accession #: [id number]  Level of Defect: Fascia      Procedure:  We discussed the principles of treatment and most likely complications including scarring, bleeding, infection, swelling, pain, crusting, nerve damage, large wound,  incomplete excision, wound dehiscence,  nerve damage, recurrence, and a second procedure such as laser may be recommended to obtain the best cosmetic or functional result.    Informed consent was obtained and the patient underwent the procedure as follows:  The patient was placed supine on the operating table.  The cancer was identified, outlined with a marker, and verified by the patient.  The entire surgical field was prepped with Hibiclens.  The surgical site was anesthetized using Lidocaine 1% with epi 1 : 100,000.    The area of clinically apparent tumor was not debulked. The layer of tissue was then surgically excised using a #15 blade and was then transferred onto a specimen sheet maintaining the orientation of the specimen. Hemostasis was obtained using hyfrecator. The wound site was then covered with a dressing while the tissue samples were processed for examination.    The excised tissue was transported to the Mohs histology laboratory maintaining the tissue orientation.  The tissue  specimen was relaxed so that the entire surgical margin was in a single horizontal plane for sectioning and inked for precise mapping.  A precise reference map was drawn to reflect the sectioning of the specimen, colored inking of the margins, and orientation on the patient.  The tissue was processed using horizontal sectioning of the base and continuous peripheral margins.  The histopathologic sections were reviewed in conjunction with the reference map.    Total blocks: 1    Total slides:  2    Residual tumor was identified and indicated in red on the reference map, identifying the location where further tissue excision was necessary. Therefore, an additional stage of Mohs Micrographic surgery was deemed necessary.     Stage II   The patient was returned to the operating room, and the area prepped in the usual manner. The residual tumor was excised using the reference map as a guide. The specimen was transferred to a labeled specimen sheet maintaining the orientation of the specimen. Hemostasis was obtained and the wound site was covered with a dressing while the tissue was processed for examination.     The excised tissue was transported to the Mohs histology laboratory maintaining orientation. The specimen margins were inked for precise mapping and a reference map was prepared for the is additional stage to maintain precise orientation as described above. The tissue was processed using horizontal sectioning of the base and continuous peripheral margins. The histopathologic sections were reviewed in conjunction with the reference map.     Total blocks: 1    Total slides:  1    There were no cancer cells visualized on examination, therefore Mohs surgery was complete.       Island Pedicle Flap (Nasalis sling flap)    PROCEDURE:  The patient was taken to the operative suite and placed recumbent position on the operating room table.  The wound was identified and the planned Nasalis sling flap island pedicle  reconstruction was outlined with a marker.  The area was then infiltrated with 1% lidocaine with epinephrine.  The area was then prepped in a sterile fashion using Chlorhexidine and rinsed with sterile saline and sterile drapes were placed.  The wound was debeveled and undermined within the supraperichondrial plane.The flap was incised with a #15 scalpel, down to the perichondrium over the medial and inferior aspect of the flap, and superficially over the lateral aspect. Laterally, bilevel undermining was then performed, with planes both above and below nasalis muscle fibers. The nasalis musculocutaneous pedicle was narrowed and released thoughtfully until appropriate movement was obtained; an appropriately sized pedical remained at the completion of this step (>1/3 of lateral margin).  Hemostasis was obtained with electrocoagulation.  The flap was then advanced into the defect and secured with buried dermal 4-0 and 5-0 monocryl sutures.  The wound edges were then carefully approximated using simple interrupted and simple running 5-0 fast absorbing gut epiderma sutures.  The wound was cleansed with saline and ointment was applied.  A non-adherent pressure dressing was placed. Wound care was reviewed verbally and in writing.  The patient left the operative suite in stable condition.     Dr. Sofy Posada (Mohs micrographic surgery fellow) performed the Mohs micrographic surgery and reconstruction under the direct supervision of Cosme Kwan MD, who was present for the entire micrographic surgery and key portions of the reconstruction, and always immediately available.    Suture removal: N/A (all dissolving sutures used)    Follow-up with dermatologic surgery: 2 week wound check, 3 month scar eval    Sofy Posada MD  Micrographic Surgery and Dermatologic Oncology (MSDO) Fellow, PGY-5    Staff Involved:   Scribe/Fellow/Staff    Scribe Disclosure:   VIMAL LUDWIG, am serving as a scribe; to document services  personally performed by Cosme Kwan MD -based on data collection and the provider's statements to me.       Attending attestation:  I was present for key elements of the procedure and immediately available for all other portions of the procedure.  I have reviewed the note and edited it as necessary.    Cosme Kwan M.D.  Professor  Director of Dermatologic Surgery  Department of Dermatology  [facility]    Dermatology Surgery Clinic  [facility] [place] [address]        Again, thank you for allowing me to participate in the care of your patient.        Sincerely,        Cosme Kwan MD

## 2024-03-13 ENCOUNTER — TELEPHONE (OUTPATIENT)
Dept: DERMATOLOGY | Facility: CLINIC | Age: 69
End: 2024-03-13
Payer: COMMERCIAL

## 2024-03-13 NOTE — TELEPHONE ENCOUNTER
SUBJECTIVE/OBJECTIVE:                                                    Damon is 1 day s/p Mohs for BCC on nasal tip.     ASSESSMENT:                                                       NURSING ASSESSMENT:     Wound location: nasal tip     What are you doing to manage your pain?  He took 2 doses of Tylenol and Ibuprofen yesterday, but hasn't needed anything since.  Is it helping?  Yes  Are you applying ice? Yes, every hour while away.  He notes swelling on the right eyelid is worse then the left.  Have you had any noticeable bleeding through the bandage?  No, dressing is dry and intact.  Do you have any concerns?  No     PLAN:                                                       Wound care directions reviewed.  Post op appointment confirmed.  Next skin check has been scheduled.     Lianna Farfan RN

## 2024-03-19 ENCOUNTER — OFFICE VISIT (OUTPATIENT)
Dept: CARDIOLOGY | Facility: CLINIC | Age: 69
End: 2024-03-19
Payer: COMMERCIAL

## 2024-03-19 VITALS
RESPIRATION RATE: 18 BRPM | HEART RATE: 75 BPM | BODY MASS INDEX: 39.2 KG/M2 | WEIGHT: 280 LBS | SYSTOLIC BLOOD PRESSURE: 152 MMHG | DIASTOLIC BLOOD PRESSURE: 92 MMHG | HEIGHT: 71 IN | OXYGEN SATURATION: 97 %

## 2024-03-19 DIAGNOSIS — Z95.2 S/P AVR: Primary | ICD-10-CM

## 2024-03-19 PROCEDURE — G2211 COMPLEX E/M VISIT ADD ON: HCPCS | Performed by: INTERNAL MEDICINE

## 2024-03-19 PROCEDURE — 99214 OFFICE O/P EST MOD 30 MIN: CPT | Performed by: INTERNAL MEDICINE

## 2024-03-19 ASSESSMENT — PAIN SCALES - GENERAL: PAINLEVEL: MODERATE PAIN (4)

## 2024-03-19 NOTE — PROGRESS NOTES
CARDIOLOGY CLINIC VISIT  DATE OF SERVICE:  March 19, 2023      PRIMARY CARE PHYSICIAN:  Kris Weems MD    HISTORY OF PRESENT ILLNESS:  Mr. Manley is a 67 y/o gentleman with PMH significant for rheumatic heart disease s/p homograft pulmonary valve and AVR in 1998 who presents to clinic today for follow up.  He was last seen by me in February 2023.    In follow up today, Damon feels well from a cardiac standpoint. He had an updated echocardiogram in January 2024 which demonstrated stable, mildly increased gradients of the prosthetic AVR and PVR.  Damon denies chest pain, chest discomfort or shortness of breath.  He is entirely without cardiovascular complaints.       PAST MEDICAL HISTORY:  1.  Rheumatic valve disease:  S/P bioprosthetic homograft pulmonary valve and AVR in 1998  2.  Depression  3.  Hypertension  4.  Obesity      MEDICATIONS:  Current Outpatient Medications   Medication    amoxicillin (AMOXIL) 500 MG capsule    ampicillin (PRINCIPEN) 500 MG capsule    glyBURIDE (DIABETA /MICRONASE) 5 MG tablet    ibuprofen (ADVIL/MOTRIN) 600 MG tablet    losartan (COZAAR) 100 MG tablet    metFORMIN (GLUCOPHAGE-XR) 500 MG 24 hr tablet    metoprolol succinate ER (TOPROL XL) 100 MG 24 hr tablet    STATIN NOT PRESCRIBED (INTENTIONAL)     No current facility-administered medications for this visit.       ALLERGIES:  Allergies   Allergen Reactions    No Known Drug Allergy        SOCIAL HISTORY:  I have reviewed this patient's social history and updated it with pertinent information if needed. Damon Manley  reports that he quit smoking about 35 years ago. His smoking use included cigarettes. He has never used smokeless tobacco. He reports current alcohol use. He reports that he does not use drugs.    FAMILY HISTORY:  I have reviewed this patient's family history and updated it with pertinent information if needed.   Family History   Problem Relation Age of Onset    Other Cancer Father         lung    Other Cancer  Mother         lung    Other Cancer Paternal Aunt         unsure    Diabetes No family hx of     Coronary Artery Disease No family hx of     Hypertension No family hx of     Hyperlipidemia No family hx of     Cerebrovascular Disease No family hx of        REVIEW OF SYSTEMS:  A complete ROS was obtained and the pertinent positives are outlined in the history of present illness above.  The remainder of systems is negative.      PHYSICAL EXAM:                     Vital Signs with Ranges     0 lbs 0 oz    Constitutional: awake, alert, no distress  Eyes: PERRL, sclera nonicteric  ENT: trachea midline  Respiratory: CTAB  Cardiovascular: RRR no m/r/g, no JVD  GI: nondistended, nontender, bowel sounds present  Lymph/Hematologic: no lymphadenopathy  Skin: dry, no rash  Musculoskeletal: good muscle tone, no edema bilaterally  Neurologic: no focal deficits  Neuropsychiatric: appropriate affact    DATA:  Ehocardiogram dated 1/23/24 images reviewed personally    Left ventricular systolic function is mild to moderately reduced.  The visual ejection fraction is 40-45%.  h/o PVR  mean gradient 15mmHg, peak 24mmHg  There is a bioprosthetic aortic valve. (not well visualized)  mean gradient 22mmHg, peak 40mmHg  Compared to prior study, there is no significant change.    ASSESSMENT:  1.  Status post prosthetic aortic valve replacement and pulmonic valve replacement in 1998: Mild increased gradients of both valves consistent with mild prosthetic stenosis.  No symptoms of concern  2.  Hypertension: Above goal  3.  Obesity  4.  Dizziness:  Chronic, previous cardiac evaluation has been unremarkable.        RECOMMENDATIONS:  -Doing well from a cardiac standpoint without symptoms of concern.  Mild increase in prosthetic valve gradients are stable by recent echocardiogram which was reviewed in detail today  -BP above goal today; Damon will check his BP at home over the next two weeks and contact me with readings  -Plan for follow up in 12  months with an echocardiogram prior to that visit    Alisa Olvera MD [facility]  March 19 2024    I spent a total of 30 minutes providing patient care.  This time spent includes chart review, time spent with the patient during the clinic visit and time spent afterwards providing necessary documentation.

## 2024-03-19 NOTE — PATIENT INSTRUCTIONS
-Check BP at home over next two weeks; contact Dr. Olvera's office via My Chart or phone with readings  -Follow up in one year with an echocardiogram prior to that visit

## 2024-03-19 NOTE — LETTER
3/19/2024    Kris Weems MD, MD  [address] [place] [address]    RE: Damon Manley       Dear Colleague,     I had the pleasure of seeing Damon Manley in the [facility].  CARDIOLOGY CLINIC VISIT  DATE OF SERVICE:  March 19, 2023      PRIMARY CARE PHYSICIAN:  Kris Weems MD    HISTORY OF PRESENT ILLNESS:  Mr. Manley is a 69 y/o gentleman with PMH significant for rheumatic heart disease s/p homograft pulmonary valve and AVR in 1998 who presents to clinic today for follow up.  He was last seen by me in February 2023.    In follow up today, Damon feels well from a cardiac standpoint. He had an updated echocardiogram in January 2024 which demonstrated stable, mildly increased gradients of the prosthetic AVR and PVR.  Damon denies chest pain, chest discomfort or shortness of breath.  He is entirely without cardiovascular complaints.       PAST MEDICAL HISTORY:  1.  Rheumatic valve disease:  S/P bioprosthetic homograft pulmonary valve and AVR in 1998  2.  Depression  3.  Hypertension  4.  Obesity      MEDICATIONS:  Current Outpatient Medications   Medication    amoxicillin (AMOXIL) 500 MG capsule    ampicillin (PRINCIPEN) 500 MG capsule    glyBURIDE (DIABETA /MICRONASE) 5 MG tablet    ibuprofen (ADVIL/MOTRIN) 600 MG tablet    losartan (COZAAR) 100 MG tablet    metFORMIN (GLUCOPHAGE-XR) 500 MG 24 hr tablet    metoprolol succinate ER (TOPROL XL) 100 MG 24 hr tablet    STATIN NOT PRESCRIBED (INTENTIONAL)     No current facility-administered medications for this visit.       ALLERGIES:  Allergies   Allergen Reactions    No Known Drug Allergy        SOCIAL HISTORY:  I have reviewed this patient's social history and updated it with pertinent information if needed. Damon Manley  reports that he quit smoking about 35 years ago. His smoking use included cigarettes. He has never used smokeless tobacco. He reports current alcohol use. He reports that he does not use drugs.    FAMILY  HISTORY:  I have reviewed this patient's family history and updated it with pertinent information if needed.   Family History   Problem Relation Age of Onset    Other Cancer Father         lung    Other Cancer Mother         lung    Other Cancer Paternal Aunt         unsure    Diabetes No family hx of     Coronary Artery Disease No family hx of     Hypertension No family hx of     Hyperlipidemia No family hx of     Cerebrovascular Disease No family hx of        REVIEW OF SYSTEMS:  A complete ROS was obtained and the pertinent positives are outlined in the history of present illness above.  The remainder of systems is negative.    PHYSICAL EXAM:                     Vital Signs with Ranges     0 lbs 0 oz    Constitutional: awake, alert, no distress  Eyes: PERRL, sclera nonicteric  ENT: trachea midline  Respiratory: CTAB  Cardiovascular: RRR no m/r/g, no JVD  GI: nondistended, nontender, bowel sounds present  Lymph/Hematologic: no lymphadenopathy  Skin: dry, no rash  Musculoskeletal: good muscle tone, no edema bilaterally  Neurologic: no focal deficits  Neuropsychiatric: appropriate affact    DATA:  Ehocardiogram dated 1/23/24 images reviewed personally    Left ventricular systolic function is mild to moderately reduced.  The visual ejection fraction is 40-45%.  h/o PVR  mean gradient 15mmHg, peak 24mmHg  There is a bioprosthetic aortic valve. (not well visualized)  mean gradient 22mmHg, peak 40mmHg  Compared to prior study, there is no significant change.    ASSESSMENT:  1.  Status post prosthetic aortic valve replacement and pulmonic valve replacement in 1998: Mild increased gradients of both valves consistent with mild prosthetic stenosis.  No symptoms of concern  2.  Hypertension: Above goal  3.  Obesity  4.  Dizziness:  Chronic, previous cardiac evaluation has been unremarkable.      RECOMMENDATIONS:  -Doing well from a cardiac standpoint without symptoms of concern.  Mild increase in prosthetic valve gradients  are stable by recent echocardiogram which was reviewed in detail today  -BP above goal today; Damon will check his BP at home over the next two weeks and contact me with readings  -Plan for follow up in 12 months with an echocardiogram prior to that visit    Alisa Olvera MD [facility]  March 19 2024    I spent a total of 30 minutes providing patient care.  This time spent includes chart review, time spent with the patient during the clinic visit and time spent afterwards providing necessary documentation.      Thank you for allowing me to participate in the care of your patient.      Sincerely,     Alisa Olvera MD   [facility]  cc:   Kris Weems MD  [address] [place] [address]

## 2024-04-01 ENCOUNTER — VIRTUAL VISIT (OUTPATIENT)
Dept: DERMATOLOGY | Facility: CLINIC | Age: 69
End: 2024-04-01
Payer: COMMERCIAL

## 2024-04-01 DIAGNOSIS — Z85.828 HISTORY OF NONMELANOMA SKIN CANCER: ICD-10-CM

## 2024-04-01 DIAGNOSIS — Z48.89 ENCOUNTER FOR POSTOPERATIVE WOUND CHECK: Primary | ICD-10-CM

## 2024-04-01 PROCEDURE — 99024 POSTOP FOLLOW-UP VISIT: CPT | Mod: GC | Performed by: DERMATOLOGY

## 2024-04-01 ASSESSMENT — PAIN SCALES - GENERAL: PAINLEVEL: NO PAIN (0)

## 2024-04-01 NOTE — LETTER
4/1/2024       RE: Damon Manley  [address]  [facility] [address]     Dear Colleague,    Thank you for referring your patient, Damon Manley, to the [facility] DERMATOLOGIC SURGERY [facility]IN[facility] [place] at [facility]. Please see a copy of my visit note below.    [facility] Dermatology Note  Encounter Date: Apr 1, 2024  Store-and-Forward and Telephone. Location of teledermatologist: [facility] DERMATOLOGIC SURGERY [facility]INIC [place].  Start time: 3:10pm. End time: 3:16pm.    Dermatology Problem List:  BCC, tip of nose, s/p MMS 3/12/24 (nasalis sling flap)    CC: Derm Problem (Patient is here today for a 2 week wound check.)      Subjective: Damon Manley is a 68 year old male who presents today for wound check after MMS with island pedicle nasalis sling flap repair was performed for BCC on the tip of the nose.   - has been applying vaseline daily  - feels like there is still some scabbing and bumpiness.  - thinks bruising has improved  -scheduled for next skin check this Friday  - no other concerns today    Objective:   Focused examination of the tip of nose within the teledermatology photograph(s) on 3/25/24 was performed.   - The surgical site noted above is clean, dry, and intact. There is no surrounding erythema or purulence. No clinical evidence of infection noted today.    Assessment and Plan:     1. BCC, tip of nose, s/p MMS 3/12/24 (nasalis sling flap)  - The patient's surgery site(s) is/are healing very well. No evidence of infection on examination today.  - The patient was told to continue with wound cares until the area(s) is/are no longer crusted.   - The patient should follow up with dermatologic surgery PRN, as well as continue with regular skin exams in general dermatology clinic.  -will schedule for 3-month scar evaluation     Patient was discussed with and evaluated by attending physician Dr. Cosme Kwan.    Staff  Involved:   Scribe/Fellow/Staff    Scribe Disclosure:   OZIEL VIMAL ELVIS, am serving as a scribe; to document services personally performed by Cosme Kwan MD -based on data collection and the provider's statements to me.     Justin Tobin MD  Internal Medicine-Dermatology PGY-2    Staff: Dr. OZIEL Kwan           Attestation signed by Cosme Kwan MD at 4/8/2024 11:02 AM:  Attending Attestation  I attest that the Fellow recorded the interview and exam that I personally performed.  I have reviewed the note and edited it as necessary.    Cosme Kwan M.D.  Professor  Director of Dermatologic Surgery  Department of Dermatology  [facility]

## 2024-04-01 NOTE — PROGRESS NOTES
[facility] Dermatology Note  Encounter Date: Apr 1, 2024  Store-and-Forward and Telephone. Location of teledermatologist: [facility] DERMATOLOGIC SURGERY CLINIC [place].  Start time: 3:10pm. End time: 3:16pm.    Dermatology Problem List:  BCC, tip of nose, s/p MMS 3/12/24 (nasalis sling flap)    CC: Derm Problem (Patient is here today for a 2 week wound check.)      Subjective: Damon Manley is a 68 year old male who presents today for wound check after MMS with island pedicle nasalis sling flap repair was performed for BCC on the tip of the nose.   - has been applying vaseline daily  - feels like there is still some scabbing and bumpiness.  - thinks bruising has improved  -scheduled for next skin check this Friday  - no other concerns today    Objective:   Focused examination of the tip of nose within the teledermatology photograph(s) on 3/25/24 was performed.   - The surgical site noted above is clean, dry, and intact. There is no surrounding erythema or purulence. No clinical evidence of infection noted today.    Assessment and Plan:     1. BCC, tip of nose, s/p MMS 3/12/24 (nasalis sling flap)  - The patient's surgery site(s) is/are healing very well. No evidence of infection on examination today.  - The patient was told to continue with wound cares until the area(s) is/are no longer crusted.   - The patient should follow up with dermatologic surgery PRN, as well as continue with regular skin exams in general dermatology clinic.  -will schedule for 3-month scar evaluation     Patient was discussed with and evaluated by attending physician Dr. Cosme Kwan.    Staff Involved:   Scribe/Fellow/Staff    Scribe Disclosure:   VIMAL LUDWIG, am serving as a scribe; to document services personally performed by Cosme Kwan MD -based on data collection and the provider's statements to me.     Justin Tobin MD  Internal Medicine-Dermatology PGY-2    Staff: Dr. OZIEL Kwan

## 2024-04-01 NOTE — NURSING NOTE
Chief Complaint   Patient presents with    Derm Problem     Patient is here today for a 2 week wound check.     Sarah MARIEE RN

## 2024-04-05 ENCOUNTER — OFFICE VISIT (OUTPATIENT)
Dept: DERMATOLOGY | Facility: CLINIC | Age: 69
End: 2024-04-05
Payer: COMMERCIAL

## 2024-04-05 ENCOUNTER — TELEPHONE (OUTPATIENT)
Dept: DERMATOLOGY | Facility: CLINIC | Age: 69
End: 2024-04-05

## 2024-04-05 VITALS — TEMPERATURE: 96.6 F | BODY MASS INDEX: 39.2 KG/M2 | HEIGHT: 71 IN | WEIGHT: 280 LBS

## 2024-04-05 DIAGNOSIS — L21.9 DERMATITIS, SEBORRHEIC: Primary | ICD-10-CM

## 2024-04-05 DIAGNOSIS — D22.9 MULTIPLE BENIGN NEVI: ICD-10-CM

## 2024-04-05 DIAGNOSIS — D48.5 NEOPLASM OF UNCERTAIN BEHAVIOR OF SKIN: ICD-10-CM

## 2024-04-05 DIAGNOSIS — Z85.828 HISTORY OF NONMELANOMA SKIN CANCER: ICD-10-CM

## 2024-04-05 PROCEDURE — 88305 TISSUE EXAM BY PATHOLOGIST: CPT | Performed by: PATHOLOGY

## 2024-04-05 PROCEDURE — 99213 OFFICE O/P EST LOW 20 MIN: CPT | Mod: 25 | Performed by: STUDENT IN AN ORGANIZED HEALTH CARE EDUCATION/TRAINING PROGRAM

## 2024-04-05 PROCEDURE — 11102 TANGNTL BX SKIN SINGLE LES: CPT | Performed by: STUDENT IN AN ORGANIZED HEALTH CARE EDUCATION/TRAINING PROGRAM

## 2024-04-05 RX ORDER — KETOCONAZOLE 20 MG/ML
SHAMPOO TOPICAL
Qty: 30 ML | Refills: 3 | Status: SHIPPED | OUTPATIENT
Start: 2024-04-06 | End: 2024-09-06

## 2024-04-05 ASSESSMENT — PAIN SCALES - GENERAL: PAINLEVEL: NO PAIN (1)

## 2024-04-05 NOTE — LETTER
4/5/2024         RE: Damon Manley  [address]  [facility] [address]        Dear Colleague,    Thank you for referring your patient, Damon Manley, to the [facility]. Please see a copy of my visit note below.    [facility] Dermatology Note  Encounter Date: Apr 5, 2024  Office Visit     Reviewed patients past medical history and pertinent chart review prior to patients visit today.     Dermatology Problem List:  Last skin check: 04/05/2024  0. NUB left mid back, shave biopsy 04/05/24 .    History of nonmelanoma skin cancer   -basal cell carcinoma, nose, s/p mohs surgery 3/12/2024  2. Seb dermatitis   -ketoconazole shampoo    Family Hx: Son and dad, unknown type   _________________________________________    Assessment & Plan:     # Neoplasm of uncertain behavior:  left mid back  DDx includes bcc vs nevus. Shave biopsy today.    Procedure Note: Biopsy by shave technique  The risks and benefits of the procedure were described to the patient. These include but are not limited to bleeding, infection, scar, incomplete removal, and non-diagnostic biopsy. Verbal informed consent was obtained. The above site(s) was cleansed with an alcohol pad and injected with 1% lidocaine with epinephrine. Once anesthesia was obtained, a biopsy(ies) was performed with Gilette blade. The tissue(s) was placed in a labeled container(s) with formalin and sent to pathology. Hemostasis was achieved with aluminum chloride. Vaseline and a bandage were applied to the wound(s). The patient tolerated the procedure well and was given post biopsy care instructions.     # Seborrheic dermatitis, scalp and face  Discussed that this is a recurring rash for most people and will need some maintenance even after rash has resolved. Begin washing areas with ketoconazole shampoo 3x weekly. Advised to leave on for 2-5 minutes before rinsing.      # Benign skin findings including: seborrheic keratoses, cherry  "angioma, lentigines and benign nevi.   - No further intervention required. Patient to report changes.   - Patient reassured of the benign nature of these lesions.    #Signs and Symptoms of non-melanoma skin cancer and ABCDEs of melanoma reviewed with patient. Patient encouraged to perform monthly self skin exams and educated on how to perform them. UV precautions reviewed with patient. Patient was asked about new or changing moles/lesions on body.     #Reviewed Sunscreen: Apply 20 minutes prior to going outdoors and reapply every two hours, when wet or sweating. We recommend using an SPF 30 or higher, and to use one that is water resistant.       Follow-up: 6 months for follow up full body skin exam, prn for new or changing lesions or new concerns    Yolanda Gonzalez PA-C  [facility]  Dermatology     ____________________________________________    CC: Skin Check (Pt states, \"here for fbsc, had Mohs BCC on nasal tip on 03/24 in [facility]\")    HPI:  Mr. Damon Manley is a(n) 69 year old male who presents today as a return patient for a full body skin cancer screening. Patient has no specific cutaneous concerns today. Pt is being diligent with photoprotection.     Patient is otherwise feeling well, without additional skin concerns.     Physical Exam:  Vitals: Temp (!) 96.6  F (35.9  C) (Temporal)   Ht 1.803 m (5' 10.98\")   Wt 127 kg (280 lb)   BMI 39.07 kg/m    SKIN: Total skin excluding the genitalia areas was performed. The exam included the head/face, neck, both arms, chest, back, abdomen, both legs, digits, mons pubis, buttock and nails.   -scalp and face, macular erythema with overlying adherent scale   -left mid back, erythematous papule with pigment globules on dermoscopy  -several 1-2mm red dome shaped symmetric papules scattered on the trunk  -multiple tan/brown flat round macules and raised papules scattered throughout trunk, extremities and head. No worrisome features for malignancy noted on " examination.  -scattered tan, homogenous macules scattered on sun exposed areas of trunk, extremities and face.   -scattered waxy, stuck on tan/brown papules and patches on the trunk       - No other lesions of concern on areas examined.     Medications:  Current Outpatient Medications   Medication Sig Dispense Refill     glyBURIDE (DIABETA /MICRONASE) 5 MG tablet Take 1 Tablet (5 mg) by mouth once daily with breakfast. 90 tablet 3     ibuprofen (ADVIL/MOTRIN) 600 MG tablet Take 1 tablet (600 mg) by mouth every 6 hours as needed for moderate pain 21 tablet 0     losartan (COZAAR) 100 MG tablet Take 1 tablet (100 mg) by mouth daily 90 tablet 3     metFORMIN (GLUCOPHAGE-XR) 500 MG 24 hr tablet Take 2 tablets by mouth once daily with dinner. 180 tablet 3     metoprolol succinate ER (TOPROL XL) 100 MG 24 hr tablet Take 1 tablet (100 mg) by mouth daily 90 tablet 0     amoxicillin (AMOXIL) 500 MG capsule Take 4 capsules (2,000 mg) by mouth once as needed (Take 1 hour prior to dental procedure) (Patient not taking: Reported on 2/1/2024) 20 capsule 0     ampicillin (PRINCIPEN) 500 MG capsule 4 tabs 1 hour  prior to dental procedure may repeat (Patient not taking: Reported on 2/1/2024) 20 capsule 3     STATIN NOT PRESCRIBED (INTENTIONAL) Please choose reason not prescribed, below (Patient not taking: Reported on 2/1/2024)       No current facility-administered medications for this visit.      Past Medical History:   Patient Active Problem List   Diagnosis     GERD (gastroesophageal reflux disease)     Abnormal glucose     Essential hypertension with goal blood pressure less than 140/90     Aortic valve prosthesis present     Pulmonary valve replaced     Aortic valve replaced     ARIAS (dyspnea on exertion)     SOB (shortness of breath)     Elevated LFTs     Elevated lipase     Type 2 diabetes mellitus without complication, without long-term current use of insulin (H)     Rib fractures     Closed fracture of transverse process  of lumbar vertebra, initial encounter (H)     Closed head injury, subsequent encounter     Obesity (BMI 35.0-39.9) with comorbidity (H)     Type 2 diabetes mellitus with complication, without long-term current use of insulin (H)     Rheumatic disease of heart valve     Past Medical History:   Diagnosis Date     Coronary artery disease     Valves replaced due to hx of Rheumatic fever. - No mechanical valves     Depressive disorder     situational - resolved     Hypertension      Motion sickness      Obesity (BMI 30-39.9)         Kris Weems MD  [address] [place]  [place] [address] on close of this encounter.       Again, thank you for allowing me to participate in the care of your patient.        Sincerely,        Yolanda Gonzalez PA-C

## 2024-04-05 NOTE — PATIENT INSTRUCTIONS
Wound Care After a Biopsy    What is a skin biopsy?  A skin biopsy allows the doctor to examine a very small piece of tissue under the microscope to determine the diagnosis and the best treatment for the skin condition. A local anesthetic (numbing medicine)  is injected with a very small needle into the skin area to be tested. A small piece of skin is taken from the area. Sometimes a suture (stitch) is used.     What are the risks of a skin biopsy?  I will experience scar, bleeding, swelling, pain, crusting and redness. I may experience incomplete removal or recurrence. Risks of this procedure are excessive bleeding, bruising, infection, nerve damage, numbness, thick (hypertrophic or keloidal) scar and non-diagnostic biopsy.    How should I care for my wound for the first 24 hours?  Keep the wound dry and covered for 24 hours  If it bleeds, hold direct pressure on the area for 15 minutes. If bleeding does not stop then go to the emergency room  Avoid strenuous exercise the first 1-2 days or as your doctor instructs you    How should I care for the wound after 24 hours?  After 24 hours, remove the bandage  You may bathe or shower as normal  If you had a scalp biopsy, you can shampoo as usual and can use shower water to clean the biopsy site daily  Clean the wound twice a day with gentle soap and water  Do not scrub, be gentle  Apply white petroleum/Vaseline after cleaning the wound with a cotton swab or a clean finger, and keep the site covered with a Bandaid /bandage. Bandages are not necessary with a scalp biopsy  If you are unable to cover the site with a Bandaid /bandage, re-apply ointment 2-3 times a day to keep the site moist. Moisture will help with healing  Avoid strenuous activity for first 1-2 days  Avoid lakes, rivers, pools, and oceans until the stitches are removed or the site is healed    How do I clean my wound?  Wash hands thoroughly with soap or use hand  before all wound care  Clean the  wound with gentle soap and water  Apply white petroleum/Vaseline  to wound after it is clean  Replace the Bandaid /bandage to keep the wound covered for the first few days or as instructed by your doctor  If you had a scalp biopsy, warm shower water to the area on a daily basis should suffice    What should I use to clean my wound?   Cotton-tipped applicators (Qtips )  White petroleum jelly (Vaseline ). Use a clean new container and use Q-tips to apply.  Bandaids   as needed  Gentle soap     How should I care for my wound long term?  Do not get your wound dirty  Keep up with wound care for one week or until the area is healed.  A small scab will form and fall off by itself when the area is completely healed. The area will be red and will become pink in color as it heals. Sun protection is very important for how your scar will turn out. Sunscreen with an SPF 30 or greater is recommended once the area is healed.  If you have stitches, stitches need to be removed in 7 days on the face/neck, or 10-14 days on the body days. You may return to our clinic for this or you may have it done locally at your doctor s office.  You should have some soreness but it should be mild and slowly go away over several days. Talk to your doctor about using tylenol for pain,    When should I call my doctor?  If you have increased:   Pain or swelling  Pus or drainage (clear or slightly yellow drainage is ok)  Temperature over 100F  Spreading redness or warmth around wound    When will I hear about my results?  The biopsy results can take 2 weeks to come back.  Your results will automatically release to [facility] before your provider has even reviewed them.  The clinic will call you with the results, send you a [employer] [facility] message, or have you schedule a follow-up clinic or phone time to discuss the results.  Contact our clinics if you do not hear from us in 2 weeks.    Who should I call with questions?  [facility]:  [phone]  [facility]: [phone]  For urgent needs outside of business hours call the [facility] at [phone] and ask for the dermatology resident on call

## 2024-04-05 NOTE — PROGRESS NOTES
[facility] Dermatology Note  Encounter Date: Apr 5, 2024  Office Visit     Reviewed patients past medical history and pertinent chart review prior to patients visit today.     Dermatology Problem List:  Last skin check: 04/05/2024  0. NUB left mid back, shave biopsy 04/05/24 .    History of nonmelanoma skin cancer   -basal cell carcinoma, nose, s/p mohs surgery 3/12/2024  2. Seb dermatitis   -ketoconazole shampoo    Family Hx: Son and dad, unknown type   _________________________________________    Assessment & Plan:     # Neoplasm of uncertain behavior:  left mid back  DDx includes bcc vs nevus. Shave biopsy today.    Procedure Note: Biopsy by shave technique  The risks and benefits of the procedure were described to the patient. These include but are not limited to bleeding, infection, scar, incomplete removal, and non-diagnostic biopsy. Verbal informed consent was obtained. The above site(s) was cleansed with an alcohol pad and injected with 1% lidocaine with epinephrine. Once anesthesia was obtained, a biopsy(ies) was performed with Gilette blade. The tissue(s) was placed in a labeled container(s) with formalin and sent to pathology. Hemostasis was achieved with aluminum chloride. Vaseline and a bandage were applied to the wound(s). The patient tolerated the procedure well and was given post biopsy care instructions.     # Seborrheic dermatitis, scalp and face  Discussed that this is a recurring rash for most people and will need some maintenance even after rash has resolved. Begin washing areas with ketoconazole shampoo 3x weekly. Advised to leave on for 2-5 minutes before rinsing.      # Benign skin findings including: seborrheic keratoses, cherry angioma, lentigines and benign nevi.   - No further intervention required. Patient to report changes.   - Patient reassured of the benign nature of these lesions.    #Signs and Symptoms of non-melanoma skin cancer and ABCDEs of melanoma reviewed  "with patient. Patient encouraged to perform monthly self skin exams and educated on how to perform them. UV precautions reviewed with patient. Patient was asked about new or changing moles/lesions on body.     #Reviewed Sunscreen: Apply 20 minutes prior to going outdoors and reapply every two hours, when wet or sweating. We recommend using an SPF 30 or higher, and to use one that is water resistant.       Follow-up: 6 months for follow up full body skin exam, prn for new or changing lesions or new concerns    Yolanda Gonzalez PA-C  [facility]  Dermatology     ____________________________________________    CC: Skin Check (Pt states, \"here for fbsc, had Mohs BCC on nasal tip on 03/24 in [facility]\")    HPI:  Mr. Damon Manley is a(n) 69 year old male who presents today as a return patient for a full body skin cancer screening. Patient has no specific cutaneous concerns today. Pt is being diligent with photoprotection.     Patient is otherwise feeling well, without additional skin concerns.     Physical Exam:  Vitals: Temp (!) 96.6  F (35.9  C) (Temporal)   Ht 1.803 m (5' 10.98\")   Wt 127 kg (280 lb)   BMI 39.07 kg/m    SKIN: Total skin excluding the genitalia areas was performed. The exam included the head/face, neck, both arms, chest, back, abdomen, both legs, digits, mons pubis, buttock and nails.   -scalp and face, macular erythema with overlying adherent scale   -left mid back, erythematous papule with pigment globules on dermoscopy  -several 1-2mm red dome shaped symmetric papules scattered on the trunk  -multiple tan/brown flat round macules and raised papules scattered throughout trunk, extremities and head. No worrisome features for malignancy noted on examination.  -scattered tan, homogenous macules scattered on sun exposed areas of trunk, extremities and face.   -scattered waxy, stuck on tan/brown papules and patches on the trunk       - No other lesions of concern on areas examined. "     Medications:  Current Outpatient Medications   Medication Sig Dispense Refill    glyBURIDE (DIABETA /MICRONASE) 5 MG tablet Take 1 Tablet (5 mg) by mouth once daily with breakfast. 90 tablet 3    ibuprofen (ADVIL/MOTRIN) 600 MG tablet Take 1 tablet (600 mg) by mouth every 6 hours as needed for moderate pain 21 tablet 0    losartan (COZAAR) 100 MG tablet Take 1 tablet (100 mg) by mouth daily 90 tablet 3    metFORMIN (GLUCOPHAGE-XR) 500 MG 24 hr tablet Take 2 tablets by mouth once daily with dinner. 180 tablet 3    metoprolol succinate ER (TOPROL XL) 100 MG 24 hr tablet Take 1 tablet (100 mg) by mouth daily 90 tablet 0    amoxicillin (AMOXIL) 500 MG capsule Take 4 capsules (2,000 mg) by mouth once as needed (Take 1 hour prior to dental procedure) (Patient not taking: Reported on 2/1/2024) 20 capsule 0    ampicillin (PRINCIPEN) 500 MG capsule 4 tabs 1 hour  prior to dental procedure may repeat (Patient not taking: Reported on 2/1/2024) 20 capsule 3    STATIN NOT PRESCRIBED (INTENTIONAL) Please choose reason not prescribed, below (Patient not taking: Reported on 2/1/2024)       No current facility-administered medications for this visit.      Past Medical History:   Patient Active Problem List   Diagnosis    GERD (gastroesophageal reflux disease)    Abnormal glucose    Essential hypertension with goal blood pressure less than 140/90    Aortic valve prosthesis present    Pulmonary valve replaced    Aortic valve replaced    ARIAS (dyspnea on exertion)    SOB (shortness of breath)    Elevated LFTs    Elevated lipase    Type 2 diabetes mellitus without complication, without long-term current use of insulin (H)    Rib fractures    Closed fracture of transverse process of lumbar vertebra, initial encounter (H)    Closed head injury, subsequent encounter    Obesity (BMI 35.0-39.9) with comorbidity (H)    Type 2 diabetes mellitus with complication, without long-term current use of insulin (H)    Rheumatic disease of heart  valve     Past Medical History:   Diagnosis Date    Coronary artery disease     Valves replaced due to hx of Rheumatic fever. - No mechanical valves    Depressive disorder     situational - resolved    Hypertension     Motion sickness     Obesity (BMI 30-39.9)        CC Kris Weems MD  [address] [place] [address] on close of this encounter.

## 2024-04-09 LAB
PATH REPORT.COMMENTS IMP SPEC: NORMAL
PATH REPORT.COMMENTS IMP SPEC: NORMAL
PATH REPORT.FINAL DX SPEC: NORMAL
PATH REPORT.GROSS SPEC: NORMAL
PATH REPORT.MICROSCOPIC SPEC OTHER STN: NORMAL
PATH REPORT.RELEVANT HX SPEC: NORMAL

## 2024-04-11 ENCOUNTER — TELEPHONE (OUTPATIENT)
Dept: DERMATOLOGY | Facility: CLINIC | Age: 69
End: 2024-04-11
Payer: COMMERCIAL

## 2024-04-11 NOTE — TELEPHONE ENCOUNTER
Called the patient and made a patient an appointment with  for and excision.  Patient had no questions at this time.         Sarah KUO

## 2024-04-11 NOTE — TELEPHONE ENCOUNTER
[facility] Call Center    Phone Message    May a detailed message be left on voicemail: yes     Reason for Call: Patient calling to schedule MOHS procedure - please call back [phone] Thank you    Action Taken: Other: [place] DERM    Travel Screening: Not Applicable

## 2024-04-12 ENCOUNTER — TELEPHONE (OUTPATIENT)
Dept: DERMATOLOGY | Facility: CLINIC | Age: 69
End: 2024-04-12
Payer: COMMERCIAL

## 2024-04-12 NOTE — TELEPHONE ENCOUNTER
Pt called to discuss results, he stated that another staff already called and scheduled him for an excision. Writer apologized for calling him again. He was unaware that he could do the ED&C procedure to treat his BCC. Pt elected to treat via ED&C and was happy to hear that we could do this at our [place] location. Pt scheduled for next Thursday.    Carley Tang RN on 4/12/2024 at 8:43 AM      Final Diagnosis  A(1). Skin, left mid back, shave:  - Basal cell carcinoma, superficial and nodular types - (see description)      Electronically signed by Jameson Gilman MD on 4/9/2024 at  4:32 PM        Result Notes     Carley Tang RN  4/10/2024 11:28 AM CDT Back to Top    Writer called pt, no answer. Voicemail box was full, unable to leave message.        Carley Tang RN on 4/10/2024 at 11:27 AM   Yolanda Gonzalez PA-C  4/10/2024 11:19 AM CDT     Please let patient know that biopsy(s) showed the following with the below treatment recommendations:     A. Left mid back, superficial and nodular basal cell carcinoma, further treatment with ED&C recommended vs. Excision based on patient preference     ED&C or electrodesiccation and curettage is an in-office procedure where the biopsy site is numbed by using local anesthetic and more tissue will be scraped away to ensure removal of any remaining skin cancer cells. Please let me know what patient decides.

## 2024-04-18 ENCOUNTER — OFFICE VISIT (OUTPATIENT)
Dept: DERMATOLOGY | Facility: CLINIC | Age: 69
End: 2024-04-18
Payer: COMMERCIAL

## 2024-04-18 DIAGNOSIS — C44.519 BCC (BASAL CELL CARCINOMA), TRUNK: Primary | ICD-10-CM

## 2024-04-18 PROCEDURE — 17262 DSTRJ MAL LES T/A/L 1.1-2.0: CPT | Mod: 79 | Performed by: STUDENT IN AN ORGANIZED HEALTH CARE EDUCATION/TRAINING PROGRAM

## 2024-04-18 ASSESSMENT — PAIN SCALES - GENERAL: PAINLEVEL: NO PAIN (0)

## 2024-04-18 NOTE — PATIENT INSTRUCTIONS
Wound Care:  Electrodesiccation and Curettage (ED&C)    I will experience scar, altered skin color, bleeding, swelling, pain, crusting and redness. I may experience altered sensation. Risks are excessive bleeding, infection, muscle weakness, thick (hypertrophic or keloidal) scar, and recurrence. A second procedure may be recommended to obtain the best cosmetic or functional result.    What is electrodesiccation and curettage?  Scraping off tissue (curettage)  Destroy tissue using electric current or cautery (electrodessication)    How do I perform wound care?  Keep dressing in place for 24 hours.  Remove prior to showering  Wash gently with mild soap and water.  Pat dry  Put on a thick layer of Vaseline on the wound using a cotton-swab   Cover the wound with a bandage to protect from dust and tight clothing  Perform wound care once daily until the center of the wound has a pinked over appearance  During wound care, do not allow the area to dry out or form a scab  **If a build up of crust develops, soak a cotton swab in hydrogen peroxide to remove the crust    What do I need?  **Hydrogen peroxide    Cotton-swabs   Vaseline or petroleum jelly   Band-AidsTM or dressing supplies as needed     When should I call the doctor?  [facility]: [phone]  [facility]: [phone]  For urgent needs outside of business hours call the [facility] at [phone] and ask for the dermatology resident on call

## 2024-04-18 NOTE — NURSING NOTE
Damon Manley's goals for this visit include:   Chief Complaint   Patient presents with    Derm Problem     Damon is here today for ED&C due to BCC on left mid back       He requests these members of his care team be copied on today's visit information:     PCP: Kris Weems    Referring Provider:  Kris Weems MD  [address] [place] [address]    There were no vitals taken for this visit.    Do you need any medication refills at today's visit? No    Ruth Gallardo RN

## 2024-04-18 NOTE — LETTER
4/18/2024         RE: Damon Manley  [address]  [place] [address]        Dear Colleague,    Thank you for referring your patient, Damon Manley, to the [facility]. Please see a copy of my visit note below.    [facility] Dermatology Note  Encounter Date: Apr 18, 2024  Office Visit     Reviewed patients past medical history and pertinent chart review prior to patients visit today.     Dermatology Problem List:  Last skin check: 04/05/2024     History of nonmelanoma skin cancer   -BCC, left mid back, s/p ED&C, 4/18/2024  -BCC nose, s/p mohs surgery, 3/12/2024  2. Seb dermatitis   -ketoconazole shampoo     Family Hx: Son and dad, unknown type     ____________________________________________    Assessment & Plan:     # superficial and nodular basal cell carcinoma, left mid back, ED&C performed today     Site: left mid back  Size of lesion: 0.7 x 0.6 cm   Pathology and biopsy date:   A(1). Skin, left mid back, shave:  - Basal cell carcinoma, superficial and nodular types - (see description)   -biopsied on 4/5/2024    A time out was taken to identify the patient and the correct site for the correct procedure.  Verbal informed consent was obtained.  Discussed risks including but not limited to: pain, bleeding, infection, scarring (the latter being essentially guaranteed)  The lesion was cleansed with a 70% isopropyl alcohol wipe and then injected with lidocaine 1% with 1:[id number] epinephrine. After anesthesia was ensured, a 4 mm nondisposable curette was used to remove the epidermis and superficial dermis of the entire visible lesion. A 4 mm margin around the periphery was gently passed over using the curette to evaluate to subclinical disease. Three cycles of curettage follow by electrofulguration and electrodessication of the base in standard fashion was performed.  The wound was then dressed with vaseline and a bandage; subsequent wound care was discussed in  detail.    The final size of the defect was 1.5 x 1.4 cm.    Yolanda Gonzalez PA-C  [facility]  Dermatology    _______________________________________    CC: Derm Problem (Damon is here today for ED&C due to BCC on left mid back)    HPI:  Mr. Damon Manley is a(n) 69 year old male who presents today as a return patient for electrodessication and curettage of biopsy confirmed basal cell carcinoma from the left mid back. Biopsied on 4/5/2024. Patient has no other concerns today.     Patient is otherwise feeling well, without additional skin concerns.      Physical Exam:  SKIN: Focused examination of back was performed.  - involving the left mid back is a pink macule with central hemorrhagic scab consistent with a previously biopsied site     - No other lesions of concern on areas examined.     Medications:  Current Outpatient Medications   Medication Sig Dispense Refill     glyBURIDE (DIABETA /MICRONASE) 5 MG tablet Take 1 Tablet (5 mg) by mouth once daily with breakfast. 90 tablet 3     ibuprofen (ADVIL/MOTRIN) 600 MG tablet Take 1 tablet (600 mg) by mouth every 6 hours as needed for moderate pain 21 tablet 0     ketoconazole (NIZORAL) 2 % external shampoo Use to wash scalp and face 3x weekly. Let lather sit for 5 minutes prior to rinsing. 30 mL 3     losartan (COZAAR) 100 MG tablet Take 1 tablet (100 mg) by mouth daily 90 tablet 3     metFORMIN (GLUCOPHAGE-XR) 500 MG 24 hr tablet Take 2 tablets by mouth once daily with dinner. 180 tablet 3     metoprolol succinate ER (TOPROL XL) 100 MG 24 hr tablet Take 1 tablet (100 mg) by mouth daily 90 tablet 0     amoxicillin (AMOXIL) 500 MG capsule Take 4 capsules (2,000 mg) by mouth once as needed (Take 1 hour prior to dental procedure) (Patient not taking: Reported on 2/1/2024) 20 capsule 0     ampicillin (PRINCIPEN) 500 MG capsule 4 tabs 1 hour  prior to dental procedure may repeat (Patient not taking: Reported on 2/1/2024) 20 capsule 3     STATIN NOT PRESCRIBED  (INTENTIONAL) Please choose reason not prescribed, below (Patient not taking: Reported on 2/1/2024)       No current facility-administered medications for this visit.      Past Medical History:   Patient Active Problem List   Diagnosis     GERD (gastroesophageal reflux disease)     Abnormal glucose     Essential hypertension with goal blood pressure less than 140/90     Aortic valve prosthesis present     Pulmonary valve replaced     Aortic valve replaced     ARIAS (dyspnea on exertion)     SOB (shortness of breath)     Elevated LFTs     Elevated lipase     Type 2 diabetes mellitus without complication, without long-term current use of insulin (H)     Rib fractures     Closed fracture of transverse process of lumbar vertebra, initial encounter (H)     Closed head injury, subsequent encounter     Obesity (BMI 35.0-39.9) with comorbidity (H)     Type 2 diabetes mellitus with complication, without long-term current use of insulin (H)     Rheumatic disease of heart valve     Past Medical History:   Diagnosis Date     Coronary artery disease     Valves replaced due to hx of Rheumatic fever. - No mechanical valves     Depressive disorder     situational - resolved     Hypertension      Motion sickness      Obesity (BMI 30-39.9)        CC Kris Weems MD  [address] [place] [address] on close of this encounter.               Again, thank you for allowing me to participate in the care of your patient.        Sincerely,        Yolanda Gonzalez PA-C

## 2024-04-18 NOTE — PROGRESS NOTES
[facility] Dermatology Note  Encounter Date: Apr 18, 2024  Office Visit     Reviewed patients past medical history and pertinent chart review prior to patients visit today.     Dermatology Problem List:  Last skin check: 04/05/2024     History of nonmelanoma skin cancer   -BCC, left mid back, s/p ED&C, 4/18/2024  -BCC nose, s/p mohs surgery, 3/12/2024  2. Seb dermatitis   -ketoconazole shampoo     Family Hx: Son and dad, unknown type     ____________________________________________    Assessment & Plan:     # superficial and nodular basal cell carcinoma, left mid back, ED&C performed today     Site: left mid back  Size of lesion: 0.7 x 0.6 cm   Pathology and biopsy date:   A(1). Skin, left mid back, shave:  - Basal cell carcinoma, superficial and nodular types - (see description)   -biopsied on 4/5/2024    A time out was taken to identify the patient and the correct site for the correct procedure.  Verbal informed consent was obtained.  Discussed risks including but not limited to: pain, bleeding, infection, scarring (the latter being essentially guaranteed)  The lesion was cleansed with a 70% isopropyl alcohol wipe and then injected with lidocaine 1% with 1:[id number] epinephrine. After anesthesia was ensured, a 4 mm nondisposable curette was used to remove the epidermis and superficial dermis of the entire visible lesion. A 4 mm margin around the periphery was gently passed over using the curette to evaluate to subclinical disease. Three cycles of curettage follow by electrofulguration and electrodessication of the base in standard fashion was performed.  The wound was then dressed with vaseline and a bandage; subsequent wound care was discussed in detail.    The final size of the defect was 1.5 x 1.4 cm.    Yolanda Gonzalez PA-C  [facility]  Dermatology    _______________________________________    CC: Derm Problem (Damon is here today for ED&C due to BCC on left mid back)    HPI:  Mr. Jose S  Vineet is a(n) 69 year old male who presents today as a return patient for electrodessication and curettage of biopsy confirmed basal cell carcinoma from the left mid back. Biopsied on 4/5/2024. Patient has no other concerns today.     Patient is otherwise feeling well, without additional skin concerns.      Physical Exam:  SKIN: Focused examination of back was performed.  - involving the left mid back is a pink macule with central hemorrhagic scab consistent with a previously biopsied site     - No other lesions of concern on areas examined.     Medications:  Current Outpatient Medications   Medication Sig Dispense Refill    glyBURIDE (DIABETA /MICRONASE) 5 MG tablet Take 1 Tablet (5 mg) by mouth once daily with breakfast. 90 tablet 3    ibuprofen (ADVIL/MOTRIN) 600 MG tablet Take 1 tablet (600 mg) by mouth every 6 hours as needed for moderate pain 21 tablet 0    ketoconazole (NIZORAL) 2 % external shampoo Use to wash scalp and face 3x weekly. Let lather sit for 5 minutes prior to rinsing. 30 mL 3    losartan (COZAAR) 100 MG tablet Take 1 tablet (100 mg) by mouth daily 90 tablet 3    metFORMIN (GLUCOPHAGE-XR) 500 MG 24 hr tablet Take 2 tablets by mouth once daily with dinner. 180 tablet 3    metoprolol succinate ER (TOPROL XL) 100 MG 24 hr tablet Take 1 tablet (100 mg) by mouth daily 90 tablet 0    amoxicillin (AMOXIL) 500 MG capsule Take 4 capsules (2,000 mg) by mouth once as needed (Take 1 hour prior to dental procedure) (Patient not taking: Reported on 2/1/2024) 20 capsule 0    ampicillin (PRINCIPEN) 500 MG capsule 4 tabs 1 hour  prior to dental procedure may repeat (Patient not taking: Reported on 2/1/2024) 20 capsule 3    STATIN NOT PRESCRIBED (INTENTIONAL) Please choose reason not prescribed, below (Patient not taking: Reported on 2/1/2024)       No current facility-administered medications for this visit.      Past Medical History:   Patient Active Problem List   Diagnosis    GERD (gastroesophageal reflux  disease)    Abnormal glucose    Essential hypertension with goal blood pressure less than 140/90    Aortic valve prosthesis present    Pulmonary valve replaced    Aortic valve replaced    ARIAS (dyspnea on exertion)    SOB (shortness of breath)    Elevated LFTs    Elevated lipase    Type 2 diabetes mellitus without complication, without long-term current use of insulin (H)    Rib fractures    Closed fracture of transverse process of lumbar vertebra, initial encounter (H)    Closed head injury, subsequent encounter    Obesity (BMI 35.0-39.9) with comorbidity (H)    Type 2 diabetes mellitus with complication, without long-term current use of insulin (H)    Rheumatic disease of heart valve     Past Medical History:   Diagnosis Date    Coronary artery disease     Valves replaced due to hx of Rheumatic fever. - No mechanical valves    Depressive disorder     situational - resolved    Hypertension     Motion sickness     Obesity (BMI 30-39.9)        CC Kris Weems MD  [address] [place] [address] on close of this encounter.

## 2024-04-29 NOTE — LETTER
[address]   [facility] [address]  Phone: [phone]    November 14, 2019        Damon Manley  [address] [facility] [address]          To whom it may concern:    RE: Damon Manley    Patient was seen and treated today at our clinic and missed work. Due to an acute issue, patient is to remain off of work today and tomorrow (11/15/19). Patient is ok to return to work on Monday, November 18th, 2019.     Please contact me for questions or concerns.      Sincerely,              Betty King PA-C   General

## 2024-05-13 ENCOUNTER — TELEPHONE (OUTPATIENT)
Dept: FAMILY MEDICINE | Facility: CLINIC | Age: 69
End: 2024-05-13
Payer: COMMERCIAL

## 2024-05-13 DIAGNOSIS — I10 ESSENTIAL HYPERTENSION WITH GOAL BLOOD PRESSURE LESS THAN 140/90: ICD-10-CM

## 2024-05-13 NOTE — TELEPHONE ENCOUNTER
Reason for Call:  Appointment Request    Patient requesting this type of appt:  Establishing care since Dr. Foote retired    Requested provider: George Pham    Reason patient unable to be scheduled: Not within requested timeframe    When does patient want to be seen/preferred time: NA      Comments: Patient wants to know if the 8/13/2024 appt. Is soon enough to get Rx refilled until then    Could we send this information to you in [employer] or would you prefer to receive a phone call?:   Patient would prefer a phone call   Okay to leave a detailed message?: Yes at Cell number on file:    Telephone Information:   Mobile [phone]       Call taken on 5/13/2024 at 10:37 AM by Elizabeth Mariscal

## 2024-05-13 NOTE — TELEPHONE ENCOUNTER
Patient called back and was told the August appt is fine and refills for Rx's will continue until appt.

## 2024-05-13 NOTE — TELEPHONE ENCOUNTER
Medication Question or Refill    Contacts         Type Contact Phone/Fax    05/13/2024 10:07 AM CDT Phone (Incoming) Damon Manley (Self) [phone] (M)            What medication are you calling about (include dose and sig)?:     metoprolol succinate ER (TOPROL XL) 100 MG 24 hr tablet       Preferred Pharmacy:   [address] [address]  Phone: [phone] Fax: [phone]      Controlled Substance Agreement on file:   CSA -- Patient Level:    CSA: None found at the patient level.       Do you need a refill? Yes    Patient offered an appointment? Yes: scheduled on 8/13/24    Do you have any questions or concerns?  No    Could we send this information to you in [facility] or would you prefer to receive a phone call?:   Patient would prefer a phone call   Okay to leave a detailed message?: Yes at Cell number on file:    Telephone Information:   Mobile [phone]

## 2024-05-14 RX ORDER — METOPROLOL SUCCINATE 100 MG/1
100 TABLET, EXTENDED RELEASE ORAL DAILY
Qty: 90 TABLET | Refills: 0 | Status: SHIPPED | OUTPATIENT
Start: 2024-05-14 | End: 2024-07-06

## 2024-05-29 DIAGNOSIS — E11.9 TYPE 2 DIABETES MELLITUS WITHOUT COMPLICATION, WITHOUT LONG-TERM CURRENT USE OF INSULIN (H): ICD-10-CM

## 2024-05-29 RX ORDER — METFORMIN HCL 500 MG
1000 TABLET, EXTENDED RELEASE 24 HR ORAL
Qty: 180 TABLET | Refills: 3 | Status: SHIPPED | OUTPATIENT
Start: 2024-05-29 | End: 2024-08-13

## 2024-05-29 NOTE — TELEPHONE ENCOUNTER
Has been requesting for a week per spouse who called in. Dr. Schoen last to prescribe per bottle.

## 2024-06-25 ENCOUNTER — OFFICE VISIT (OUTPATIENT)
Dept: DERMATOLOGY | Facility: CLINIC | Age: 69
End: 2024-06-25
Payer: COMMERCIAL

## 2024-06-25 DIAGNOSIS — C44.311 BASAL CELL CARCINOMA (BCC) OF NASAL TIP: Primary | ICD-10-CM

## 2024-06-25 PROCEDURE — 99024 POSTOP FOLLOW-UP VISIT: CPT | Performed by: DERMATOLOGY

## 2024-06-25 NOTE — LETTER
6/25/2024      Damon Manley  [address]  [facility] [address]      Dear Colleague,    Thank you for referring your patient, Damon Manley, to the [facility]. Please see a copy of my visit note below.    Dermatologic Surgery Post-Op Scar Check     CC: Scar Management      Dermatology Problem List:  Last skin check: 04/05/2024     History of nonmelanoma skin cancer   -BCC, left mid back, s/p ED&C, 4/18/2024  -BCC nose, s/p mohs surgery, 3/12/2024  2. Seb dermatitis   -ketoconazole shampoo     Family Hx: Son and dad, unknown type        Subjective: Damon Manley is a 69 year old male who presents today for scar evaluation after MMS with island pedicle nasalis sling flap repair was performed for BCC on the tip of the nose on 03/12/24.    - reports soreness while raising eyebrows  - however, notes improvement compared to a month ago  - no other concerns today      Objective: An exam of the nose was performed today   - well-healed scar on the tip of nose    - the following photos were taken today:                  Assessment and Plan:     1. BCC, tip of nose, s/p MMS 3/12/24 (nasalis sling flap)   - Surgical site healed well.  - continue applying Vaseline   - pt decline steroid injection   - recommend massage for tightness at the glabella   - The patient should follow up with dermatologic surgery PRN, as well as continue with regular skin exams in general dermatology clinic.    Patient was discussed with and evaluated by attending physician Dr. Cosme Kwan.    Staff Involved:   Scribe/Staff     Scribe Disclosure:   I, CARLOS LANDERS, am serving as a scribe; to document services personally performed by Cosme Kwan MD -based on data collection and the provider's statements to me.    Attending Attestation  I attest that the Scribe recorded the interview and exam that I personally performed.  I have reviewed the note and edited it as necessary.    Cosme Kwan M.D.  Professor  Director of  Dermatologic Surgery  Department of Dermatology  [facility]                Again, thank you for allowing me to participate in the care of your patient.        Sincerely,        Cosme Kwan MD

## 2024-06-25 NOTE — NURSING NOTE
Damon Manley's goals for this visit include:   Chief Complaint   Patient presents with    Scar Management       He requests these members of his care team be copied on today's visit information: n/a    PCP: George Pham    Referring Provider:  Referred Self, MD  No address on file    There were no vitals taken for this visit.    Do you need any medication refills at today's visit? No  Lianna Farfan RN

## 2024-06-25 NOTE — PROGRESS NOTES
Dermatologic Surgery Post-Op Scar Check     CC: Scar Management      Dermatology Problem List:  Last skin check: 04/05/2024     History of nonmelanoma skin cancer   -BCC, left mid back, s/p ED&C, 4/18/2024  -BCC nose, s/p mohs surgery, 3/12/2024  2. Seb dermatitis   -ketoconazole shampoo     Family Hx: Son and dad, unknown type        Subjective: Damon Manley is a 69 year old male who presents today for scar evaluation after MMS with island pedicle nasalis sling flap repair was performed for BCC on the tip of the nose on 03/12/24.    - reports soreness while raising eyebrows  - however, notes improvement compared to a month ago  - no other concerns today      Objective: An exam of the nose was performed today   - well-healed scar on the tip of nose    - the following photos were taken today:                  Assessment and Plan:     1. BCC, tip of nose, s/p MMS 3/12/24 (nasalis sling flap)   - Surgical site healed well.  - continue applying Vaseline   - pt decline steroid injection   - recommend massage for tightness at the glabella   - The patient should follow up with dermatologic surgery PRN, as well as continue with regular skin exams in general dermatology clinic.    Patient was discussed with and evaluated by attending physician Dr. Cosme Kwan.    Staff Involved:   Scribe/Staff     Scribe Disclosure:   I, CARLOS LANDERS, am serving as a scribe; to document services personally performed by Cosme Kwan MD -based on data collection and the provider's statements to me.    Attending Attestation  I attest that the Scribe recorded the interview and exam that I personally performed.  I have reviewed the note and edited it as necessary.    Cosme Kwan M.D.  Professor  Director of Dermatologic Surgery  Department of Dermatology  [facility]

## 2024-07-06 ENCOUNTER — MYC REFILL (OUTPATIENT)
Dept: FAMILY MEDICINE | Facility: CLINIC | Age: 69
End: 2024-07-06
Payer: COMMERCIAL

## 2024-07-06 DIAGNOSIS — I10 ESSENTIAL HYPERTENSION WITH GOAL BLOOD PRESSURE LESS THAN 140/90: ICD-10-CM

## 2024-07-09 RX ORDER — METOPROLOL SUCCINATE 100 MG/1
100 TABLET, EXTENDED RELEASE ORAL DAILY
Qty: 90 TABLET | Refills: 0 | Status: SHIPPED | OUTPATIENT
Start: 2024-07-09 | End: 2024-08-13

## 2024-08-03 ENCOUNTER — HEALTH MAINTENANCE LETTER (OUTPATIENT)
Age: 69
End: 2024-08-03

## 2024-08-13 ENCOUNTER — OFFICE VISIT (OUTPATIENT)
Dept: FAMILY MEDICINE | Facility: CLINIC | Age: 69
End: 2024-08-13
Payer: COMMERCIAL

## 2024-08-13 VITALS
SYSTOLIC BLOOD PRESSURE: 126 MMHG | HEIGHT: 71 IN | RESPIRATION RATE: 16 BRPM | DIASTOLIC BLOOD PRESSURE: 88 MMHG | BODY MASS INDEX: 37.49 KG/M2 | TEMPERATURE: 97.6 F | WEIGHT: 267.8 LBS | HEART RATE: 72 BPM | OXYGEN SATURATION: 95 %

## 2024-08-13 DIAGNOSIS — Z86.0100 HISTORY OF COLONIC POLYPS: ICD-10-CM

## 2024-08-13 DIAGNOSIS — Z95.2 AORTIC VALVE PROSTHESIS PRESENT: ICD-10-CM

## 2024-08-13 DIAGNOSIS — Z95.2 H/O MITRAL VALVE REPLACEMENT: ICD-10-CM

## 2024-08-13 DIAGNOSIS — Z76.89 ENCOUNTER TO ESTABLISH CARE: Primary | ICD-10-CM

## 2024-08-13 DIAGNOSIS — E11.9 TYPE 2 DIABETES MELLITUS WITHOUT COMPLICATION, WITHOUT LONG-TERM CURRENT USE OF INSULIN (H): ICD-10-CM

## 2024-08-13 DIAGNOSIS — E11.8 TYPE 2 DIABETES MELLITUS WITH COMPLICATION, WITHOUT LONG-TERM CURRENT USE OF INSULIN (H): ICD-10-CM

## 2024-08-13 DIAGNOSIS — S29.012S UPPER BACK STRAIN, SEQUELA: ICD-10-CM

## 2024-08-13 DIAGNOSIS — I10 HYPERTENSION GOAL BP (BLOOD PRESSURE) < 140/90: ICD-10-CM

## 2024-08-13 DIAGNOSIS — I10 ESSENTIAL HYPERTENSION WITH GOAL BLOOD PRESSURE LESS THAN 140/90: ICD-10-CM

## 2024-08-13 DIAGNOSIS — K21.9 GASTROESOPHAGEAL REFLUX DISEASE, UNSPECIFIED WHETHER ESOPHAGITIS PRESENT: ICD-10-CM

## 2024-08-13 LAB
ANION GAP SERPL CALCULATED.3IONS-SCNC: 13 MMOL/L (ref 7–15)
BUN SERPL-MCNC: 12.9 MG/DL (ref 8–23)
CALCIUM SERPL-MCNC: 9.3 MG/DL (ref 8.8–10.4)
CHLORIDE SERPL-SCNC: 107 MMOL/L (ref 98–107)
CHOLEST SERPL-MCNC: 191 MG/DL
CREAT SERPL-MCNC: 0.92 MG/DL (ref 0.67–1.17)
CREAT UR-MCNC: 274.3 MG/DL
EGFRCR SERPLBLD CKD-EPI 2021: >90 ML/MIN/1.73M2
FASTING STATUS PATIENT QL REPORTED: NO
FASTING STATUS PATIENT QL REPORTED: NO
GLUCOSE SERPL-MCNC: 89 MG/DL (ref 70–99)
HBA1C MFR BLD: 6.7 %
HCO3 SERPL-SCNC: 25 MMOL/L (ref 22–29)
HDLC SERPL-MCNC: 37 MG/DL
LDLC SERPL CALC-MCNC: 109 MG/DL
MICROALBUMIN UR-MCNC: 27.1 MG/L
MICROALBUMIN/CREAT UR: 9.88 MG/G CR (ref 0–17)
NONHDLC SERPL-MCNC: 154 MG/DL
POTASSIUM SERPL-SCNC: 3.9 MMOL/L (ref 3.4–5.3)
SODIUM SERPL-SCNC: 145 MMOL/L (ref 135–145)
TRIGL SERPL-MCNC: 226 MG/DL

## 2024-08-13 PROCEDURE — 80061 LIPID PANEL: CPT | Performed by: STUDENT IN AN ORGANIZED HEALTH CARE EDUCATION/TRAINING PROGRAM

## 2024-08-13 PROCEDURE — 80048 BASIC METABOLIC PNL TOTAL CA: CPT | Performed by: STUDENT IN AN ORGANIZED HEALTH CARE EDUCATION/TRAINING PROGRAM

## 2024-08-13 PROCEDURE — 36415 COLL VENOUS BLD VENIPUNCTURE: CPT | Performed by: STUDENT IN AN ORGANIZED HEALTH CARE EDUCATION/TRAINING PROGRAM

## 2024-08-13 PROCEDURE — G2211 COMPLEX E/M VISIT ADD ON: HCPCS | Performed by: STUDENT IN AN ORGANIZED HEALTH CARE EDUCATION/TRAINING PROGRAM

## 2024-08-13 PROCEDURE — 83036 HEMOGLOBIN GLYCOSYLATED A1C: CPT | Performed by: STUDENT IN AN ORGANIZED HEALTH CARE EDUCATION/TRAINING PROGRAM

## 2024-08-13 PROCEDURE — 99207 PR FOOT EXAM NO CHARGE: CPT | Performed by: STUDENT IN AN ORGANIZED HEALTH CARE EDUCATION/TRAINING PROGRAM

## 2024-08-13 PROCEDURE — 99214 OFFICE O/P EST MOD 30 MIN: CPT | Performed by: STUDENT IN AN ORGANIZED HEALTH CARE EDUCATION/TRAINING PROGRAM

## 2024-08-13 PROCEDURE — 82043 UR ALBUMIN QUANTITATIVE: CPT | Performed by: STUDENT IN AN ORGANIZED HEALTH CARE EDUCATION/TRAINING PROGRAM

## 2024-08-13 PROCEDURE — 82570 ASSAY OF URINE CREATININE: CPT | Performed by: STUDENT IN AN ORGANIZED HEALTH CARE EDUCATION/TRAINING PROGRAM

## 2024-08-13 RX ORDER — GLYBURIDE 5 MG/1
TABLET ORAL
Qty: 90 TABLET | Refills: 3 | Status: SHIPPED | OUTPATIENT
Start: 2024-08-13

## 2024-08-13 RX ORDER — METOPROLOL SUCCINATE 100 MG/1
100 TABLET, EXTENDED RELEASE ORAL DAILY
Qty: 90 TABLET | Refills: 3 | Status: SHIPPED | OUTPATIENT
Start: 2024-08-13

## 2024-08-13 RX ORDER — LOSARTAN POTASSIUM 100 MG/1
100 TABLET ORAL DAILY
Qty: 90 TABLET | Refills: 3 | Status: SHIPPED | OUTPATIENT
Start: 2024-08-13

## 2024-08-13 RX ORDER — METFORMIN HCL 500 MG
1000 TABLET, EXTENDED RELEASE 24 HR ORAL
Qty: 180 TABLET | Refills: 3 | Status: SHIPPED | OUTPATIENT
Start: 2024-08-13

## 2024-08-13 ASSESSMENT — PAIN SCALES - GENERAL: PAINLEVEL: MILD PAIN (3)

## 2024-08-13 NOTE — PROGRESS NOTES
Assessment & Plan   Problem List Items Addressed This Visit          Digestive    GERD (gastroesophageal reflux disease)       Endocrine    Type 2 diabetes mellitus without complication, without long-term current use of insulin (H)    Relevant Medications    metFORMIN (GLUCOPHAGE XR) 500 MG 24 hr tablet    glyBURIDE (DIABETA /MICRONASE) 5 MG tablet    Type 2 diabetes mellitus with complication, without long-term current use of insulin (H)    Relevant Medications    metFORMIN (GLUCOPHAGE XR) 500 MG 24 hr tablet    glyBURIDE (DIABETA /MICRONASE) 5 MG tablet    Other Relevant Orders    Lipid panel reflex to direct LDL Non-fasting    Albumin Random Urine Quantitative with Creat Ratio    HEMOGLOBIN A1C    FOOT EXAM (Completed)       Circulatory    Essential hypertension with goal blood pressure less than 140/90    Relevant Medications    metoprolol succinate ER (TOPROL XL) 100 MG 24 hr tablet    losartan (COZAAR) 100 MG tablet    Other Relevant Orders    BASIC METABOLIC PANEL       Other    Aortic valve prosthesis present     Other Visit Diagnoses       Encounter to establish care    -  Primary    H/O mitral valve replacement        History of colonic polyps        Hypertension goal BP (blood pressure) < 140/90        Relevant Medications    losartan (COZAAR) 100 MG tablet    Upper back strain, sequela               History reviewed and updated.  Diabetes has been well-controlled previously and will repeat A1c as well as lipids today.  Overall cardiovascular risk and benefit of statin and potential side effects discussed.  He is still not wanting this now.  Encouraged healthy diet and exercise.  Straight into his upper back and we will plan for strengthening exercises now.  Suspect underlying arthritis into his hands at MP and PIP joints without evidence of inflammatory arthropathy.  Plan for trial of extra strength Tylenol with naproxen as needed consider imaging in the future if worsening or not improving.    The  "longitudinal plan of care for the diagnosis(es)/condition(s) as documented were addressed during this visit. Due to the added complexity in care, I will continue to support Damon in the subsequent management and with ongoing continuity of care.       BMI  Estimated body mass index is 37.28 kg/m  as calculated from the following:    Height as of this encounter: 1.805 m (5' 11.06\").    Weight as of this encounter: 121.5 kg (267 lb 12.8 oz).   Weight management plan: Discussed healthy diet and exercise guidelines        Subjective   Damon is a 69 year old, presenting for the following health issues:  Establish Care        8/13/2024     1:48 PM   Additional Questions   Roomed by Charisse     Via the Health Maintenance questionnaire, the patient has reported the following services have been completed -Eye Exam: nimesh 2024-03-12, this information has been sent to the abstraction team.  History of Present Illness       Back Pain:  He presents for follow up of back pain. Patient's back pain is a new problem.    Original cause of back pain: other  First noticed back pain: more than 1 month ago  Patient feels back pain: dailyLocation of back pain:  Right buttock, left buttock and other  Description of back pain: sharp  Back pain spreads: right buttocks and left buttocks    Since patient first noticed back pain, pain is: always present, but gets better and worse  Does back pain interfere with his job:  No  On a scale of 1-10 (10 being the worst), patient describes pain as:  6  What makes back pain worse: certain positions and other   Acupuncture: not tried  Acetaminophen: not tried  Activity or exercise: helpful  Chiropractor:  Not tried  Cold: not tried  Heat: not helpful  Massage: not tried  Physical Therapy: not tried  Rest: not helpful  TENS unit: not tried  Topical pain relievers: helpful  Other healthcare providers patient is seeing for back pain: None    Diabetes:   He presents for follow up of diabetes.    He is not " "checking blood glucose.         He has no concerns regarding his diabetes at this time.  He is having numbness in feet and burning in feet.  The patient has had a diabetic eye exam in the last 12 months. Eye exam performed on not. Location of last eye exam [place].        Hypertension: He presents for follow up of hypertension.  He does check blood pressure  regularly outside of the clinic. Outpatient blood pressures have not been over 140/90. He does not follow a low salt diet.     Reason for visit:  Seeing new doctor  last doctor harris retired  need perscriptions renewed    He eats 0-1 servings of fruits and vegetables daily.He consumes 0 sweetened beverage(s) daily.He exercises with enough effort to increase his heart rate 60 or more minutes per day.  He exercises with enough effort to increase his heart rate 4 days per week.   He is taking medications regularly.     The 10-year ASCVD risk score (Luzma TUCKER, et al., 2019) is: 36.4%    Values used to calculate the score:      Age: 69 years      Sex: Male      Is Non- : No      Diabetic: Yes      Tobacco smoker: No      Systolic Blood Pressure: 126 mmHg      Is BP treated: Yes      HDL Cholesterol: 41 mg/dL      Total Cholesterol: 226 mg/dL       Review of Systems  Constitutional, HEENT, cardiovascular, pulmonary, GI, , musculoskeletal, neuro, skin, endocrine and psych systems are negative, except as otherwise noted.      Objective    /88   Pulse 72   Temp 97.6  F (36.4  C) (Temporal)   Resp 16   Ht 1.805 m (5' 11.06\")   Wt 121.5 kg (267 lb 12.8 oz)   SpO2 95%   BMI 37.28 kg/m    Body mass index is 37.28 kg/m .  Physical Exam   GENERAL: alert and no distress  EYES: Eyes grossly normal to inspection, PERRL and conjunctivae and sclerae normal  HENT:nose and mouth without ulcers or lesions  RESP: lungs clear to auscultation - no rales, rhonchi or wheezes  CV: regular rate and rhythm, systolic murmur no peripheral edema  MS: no " gross musculoskeletal defects noted, no edema, sensation monofilament intact bilaterally in lower extremities  SKIN: no suspicious lesions or rashes  NEURO: Normal strength and tone, mentation intact and speech normal  PSYCH: mentation appears normal, affect normal/bright          Signed Electronically by: George Pham MD

## 2024-08-28 NOTE — PATIENT INSTRUCTIONS
The ABCDEs of Melanoma    Skin cancer can develop anywhere on the skin. Ask someone for help when checking your skin, especially in hard to see places. If you notice a mole different from others, or that changes, enlarges, itches, or bleeds (even if it is small), you should see a dermatologist.

## 2024-08-28 NOTE — PROGRESS NOTES
[facility] Dermatology Note  Encounter Date: Sep 6, 2024  Office Visit     Reviewed patients past medical history and pertinent chart review prior to patients visit today.     Dermatology Problem List:  Last skin check: 09/06/2024    History of NMSC  - BCC, left mid back, s/p ED&C 04/18/2024  - BCC, nose, s/p Mohs 03/12/2024  2. Seborrheic Dermatitis   Tx: Ketoconazole Shampoo     Family Hx: Family history of skin cancer- Unknown type   ____________________________________________    Assessment & Plan:     # Personal history of nonmelanoma skin cancer  - No signs of recurrence. Continued observation recommended.   - Sun protection: Counseled SPF 30+ sunscreen, UPF clothing, sun avoidance, tanning bed avoidance.    # Multiple nevi, trunk and extremities  # Solar lentigines  - Nevi demonstrate no concerning features on dermoscopy. We discussed the importance of self exams at home.   - ABCDEs: Counseled ABCDEs of melanoma: Asymmetry, Border (irregularity), Color (not uniform, changes in color), Diameter (greater than 6 mm which is about the size of a pencil eraser), and Evolving (any changes in preexisting moles).    # Cherry angiomas  # Seborrheic keratoses  - We discussed the benign nature of the skin lesions. No treatment required. Continued observation recommended. Follow up with any concerns.      # seborrheic dermatitis   -well controlled with ketoconazole shampoo. Refill provided. Patient to continue using this 3x weekly.    Follow-up:  6 months for follow up full body skin exam, as needed for new or changing lesions or new concerns    All risks, benefits and alternatives were discussed with patient.  Patient is in agreement and understands the assessment and plan.  All questions were answered.  Yolanda Berg PA-C  [facility] Dermatology    ____________________________________________    CC: No chief complaint on file.    HPI:  Mr. Damon Manley is a(n) 69 year old male who  presents today as a return patient for a full body skin cancer screening. The patient has a history of nonmelanoma skin cancer. The patient was last seen in dermatology 06/25/2024. Today, the patient reports no areas of concern today. No other cutaneous concerns. is being diligent with photoprotection.      Physical Exam:  Vitals: There were no vitals taken for this visit.   SKIN: Total skin excluding the genitalia areas was performed. The exam included the scalp, face, neck, bilateral arms, chest, back, abdomen, bilateral legs, digits, mons pubis, buttocks, and nails.   - Jaquez II.  - Multiple tan/brown macules and papules scattered throughout exam, consistent with benign nevi. No concerning features on dermoscopy.   - Scattered tan, homogenous macules scattered on sun exposed skin, consistent with solar lentigines.   - Scattered waxy, stuck on appearing papules and patches, consistent with seborrheic keratoses.  - Several 1-2 mm red dome shaped symmetric papules, consistent with cherry angiomas.     - No other lesions of concern on areas examined.     Medications:  Current Outpatient Medications   Medication Sig Dispense Refill    amoxicillin (AMOXIL) 500 MG capsule Take 4 capsules (2,000 mg) by mouth once as needed (Take 1 hour prior to dental procedure) (Patient not taking: Reported on 8/13/2024) 20 capsule 0    ampicillin (PRINCIPEN) 500 MG capsule 4 tabs 1 hour  prior to dental procedure may repeat (Patient not taking: Reported on 8/13/2024) 20 capsule 3    glyBURIDE (DIABETA /MICRONASE) 5 MG tablet Take 1 Tablet (5 mg) by mouth once daily with breakfast. 90 tablet 3    ibuprofen (ADVIL/MOTRIN) 600 MG tablet Take 1 tablet (600 mg) by mouth every 6 hours as needed for moderate pain 21 tablet 0    ketoconazole (NIZORAL) 2 % external shampoo Use to wash scalp and face 3x weekly. Let lather sit for 5 minutes prior to rinsing. 30 mL 3    losartan (COZAAR) 100 MG tablet Take 1 tablet (100 mg) by mouth daily 90  tablet 3    metFORMIN (GLUCOPHAGE XR) 500 MG 24 hr tablet Take 2 tablets (1,000 mg) by mouth daily (with dinner) 180 tablet 3    metoprolol succinate ER (TOPROL XL) 100 MG 24 hr tablet Take 1 tablet (100 mg) by mouth daily 90 tablet 3    STATIN NOT PRESCRIBED (INTENTIONAL) Please choose reason not prescribed, below (Patient not taking: Reported on 8/13/2024)       No current facility-administered medications for this visit.      Past Medical History:   Patient Active Problem List   Diagnosis    GERD (gastroesophageal reflux disease)    Abnormal glucose    Essential hypertension with goal blood pressure less than 140/90    Aortic valve prosthesis present    Pulmonary valve replaced    Aortic valve replaced    ARIAS (dyspnea on exertion)    SOB (shortness of breath)    Elevated LFTs    Elevated lipase    Type 2 diabetes mellitus without complication, without long-term current use of insulin (H)    Rib fractures    Closed fracture of transverse process of lumbar vertebra, initial encounter (H)    Closed head injury, subsequent encounter    Obesity (BMI 35.0-39.9) with comorbidity (H)    Type 2 diabetes mellitus with complication, without long-term current use of insulin (H)    Rheumatic disease of heart valve     Past Medical History:   Diagnosis Date    Coronary artery disease     Valves replaced due to hx of Rheumatic fever. - No mechanical valves    Depressive disorder     situational - resolved    Hypertension     Motion sickness     Obesity (BMI 30-39.9)        CC No referring provider defined for this encounter. on close of this encounter.

## 2024-09-06 ENCOUNTER — OFFICE VISIT (OUTPATIENT)
Dept: DERMATOLOGY | Facility: CLINIC | Age: 69
End: 2024-09-06
Payer: COMMERCIAL

## 2024-09-06 DIAGNOSIS — D22.9 MULTIPLE BENIGN NEVI: ICD-10-CM

## 2024-09-06 DIAGNOSIS — D18.01 CHERRY ANGIOMA: ICD-10-CM

## 2024-09-06 DIAGNOSIS — L82.1 SEBORRHEIC KERATOSIS: Primary | ICD-10-CM

## 2024-09-06 DIAGNOSIS — L21.9 DERMATITIS, SEBORRHEIC: ICD-10-CM

## 2024-09-06 DIAGNOSIS — L81.4 LENTIGINES: ICD-10-CM

## 2024-09-06 PROCEDURE — 99213 OFFICE O/P EST LOW 20 MIN: CPT | Performed by: STUDENT IN AN ORGANIZED HEALTH CARE EDUCATION/TRAINING PROGRAM

## 2024-09-06 RX ORDER — KETOCONAZOLE 20 MG/ML
SHAMPOO TOPICAL
Qty: 30 ML | Refills: 3 | Status: SHIPPED | OUTPATIENT
Start: 2024-09-06

## 2024-09-06 NOTE — NURSING NOTE
Damon Manley's goals for this visit include:   Chief Complaint   Patient presents with    Skin Check     Patient is here for a full body skin check, areas of concern none, HX of BCC       He requests these members of his care team be copied on today's visit information:     PCP: George Pham    Referring Provider:  Referred Self, MD  No address on file    There were no vitals taken for this visit.    Do you need any medication refills at today's visit?       Sarah Quigley EMT

## 2024-09-06 NOTE — LETTER
9/6/2024      Damon Manley  [address]  [facility] [address]      Dear Colleague,    Thank you for referring your patient, Damon Manley, to the [facility]. Please see a copy of my visit note below.    [facility] Dermatology Note  Encounter Date: Sep 6, 2024  Office Visit     Reviewed patients past medical history and pertinent chart review prior to patients visit today.     Dermatology Problem List:  Last skin check: 09/06/2024    History of NMSC  - BCC, left mid back, s/p ED&C 04/18/2024  - BCC, nose, s/p Mohs 03/12/2024  2. Seborrheic Dermatitis   Tx: Ketoconazole Shampoo     Family Hx: Family history of skin cancer- Unknown type   ____________________________________________    Assessment & Plan:     # Personal history of nonmelanoma skin cancer  - No signs of recurrence. Continued observation recommended.   - Sun protection: Counseled SPF 30+ sunscreen, UPF clothing, sun avoidance, tanning bed avoidance.    # Multiple nevi, trunk and extremities  # Solar lentigines  - Nevi demonstrate no concerning features on dermoscopy. We discussed the importance of self exams at home.   - ABCDEs: Counseled ABCDEs of melanoma: Asymmetry, Border (irregularity), Color (not uniform, changes in color), Diameter (greater than 6 mm which is about the size of a pencil eraser), and Evolving (any changes in preexisting moles).    # Cherry angiomas  # Seborrheic keratoses  - We discussed the benign nature of the skin lesions. No treatment required. Continued observation recommended. Follow up with any concerns.      # seborrheic dermatitis   -well controlled with ketoconazole shampoo. Refill provided. Patient to continue using this 3x weekly.    Follow-up:  6 months for follow up full body skin exam, as needed for new or changing lesions or new concerns    All risks, benefits and alternatives were discussed with patient.  Patient is in agreement and understands the assessment and  plan.  All questions were answered.  LORENZO Lopez [facility] Dermatology    ____________________________________________    CC: No chief complaint on file.    HPI:  Mr. Damon Manley is a(n) 69 year old male who presents today as a return patient for a full body skin cancer screening. The patient has a history of nonmelanoma skin cancer. The patient was last seen in dermatology 06/25/2024. Today, the patient reports no areas of concern today. No other cutaneous concerns. is being diligent with photoprotection.      Physical Exam:  Vitals: There were no vitals taken for this visit.   SKIN: Total skin excluding the genitalia areas was performed. The exam included the scalp, face, neck, bilateral arms, chest, back, abdomen, bilateral legs, digits, mons pubis, buttocks, and nails.   - Jaquez II.  - Multiple tan/brown macules and papules scattered throughout exam, consistent with benign nevi. No concerning features on dermoscopy.   - Scattered tan, homogenous macules scattered on sun exposed skin, consistent with solar lentigines.   - Scattered waxy, stuck on appearing papules and patches, consistent with seborrheic keratoses.  - Several 1-2 mm red dome shaped symmetric papules, consistent with cherry angiomas.     - No other lesions of concern on areas examined.     Medications:  Current Outpatient Medications   Medication Sig Dispense Refill     amoxicillin (AMOXIL) 500 MG capsule Take 4 capsules (2,000 mg) by mouth once as needed (Take 1 hour prior to dental procedure) (Patient not taking: Reported on 8/13/2024) 20 capsule 0     ampicillin (PRINCIPEN) 500 MG capsule 4 tabs 1 hour  prior to dental procedure may repeat (Patient not taking: Reported on 8/13/2024) 20 capsule 3     glyBURIDE (DIABETA /MICRONASE) 5 MG tablet Take 1 Tablet (5 mg) by mouth once daily with breakfast. 90 tablet 3     ibuprofen (ADVIL/MOTRIN) 600 MG tablet Take 1 tablet (600 mg) by mouth every 6 hours as needed for  moderate pain 21 tablet 0     ketoconazole (NIZORAL) 2 % external shampoo Use to wash scalp and face 3x weekly. Let lather sit for 5 minutes prior to rinsing. 30 mL 3     losartan (COZAAR) 100 MG tablet Take 1 tablet (100 mg) by mouth daily 90 tablet 3     metFORMIN (GLUCOPHAGE XR) 500 MG 24 hr tablet Take 2 tablets (1,000 mg) by mouth daily (with dinner) 180 tablet 3     metoprolol succinate ER (TOPROL XL) 100 MG 24 hr tablet Take 1 tablet (100 mg) by mouth daily 90 tablet 3     STATIN NOT PRESCRIBED (INTENTIONAL) Please choose reason not prescribed, below (Patient not taking: Reported on 8/13/2024)       No current facility-administered medications for this visit.      Past Medical History:   Patient Active Problem List   Diagnosis     GERD (gastroesophageal reflux disease)     Abnormal glucose     Essential hypertension with goal blood pressure less than 140/90     Aortic valve prosthesis present     Pulmonary valve replaced     Aortic valve replaced     ARIAS (dyspnea on exertion)     SOB (shortness of breath)     Elevated LFTs     Elevated lipase     Type 2 diabetes mellitus without complication, without long-term current use of insulin (H)     Rib fractures     Closed fracture of transverse process of lumbar vertebra, initial encounter (H)     Closed head injury, subsequent encounter     Obesity (BMI 35.0-39.9) with comorbidity (H)     Type 2 diabetes mellitus with complication, without long-term current use of insulin (H)     Rheumatic disease of heart valve     Past Medical History:   Diagnosis Date     Coronary artery disease     Valves replaced due to hx of Rheumatic fever. - No mechanical valves     Depressive disorder     situational - resolved     Hypertension      Motion sickness      Obesity (BMI 30-39.9)        CC No referring provider defined for this encounter. on close of this encounter.      Again, thank you for allowing me to participate in the care of your patient.        Sincerely,        Yolanda  LORENZO Berg

## 2024-10-25 DIAGNOSIS — I09.1 RHEUMATIC DISEASE OF HEART VALVE: Primary | ICD-10-CM

## 2024-10-29 RX ORDER — AMPICILLIN 500 MG/1
CAPSULE ORAL
Qty: 20 CAPSULE | Refills: 3 | Status: SHIPPED | OUTPATIENT
Start: 2024-10-29

## 2024-11-19 ENCOUNTER — PATIENT OUTREACH (OUTPATIENT)
Dept: CARE COORDINATION | Facility: CLINIC | Age: 69
End: 2024-11-19
Payer: COMMERCIAL

## 2024-12-03 ENCOUNTER — PATIENT OUTREACH (OUTPATIENT)
Dept: CARE COORDINATION | Facility: CLINIC | Age: 69
End: 2024-12-03
Payer: COMMERCIAL

## 2025-02-11 ENCOUNTER — OFFICE VISIT (OUTPATIENT)
Dept: FAMILY MEDICINE | Facility: CLINIC | Age: 70
End: 2025-02-11
Payer: COMMERCIAL

## 2025-02-11 VITALS
OXYGEN SATURATION: 96 % | BODY MASS INDEX: 38.39 KG/M2 | WEIGHT: 274.2 LBS | TEMPERATURE: 98.1 F | SYSTOLIC BLOOD PRESSURE: 134 MMHG | HEART RATE: 64 BPM | HEIGHT: 71 IN | RESPIRATION RATE: 16 BRPM | DIASTOLIC BLOOD PRESSURE: 76 MMHG

## 2025-02-11 DIAGNOSIS — Z23 NEED FOR TDAP VACCINATION: ICD-10-CM

## 2025-02-11 DIAGNOSIS — Z29.11 NEED FOR VACCINATION AGAINST RESPIRATORY SYNCYTIAL VIRUS: ICD-10-CM

## 2025-02-11 DIAGNOSIS — I10 ESSENTIAL HYPERTENSION WITH GOAL BLOOD PRESSURE LESS THAN 140/90: ICD-10-CM

## 2025-02-11 DIAGNOSIS — Z86.0100 HISTORY OF COLONIC POLYPS: ICD-10-CM

## 2025-02-11 DIAGNOSIS — E11.8 TYPE 2 DIABETES MELLITUS WITH COMPLICATION, WITHOUT LONG-TERM CURRENT USE OF INSULIN (H): ICD-10-CM

## 2025-02-11 DIAGNOSIS — S09.90XD CLOSED HEAD INJURY, SUBSEQUENT ENCOUNTER: ICD-10-CM

## 2025-02-11 DIAGNOSIS — Z85.828 HISTORY OF BASAL CELL CARCINOMA: ICD-10-CM

## 2025-02-11 DIAGNOSIS — Z00.00 ENCOUNTER FOR MEDICARE ANNUAL WELLNESS EXAM: Primary | ICD-10-CM

## 2025-02-11 DIAGNOSIS — Z12.5 SCREENING FOR PROSTATE CANCER: ICD-10-CM

## 2025-02-11 DIAGNOSIS — Z95.2 AORTIC VALVE PROSTHESIS PRESENT: ICD-10-CM

## 2025-02-11 DIAGNOSIS — K21.9 GASTROESOPHAGEAL REFLUX DISEASE, UNSPECIFIED WHETHER ESOPHAGITIS PRESENT: ICD-10-CM

## 2025-02-11 DIAGNOSIS — E66.01 MORBID OBESITY (H): ICD-10-CM

## 2025-02-11 DIAGNOSIS — Z95.2 PULMONARY VALVE REPLACED: ICD-10-CM

## 2025-02-11 DIAGNOSIS — L82.1 SEBORRHEIC KERATOSIS: ICD-10-CM

## 2025-02-11 LAB
ANION GAP SERPL CALCULATED.3IONS-SCNC: 16 MMOL/L (ref 7–15)
BUN SERPL-MCNC: 21.5 MG/DL (ref 8–23)
CALCIUM SERPL-MCNC: 9.5 MG/DL (ref 8.8–10.4)
CHLORIDE SERPL-SCNC: 103 MMOL/L (ref 98–107)
CREAT SERPL-MCNC: 0.92 MG/DL (ref 0.67–1.17)
EGFRCR SERPLBLD CKD-EPI 2021: >90 ML/MIN/1.73M2
EST. AVERAGE GLUCOSE BLD GHB EST-MCNC: 151 MG/DL
GLUCOSE SERPL-MCNC: 156 MG/DL (ref 70–99)
HBA1C MFR BLD: 6.9 %
HCO3 SERPL-SCNC: 19 MMOL/L (ref 22–29)
POTASSIUM SERPL-SCNC: 4.4 MMOL/L (ref 3.4–5.3)
PSA SERPL DL<=0.01 NG/ML-MCNC: 0.55 NG/ML (ref 0–4.5)
SODIUM SERPL-SCNC: 138 MMOL/L (ref 135–145)

## 2025-02-11 PROCEDURE — 83036 HEMOGLOBIN GLYCOSYLATED A1C: CPT | Performed by: STUDENT IN AN ORGANIZED HEALTH CARE EDUCATION/TRAINING PROGRAM

## 2025-02-11 PROCEDURE — G0103 PSA SCREENING: HCPCS | Performed by: STUDENT IN AN ORGANIZED HEALTH CARE EDUCATION/TRAINING PROGRAM

## 2025-02-11 PROCEDURE — 80048 BASIC METABOLIC PNL TOTAL CA: CPT | Performed by: STUDENT IN AN ORGANIZED HEALTH CARE EDUCATION/TRAINING PROGRAM

## 2025-02-11 PROCEDURE — 36415 COLL VENOUS BLD VENIPUNCTURE: CPT | Performed by: STUDENT IN AN ORGANIZED HEALTH CARE EDUCATION/TRAINING PROGRAM

## 2025-02-11 SDOH — HEALTH STABILITY: PHYSICAL HEALTH: ON AVERAGE, HOW MANY DAYS PER WEEK DO YOU ENGAGE IN MODERATE TO STRENUOUS EXERCISE (LIKE A BRISK WALK)?: 4 DAYS

## 2025-02-11 ASSESSMENT — PAIN SCALES - GENERAL: PAINLEVEL_OUTOF10: MODERATE PAIN (5)

## 2025-02-11 ASSESSMENT — SOCIAL DETERMINANTS OF HEALTH (SDOH): HOW OFTEN DO YOU GET TOGETHER WITH FRIENDS OR RELATIVES?: TWICE A WEEK

## 2025-02-11 NOTE — PATIENT INSTRUCTIONS
Patient Education   Preventive Care Advice   This is general advice given by our system to help you stay healthy. However, your care team may have specific advice just for you. Please talk to your care team about your preventive care needs.  Nutrition  Eat 5 or more servings of fruits and vegetables each day.  Try wheat bread, brown rice and whole grain pasta (instead of white bread, rice, and pasta).  Get enough calcium and vitamin D. Check the label on foods and aim for 100% of the RDA (recommended daily allowance).  Lifestyle  Exercise at least 150 minutes each week  (30 minutes a day, 5 days a week).  Do muscle strengthening activities 2 days a week. These help control your weight and prevent disease.  No smoking.  Wear sunscreen to prevent skin cancer.  Have a dental exam and cleaning every 6 months.  Yearly exams  See your health care team every year to talk about:  Any changes in your health.  Any medicines your care team has prescribed.  Preventive care, family planning, and ways to prevent chronic diseases.  Shots (vaccines)   HPV shots (up to age 26), if you've never had them before.  Hepatitis B shots (up to age 59), if you've never had them before.  COVID-19 shot: Get this shot when it's due.  Flu shot: Get a flu shot every year.  Tetanus shot: Get a tetanus shot every 10 years.  Pneumococcal, hepatitis A, and RSV shots: Ask your care team if you need these based on your risk.  Shingles shot (for age 50 and up)  General health tests  Diabetes screening:  Starting at age 35, Get screened for diabetes at least every 3 years.  If you are younger than age 35, ask your care team if you should be screened for diabetes.  Cholesterol test: At age 39, start having a cholesterol test every 5 years, or more often if advised.  Bone density scan (DEXA): At age 50, ask your care team if you should have this scan for osteoporosis (brittle bones).  Hepatitis C: Get tested at least once in your life.  STIs (sexually  transmitted infections)  Before age 24: Ask your care team if you should be screened for STIs.  After age 24: Get screened for STIs if you're at risk. You are at risk for STIs (including HIV) if:  You are sexually active with more than one person.  You don't use condoms every time.  You or a partner was diagnosed with a sexually transmitted infection.  If you are at risk for HIV, ask about PrEP medicine to prevent HIV.  Get tested for HIV at least once in your life, whether you are at risk for HIV or not.  Cancer screening tests  Cervical cancer screening: If you have a cervix, begin getting regular cervical cancer screening tests starting at age 21.  Breast cancer scan (mammogram): If you've ever had breasts, begin having regular mammograms starting at age 40. This is a scan to check for breast cancer.  Colon cancer screening: It is important to start screening for colon cancer at age 45.  Have a colonoscopy test every 10 years (or more often if you're at risk) Or, ask your provider about stool tests like a FIT test every year or Cologuard test every 3 years.  To learn more about your testing options, visit:   .  For help making a decision, visit:   https://bit.ly/iz71375.  Prostate cancer screening test: If you have a prostate, ask your care team if a prostate cancer screening test (PSA) at age 55 is right for you.  Lung cancer screening: If you are a current or former smoker ages 50 to 80, ask your care team if ongoing lung cancer screenings are right for you.  For informational purposes only. Not to replace the advice of your health care provider. Copyright   2023 [facility]. All rights reserved. Clinically reviewed by the [facility]itions [facility]. [employer] 455125 - REV 01/24.  Preventing Falls: Care Instructions  Injuries and health problems such as trouble walking or poor eyesight can increase your risk of falling. So can some medicines. But there are things you can do to help  "prevent falls. You can exercise to get stronger. You can also arrange your home to make it safer.    Talk to your doctor about the medicines you take. Ask if any of them increase the risk of falls and whether they can be changed or stopped.   Try to exercise regularly. It can help improve your strength and balance. This can help lower your risk of falling.         Practice fall safety and prevention.   Wear low-heeled shoes that fit well and give your feet good support. Talk to your doctor if you have foot problems that make this hard.  Carry a cellphone or wear a medical alert device that you can use to call for help.  Use stepladders instead of chairs to reach high objects. Don't climb if you're at risk for falls. Ask for help, if needed.  Wear the correct eyeglasses, if you need them.        Make your home safer.   Remove rugs, cords, clutter, and furniture from walkways.  Keep your house well lit. Use night-lights in hallways and bathrooms.  Install and use sturdy handrails on stairways.  Wear nonskid footwear, even inside. Don't walk barefoot or in socks without shoes.        Be safe outside.   Use handrails, curb cuts, and ramps whenever possible.  Keep your hands free by using a shoulder bag or backpack.  Try to walk in well-lit areas. Watch out for uneven ground, changes in pavement, and debris.  Be careful in the winter. Walk on the grass or gravel when sidewalks are slippery. Use de-icer on steps and walkways. Add non-slip devices to shoes.    Put grab bars and nonskid mats in your shower or tub and near the toilet. Try to use a shower chair or bath bench when bathing.   Get into a tub or shower by putting in your weaker leg first. Get out with your strong side first. Have a phone or medical alert device in the bathroom with you.   Where can you learn more?  Go to https://www.[employer].net/patiented  Enter G117 in the search box to learn more about \"Preventing Falls: Care Instructions.\"  Current as of: " July 31, 2024  Content Version: 14.3    2024 [employer].   Care instructions adapted under license by your healthcare professional. If you have questions about a medical condition or this instruction, always ask your healthcare professional. [employer] disclaims any warranty or liability for your use of this information.    Hearing Loss: Care Instructions  Overview     Hearing loss is a sudden or slow decrease in how well you hear. It can range from slight to profound. Permanent hearing loss can occur with aging. It also can happen when you are exposed long-term to loud noise. Examples include listening to loud music, riding motorcycles, or being around other loud machines.  Hearing loss can affect your work and home life. It can make you feel lonely or depressed. You may feel that you have lost your independence. But hearing aids and other devices can help you hear better and feel connected to others.  Follow-up care is a key part of your treatment and safety. Be sure to make and go to all appointments, and call your doctor if you are having problems. It's also a good idea to know your test results and keep a list of the medicines you take.  How can you care for yourself at home?  Avoid loud noises whenever possible. This helps keep your hearing from getting worse.  Always wear hearing protection around loud noises.  Wear a hearing aid as directed.  A professional can help you pick a hearing aid that will work best for you.  You can also get hearing aids over the counter for mild to moderate hearing loss.  Have hearing tests as your doctor suggests. They can show whether your hearing has changed. Your hearing aid may need to be adjusted.  Use other devices as needed. These may include:  Telephone amplifiers and hearing aids that can connect to a television, stereo, radio, or microphone.  Devices that use lights or vibrations. These alert you to the doorbell, a ringing telephone, or a baby  "monitor.  Television closed-captioning. This shows the words at the bottom of the screen. Most new TVs can do this.  TTY (text telephone). This lets you type messages back and forth on the telephone instead of talking or listening. These devices are also called TDD. When messages are typed on the keyboard, they are sent over the phone line to a receiving TTY. The message is shown on a monitor.  Use text messaging, social media, and email if it is hard for you to communicate by telephone.  Try to learn a listening technique called speechreading. It is not lipreading. You pay attention to people's gestures, expressions, posture, and tone of voice. These clues can help you understand what a person is saying. Face the person you are talking to, and have them face you. Make sure the lighting is good. You need to see the other person's face clearly.  Think about counseling if you need help to adjust to your hearing loss.  When should you call for help?  Watch closely for changes in your health, and be sure to contact your doctor if:    You think your hearing is getting worse.     You have new symptoms, such as dizziness or nausea.   Where can you learn more?  Go to https://www.[employer].net/patiented  Enter R798 in the search box to learn more about \"Hearing Loss: Care Instructions.\"  Current as of: September 27, 2023  Content Version: 14.3    2024 [employer].   Care instructions adapted under license by your healthcare professional. If you have questions about a medical condition or this instruction, always ask your healthcare professional. [employer] disclaims any warranty or liability for your use of this information.    Learning About Stress  What is stress?     Stress is your body's response to a hard situation. Your body can have a physical, emotional, or mental response. Stress is a fact of life for most people, and it affects everyone differently. What causes stress for you may not be " stressful for someone else.  A lot of things can cause stress. You may feel stress when you go on a job interview, take a test, or run a race. This kind of short-term stress is normal and even useful. It can help you if you need to work hard or react quickly. For example, stress can help you finish an important job on time.  Long-term stress is caused by ongoing stressful situations or events. Examples of long-term stress include long-term health problems, ongoing problems at work, or conflicts in your family. Long-term stress can harm your health.  How does stress affect your health?  When you are stressed, your body responds as though you are in danger. It makes hormones that speed up your heart, make you breathe faster, and give you a burst of energy. This is called the fight-or-flight stress response. If the stress is over quickly, your body goes back to normal and no harm is done.  But if stress happens too often or lasts too long, it can have bad effects. Long-term stress can make you more likely to get sick, and it can make symptoms of some diseases worse. If you tense up when you are stressed, you may develop neck, shoulder, or low back pain. Stress is linked to high blood pressure and heart disease.  Stress also harms your emotional health. It can make you rebollar, tense, or depressed. Your relationships may suffer, and you may not do well at work or school.  What can you do to manage stress?  You can try these things to help manage stress:   Do something active. Exercise or activity can help reduce stress. Walking is a great way to get started. Even everyday activities such as housecleaning or yard work can help.  Try yoga or reagan chi. These techniques combine exercise and meditation. You may need some training at first to learn them.  Do something you enjoy. For example, listen to music or go to a movie. Practice your hobby or do volunteer work.  Meditate. This can help you relax, because you are not  "worrying about what happened before or what may happen in the future.  Do guided imagery. Imagine yourself in any setting that helps you feel calm. You can use online videos, books, or a teacher to guide you.  Do breathing exercises. For example:  From a standing position, bend forward from the waist with your knees slightly bent. Let your arms dangle close to the floor.  Breathe in slowly and deeply as you return to a standing position. Roll up slowly and lift your head last.  Hold your breath for just a few seconds in the standing position.  Breathe out slowly and bend forward from the waist.  Let your feelings out. Talk, laugh, cry, and express anger when you need to. Talking with supportive friends or family, a counselor, or a ramiro leader about your feelings is a healthy way to relieve stress. Avoid discussing your feelings with people who make you feel worse.  Write. It may help to write about things that are bothering you. This helps you find out how much stress you feel and what is causing it. When you know this, you can find better ways to cope.  What can you do to prevent stress?  You might try some of these things to help prevent stress:  Manage your time. This helps you find time to do the things you want and need to do.  Get enough sleep. Your body recovers from the stresses of the day while you are sleeping.  Get support. Your family, friends, and community can make a difference in how you experience stress.  Limit your news feed. Avoid or limit time on social media or news that may make you feel stressed.  Do something active. Exercise or activity can help reduce stress. Walking is a great way to get started.  Where can you learn more?  Go to https://www.[facility].net/patiented  Enter N032 in the search box to learn more about \"Learning About Stress.\"  Current as of: October 24, 2023  Content Version: 14.3    2024 [employer].   Care instructions adapted under license by [facility] " professional. If you have questions about a medical condition or this instruction, always ask your healthcare professional. [employer] disclaims any warranty or liability for your use of this information.    Substance Use Disorder: Care Instructions  Overview     You can improve your life and health by stopping your use of alcohol or drugs. When you don't drink or use drugs, you may feel and sleep better. You may get along better with your family, friends, and coworkers. There are medicines and programs that can help with substance use disorder.  How can you care for yourself at home?  Here are some ways to help you stay sober and prevent relapse.  If you have been given medicine to help keep you sober or reduce your cravings, be sure to take it exactly as prescribed.  Talk to your doctor about programs that can help you stop using drugs or drinking alcohol.  Do not keep alcohol or drugs in your home.  Plan ahead. Think about what you'll say if other people ask you to drink or use drugs. Try not to spend time with people who drink or use drugs.  Use the time and money spent on drinking or drugs to do something that's important to you.  Preventing a relapse  Have a plan to deal with relapse. Learn to recognize changes in your thinking that lead you to drink or use drugs. Get help before you start to drink or use drugs again.  Try to stay away from situations, friends, or places that may lead you to drink or use drugs.  If you feel the need to drink alcohol or use drugs again, seek help right away. Call a trusted friend or family member. Some people get support from organizations such as Narcotics Anonymous or [employer] or from treatment facilities.  If you relapse, get help as soon as you can. Some people make a plan with another person that outlines what they want that person to do for them if they relapse. The plan usually includes how to handle the relapse and who to notify in case of  "relapse.  Don't give up. Remember that a relapse doesn't mean that you have failed. Use the experience to learn the triggers that lead you to drink or use drugs. Then quit again. Recovery is a lifelong process. Many people have several relapses before they are able to quit for good.  Follow-up care is a key part of your treatment and safety. Be sure to make and go to all appointments, and call your doctor if you are having problems. It's also a good idea to know your test results and keep a list of the medicines you take.  When should you call for help?   Call 911  anytime you think you may need emergency care. For example, call if you or someone else:    Has overdosed or has withdrawal signs. Be sure to tell the emergency workers that you are or someone else is using or trying to quit using drugs. Overdose or withdrawal signs may include:  Losing consciousness.  Seizure.  Seeing or hearing things that aren't there (hallucinations).     Is thinking or talking about suicide or harming others.   Where to get help 24 hours a day, 7 days a week   If you or someone you know talks about suicide, self-harm, a mental health crisis, a substance use crisis, or any other kind of emotional distress, get help right away. You can:    Call the Suicide and Crisis Lifeline at 988.     Call 9-256-690-JERL (1-349.867.3328).     Text HOME to 502365 to access the Crisis Text Line.   Consider saving these numbers in your phone.  Go to [employer] for more information or to chat online.  Call your doctor now or seek immediate medical care if:    You are having withdrawal symptoms. These may include nausea or vomiting, sweating, shakiness, and anxiety.   Watch closely for changes in your health, and be sure to contact your doctor if:    You have a relapse.     You need more help or support to stop.   Where can you learn more?  Go to https://www.healthwise.net/patiented  Enter H573 in the search box to learn more about \"Substance Use " "Disorder: Care Instructions.\"  Current as of: November 15, 2023  Content Version: 14.3    2024 [employer].   Care instructions adapted under license by your healthcare professional. If you have questions about a medical condition or this instruction, always ask your healthcare professional. [employer] disclaims any warranty or liability for your use of this information.       "

## 2025-02-11 NOTE — PROGRESS NOTES
Preventive Care Visit  Formerly [facility]  George Pham MD, Family Medicine  Feb 11, 2025      Assessment & Plan   Problem List Items Addressed This Visit          Digestive    GERD (gastroesophageal reflux disease)    Obesity (BMI 35.0-39.9) with comorbidity (H)       Endocrine    Type 2 diabetes mellitus with complication, without long-term current use of insulin (H)    Relevant Orders    HEMOGLOBIN A1C    Basic metabolic panel  (Ca, Cl, CO2, Creat, Gluc, K, Na, BUN)    FOOT EXAM (Completed)       Circulatory    Essential hypertension with goal blood pressure less than 140/90       Musculoskeletal and Integumentary    Closed head injury, subsequent encounter    History of basal cell carcinoma       Other    Aortic valve prosthesis present    Pulmonary valve replaced     Other Visit Diagnoses       Encounter for Medicare annual wellness exam    -  Primary    Need for Tdap vaccination        Need for vaccination against respiratory syncytial virus        History of colonic polyps        Screening for prostate cancer        Relevant Orders    PSA, screen    Seborrheic keratosis        Relevant Orders    DESTRUCT BENIGN LESION, UP TO 14 (Completed)           Age-appropriate screening and immunization reviewed.  Blood sugar stable with increasing metformin and stools much better.  Repeat A1c today.  We did discuss risk and benefits of statin and he is not wanting at this time.  Has done well since valve replacement.  Previous imaging of his back reviewed we did discuss home exercises as well as following up with physical therapy now.  No other red flag symptoms and anti-inflammatory is very helpful.  He does want seborrheic keratoses frozen today and he will follow-up with dermatology regularly given his history of skin cancer.  Continues exercise program and doing well with this.      Patient has been advised of split billing requirements and indicates understanding: Yes    The  "longitudinal plan of care for the diagnosis(es)/condition(s) as documented were addressed during this visit. Due to the added complexity in care, I will continue to support Damon in the subsequent management and with ongoing continuity of care.       BMI  Estimated body mass index is 38.79 kg/m  as calculated from the following:    Height as of this encounter: 1.791 m (5' 10.5\").    Weight as of this encounter: 124.4 kg (274 lb 3.2 oz).   Weight management plan: Discussed healthy diet and exercise guidelines    Counseling  Appropriate preventive services were addressed with this patient via screening, questionnaire, or discussion as appropriate for fall prevention, nutrition, physical activity, Tobacco-use cessation, social engagement, weight loss and cognition.  Checklist reviewing preventive services available has been given to the patient.  Reviewed patient's diet, addressing concerns and/or questions.   The patient was instructed to see the dentist every 6 months.   He is at risk for psychosocial distress and has been provided with information to reduce risk.   Addressed any concerns about safety while driving.  The patient was provided with written information regarding signs of hearing loss.         Subjective   Damon is a 69 year old, presenting for the following:  Wellness Visit        2/11/2025     6:58 AM   Additional Questions   Roomed by Lissa ADAMES         2/11/2025     6:58 AM   Patient Reported Additional Medications   Patient reports taking the following new medications metformin  mg 1 tablet daily           HPI  On going pain with his back. He has been doing the home exercises. No new activities. He did have fall 4 years ago. He does have \"disorientation\" episodes since his fall and he notes being dx with migraines for this. Feels like he doesn't have focus, nausea and vision is off for a few seconds only and then back to normal shortly after. He does garden and goes to the gym.       Health Care " Directive  Patient has a Health Care Directive on file  Advance care planning document is on file and is current.      2/11/2025   General Health   How would you rate your overall physical health? (!) FAIR   Feel stress (tense, anxious, or unable to sleep) To some extent   (!) STRESS CONCERN      2/11/2025   Nutrition   Diet: I don't know         2/11/2025   Exercise   Days per week of moderate/strenous exercise 4 days         2/11/2025   Social Factors   Frequency of gathering with friends or relatives Twice a week   Worry food won't last until get money to buy more No   Food not last or not have enough money for food? No   Do you have housing? (Housing is defined as stable permanent housing and does not include staying ouside in a car, in a tent, in an abandoned building, in an overnight shelter, or couch-surfing.) Yes   Are you worried about losing your housing? No   Lack of transportation? No   Unable to get utilities (heat,electricity)? No         2/11/2025   Fall Risk   Fallen 2 or more times in the past year? No    No   Trouble with walking or balance? Yes    Yes   Gait Speed Test (Document in seconds) 4   Gait Speed Test Interpretation Less than or equal to 5.00 seconds - PASS       Multiple values from one day are sorted in reverse-chronological order          2/11/2025   Activities of Daily Living- Home Safety   Needs help with the following daily activites None of the above   Safety concerns in the home None of the above         2/11/2025   Dental   Dentist two times every year? (!) NO         2/11/2025   Hearing Screening   Hearing concerns? (!) I NEED TO ASK PEOPLE TO SPEAK UP OR REPEAT THEMSELVES.    (!) IT'S HARD TO FOLLOW A CONVERSATION IN A NOISY RESTAURANT OR CROWDED ROOM.       Multiple values from one day are sorted in reverse-chronological order         2/11/2025   Driving Risk Screening   Patient/family members have concerns about driving (!) YES         2/11/2025   General Alertness/Fatigue  Screening   Have you been more tired than usual lately? No         2025   Urinary Incontinence Screening   Bothered by leaking urine in past 6 months No            Today's PHQ-2 Score:       2025     6:51 AM   PHQ-2 (  [employer])   Q1: Little interest or pleasure in doing things 0   Q2: Feeling down, depressed or hopeless 0   PHQ-2 Score 0    Q1: Little interest or pleasure in doing things Not at all   Q2: Feeling down, depressed or hopeless Not at all   PHQ-2 Score 0       Patient-reported           2025   Substance Use   Alcohol more than 3/day or more than 7/wk No   Do you have a current opioid prescription? No   How severe/bad is pain from 1 to 10? 5/10   Do you use any other substances recreationally? (!) OTHER     Social History     Tobacco Use    Smoking status: Former     Current packs/day: 0.00     Types: Cigarettes     Start date: 1973     Quit date: 1988     Years since quittin.8    Smokeless tobacco: Never   Vaping Use    Vaping status: Never Used   Substance Use Topics    Alcohol use: Yes     Alcohol/week: 0.0 standard drinks of alcohol     Comment: social - glass of wine socially    Drug use: No       Last PSA:   PSA   Date Value Ref Range Status   2015 0.48 0 - 4 ug/L Final     ASCVD Risk   The 10-year ASCVD risk score (Luzma TUCKER, et al., 2019) is: 38.2%    Values used to calculate the score:      Age: 69 years      Sex: Male      Is Non- : No      Diabetic: Yes      Tobacco smoker: No      Systolic Blood Pressure: 134 mmHg      Is BP treated: Yes      HDL Cholesterol: 37 mg/dL      Total Cholesterol: 191 mg/dL    Fracture Risk Assessment Tool  Link to Frax Calculator  Use the information below to complete the Frax calculator  : 1955  Sex: male  Weight (kg): 124.4 kg (actual weight)  Height (cm): 179.1 cm  Previous Fragility Fracture:  Yes  History of parent with fractured hip:  No  Current Smoking:  No  Patient has been on  glucocorticoids for more than 3 months (5mg/day or more): No  Rheumatoid Arthritis on Problem List:  No  Secondary Osteoporosis on Problem List:  No  Consumes 3 or more units of alcohol per day: No  Femoral Neck BMD (g/cm2)            Reviewed and updated as needed this visit by Provider                    Past Medical History:   Diagnosis Date    Coronary artery disease     Valves replaced due to hx of Rheumatic fever. - No mechanical valves    Depressive disorder     situational - resolved    Hypertension     Motion sickness     Obesity (BMI 30-39.9)      Past Surgical History:   Procedure Laterality Date    COLONOSCOPY  05/09/2007    Colon polyps.    COLONOSCOPY N/A 1/13/2017    Procedure: COMBINED COLONOSCOPY, SINGLE OR MULTIPLE BIOPSY/POLYPECTOMY BY BIOPSY;  Surgeon: Dejan Mckee MD;  Location: [facility]    COLONOSCOPY N/A 2/12/2024    Procedure: Colonoscopy with Ascending Colon Polypectomies and;  Surgeon: Yung Owusu DO;  Location: [facility]    COLONOSCOPY N/A 2/12/2024    Procedure: Transverse Colon Polypectomies and Sigmoid Colon Polypectomies.;  Surgeon: Yung Owusu DO;  Location:  GI    HC KNEE SCOPE,MED/LAT MENISECTOMY  09/05/2007    Partial medial meniscectomy, both knees.    ZZC ANESTH,DX ARTHROSCOPIC PROC KNEE JOINT  12/12/05    Left knee with partial medial meniscectomy.    ZZC ANESTH,OPEN HEART; W/O PUMP OXYEGENATOR  1998     Current providers sharing in care for this patient include:  Patient Care Team:  George Pham MD as PCP - General (Family Medicine)  Mandeep Duatre MD as MD (Neurology)  Cosme Kwan MD as Assigned Surgical Provider  George Pham MD as Assigned PCP    The following health maintenance items are reviewed in Epic and correct as of today:  Health Maintenance   Topic Date Due    URINE DRUG SCREEN  Never done    RSV VACCINE (1 - Risk 60-74 years 1-dose series) Never done    INFLUENZA VACCINE (1) 09/01/2024    COVID-19 Vaccine (4 - 2024-25  "season) 09/01/2024    DTAP/TDAP/TD IMMUNIZATION (2 - Td or Tdap) 09/30/2024    A1C  02/13/2025    EYE EXAM  04/26/2025    BMP  08/13/2025    LIPID  08/13/2025    MICROALBUMIN  08/13/2025    MEDICARE ANNUAL WELLNESS VISIT  02/11/2026    DIABETIC FOOT EXAM  02/11/2026    ANNUAL REVIEW OF HM ORDERS  02/11/2026    FALL RISK ASSESSMENT  02/11/2026    COLORECTAL CANCER SCREENING  02/12/2027    ADVANCE CARE PLANNING  02/11/2030    HEPATITIS C SCREENING  Completed    PHQ-2 (once per calendar year)  Completed    Pneumococcal Vaccine: 50+ Years  Completed    ZOSTER IMMUNIZATION  Completed    AORTIC ANEURYSM SCREENING (SYSTEM ASSIGNED)  Completed    HPV IMMUNIZATION  Aged Out    MENINGITIS IMMUNIZATION  Aged Out    TSH W/FREE T4 REFLEX  Discontinued         Review of Systems  Constitutional, HEENT, cardiovascular, pulmonary, GI, , musculoskeletal, neuro, skin, endocrine and psych systems are negative, except as otherwise noted.     Objective    Exam  /76   Pulse 64   Temp 98.1  F (36.7  C) (Temporal)   Resp 16   Ht 1.791 m (5' 10.5\")   Wt 124.4 kg (274 lb 3.2 oz)   SpO2 96%   BMI 38.79 kg/m     Estimated body mass index is 38.79 kg/m  as calculated from the following:    Height as of this encounter: 1.791 m (5' 10.5\").    Weight as of this encounter: 124.4 kg (274 lb 3.2 oz).    Physical Exam  GENERAL: alert and no distress  EYES: Eyes grossly normal to inspection, PERRL and conjunctivae and sclerae normal  HENT: ear canals and TM's normal, nose and mouth without ulcers or lesions  NECK: no adenopathy, no asymmetry, masses, or scars  RESP: lungs clear to auscultation - no rales, rhonchi or wheezes  CV: regular rate and rhythm, systolic murmur, no peripheral edema  ABDOMEN: soft, nontender, no hepatosplenomegaly, no masses and bowel sounds normal  MS: no gross musculoskeletal defects noted, no edema, no spinal process tenderness, bilateral mid back tenderness laterally  SKIN: no suspicious lesions or rashes, left " forehead stuck on papule  NEURO: Normal strength and tone, mentation intact and speech normal  PSYCH: mentation appears normal, affect normal/bright        2/11/2025   Mini Cog   Clock Draw Score 2 Normal   3 Item Recall 3 objects recalled   Mini Cog Total Score 5          Cryotherapy procedure note: After verbal consent and discussion of risks and benefits including but no limited to dyspigmentation/scar, blister, and pain, 1 was(were) treated with 1-2mm freeze border for 3 cycles with liquid nitrogen. Post cryotherapy instructions were provided. Plan for patient to return to clinic in 2 weeks if still present.       Signed Electronically by: George Pham MD

## 2025-04-03 PROBLEM — D12.6 ADENOMATOUS COLON POLYP: Status: ACTIVE | Noted: 2025-04-03

## 2025-08-16 ENCOUNTER — HEALTH MAINTENANCE LETTER (OUTPATIENT)
Age: 70
End: 2025-08-16

## (undated) DEVICE — SOL WATER IRRIG 1000ML BOTTLE 07139-09

## (undated) DEVICE — ENDO SNARE EXACTO COLD 9MM LOOP 2.4MMX230CM 00711115

## (undated) DEVICE — GLOVE EXAM NITRILE LG

## (undated) DEVICE — LUBRICATING JELLY 4.25OZ

## (undated) DEVICE — TUBING SUCTION 12"X1/4" N612

## (undated) DEVICE — KIT ENDO TURNOVER/PROCEDURE CARRY-ON 101822

## (undated) RX ORDER — FENTANYL CITRATE 50 UG/ML
INJECTION, SOLUTION INTRAMUSCULAR; INTRAVENOUS
Status: DISPENSED
Start: 2017-01-13